# Patient Record
Sex: MALE | Race: WHITE | NOT HISPANIC OR LATINO | Employment: FULL TIME | URBAN - METROPOLITAN AREA
[De-identification: names, ages, dates, MRNs, and addresses within clinical notes are randomized per-mention and may not be internally consistent; named-entity substitution may affect disease eponyms.]

---

## 2019-05-08 ENCOUNTER — OFFICE VISIT (OUTPATIENT)
Dept: GASTROENTEROLOGY | Facility: CLINIC | Age: 44
End: 2019-05-08
Payer: COMMERCIAL

## 2019-05-08 VITALS
DIASTOLIC BLOOD PRESSURE: 80 MMHG | RESPIRATION RATE: 18 BRPM | BODY MASS INDEX: 29.07 KG/M2 | HEIGHT: 72 IN | HEART RATE: 70 BPM | TEMPERATURE: 97.6 F | SYSTOLIC BLOOD PRESSURE: 120 MMHG | WEIGHT: 214.6 LBS

## 2019-05-08 DIAGNOSIS — K21.9 GERD WITHOUT ESOPHAGITIS: Primary | ICD-10-CM

## 2019-05-08 PROCEDURE — 99204 OFFICE O/P NEW MOD 45 MIN: CPT | Performed by: INTERNAL MEDICINE

## 2019-05-08 RX ORDER — PANTOPRAZOLE SODIUM 40 MG/1
40 TABLET, DELAYED RELEASE ORAL DAILY
Qty: 30 TABLET | Refills: 2 | Status: SHIPPED | OUTPATIENT
Start: 2019-05-08 | End: 2019-08-05 | Stop reason: SDUPTHER

## 2019-05-08 RX ORDER — BUSPIRONE HYDROCHLORIDE 10 MG/1
TABLET ORAL
COMMUNITY
Start: 2019-04-15 | End: 2020-07-10 | Stop reason: SDUPTHER

## 2019-05-08 RX ORDER — VENLAFAXINE HYDROCHLORIDE 75 MG/1
CAPSULE, EXTENDED RELEASE ORAL
COMMUNITY
Start: 2019-04-15 | End: 2020-07-10 | Stop reason: SDUPTHER

## 2019-05-08 RX ORDER — RANITIDINE 150 MG/1
150 TABLET ORAL
Qty: 30 TABLET | Refills: 3 | Status: SHIPPED | OUTPATIENT
Start: 2019-05-08 | End: 2019-09-06 | Stop reason: SDUPTHER

## 2019-06-27 ENCOUNTER — ANESTHESIA EVENT (OUTPATIENT)
Dept: GASTROENTEROLOGY | Facility: AMBULARY SURGERY CENTER | Age: 44
End: 2019-06-27

## 2019-06-28 ENCOUNTER — ANESTHESIA (OUTPATIENT)
Dept: GASTROENTEROLOGY | Facility: AMBULARY SURGERY CENTER | Age: 44
End: 2019-06-28

## 2019-06-28 ENCOUNTER — HOSPITAL ENCOUNTER (OUTPATIENT)
Dept: GASTROENTEROLOGY | Facility: AMBULARY SURGERY CENTER | Age: 44
Setting detail: OUTPATIENT SURGERY
Discharge: HOME | End: 2019-06-28
Attending: INTERNAL MEDICINE | Admitting: INTERNAL MEDICINE
Payer: COMMERCIAL

## 2019-06-28 VITALS
HEART RATE: 77 BPM | WEIGHT: 215 LBS | BODY MASS INDEX: 29.12 KG/M2 | RESPIRATION RATE: 18 BRPM | TEMPERATURE: 98.6 F | HEIGHT: 72 IN | OXYGEN SATURATION: 100 % | DIASTOLIC BLOOD PRESSURE: 72 MMHG | SYSTOLIC BLOOD PRESSURE: 115 MMHG

## 2019-06-28 DIAGNOSIS — K21.9 GERD WITHOUT ESOPHAGITIS: ICD-10-CM

## 2019-06-28 PROCEDURE — 43239 EGD BIOPSY SINGLE/MULTIPLE: CPT | Performed by: INTERNAL MEDICINE

## 2019-06-28 PROCEDURE — 88305 TISSUE EXAM BY PATHOLOGIST: CPT | Performed by: PATHOLOGY

## 2019-06-28 RX ORDER — LIDOCAINE HYDROCHLORIDE 10 MG/ML
INJECTION, SOLUTION EPIDURAL; INFILTRATION; INTRACAUDAL; PERINEURAL AS NEEDED
Status: DISCONTINUED | OUTPATIENT
Start: 2019-06-28 | End: 2019-06-28 | Stop reason: SURG

## 2019-06-28 RX ORDER — SODIUM CHLORIDE, SODIUM LACTATE, POTASSIUM CHLORIDE, CALCIUM CHLORIDE 600; 310; 30; 20 MG/100ML; MG/100ML; MG/100ML; MG/100ML
125 INJECTION, SOLUTION INTRAVENOUS CONTINUOUS
Status: DISCONTINUED | OUTPATIENT
Start: 2019-06-28 | End: 2019-07-02 | Stop reason: HOSPADM

## 2019-06-28 RX ORDER — PROPOFOL 10 MG/ML
INJECTION, EMULSION INTRAVENOUS AS NEEDED
Status: DISCONTINUED | OUTPATIENT
Start: 2019-06-28 | End: 2019-06-28 | Stop reason: SURG

## 2019-06-28 RX ORDER — ONDANSETRON 2 MG/ML
4 INJECTION INTRAMUSCULAR; INTRAVENOUS ONCE AS NEEDED
Status: DISCONTINUED | OUTPATIENT
Start: 2019-06-28 | End: 2019-07-02 | Stop reason: HOSPADM

## 2019-06-28 RX ADMIN — PROPOFOL 150 MG: 10 INJECTION, EMULSION INTRAVENOUS at 11:45

## 2019-06-28 RX ADMIN — PROPOFOL 50 MG: 10 INJECTION, EMULSION INTRAVENOUS at 11:47

## 2019-06-28 RX ADMIN — LIDOCAINE HYDROCHLORIDE 50 MG: 10 INJECTION, SOLUTION EPIDURAL; INFILTRATION; INTRACAUDAL; PERINEURAL at 11:45

## 2019-06-28 RX ADMIN — PROPOFOL 50 MG: 10 INJECTION, EMULSION INTRAVENOUS at 11:49

## 2019-06-28 RX ADMIN — SODIUM CHLORIDE, SODIUM LACTATE, POTASSIUM CHLORIDE, AND CALCIUM CHLORIDE: .6; .31; .03; .02 INJECTION, SOLUTION INTRAVENOUS at 11:06

## 2019-06-28 RX ADMIN — PROPOFOL 50 MG: 10 INJECTION, EMULSION INTRAVENOUS at 11:46

## 2019-06-28 NOTE — H&P
History and Physical -  [facility]  Annmarie Smith 44 y o  male MRN: [id number]    HPI: Annmarie Smith is a 44y o  year old male who presents with chronic GERD, refractory reflux  Review of Systems    Historical Information   Past Medical History:   Diagnosis Date    Depression     GERD (gastroesophageal reflux disease)      Past Surgical History:   Procedure Laterality Date    CLOSED REDUCTION FOREARM FRACTURE Right     EGD       Social History   Social History     Substance and Sexual Activity   Alcohol Use Yes    Frequency: Monthly or less     Social History     Substance and Sexual Activity   Drug Use Not on file     Social History     Tobacco Use   Smoking Status Never Smoker     History reviewed  No pertinent family history  Meds/Allergies       (Not in a hospital admission)    No Known Allergies    Objective     /84   Pulse 64   Temp 98 6 °F (37 °C) (Tympanic)   Resp (!) 64   Ht 6' (1 829 m)   Wt 97 5 kg (215 lb)   SpO2 98%   BMI 29 16 kg/m²       PHYSICAL EXAM    Gen: NAD  CV: RRR  CHEST: Clear  ABD: soft, NT/ND  EXT: no edema  Neuro: AAO      ASSESSMENT/PLAN:  This is a 44y o  year old male here for chronic GERD, refractory reflux         PLAN:   Procedure: Preet Krishna

## 2019-07-08 ENCOUNTER — TELEPHONE (OUTPATIENT)
Dept: GASTROENTEROLOGY | Facility: CLINIC | Age: 44
End: 2019-07-08

## 2019-07-08 NOTE — TELEPHONE ENCOUNTER
Spoke to patient, notified of results  Pt stated he will call back to make follow up apt as he has to look at his schedule

## 2019-07-08 NOTE — TELEPHONE ENCOUNTER
----- Message from Carol Coleman MD sent at 7/4/2019 11:22 PM EDT -----  Please inform patient that biopsies were negative for H pylori, biopsies from esophagus show acid reflux changes  Please have him follow-up in the office in 2 months time, continue current medications, please have him call with any questions or concerns

## 2019-07-09 ENCOUNTER — TELEPHONE (OUTPATIENT)
Dept: GASTROENTEROLOGY | Facility: AMBULARY SURGERY CENTER | Age: 44
End: 2019-07-09

## 2019-07-09 NOTE — TELEPHONE ENCOUNTER
----- Message from Irlanda Giron MD sent at 7/8/2019  2:20 PM EDT -----  Please inform the patient biopsies from stomach were negative for H pylori  Biopsies from esophagus show changes of acid reflux with mild increase in number of eosinophils  Please make sure that he is taking acid medication twice daily, please have him follow-up in the office with the PA in 3 months time  Please have him call with any questions or concerns

## 2019-08-05 ENCOUNTER — TELEPHONE (OUTPATIENT)
Dept: GASTROENTEROLOGY | Facility: AMBULARY SURGERY CENTER | Age: 44
End: 2019-08-05

## 2019-08-05 DIAGNOSIS — K21.9 GERD WITHOUT ESOPHAGITIS: ICD-10-CM

## 2019-08-05 RX ORDER — PANTOPRAZOLE SODIUM 40 MG/1
40 TABLET, DELAYED RELEASE ORAL DAILY
Qty: 30 TABLET | Refills: 2 | Status: SHIPPED | OUTPATIENT
Start: 2019-08-05 | End: 2019-10-31 | Stop reason: SDUPTHER

## 2019-08-05 NOTE — TELEPHONE ENCOUNTER
DR Sherrill Madsen PT    Refill Request for Medication: PANTOPRAZOLE    Dose: 40MG  Quantity:     Pharmacy: [address] [place] [address]

## 2019-09-06 ENCOUNTER — TELEPHONE (OUTPATIENT)
Dept: GASTROENTEROLOGY | Facility: CLINIC | Age: 44
End: 2019-09-06

## 2019-09-06 DIAGNOSIS — K21.9 GERD WITHOUT ESOPHAGITIS: ICD-10-CM

## 2019-09-06 RX ORDER — RANITIDINE 150 MG/1
150 TABLET ORAL
Qty: 30 TABLET | Refills: 3 | Status: SHIPPED | OUTPATIENT
Start: 2019-09-06 | End: 2019-10-31 | Stop reason: ALTCHOICE

## 2019-09-06 NOTE — TELEPHONE ENCOUNTER
Dr Jennifer Xiong pt called stating he needs a refill      Refill Request for Medication: Ranitidine    Dose: 150mg    Quantity: 30    Pharmacy: [facility]

## 2019-10-18 ENCOUNTER — OFFICE VISIT (OUTPATIENT)
Dept: GASTROENTEROLOGY | Facility: CLINIC | Age: 44
End: 2019-10-18
Payer: COMMERCIAL

## 2019-10-18 VITALS
RESPIRATION RATE: 18 BRPM | BODY MASS INDEX: 29.45 KG/M2 | HEIGHT: 72 IN | SYSTOLIC BLOOD PRESSURE: 112 MMHG | TEMPERATURE: 97.8 F | HEART RATE: 76 BPM | DIASTOLIC BLOOD PRESSURE: 62 MMHG | WEIGHT: 217.4 LBS

## 2019-10-18 DIAGNOSIS — K21.9 GERD WITHOUT ESOPHAGITIS: Primary | ICD-10-CM

## 2019-10-18 DIAGNOSIS — R10.13 EPIGASTRIC PAIN: ICD-10-CM

## 2019-10-18 PROCEDURE — 99213 OFFICE O/P EST LOW 20 MIN: CPT | Performed by: INTERNAL MEDICINE

## 2019-10-18 RX ORDER — FAMOTIDINE 40 MG/1
40 TABLET, FILM COATED ORAL
Qty: 30 TABLET | Refills: 3 | Status: SHIPPED | OUTPATIENT
Start: 2019-10-18 | End: 2019-12-03 | Stop reason: SDUPTHER

## 2019-10-19 NOTE — PROGRESS NOTES
[facility]  Wendy Johnson 44 y o  male MRN: [id number]            Assessment & Plan:    Pleasant 36-year-old gentleman here for follow-up of chronic reflux  Patient has been doing better with twice daily PPI therapy, still having some intermittent epigastric abdominal pain  1  GERD with esophagitis:  -continue with PPI therapy in the morning, recommended that he transitioned to eat H2 blocker before going to bed  -continu with reflux lifestyle modification, increase back to twice daily PPI therapy if needed    2  Epigastric abdominal pain:  Unclear etiology, likely secondary to reflux  -given persistent symptoms we will check laboratory studies and ultrasound to exclude biliary or other pathology  -we will check lipase  -if symptoms persist consider CT scan in future             _____________________________________________________________        CC: Follow-up of GERD    HPI:  Wendy Johnson is a 44 y o male who is here for follow-up of GERD  As you know this is a 36-year-old gentleman who presents with significant acid reflux, he had upper endoscopy which demonstrated esophagitis  He notes that since taking PPI therapy twice daily he has been feeling much better but still has some breakthrough reflux about twice per week  Patient still notes that unfortunately due to his working schedule he returns home in the eats dinner just before going to bed  He notes that his appetite is increased since being on the medication but his weight has been stable  Denies any nausea, vomiting, dysphagia  Overall has been feeling much better  Bowel movements are regular  Continues to drink significant amounts of beer throughout the course of the week usually on nights that he is not working  ROS:  The remainder of the ROS was negative except for the pertinent positives mentioned in HPI  Allergies: Patient has no known allergies      Medications:   Current Outpatient Medications:    busPIRone (BUSPAR) 10 mg tablet, , Disp: , Rfl:     pantoprazole (PROTONIX) 40 mg tablet, Take 1 tablet (40 mg total) by mouth daily for 90 days, Disp: 30 tablet, Rfl: 2    ranitidine (ZANTAC) 150 mg tablet, Take 1 tablet (150 mg total) by mouth daily at bedtime, Disp: 30 tablet, Rfl: 3    venlafaxine (EFFEXOR-XR) 75 mg 24 hr capsule, , Disp: , Rfl:     famotidine (PEPCID) 40 MG tablet, Take 1 tablet (40 mg total) by mouth daily at bedtime, Disp: 30 tablet, Rfl: 3    Past Medical History:   Diagnosis Date    Depression     GERD (gastroesophageal reflux disease)        Past Surgical History:   Procedure Laterality Date    CLOSED REDUCTION FOREARM FRACTURE Right     EGD         No family history on file  reports that he has never smoked  He does not have any smokeless tobacco history on file  He reports that he drinks alcohol        Physical Exam:    /62 (BP Location: Right arm, Patient Position: Sitting, Cuff Size: Standard)   Pulse 76   Temp 97 8 °F (36 6 °C)   Resp 18   Ht 6' (1 829 m)   Wt 98 6 kg (217 lb 6 4 oz)   BMI 29 48 kg/m²     Gen: wn/wd, NAD, healthy-appearing gentleman  HEENT: anicteric, MMM, no cervical LAD  CVS: RRR, no m/r/g  CHEST: CTA b/l  ABD: +BS, soft, NT,ND, no hepatosplenomegaly  EXT: no c/c/e  NEURO: aaox3  SKIN: NO rashes

## 2019-10-23 ENCOUNTER — TRANSCRIBE ORDERS (OUTPATIENT)
Dept: ADMINISTRATIVE | Facility: HOSPITAL | Age: 44
End: 2019-10-23

## 2019-10-23 ENCOUNTER — APPOINTMENT (OUTPATIENT)
Dept: LAB | Facility: HOSPITAL | Age: 44
End: 2019-10-23
Attending: INTERNAL MEDICINE
Payer: COMMERCIAL

## 2019-10-23 ENCOUNTER — HOSPITAL ENCOUNTER (OUTPATIENT)
Dept: RADIOLOGY | Facility: HOSPITAL | Age: 44
Discharge: HOME | End: 2019-10-23
Attending: INTERNAL MEDICINE
Payer: COMMERCIAL

## 2019-10-23 DIAGNOSIS — R10.13 EPIGASTRIC PAIN: ICD-10-CM

## 2019-10-23 LAB
ALBUMIN SERPL BCP-MCNC: 3.7 G/DL (ref 3.5–5)
ALP SERPL-CCNC: 127 U/L (ref 46–116)
ALT SERPL W P-5'-P-CCNC: 115 U/L (ref 12–78)
ANION GAP SERPL CALCULATED.3IONS-SCNC: 5 MMOL/L (ref 4–13)
AST SERPL W P-5'-P-CCNC: 40 U/L (ref 5–45)
BASOPHILS # BLD AUTO: 0.07 THOUSANDS/ΜL (ref 0–0.1)
BASOPHILS NFR BLD AUTO: 1 % (ref 0–1)
BILIRUB DIRECT SERPL-MCNC: 0.3 MG/DL (ref 0–0.2)
BILIRUB SERPL-MCNC: 1.1 MG/DL (ref 0.2–1)
BUN SERPL-MCNC: 16 MG/DL (ref 5–25)
CALCIUM SERPL-MCNC: 8.8 MG/DL (ref 8.3–10.1)
CHLORIDE SERPL-SCNC: 103 MMOL/L (ref 100–108)
CO2 SERPL-SCNC: 31 MMOL/L (ref 21–32)
CREAT SERPL-MCNC: 0.98 MG/DL (ref 0.6–1.3)
EOSINOPHIL # BLD AUTO: 0.18 THOUSAND/ΜL (ref 0–0.61)
EOSINOPHIL NFR BLD AUTO: 3 % (ref 0–6)
ERYTHROCYTE [DISTWIDTH] IN BLOOD BY AUTOMATED COUNT: 11.8 % (ref 11.6–15.1)
GFR SERPL CREATININE-BSD FRML MDRD: 95 ML/MIN/1.73SQ M
GLUCOSE P FAST SERPL-MCNC: 81 MG/DL (ref 65–99)
HCT VFR BLD AUTO: 45.5 % (ref 36.5–49.3)
HGB BLD-MCNC: 15 G/DL (ref 12–17)
IMM GRANULOCYTES # BLD AUTO: 0.03 THOUSAND/UL (ref 0–0.2)
IMM GRANULOCYTES NFR BLD AUTO: 1 % (ref 0–2)
LIPASE SERPL-CCNC: 94 U/L (ref 73–393)
LYMPHOCYTES # BLD AUTO: 1.22 THOUSANDS/ΜL (ref 0.6–4.47)
LYMPHOCYTES NFR BLD AUTO: 19 % (ref 14–44)
MCH RBC QN AUTO: 30 PG (ref 26.8–34.3)
MCHC RBC AUTO-ENTMCNC: 33 G/DL (ref 31.4–37.4)
MCV RBC AUTO: 91 FL (ref 82–98)
MONOCYTES # BLD AUTO: 0.75 THOUSAND/ΜL (ref 0.17–1.22)
MONOCYTES NFR BLD AUTO: 11 % (ref 4–12)
NEUTROPHILS # BLD AUTO: 4.35 THOUSANDS/ΜL (ref 1.85–7.62)
NEUTS SEG NFR BLD AUTO: 65 % (ref 43–75)
NRBC BLD AUTO-RTO: 0 /100 WBCS
PLATELET # BLD AUTO: 375 THOUSANDS/UL (ref 149–390)
PMV BLD AUTO: 9.5 FL (ref 8.9–12.7)
POTASSIUM SERPL-SCNC: 4.8 MMOL/L (ref 3.5–5.3)
PROT SERPL-MCNC: 7.5 G/DL (ref 6.4–8.2)
RBC # BLD AUTO: 5 MILLION/UL (ref 3.88–5.62)
SODIUM SERPL-SCNC: 139 MMOL/L (ref 136–145)
WBC # BLD AUTO: 6.6 THOUSAND/UL (ref 4.31–10.16)

## 2019-10-23 PROCEDURE — 80076 HEPATIC FUNCTION PANEL: CPT

## 2019-10-23 PROCEDURE — 76700 US EXAM ABDOM COMPLETE: CPT

## 2019-10-23 PROCEDURE — 36415 COLL VENOUS BLD VENIPUNCTURE: CPT

## 2019-10-23 PROCEDURE — 83690 ASSAY OF LIPASE: CPT

## 2019-10-23 PROCEDURE — 80048 BASIC METABOLIC PNL TOTAL CA: CPT

## 2019-10-23 PROCEDURE — 85025 COMPLETE CBC W/AUTO DIFF WBC: CPT

## 2019-10-28 ENCOUNTER — TELEPHONE (OUTPATIENT)
Dept: GASTROENTEROLOGY | Facility: CLINIC | Age: 44
End: 2019-10-28

## 2019-10-28 NOTE — LETTER
October 28, 2019     [address] [place] [id number]      Dear Mr Sherwood: We have attempted to reach you regarding your results  We ask that you please contact our office upon receipt of this letter to receive your results  Thank you in advance for your cooperation and assistance          Sincerely,   Dede [facility] Staff  [phone]

## 2019-10-28 NOTE — TELEPHONE ENCOUNTER
----- Message from Cassie Sanchez MD sent at 10/26/2019 10:34 AM EDT -----  Please inform patient that recent laboratory studies show some elevation of his liver function tests, otherwise his labs are normal     Patient should try to minimize alcohol as this is leading to mild inflammation in his liver      Please have the patient call with any questions or concerns, please make sure he has office follow-up with the PA in 3 months time period

## 2019-10-31 DIAGNOSIS — K21.9 GERD WITHOUT ESOPHAGITIS: ICD-10-CM

## 2019-10-31 RX ORDER — PANTOPRAZOLE SODIUM 40 MG/1
40 TABLET, DELAYED RELEASE ORAL DAILY
Qty: 30 TABLET | Refills: 5 | Status: SHIPPED | OUTPATIENT
Start: 2019-10-31 | End: 2020-12-16 | Stop reason: ALTCHOICE

## 2019-11-11 ENCOUNTER — TELEPHONE (OUTPATIENT)
Dept: GASTROENTEROLOGY | Facility: CLINIC | Age: 44
End: 2019-11-11

## 2019-11-12 NOTE — TELEPHONE ENCOUNTER
Recall apt: 2/19 at [address] [place] [address] with Santana Brood at [place]

## 2019-12-03 DIAGNOSIS — R10.13 EPIGASTRIC PAIN: ICD-10-CM

## 2019-12-03 RX ORDER — FAMOTIDINE 40 MG/1
40 TABLET, FILM COATED ORAL
Qty: 30 TABLET | Refills: 3 | Status: SHIPPED | OUTPATIENT
Start: 2019-12-03 | End: 2021-12-01 | Stop reason: DRUGHIGH

## 2020-02-19 ENCOUNTER — OFFICE VISIT (OUTPATIENT)
Dept: GASTROENTEROLOGY | Facility: CLINIC | Age: 45
End: 2020-02-19
Payer: COMMERCIAL

## 2020-02-19 VITALS
BODY MASS INDEX: 29.93 KG/M2 | SYSTOLIC BLOOD PRESSURE: 124 MMHG | DIASTOLIC BLOOD PRESSURE: 76 MMHG | HEART RATE: 72 BPM | WEIGHT: 221 LBS | TEMPERATURE: 98.7 F | HEIGHT: 72 IN

## 2020-02-19 DIAGNOSIS — R79.89 ELEVATED LFTS: ICD-10-CM

## 2020-02-19 DIAGNOSIS — K21.00 GASTROESOPHAGEAL REFLUX DISEASE WITH ESOPHAGITIS: Primary | ICD-10-CM

## 2020-02-19 DIAGNOSIS — K76.0 FATTY LIVER: ICD-10-CM

## 2020-02-19 DIAGNOSIS — F10.10 ALCOHOL ABUSE: ICD-10-CM

## 2020-02-19 DIAGNOSIS — K21.9 GERD WITHOUT ESOPHAGITIS: Primary | ICD-10-CM

## 2020-02-19 PROCEDURE — 99214 OFFICE O/P EST MOD 30 MIN: CPT | Performed by: PHYSICIAN ASSISTANT

## 2020-02-19 NOTE — PROGRESS NOTES
Assessment and Plan    #1  GERD with esophagitis: well controlled on pantoprazole 40 mg in AM and pepcid in PM   -continue anti acid medications  Discussed with patient that he can trial doing Pepcid b i d  And weaning off the pantoprazole  -anti-reflux diet and measures  -discussed alcohol cessation  -no indication for repeat EGD unless alarm symptoms      #2  Elevated LFTs and fatty liver in the setting of alcohol abuse: LFTs elevated in October, reports 20-30 beers per week, due to underlying stress/anxiety  -extensively discussed alcohol cessation with the patient to avoid worsening liver inflammation and prevent cirrhosis  -recommended patient follow up with family doctor to further discuss underlying stress and anxiety issues that is leading to the alcohol use  -we will check a CBC for platelets, INR, and repeat liver function tests    Follow-up in 6 months or sooner if needed  --------------------------------------------------------------------------------------------------------------------    Chief Complaint:  Follow-up GERD, elevated LFTs    HPI: Enmanuel Hilton is a 38 y o  male with a history of GERD, anxiety , and alcohol abuse who presents today for follow up for elevated LFTs and GERD  Patient currently is taking pantoprazole 40 mg in the morning and Pepcid before the evening  He has been buying over-the-counter because of the shortage of Pepcid recently  He reports his symptoms are well controlled  He no longer has any epigastric pain  Denies any significant heartburn symptoms or trouble swallowing  Will occasionally has some left upper quadrant discomfort but this is rare  Has been eating well and denies any anorexia, nausea, or vomiting  Denies any unexplained weight loss  Bowel movements have been regular and denies any blood in the stool, black tarry stools, diarrhea, or constipation  Last year he did have blood work and an ultrasound which showed fatty liver and elevated LFTs  Patient reports that he drinks about 20 to 30 beers in a week primarily in the evenings after work  He reports he has underlying anxiety and stress that leads to this  He is on BuSpar and Effexor  His last endoscopy was June 2019 which showed some esophagitis and gastritis  This was negative for eosinophilic esophagitis    Review of Systems:   General: negative for fatigue, fever, night sweats or unexpected weight loss  Psychological: negative for depression; +anxiety  Ophthalmic: negative for blurry vision or scleral icterus  ENT: negative for headaches, oral lesions, sore throat, vocal changes or dysphagia  Hematological and Lymphatic: negative for pallor or swollen lymph nodes  Respiratory: negative for cough, shortness of breath or wheezing  Cardiovascular: negative for chest pain, edema or murmur  Gastrointestinal: as mentioned in HPI  Genito-Urinary: negative for dysuria or incontinence  Musculoskeletal: negative for joint pain, joint stiffness or joint swelling  Dermatological: negative for pruritus, rash, or jaundice    Current Medications  Current Outpatient Medications   Medication Sig Dispense Refill    busPIRone (BUSPAR) 10 mg tablet       famotidine (PEPCID) 40 MG tablet Take 1 tablet (40 mg total) by mouth daily at bedtime 30 tablet 3    pantoprazole (PROTONIX) 40 mg tablet Take 1 tablet (40 mg total) by mouth daily 30 tablet 5    venlafaxine (EFFEXOR-XR) 75 mg 24 hr capsule        No current facility-administered medications for this visit          Past Medical History  Past Medical History:   Diagnosis Date    Depression     GERD (gastroesophageal reflux disease)        Past Surgical History  Past Surgical History:   Procedure Laterality Date    CLOSED REDUCTION FOREARM FRACTURE Right     EGD         Past Social History   Social History     Socioeconomic History    Marital status: Single     Spouse name: None    Number of children: None    Years of education: None    Highest education level: None   Occupational History    None   Social Needs    Financial resource strain: None    Food insecurity:     Worry: None     Inability: None    Transportation needs:     Medical: None     Non-medical: None   Tobacco Use    Smoking status: Never Smoker    Smokeless tobacco: Never Used   Substance and Sexual Activity    Alcohol use: Yes     Frequency: Monthly or less    Drug use: Never    Sexual activity: None   Lifestyle    Physical activity:     Days per week: None     Minutes per session: None    Stress: None   Relationships    Social connections:     Talks on phone: None     Gets together: None     Attends Worship service: None     Active member of club or organization: None     Attends meetings of clubs or organizations: None     Relationship status: None    Intimate partner violence:     Fear of current or ex partner: None     Emotionally abused: None     Physically abused: None     Forced sexual activity: None   Other Topics Concern    None   Social History Narrative    None       The following portions of the patient's history were reviewed and updated as appropriate: allergies, current medications, past family history, past medical history, past social history, past surgical history and problem list     Vital Signs  Vitals:    02/19/20 0828   BP: 124/76   BP Location: Right arm   Patient Position: Sitting   Cuff Size: Standard   Pulse: 72   Temp: 98 7 °F (37 1 °C)   Weight: 100 kg (221 lb)   Height: 6' (1 829 m)       Physical Exam:  General appearance: alert, cooperative, no distress  HEENT: normocephalic, anicteric, no eye erythema or discharge, no oropharyngeal thrush  Neck: supple, trachea midline, no adenopathy  Lungs: CTA b/l, no rales, rhonchi, or wheezing, unlabored respirations  Heart: RRR, no murmur, rubs, or gallops  Abdomen: soft, non-tender, non-distended, normal bowel sounds, no masses or organomegaly  Rectal: deferred  Extremities: no cyanosis, clubbing, or edema  Musculoskeletal: normal gait  Skin: color and texture normal, no jaundice, no rashes or lesions  Psychiatric: alert and oriented, normal affect and behavior

## 2020-07-01 PROBLEM — F41.9 ANXIETY DISORDER, UNSPECIFIED: Status: ACTIVE | Noted: 2020-07-01

## 2020-07-10 DIAGNOSIS — F41.9 ANXIETY DISORDER, UNSPECIFIED TYPE: Primary | ICD-10-CM

## 2020-07-10 RX ORDER — BUSPIRONE HYDROCHLORIDE 10 MG/1
10 TABLET ORAL 3 TIMES DAILY
Qty: 270 TABLET | Refills: 0 | Status: SHIPPED | OUTPATIENT
Start: 2020-07-10 | End: 2020-09-23 | Stop reason: SDUPTHER

## 2020-07-10 RX ORDER — VENLAFAXINE HYDROCHLORIDE 75 MG/1
75 CAPSULE, EXTENDED RELEASE ORAL DAILY
Qty: 90 CAPSULE | Refills: 0 | Status: SHIPPED | OUTPATIENT
Start: 2020-07-10 | End: 2020-09-23 | Stop reason: SDUPTHER

## 2020-09-23 ENCOUNTER — OFFICE VISIT (OUTPATIENT)
Dept: PSYCHIATRY | Facility: CLINIC | Age: 45
End: 2020-09-23
Payer: COMMERCIAL

## 2020-09-23 DIAGNOSIS — F41.9 ANXIETY DISORDER, UNSPECIFIED TYPE: Primary | ICD-10-CM

## 2020-09-23 PROCEDURE — 99214 OFFICE O/P EST MOD 30 MIN: CPT | Performed by: NURSE PRACTITIONER

## 2020-09-23 RX ORDER — VENLAFAXINE HYDROCHLORIDE 75 MG/1
75 CAPSULE, EXTENDED RELEASE ORAL DAILY
Qty: 90 CAPSULE | Refills: 0 | Status: SHIPPED | OUTPATIENT
Start: 2020-09-23 | End: 2020-12-16 | Stop reason: SDUPTHER

## 2020-09-23 RX ORDER — BUSPIRONE HYDROCHLORIDE 10 MG/1
10 TABLET ORAL 3 TIMES DAILY
Qty: 270 TABLET | Refills: 0 | Status: SHIPPED | OUTPATIENT
Start: 2020-09-23 | End: 2020-12-16 | Stop reason: SDUPTHER

## 2020-10-18 VITALS — HEIGHT: 72 IN | BODY MASS INDEX: 29.31 KG/M2 | WEIGHT: 216.4 LBS

## 2020-12-16 ENCOUNTER — OFFICE VISIT (OUTPATIENT)
Dept: PSYCHIATRY | Facility: CLINIC | Age: 45
End: 2020-12-16
Payer: COMMERCIAL

## 2020-12-16 DIAGNOSIS — F41.9 ANXIETY DISORDER, UNSPECIFIED TYPE: Primary | ICD-10-CM

## 2020-12-16 PROCEDURE — 99214 OFFICE O/P EST MOD 30 MIN: CPT | Performed by: NURSE PRACTITIONER

## 2020-12-16 RX ORDER — BUSPIRONE HYDROCHLORIDE 10 MG/1
10 TABLET ORAL 3 TIMES DAILY
Qty: 270 TABLET | Refills: 0 | Status: SHIPPED | OUTPATIENT
Start: 2020-12-16 | End: 2021-03-10 | Stop reason: SDUPTHER

## 2020-12-16 RX ORDER — VENLAFAXINE HYDROCHLORIDE 75 MG/1
75 CAPSULE, EXTENDED RELEASE ORAL DAILY
Qty: 90 CAPSULE | Refills: 0 | Status: SHIPPED | OUTPATIENT
Start: 2020-12-16 | End: 2021-03-10 | Stop reason: SDUPTHER

## 2021-03-10 ENCOUNTER — TELEMEDICINE (OUTPATIENT)
Dept: PSYCHIATRY | Facility: CLINIC | Age: 46
End: 2021-03-10
Payer: COMMERCIAL

## 2021-03-10 DIAGNOSIS — F41.9 ANXIETY DISORDER, UNSPECIFIED TYPE: Primary | ICD-10-CM

## 2021-03-10 PROCEDURE — 99214 OFFICE O/P EST MOD 30 MIN: CPT | Performed by: NURSE PRACTITIONER

## 2021-03-10 RX ORDER — VENLAFAXINE HYDROCHLORIDE 75 MG/1
75 CAPSULE, EXTENDED RELEASE ORAL DAILY
Qty: 90 CAPSULE | Refills: 0 | Status: SHIPPED | OUTPATIENT
Start: 2021-03-10 | End: 2021-08-17 | Stop reason: SDUPTHER

## 2021-03-10 RX ORDER — BUSPIRONE HYDROCHLORIDE 10 MG/1
10 TABLET ORAL 3 TIMES DAILY
Qty: 270 TABLET | Refills: 0 | Status: SHIPPED | OUTPATIENT
Start: 2021-03-10 | End: 2021-07-14 | Stop reason: SDUPTHER

## 2021-03-10 NOTE — PSYCH
PROGRESS NOTE        [facility]      Name and Date of Birth:  Sarabjit Dixon 41 y o  1975    Date of Visit: 03/10/21    Pt was spoken to today for medication management for 30 minutes by phone because pt was unable to connect via Teams  Door to office was closed; provider was alone in office, pt aware and consented to phone session  Time spent on Epic chart review of relevant information, note composition, and after-visit plan: 5 minutes  Total time for this visit: 35 minutes      SUBJECTIVE: moods generally ok, takes his medications as prescribed, no side effects  Has been hanging in there, thinks things are looking a little better, mood down sometimes but it comes and goes, the down is probably a little not a lot  Has to go to a  today, his best friend's brother , pt hadn't seen the ginny who  in quite a few years  Work has been better for the last couple of weeks, he thinks corporate 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 some changes and things have been better  He hopes to make it there until he is 49, then go something that isn't physical  He's there for 19 years this summer  His parents got both shots of COVID vaccine  He thinking about it but isn't sure  2020: not doing bad, still working, will spend [place] day with his parents, quiet year but getting through it, a little down because of COVID  He denies suicidal ideation, intent or plan at present, has no suicidal ideation, intent or plan at present  He denies any auditory hallucinations and visual hallucinations, denies any other delusional thinking, denies any delusional thinking  He denies any side effects from medications      HPI ROS Appetite Changes and Sleep: normal appetite, normal sleep    Review Of Systems:      Constitutional Negative   ENT Negative   Cardiovascular Negative   Respiratory Negative   Gastrointestinal Negative   Genitourinary Negative Musculoskeletal Negative   Integumentary Negative   Neurological Negative   Endocrine Negative   Other Symptoms Negative and None       Laboratory Results: No results found for this or any previous visit  Substance Abuse History:    Social History     Substance and Sexual Activity   Drug Use Never       Family Psychiatric History:     No family history on file      The following portions of the patient's history were reviewed and updated as appropriate: past family history, past medical history, past social history, past surgical history and problem list     Social History     Socioeconomic History    Marital status: Single     Spouse name: Not on file    Number of children: Not on file    Years of education: Not on file    Highest education level: Not on file   Occupational History    Not on file   Social Needs    Financial resource strain: Not on file    Food insecurity     Worry: Not on file     Inability: Not on file    Transportation needs     Medical: Not on file     Non-medical: Not on file   Tobacco Use    Smoking status: Never Smoker    Smokeless tobacco: Never Used   Substance and Sexual Activity    Alcohol use: Yes     Frequency: Monthly or less    Drug use: Never    Sexual activity: Not on file   Lifestyle    Physical activity     Days per week: Not on file     Minutes per session: Not on file    Stress: Not on file   Relationships    Social connections     Talks on phone: Not on file     Gets together: Not on file     Attends Protestant service: Not on file     Active member of club or organization: Not on file     Attends meetings of clubs or organizations: Not on file     Relationship status: Not on file    Intimate partner violence     Fear of current or ex partner: Not on file     Emotionally abused: Not on file     Physically abused: Not on file     Forced sexual activity: Not on file   Other Topics Concern    Not on file   Social History Narrative    Not on file     Social History     Social History Narrative    Not on file        Social History     Tobacco History     Smoking Status  Never Smoker    Smokeless Tobacco Use  Never Used          Alcohol History     Alcohol Use Status  Yes          Drug Use     Drug Use Status  Never          Sexual Activity     Sexually Active  Not Asked          Activities of Daily Living    Not Asked                     OBJECTIVE:     Mental Status Evaluation:    Appearance Phone appointment, not assessed   Behavior pleasant, cooperative   Speech normal volume, normal pitch   Mood slightly sad   Affect Phone appointment, not assessed   Thought Processes logical   Associations intact associations   Thought Content normal   Perceptual Disturbances: none   Abnormal Thoughts  Risk Potential Suicidal ideation - None  Homicidal ideation - None  Potential for aggression - No   Orientation oriented to person, place, time/date and situation   Memory recent and remote memory grossly intact   Cosciousness alert and awake   Attention Span attention span and concentration are age appropriate   Intellect Not formally assessed   Insight age appropriate    Judgement good    Muscle Strength and  Gait Phone appointment, not assessed   Language no difficulty naming common objects   Fund of Knowledge displays adequate knowledge of current events   Pain none   Pain Scale 0       Assessment/Plan:       Diagnoses and all orders for this visit:    Anxiety disorder, unspecified type          Treatment Recommendations/Precautions:    Continue current medications: venlafaxine ER 75 mg cap 1 cap po qd, buspirone 10 mg one tab po TID    Risks/Benefits      Risks, Benefits And Possible Side Effects Of Medications:    Risks, benefits, and possible side effects of medications explained to patient and patient verbalizes understanding and agreement for treatment      Controlled Medication Discussion:     Not applicable    Psychotherapy Provided:     Individual psychotherapy provided: No

## 2021-06-02 ENCOUNTER — TELEMEDICINE (OUTPATIENT)
Dept: PSYCHIATRY | Facility: CLINIC | Age: 46
End: 2021-06-02
Payer: COMMERCIAL

## 2021-06-02 DIAGNOSIS — F41.1 GENERALIZED ANXIETY DISORDER: ICD-10-CM

## 2021-06-02 DIAGNOSIS — F33.42 MAJOR DEPRESSIVE DISORDER, RECURRENT, IN FULL REMISSION (HCC): Primary | ICD-10-CM

## 2021-06-02 PROCEDURE — 99214 OFFICE O/P EST MOD 30 MIN: CPT | Performed by: NURSE PRACTITIONER

## 2021-06-02 PROCEDURE — 90833 PSYTX W PT W E/M 30 MIN: CPT | Performed by: NURSE PRACTITIONER

## 2021-06-02 NOTE — PATIENT INSTRUCTIONS
Continue current medications:    venlafaxine ER 75 mg capsule - take 1 capsule by mouth every day   buspirone 10 mg tablet - take 1 tablet by mouth three times a day

## 2021-06-02 NOTE — PSYCH
PROGRESS NOTE        [facility]      Name and Date of Birth:  Ivett Jon 41 y o  1975    Date of Visit: 21    Pt was seen today via [address] for medication management and psychotherapy with pts consent  Door to office was closed, provider was alone in office  Time spent on psychotherapy: 19  minutes    Treatment modality: medication management; medication education, COVID vaccination information      SUBJECTIVE: feels better than he did at his last appt  doesn't plan on getting COVID vaccine, never got the flu vaccine or had the flu  Both parents have had medical procedures lately and he has been driving them around  Takes it one day at a time at his job  Mood is neither happy nor sad, neutral     3/10/2021: moods generally ok, takes his medications as prescribed, no side effects  Has been hanging in there, thinks things are looking a little better, mood down sometimes but it comes and goes, the down is probably a little not a lot  Has to go to a  today, his best friend's brother , pt hadn't seen the ginny who  in quite a few years  Work has been better for the last couple of weeks, he thinks [employer] 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 some changes and things have been better  He hopes to make it there until he is 49, then go something that isn't physical  He's there for 19 years this summer  His parents got both shots of COVID vaccine  He thinking about it but isn't sure        He denies suicidal ideation, intent or plan at present, has no suicidal ideation, intent or plan at present  He denies any auditory hallucinations and visual hallucinations, denies any other delusional thinking, denies any delusional thinking  He denies any side effects from medications      HPI ROS Appetite Changes and Sleep: normal appetite, normal sleep    Review Of Systems:      Constitutional Negative   ENT Negative   Cardiovascular Negative   Respiratory Negative   Gastrointestinal Negative   Genitourinary Negative   Musculoskeletal Negative   Integumentary Negative   Neurological Negative   Endocrine Negative   Other Symptoms Negative and None       Laboratory Results: No results found for this or any previous visit  Substance Abuse History:    Social History     Substance and Sexual Activity   Drug Use Never       Family Psychiatric History:     No family history on file      The following portions of the patient's history were reviewed and updated as appropriate: past family history, past medical history, past social history, past surgical history and problem list     Social History     Socioeconomic History    Marital status: Single     Spouse name: Not on file    Number of children: Not on file    Years of education: Not on file    Highest education level: Not on file   Occupational History    Not on file   Social Needs    Financial resource strain: Not on file    Food insecurity     Worry: Not on file     Inability: Not on file    Transportation needs     Medical: Not on file     Non-medical: Not on file   Tobacco Use    Smoking status: Never Smoker    Smokeless tobacco: Never Used   Substance and Sexual Activity    Alcohol use: Yes     Frequency: Monthly or less    Drug use: Never    Sexual activity: Not on file   Lifestyle    Physical activity     Days per week: Not on file     Minutes per session: Not on file    Stress: Not on file   Relationships    Social connections     Talks on phone: Not on file     Gets together: Not on file     Attends Holiness service: Not on file     Active member of club or organization: Not on file     Attends meetings of clubs or organizations: Not on file     Relationship status: Not on file    Intimate partner violence     Fear of current or ex partner: Not on file     Emotionally abused: Not on file     Physically abused: Not on file     Forced sexual activity: Not on file   Other Topics Concern    Not on file   Social History Narrative    Not on file     Social History     Social History Narrative    Not on file        Social History     Tobacco History     Smoking Status  Never Smoker    Smokeless Tobacco Use  Never Used          Alcohol History     Alcohol Use Status  Yes          Drug Use     Drug Use Status  Never          Sexual Activity     Sexually Active  Not Asked          Activities of Daily Living    Not Asked                     OBJECTIVE:     Mental Status Evaluation:    Appearance age appropriate, casually dressed   Behavior pleasant, cooperative   Speech normal volume, normal pitch   Mood neutral   Affect Mood-congruent   Thought Processes Coherent, organized, goal-directed   Associations intact associations   Thought Content normal   Perceptual Disturbances: none   Abnormal Thoughts  Risk Potential Suicidal ideation - None  Homicidal ideation - None  Potential for aggression - No   Orientation oriented to person, place, time/date and situation   Memory recent and remote memory grossly intact   Cosciousness alert and awake   Attention Span attention span and concentration are age appropriate   Intellect Not formally assessed   Insight age appropriate    Judgement good    Muscle Strength and  Gait muscle strength and tone were normal   Language no difficulty naming common objects   Fund of Knowledge displays adequate knowledge of current events   Pain none   Pain Scale 0     Patient's chart was reviewed for relevant lab reports and recent encounters with other providers  Medications, treatment progress and treatment plan were reviewed with patient  Treatment plan was updated with patient who agreed with updated plan      Assessment/Plan:       Diagnoses and all orders for this visit:    Major depressive disorder, recurrent, in full remission ([facility] [address] )    Generalized anxiety disorder        Treatment Recommendations/Precautions:    Continue current medications:    venlafaxine ER 75 mg capsule - take 1 capsule by mouth every day   buspirone 10 mg tablet - take 1 tablet by mouth three times a day      Risks/Benefits       Risks, Benefits And Possible Side Effects Of Medications:     Risks, benefits, and possible side effects of medications explained to patient and patient verbalizes understanding and agreement for treatment      Controlled Medication Discussion:      Not applicable

## 2021-06-02 NOTE — BH TREATMENT PLAN
TREATMENT PLAN         [facility]    Name and Date of Birth:  Harinder Ward 41 y o  1975    Date of Treatment Plan: June 2, 2021    Diagnosis/Diagnoses:    1  Major depressive disorder, recurrent, in full remission ([facility] [address] )    2  Generalized anxiety disorder      Strengths/Personal Resources for Self-Care: taking medications as prescribed, ability to communicate well, motivation for treatment      Area/Areas of need (in own words): anxiety symptoms  1  Long Term Goal: continue current status with medication  Target date - 12/2/2021  Person/Persons responsible for completion of goal: heriberto Magallanes     2  Short Term Objective (s) - How will we reach this goal?:   A  Provider new recommended medication/dosage changes and/or continue medication(s): continue current medications as prescribed Effexor XR, Buspar  B  Take medications as prescribed, attend scheduled appts  C  N/A  Target date - 12/2/2021  Person/Persons Responsible for Completion of Goal: heriberto Magallanes     Progress Towards Goals: stable     Treatment Modality: medication management every 12 weeks     Review due 120 - 180 days from date of this plan:  12/2/2021  Expected length of service: maintenance  My Physician/PA/NP and I have developed this plan together and I agree to work on the goals and objectives  I understand the treatment goals that were developed for my treatment

## 2021-06-04 DIAGNOSIS — F41.9 ANXIETY DISORDER, UNSPECIFIED TYPE: ICD-10-CM

## 2021-06-08 RX ORDER — BUSPIRONE HYDROCHLORIDE 10 MG/1
TABLET ORAL
Qty: 270 TABLET | Refills: 0 | OUTPATIENT
Start: 2021-06-08

## 2021-06-08 RX ORDER — VENLAFAXINE HYDROCHLORIDE 75 MG/1
CAPSULE, EXTENDED RELEASE ORAL
Qty: 90 CAPSULE | Refills: 0 | OUTPATIENT
Start: 2021-06-08

## 2021-07-14 DIAGNOSIS — F41.9 ANXIETY DISORDER, UNSPECIFIED TYPE: ICD-10-CM

## 2021-07-14 RX ORDER — BUSPIRONE HYDROCHLORIDE 10 MG/1
10 TABLET ORAL 3 TIMES DAILY
Qty: 270 TABLET | Refills: 0 | Status: SHIPPED | OUTPATIENT
Start: 2021-07-14 | End: 2021-09-08 | Stop reason: SDUPTHER

## 2021-07-14 NOTE — TELEPHONE ENCOUNTER
----- Message from Sonya Olivera sent at 7/14/2021 12:07 PM EDT -----  Regarding: Refill  Buspar 10mg  [facility] [address]  Appt 9/8 elisa

## 2021-08-17 DIAGNOSIS — F41.9 ANXIETY DISORDER, UNSPECIFIED TYPE: ICD-10-CM

## 2021-08-17 RX ORDER — VENLAFAXINE HYDROCHLORIDE 75 MG/1
75 CAPSULE, EXTENDED RELEASE ORAL DAILY
Qty: 90 CAPSULE | Refills: 0 | Status: SHIPPED | OUTPATIENT
Start: 2021-08-17 | End: 2021-11-08 | Stop reason: SDUPTHER

## 2021-08-17 NOTE — TELEPHONE ENCOUNTER
----- Message from Abdirashid Rubio sent at 8/17/2021  2:45 PM EDT -----  Regarding: Refill  Venlafaxine 75mg  [facility] [address]  Appt 9/8, Jeremiah Pool

## 2021-09-08 ENCOUNTER — OFFICE VISIT (OUTPATIENT)
Dept: PSYCHIATRY | Facility: CLINIC | Age: 46
End: 2021-09-08
Payer: COMMERCIAL

## 2021-09-08 VITALS — WEIGHT: 214 LBS | HEIGHT: 72 IN | BODY MASS INDEX: 28.99 KG/M2

## 2021-09-08 DIAGNOSIS — F33.42 MAJOR DEPRESSIVE DISORDER, RECURRENT, IN FULL REMISSION (HCC): Primary | ICD-10-CM

## 2021-09-08 DIAGNOSIS — F41.9 ANXIETY DISORDER, UNSPECIFIED TYPE: ICD-10-CM

## 2021-09-08 DIAGNOSIS — F41.1 GENERALIZED ANXIETY DISORDER: ICD-10-CM

## 2021-09-08 PROCEDURE — 90833 PSYTX W PT W E/M 30 MIN: CPT | Performed by: NURSE PRACTITIONER

## 2021-09-08 PROCEDURE — 99214 OFFICE O/P EST MOD 30 MIN: CPT | Performed by: NURSE PRACTITIONER

## 2021-09-08 RX ORDER — BUSPIRONE HYDROCHLORIDE 10 MG/1
10 TABLET ORAL 3 TIMES DAILY
Qty: 270 TABLET | Refills: 0 | Status: SHIPPED | OUTPATIENT
Start: 2021-09-08 | End: 2021-12-01 | Stop reason: SDUPTHER

## 2021-09-08 NOTE — PSYCH
PROGRESS NOTE        [facility]      Name and Date of Birth:  Maximo Dixon 41 y o  1975    Date of Visit: 21    This patient was seen in the office today for medication management and psychotherapy  COVID-19 precautions were followed at all times: masks were worn by patient and provider, goggles were worn by provider, social distancing was maintained, hand-washing was performed before and after appointment, pts seat and providers electronic signature device and stylus were cleaned with germicidal disposable wipes according to product instructions, and room was ventilated before pt entered the room and after pt left it  Time spent on psychotherapy: 20 minutes    Treatment modality: medication management; medication education; reinforce adaptive behavior, e g  stress management      SUBJECTIVE: PHQ9 score =4  Pt reports that in the past two weeks for several days he has had anhedonia, felt down, tired/little energy, bad about himself  He did decide to get vaccinated against COVID-19, had both Pfizer doses, has not felt quite like himself since then  Work stresses continue  2021: feels better than he did at his last appt  doesn't plan on getting COVID vaccine, never got the flu vaccine or had the flu  Both parents have had medical procedures lately and he has been driving them around  Takes it one day at a time at his job  Mood is neither happy nor sad, neutral      3/10/2021: moods generally ok, takes his medications as prescribed, no side effects  Has been hanging in there, thinks things are looking a little better, mood down sometimes but it comes and goes, the down is probably a little not a lot  Has to go to a  today, his best friend's brother , pt hadn't seen the ginny who  in quite a few years   Work has been better for the last couple of weeks, he thinks [employer] 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 some changes and things have been better  He hopes to make it there until he is 49, then go something that isn't physical  He's there for 19 years this summer  His parents got both shots of COVID vaccine  He thinking about it but isn't sure  He denies suicidal ideation, intent or plan at present, has no suicidal ideation, intent or plan at present  He denies any auditory hallucinations and visual hallucinations, denies any other delusional thinking, denies any delusional thinking  He denies any side effects from medications    HPI ROS Appetite Changes and Sleep: normal appetite, normal sleep    Review Of Systems:      Constitutional Negative   ENT Negative   Cardiovascular Negative   Respiratory Negative   Gastrointestinal Negative   Genitourinary Negative   Musculoskeletal Negative   Integumentary Negative   Neurological Negative   Endocrine Negative   Other Symptoms Negative and None       Laboratory Results: No results found for this or any previous visit  Substance Abuse History:    Social History     Substance and Sexual Activity   Drug Use Never       Family Psychiatric History:     No family history on file  The following portions of the patient's history were reviewed and updated as appropriate: past family history, past medical history, past social history, past surgical history and problem list     Social History     Socioeconomic History    Marital status: Single     Spouse name: Not on file    Number of children: Not on file    Years of education: Not on file    Highest education level: Not on file   Occupational History    Not on file   Tobacco Use    Smoking status: Never Smoker    Smokeless tobacco: Never Used   Vaping Use    Vaping Use: Never used   Substance and Sexual Activity    Alcohol use:  Yes    Drug use: Never    Sexual activity: Not on file   Other Topics Concern    Not on file   Social History Narrative    Not on file     Social Determinants of Health     Financial Resource Strain:    Jesus Lira Difficulty of Paying Living Expenses:    Food Insecurity:     Worried About Running Out of Food in the Last Year:     [address] in the Last Year:    Transportation Needs:     Lack of Transportation (Medical):      Lack of Transportation (Non-Medical):    Physical Activity:     Days of Exercise per Week:     Minutes of Exercise per Session:    Stress:     Feeling of Stress :    Social Connections:     Frequency of Communication with Friends and Family:     Frequency of Social Gatherings with Friends and Family:     Attends Worship Services:     Active Member of Clubs or Organizations:     Attends Club or Organization Meetings:     Marital Status:    Intimate Partner Violence:     Fear of Current or Ex-Partner:     Emotionally Abused:     Physically Abused:     Sexually Abused:      Social History     Social History Narrative    Not on file        Social History     Tobacco History     Smoking Status  Never Smoker    Smokeless Tobacco Use  Never Used          Alcohol History     Alcohol Use Status  Yes          Drug Use     Drug Use Status  Never          Sexual Activity     Sexually Active  Not Asked          Activities of Daily Living    Not Asked                     OBJECTIVE:     Mental Status Evaluation:    Appearance age appropriate, casually dressed   Behavior pleasant, cooperative   Speech normal volume, normal pitch   Mood Slightly anxious   Affect Full and appropriate   Thought Processes Coherent, organized, goal-directed   Associations intact associations   Thought Content normal   Perceptual Disturbances: none   Abnormal Thoughts  Risk Potential Suicidal ideation - None  Homicidal ideation - None  Potential for aggression - No   Orientation oriented to person, place, time/date and situation   Memory recent and remote memory grossly intact   Consciousness alert and awake   Attention Span attention span and concentration are age appropriate   Intellect Not formally assessed   Insight age appropriate    Judgement good    Muscle Strength and  Gait muscle strength and tone were normal   Language no difficulty naming common objects   Fund of Knowledge displays adequate knowledge of current events   Pain none   Pain Scale 0     The patient's chart was reviewed for relevant lab reports and recent encounters with other providers  Medications, treatment progress and treatment plan were reviewed with the patient         Assessment/Plan:       Diagnoses and all orders for this visit:    Major depressive disorder, recurrent, in full remission ([facility] [address] )    Generalized anxiety disorder          Treatment Recommendations/Precautions:    Continue current medications:               venlafaxine ER 75 mg capsule - take 1 capsule by mouth every day              buspirone 10 mg tablet - take 1 tablet by mouth three times a day      Risks/Benefits       Risks, Benefits And Possible Side Effects Of Medications:     Risks, benefits, and possible side effects of medications explained to patient and patient verbalizes understanding and agreement for treatment      Controlled Medication Discussion:      Not applicable

## 2021-11-08 DIAGNOSIS — F41.9 ANXIETY DISORDER, UNSPECIFIED TYPE: ICD-10-CM

## 2021-11-09 RX ORDER — VENLAFAXINE HYDROCHLORIDE 75 MG/1
75 CAPSULE, EXTENDED RELEASE ORAL DAILY
Qty: 90 CAPSULE | Refills: 0 | Status: SHIPPED | OUTPATIENT
Start: 2021-11-09 | End: 2022-02-15 | Stop reason: SDUPTHER

## 2021-12-01 ENCOUNTER — OFFICE VISIT (OUTPATIENT)
Dept: PSYCHIATRY | Facility: CLINIC | Age: 46
End: 2021-12-01
Payer: COMMERCIAL

## 2021-12-01 VITALS — HEIGHT: 72 IN | WEIGHT: 214.6 LBS | BODY MASS INDEX: 29.07 KG/M2

## 2021-12-01 DIAGNOSIS — F41.1 GENERALIZED ANXIETY DISORDER: ICD-10-CM

## 2021-12-01 DIAGNOSIS — F33.42 MAJOR DEPRESSIVE DISORDER, RECURRENT, IN FULL REMISSION (HCC): Primary | ICD-10-CM

## 2021-12-01 PROCEDURE — 99214 OFFICE O/P EST MOD 30 MIN: CPT | Performed by: NURSE PRACTITIONER

## 2021-12-01 RX ORDER — BUSPIRONE HYDROCHLORIDE 10 MG/1
10 TABLET ORAL 3 TIMES DAILY
Qty: 270 TABLET | Refills: 0 | Status: SHIPPED | OUTPATIENT
Start: 2021-12-01 | End: 2022-02-23 | Stop reason: SDUPTHER

## 2021-12-01 RX ORDER — FAMOTIDINE 20 MG/1
20 TABLET, FILM COATED ORAL DAILY
COMMUNITY

## 2022-02-15 DIAGNOSIS — F33.42 MAJOR DEPRESSIVE DISORDER, RECURRENT, IN FULL REMISSION (HCC): ICD-10-CM

## 2022-02-15 DIAGNOSIS — F41.1 GENERALIZED ANXIETY DISORDER: Primary | ICD-10-CM

## 2022-02-15 DIAGNOSIS — F41.9 ANXIETY DISORDER, UNSPECIFIED TYPE: ICD-10-CM

## 2022-02-15 RX ORDER — VENLAFAXINE HYDROCHLORIDE 75 MG/1
75 CAPSULE, EXTENDED RELEASE ORAL DAILY
Qty: 90 CAPSULE | Refills: 0 | Status: SHIPPED | OUTPATIENT
Start: 2022-02-15 | End: 2022-05-18 | Stop reason: SDUPTHER

## 2022-02-15 NOTE — TELEPHONE ENCOUNTER
----- Message from Salina Arzola sent at 2/15/2022  1:12 PM EST -----  Regarding: refill  venlafaxine (EFFEXOR-XR) 75 mg 24 hr capsule Take 1 capsule (75 mg total) by mouth daily    [facility] [address] [place], [place] - [address] [place] [address] [address] [place]  2/23/22

## 2022-02-23 ENCOUNTER — OFFICE VISIT (OUTPATIENT)
Dept: PSYCHIATRY | Facility: CLINIC | Age: 47
End: 2022-02-23
Payer: COMMERCIAL

## 2022-02-23 VITALS — BODY MASS INDEX: 28.96 KG/M2 | HEIGHT: 72 IN | WEIGHT: 213.8 LBS

## 2022-02-23 DIAGNOSIS — F41.1 GENERALIZED ANXIETY DISORDER: ICD-10-CM

## 2022-02-23 DIAGNOSIS — F33.42 MAJOR DEPRESSIVE DISORDER, RECURRENT, IN FULL REMISSION (HCC): Primary | ICD-10-CM

## 2022-02-23 PROCEDURE — 90833 PSYTX W PT W E/M 30 MIN: CPT | Performed by: NURSE PRACTITIONER

## 2022-02-23 PROCEDURE — 99214 OFFICE O/P EST MOD 30 MIN: CPT | Performed by: NURSE PRACTITIONER

## 2022-02-23 RX ORDER — BUSPIRONE HYDROCHLORIDE 10 MG/1
10 TABLET ORAL 3 TIMES DAILY
Qty: 270 TABLET | Refills: 0 | Status: SHIPPED | OUTPATIENT
Start: 2022-02-23 | End: 2022-05-18 | Stop reason: SDUPTHER

## 2022-02-23 NOTE — PSYCH
This note was not shared with the patient due to privacy exception: note includes other individuals    Aleshia      Name and Date of Birth:  Ivett Jon 40 y o  1975    Date of Visit: 02/23/22      Ivett Jon was seen today for medication management and brief psychotherapy for depression, anxiety      Office Regular Visit    Throughout this appointment, COVID-19 precautions were followed at all times: masks were worn by this patient and the provider, goggles were worn by the provider, social distancing was maintained, hand-washing was performed before and after appointment, and the provider's room was ventilated before this patient entered the room and after this patient left it  Time spent on psychotherapy: 17 minutes    Treatment modality:   Medication management   Medication education  Supportive psychotherapy  Work-related stress      SUBJECTIVE: taking medications as prescribed, reports stable symptom control, denies side effects  about the same as at his last appointment, some anxiety, job and family stress, his father's health isn't good and pt and his mother disagree about pt driving them to [place] to visit family there  Wants to stay at his current doses of medications  Stayed in this area for [place]     12/1/2021: feels a little better than at his last appt, getting better sleep lately, has often had broken sleep and checked the clock frequently  Still working day shift  Had to work [employer], and thinks he will have to work [employer]  His mother recently retired from working at [employer]  His father is on dialysis again and he and pt's mother may not be able to visit pt's sister in [place] for [place] because of it  Pt says that he gets seasick very easily, even on a small boat in the [facility]   Pt says that his health is stable     medication management; medication education, supportive psychotherapy re dealing with job stresses        He denies suicidal ideation, intent or plan at present, has no suicidal ideation, intent or plan at present  He denies any auditory hallucinations and visual hallucinations, denies any other delusional thinking, denies any delusional thinking  He denies any side effects from medications      HPI ROS Appetite Changes and Sleep:   Appetite - normal    Sleep - normal    Review Of Systems:      Constitutional Negative   ENT Negative   Cardiovascular Negative   Respiratory Negative   Gastrointestinal Negative   Genitourinary Negative   Musculoskeletal Negative   Integumentary Negative   Neurological Negative   Endocrine Negative   Other Symptoms Negative and None       Laboratory Results: No results found for this or any previous visit  Substance Abuse History:    Social History     Substance and Sexual Activity   Drug Use Never       Family Psychiatric History:     No family history on file  The following portions of the patient's history were reviewed and updated as appropriate: past family history, past medical history, past social history, past surgical history and problem list     Social History     Socioeconomic History    Marital status: Single     Spouse name: Not on file    Number of children: Not on file    Years of education: Not on file    Highest education level: Not on file   Occupational History    Not on file   Tobacco Use    Smoking status: Never Smoker    Smokeless tobacco: Never Used   Vaping Use    Vaping Use: Never used   Substance and Sexual Activity    Alcohol use:  Yes    Drug use: Never    Sexual activity: Not on file   Other Topics Concern    Not on file   Social History Narrative    Not on file     Social Determinants of Health     Financial Resource Strain: Not on file   Food Insecurity: Not on file   Transportation Needs: Not on file   Physical Activity: Not on file   Stress: Not on file   Social Connections: Not on file   Intimate Partner Violence: Not on file   Housing Stability: Not on file     Social History     Social History Narrative    Not on file        Social History     Tobacco History     Smoking Status  Never Smoker    Smokeless Tobacco Use  Never Used          Alcohol History     Alcohol Use Status  Yes          Drug Use     Drug Use Status  Never          Sexual Activity     Sexually Active  Not Asked          Activities of Daily Living    Not Asked                     OBJECTIVE:     Mental Status Evaluation:    Appearance age appropriate, casually dressed   Behavior pleasant, cooperative   Speech normal rate, rhythm, volume   Mood "kind of nervous"   Affect Mood-congruent, full   Thought Processes Coherent, organized, goal-directed   Associations intact associations   Thought Content normal   Perceptual Disturbances: none   Abnormal Thoughts  Risk Potential Suicidal ideation - None  Homicidal ideation - None  Potential for aggression - No   Orientation oriented to person, place, date and situation   Memory recent and remote memory grossly intact   Consciousness alert and awake   Attention Span attention span and concentration are age appropriate   Intellect Not formally assessed   Insight intact   Judgement intact    Muscle Strength and  Gait muscle strength and tone were normal, gait normal   Language no difficulty naming common objects   Fund of Knowledge displays adequate knowledge of current events             The patient's chart was reviewed for relevant lab reports and recent encounters with other providers  Medications, treatment progress and treatment plan were reviewed with the patient         Assessment/Plan:       Diagnoses and all orders for this visit:    Major depressive disorder, recurrent, in full remission ([facility] [address] )    Generalized anxiety disorder          Treatment Recommendations/Precautions:    Continue current medications:               venlafaxine ER 75 mg capsule - take 1 capsule by mouth every day              buspirone 10 mg tablet - take 1 tablet by mouth three times a day      Risks/Benefits       Risks, Benefits And Possible Side Effects Of Medications:     Risks, benefits, and possible side effects of medications explained to patient and patient verbalizes understanding and agreement for treatment      Controlled Medication Discussion:      Not applicable

## 2022-05-18 ENCOUNTER — OFFICE VISIT (OUTPATIENT)
Dept: PSYCHIATRY | Facility: CLINIC | Age: 47
End: 2022-05-18
Payer: COMMERCIAL

## 2022-05-18 VITALS — WEIGHT: 216.2 LBS | BODY MASS INDEX: 29.28 KG/M2 | HEIGHT: 72 IN

## 2022-05-18 DIAGNOSIS — F33.42 MAJOR DEPRESSIVE DISORDER, RECURRENT, IN FULL REMISSION (HCC): Primary | ICD-10-CM

## 2022-05-18 DIAGNOSIS — F41.1 GENERALIZED ANXIETY DISORDER: ICD-10-CM

## 2022-05-18 PROCEDURE — 99214 OFFICE O/P EST MOD 30 MIN: CPT | Performed by: NURSE PRACTITIONER

## 2022-05-18 RX ORDER — BUSPIRONE HYDROCHLORIDE 10 MG/1
10 TABLET ORAL 3 TIMES DAILY
Qty: 270 TABLET | Refills: 0 | Status: SHIPPED | OUTPATIENT
Start: 2022-05-18

## 2022-05-18 RX ORDER — VENLAFAXINE HYDROCHLORIDE 75 MG/1
75 CAPSULE, EXTENDED RELEASE ORAL DAILY
Qty: 90 CAPSULE | Refills: 0 | Status: SHIPPED | OUTPATIENT
Start: 2022-05-18

## 2022-05-18 NOTE — BH TREATMENT PLAN
TREATMENT PLAN         [facility]    Name and Date of Birth:  Mara Restrepo 40 y o  1975    Date of Treatment Plan: May 18, 2022    Diagnosis/Diagnoses:    1  Major depressive disorder, recurrent, in full remission ([facility] [address] )    2  Generalized anxiety disorder        Strengths/Personal Resources for Self-Care: taking medications as prescribed, ability to communicate well, motivation for treatment      Area/Areas of need (in own words): anxiety symptoms  1          Long Term Goal: continue current status with medication  Target date: 11/18/2022  Person/Persons responsible for completion of goal: heriberto Maldonado     2          Short Term Objective (s) - How will we reach this goal?:   A  Provider new recommended medication/dosage changes and/or continue medication(s): continue current medications as prescribed Effexor XR, Buspar  B  Take medications as prescribed, attend scheduled appts  Target date: 11/18/2022  Person/Persons Responsible for Completion of Goal: heriberto Maldonado     Progress Towards Goals: stable     Treatment Modality: medication management every 12 weeks     Review due 120 - 180 days from date of this plan: 11/18/2022  Expected length of service: maintenance  My Physician/PA/NP and I have developed this plan together and I agree to work on the goals and objectives  I understand the treatment goals that were developed for my treatment

## 2022-05-18 NOTE — PSYCH
This note was not shared with the patient due to privacy exception: note includes other individuals    [facility]      Name and Date of Birth:  Jluis Madrigal 40 y o  1975    Date of Visit: 05/18/22      Jluis Madrigal was seen today for medication management and brief psychotherapy for depression, anxiety      Office Regular Visit    Throughout this appointment, COVID-19 precautions were followed at all times: masks were worn by this patient and the provider, social distancing was maintained, hand-washing was performed before and after appointment, and the provider's room was ventilated before this patient entered the room and after this patient left it  Time spent on psychotherapy: 10 minutes    Treatment modality:   Medication management   Medication education  Supportive psychotherapy  Encouraged and reinforced adaptive behavior        SUBJECTIVE: taking medications as prescribed, denies side effects  Generally ok, not too bad  Eating and sleeping ok  Job the same, sometimes better sometimes worses  Father is getting dialysis port changed to one in his arm today      2/23/2022: taking medications as prescribed, reports stable symptom control, denies side effects  about the same as at his last appointment, some anxiety, job and family stress, his father's health isn't good and pt and his mother disagree about pt driving them to [place] to visit family there  Wants to stay at his current doses of medications  Stayed in this area for [place]    Continue current medications:               venlafaxine ER 75 mg capsule - take 1 capsule by mouth every day              buspirone 10 mg tablet - take 1 tablet by mouth three times a day       He denies suicidal ideation, intent or plan at present, has no suicidal ideation, intent or plan at present      He denies any auditory hallucinations and visual hallucinations, denies any other delusional thinking, denies any delusional thinking  He denies any side effects from medications      HPI ROS Appetite Changes and Sleep:   Appetite - normal    Sleep - normal    Review Of Systems:      Constitutional Negative   ENT Negative   Cardiovascular Negative   Respiratory Negative   Gastrointestinal Negative   Genitourinary Negative   Musculoskeletal Negative   Integumentary Negative   Neurological Negative   Endocrine Negative   Other Symptoms Negative and None       Laboratory Results: No results found for this or any previous visit  Substance Abuse History:    Social History     Substance and Sexual Activity   Drug Use Never       Family Psychiatric History:     History reviewed  No pertinent family history  The following portions of the patient's history were reviewed and updated as appropriate: past family history, past medical history, past social history, past surgical history and problem list     Social History     Socioeconomic History    Marital status: Single     Spouse name: Not on file    Number of children: Not on file    Years of education: Not on file    Highest education level: Not on file   Occupational History    Not on file   Tobacco Use    Smoking status: Never Smoker    Smokeless tobacco: Never Used   Vaping Use    Vaping Use: Never used   Substance and Sexual Activity    Alcohol use:  Yes    Drug use: Never    Sexual activity: Not on file   Other Topics Concern    Not on file   Social History Narrative    Not on file     Social Determinants of Health     Financial Resource Strain: Not on file   Food Insecurity: Not on file   Transportation Needs: Not on file   Physical Activity: Not on file   Stress: Not on file   Social Connections: Not on file   Intimate Partner Violence: Not on file   Housing Stability: Not on file     Social History     Social History Narrative    Not on file        Social History     Tobacco History     Smoking Status  Never Smoker Smokeless Tobacco Use  Never Used          Alcohol History     Alcohol Use Status  Yes          Drug Use     Drug Use Status  Never          Sexual Activity     Sexually Active  Not Asked          Activities of Daily Living    Not Asked                     OBJECTIVE:     Mental Status Evaluation:    Appearance age appropriate, casually dressed   Behavior pleasant, cooperative   Speech normal rate, rhythm, volume   Mood "ok now"   Affect Mood-congruent, full   Thought Processes Coherent, organized, goal-directed   Associations intact associations   Thought Content normal   Perceptual Disturbances: none   Abnormal Thoughts  Risk Potential Suicidal ideation - None  Homicidal ideation - None  Potential for aggression - No   Orientation oriented to person, place, date and situation   Memory recent and remote memory grossly intact   Consciousness alert and awake   Attention Span attention span and concentration are age appropriate   Intellect Not formally assessed   Insight intact   Judgement intact    Muscle Strength and  Gait muscle strength and tone were normal, gait normal   Language no difficulty naming common objects   Fund of Knowledge displays adequate knowledge of current events               The patient's chart was reviewed for relevant lab reports and recent encounters with other providers  Medications, treatment progress, and current treatment plan that was enacted on 12/1/2021 and due for review/update on 6/1/2022 were reviewed with the patient  The treatment plan was updated today with the patient who verbally agreed with the updated plan  Today's treatment plan is due for review/update NLT 11/18/2022  Pt and writer were unable to sign plan because signature pad is not working      Treatment plan goals discussed in this encounter: continue current status with medication        Assessment/Plan:       Diagnoses and all orders for this visit:    Major depressive disorder, recurrent, in full remission ([address] )  -     venlafaxine (EFFEXOR-XR) 75 mg 24 hr capsule; Take 1 capsule (75 mg total) by mouth in the morning  Generalized anxiety disorder  -     venlafaxine (EFFEXOR-XR) 75 mg 24 hr capsule; Take 1 capsule (75 mg total) by mouth in the morning   -     busPIRone (BUSPAR) 10 mg tablet; Take 1 tablet (10 mg total) by mouth in the morning and 1 tablet (10 mg total) in the evening and 1 tablet (10 mg total) before bedtime            Treatment Recommendations/Precautions:    Continue current medications:               venlafaxine ER 75 mg capsule - take 1 capsule by mouth every day              buspirone 10 mg tablet - take 1 tablet by mouth three times a day      Risks/Benefits       Risks, Benefits And Possible Side Effects Of Medications:     Risks, benefits, and possible side effects of medications explained to patient and patient verbalizes understanding and agreement for treatment      Controlled Medication Discussion:      Not applicable

## 2022-07-26 ENCOUNTER — TELEPHONE (OUTPATIENT)
Dept: PSYCHIATRY | Facility: CLINIC | Age: 47
End: 2022-07-26

## 2022-07-26 NOTE — TELEPHONE ENCOUNTER
I left message on patient's voicemail for a call back and  cancelling appointment on 8/10/2022 with [employer] due to provider on vacation

## 2022-08-15 DIAGNOSIS — F41.1 GENERALIZED ANXIETY DISORDER: ICD-10-CM

## 2022-08-15 DIAGNOSIS — F33.42 MAJOR DEPRESSIVE DISORDER, RECURRENT, IN FULL REMISSION (HCC): ICD-10-CM

## 2022-08-15 RX ORDER — VENLAFAXINE HYDROCHLORIDE 75 MG/1
75 CAPSULE, EXTENDED RELEASE ORAL DAILY
Qty: 90 CAPSULE | Refills: 1 | Status: SHIPPED | OUTPATIENT
Start: 2022-08-15 | End: 2023-02-03 | Stop reason: SDUPTHER

## 2022-08-24 ENCOUNTER — OFFICE VISIT (OUTPATIENT)
Dept: PSYCHIATRY | Facility: CLINIC | Age: 47
End: 2022-08-24
Payer: COMMERCIAL

## 2022-08-24 VITALS — BODY MASS INDEX: 29.15 KG/M2 | HEIGHT: 72 IN | WEIGHT: 215.2 LBS

## 2022-08-24 DIAGNOSIS — F33.42 MAJOR DEPRESSIVE DISORDER, RECURRENT, IN FULL REMISSION (HCC): Primary | ICD-10-CM

## 2022-08-24 DIAGNOSIS — F41.1 GENERALIZED ANXIETY DISORDER: ICD-10-CM

## 2022-08-24 PROCEDURE — 99214 OFFICE O/P EST MOD 30 MIN: CPT | Performed by: NURSE PRACTITIONER

## 2022-08-24 RX ORDER — BUSPIRONE HYDROCHLORIDE 10 MG/1
10 TABLET ORAL 3 TIMES DAILY
Qty: 270 TABLET | Refills: 1 | Status: SHIPPED | OUTPATIENT
Start: 2022-08-24

## 2022-08-24 NOTE — BH TREATMENT PLAN
TREATMENT PLAN         [facility]    Name and Date of Birth:  Jensen Jackson 40 y o  1975    Date of Treatment Plan: August 24, 2022    Diagnosis/Diagnoses:    1  Major depressive disorder, recurrent, in full remission ([facility] [address] )    2  Generalized anxiety disorder        Strengths/Personal Resources for Self-Care: taking medications as prescribed, ability to communicate well, motivation for treatment      Area/Areas of need (in own words): anxiety symptoms  1          Long Term Goal: continue current status with medication  Target date: 2/23/2023  Person/Persons responsible for completion of goal: heriberto Maldonado     2          Short Term Objective (s) - How will we reach this goal?:   A  Provider new recommended medication/dosage changes and/or continue medication(s): continue current medications as prescribed Effexor XR, Buspar  B  Take medications as prescribed, attend scheduled appts  Target date: 2/23/2023  Person/Persons Responsible for Completion of Goal: heriberto Maldonado     Progress Towards Goals: stable     Treatment Modality: medication management every 12 weeks     Review due 120 - 180 days from date of this plan: 2/23/2023  Expected length of service: maintenance  My Physician/PA/NP and I have developed this plan together and I agree to work on the goals and objectives  I understand the treatment goals that were developed for my treatment

## 2022-08-24 NOTE — PSYCH
This note was not shared with the patient due to privacy exception: note includes other individuals    Aleshia      Name and Date of Birth:  Eric Richards 40 y o  1975    Date of Visit: 08/24/22      Eric Richards was seen today for medication management and brief psychotherapy  Office Regular Visit    Throughout this appointment, COVID-19 precautions were followed at all times: masks were worn by this patient and the provider, social distancing was maintained, hand-washing was performed before and after appointment, and the provider's room was ventilated before this patient entered the room and after this patient left it            Time spent on psychotherapy: 10 minutes    Treatment modality:   Medication management   Medication education  Supportive psychotherapy        SUBJECTIVE: pt reports taking psychiatric medications as prescribed, denies side effects  Things are pretty much the same  Job no change  Father mostly the same  Appetite and sleep are fine      5/18/2022: taking medications as prescribed, denies side effects  Generally ok, not too bad  Eating and sleeping ok  Job the same, sometimes better sometimes worses  Father is getting dialysis port changed to one in his arm today   Continue current medications:               venlafaxine ER 75 mg capsule - take 1 capsule by mouth every day              buspirone 10 mg tablet - take 1 tablet by mouth three times a day            2/23/2022: taking medications as prescribed, reports stable symptom control, denies side effects  about the same as at his last appointment, some anxiety, job and family stress, his father's health isn't good and pt and his mother disagree about pt driving them to [place] to visit family there  Wants to stay at his current doses of medications  Stayed in this area for [place]    Continue current medications:   [facility] caps[facility] - take 1 capsule by mouth every day              buspirone 10 mg tablet - take 1 tablet by mouth three times a day          He denies suicidal ideation, intent or plan at present, has no suicidal ideation, intent or plan at present  He denies any auditory hallucinations and visual hallucinations, denies any other delusional thinking, denies any delusional thinking  He denies any side effects from medications      HPI ROS Appetite Changes and Sleep:   Appetite - normal    Sleep - normal    Review Of Systems:      Constitutional Negative   ENT Negative   Cardiovascular Negative   Respiratory Negative   Gastrointestinal Negative   Genitourinary Negative   Musculoskeletal Negative   Integumentary Negative   Neurological Negative   Endocrine Negative   Other Symptoms Negative and None       Laboratory Results: No results found for this or any previous visit  Substance Abuse History:    Social History     Substance and Sexual Activity   Drug Use Never       Family Psychiatric History:     No family history on file  The following portions of the patient's history were reviewed and updated as appropriate: past family history, past medical history, past social history, past surgical history and problem list     Social History     Socioeconomic History   • Marital status: Single     Spouse name: Not on file   • Number of children: Not on file   • Years of education: Not on file   • Highest education level: Not on file   Occupational History   • Not on file   Tobacco Use   • Smoking status: Never Smoker   • Smokeless tobacco: Never Used   Vaping Use   • Vaping Use: Never used   Substance and Sexual Activity   • Alcohol use:  Yes   • Drug use: Never   • Sexual activity: Not on file   Other Topics Concern   • Not on file   Social History Narrative   • Not on file     Social Determinants of Health     Financial Resource Strain: Not on file   Food Insecurity: Not on file   Transportation Needs: Not on file Physical Activity: Not on file   Stress: Not on file   Social Connections: Not on file   Intimate Partner Violence: Not on file   Housing Stability: Not on file     Social History     Social History Narrative   • Not on file        Social History     Tobacco History     Smoking Status  Never Smoker    Smokeless Tobacco Use  Never Used          Alcohol History     Alcohol Use Status  Yes          Drug Use     Drug Use Status  Never          Sexual Activity     Sexually Active  Not Asked          Activities of Daily Living    Not Asked                     OBJECTIVE:     Mental Status Evaluation:    Appearance age appropriate, casually dressed   Behavior pleasant, cooperative   Speech normal rate, rhythm, volume   Mood Generally euthymic, occasional mild anxiety    Affect Normal range and intensity, appropriate   Thought Processes Organized, goal-directed   Associations intact associations   Thought Content No overt delusions   Perceptual Disturbances: No auditory hallucinations, no visual hallucinations   Abnormal Thoughts  Risk Potential Suicidal ideation - None  Homicidal ideation - None  Potential for aggression - No   Orientation oriented to person, place, date and situation   Memory recent and remote memory grossly intact   Consciousness alert and awake   Attention Span attention span and concentration are age appropriate   Intellect Not formally assessed   Insight intact   Judgement intact    Muscle Strength and  Gait muscle strength and tone were normal, gait normal   Language no difficulty naming common objects, repeating a phrase   Fund of Knowledge displays adequate knowledge of current events, past history, vocabulary               The patient's chart was reviewed for relevant lab reports and recent encounters with other providers  Medications, treatment progress, and current treatment plan that was enacted on 5/18/2022 and due for review/update on 11/28/2022 were reviewed with the patient   The treatment plan was updated today with the patient who verbally agreed with the updated plan  Today's treatment plan is due for review/update NLT  2/24/2023  Pt and writer were unable to sign plan because signature pad is not working      Treatment plan goals discussed in this encounter: continue current status with medication        Assessment/Plan:       Diagnoses and all orders for this visit:    Major depressive disorder, recurrent, in full remission ([facility] [address] )    Generalized anxiety disorder          Treatment Recommendations/Precautions:    Continue current medications/doses:        venlafaxine ER 75 mg capsule - take 1 capsule by mouth every day              buspirone 10 mg tablet - take 1 tablet by mouth three times a day      Risks/Benefits       Risks, Benefits And Possible Side Effects Of Medications:     Risks, benefits, and possible side effects of medications explained to patient and patient verbalizes understanding and agreement for treatment      Controlled Medication Discussion:      Not applicable

## 2022-10-13 NOTE — PATIENT INSTRUCTIONS
Continue current medications/doses:        venlafaxine ER 75 mg capsule - take 1 capsule by mouth every day              buspirone 10 mg tablet - take 1 tablet by mouth three times a day

## 2022-10-18 ENCOUNTER — TELEPHONE (OUTPATIENT)
Dept: PSYCHIATRY | Facility: CLINIC | Age: 47
End: 2022-10-18

## 2022-10-18 NOTE — TELEPHONE ENCOUNTER
I left message on patient's voicemail for a call back and reschedule of appointment 11/16/2022 with Brittany Burch to an earlier day

## 2022-11-01 ENCOUNTER — TELEPHONE (OUTPATIENT)
Dept: PSYCHIATRY | Facility: CLINIC | Age: 47
End: 2022-11-01

## 2022-11-01 NOTE — TELEPHONE ENCOUNTER
Our office is calling regarding cancelling an appointment  Date/Time: 11/7/2022 @ 2:45 pm    Reason: Provider Retiring Left message on voicemail to call back if having medication issues and will call office when he needs refills      Patient was rescheduled: YES [] NO [x]  If yes, when was Patient reschedule for:     Patient requesting call back to reschedule: YES [] NO [x]

## 2023-02-03 DIAGNOSIS — F41.1 GENERALIZED ANXIETY DISORDER: ICD-10-CM

## 2023-02-03 DIAGNOSIS — F33.42 MAJOR DEPRESSIVE DISORDER, RECURRENT, IN FULL REMISSION (HCC): ICD-10-CM

## 2023-02-03 RX ORDER — VENLAFAXINE HYDROCHLORIDE 75 MG/1
75 CAPSULE, EXTENDED RELEASE ORAL DAILY
Qty: 90 CAPSULE | Refills: 1 | Status: SHIPPED | OUTPATIENT
Start: 2023-02-03

## 2023-02-03 NOTE — TELEPHONE ENCOUNTER
Medication Refill Request     Name of Medication venlafaxine  Dose/Frequency 75mg  Quantity 1 cap daily  Verified pharmacy   [x]  Verified ordering Provider   [x]  Does patient have enough for the next 3 days? Yes [] No [x]  Does patient have a follow-up appointment scheduled?  Yes [] No [x]   If so when is appointment: pt of [facility] waiting for new provider

## 2023-03-27 DIAGNOSIS — F41.1 GENERALIZED ANXIETY DISORDER: ICD-10-CM

## 2023-03-27 RX ORDER — BUSPIRONE HYDROCHLORIDE 10 MG/1
10 TABLET ORAL 3 TIMES DAILY
Qty: 90 TABLET | Refills: 1 | Status: SHIPPED | OUTPATIENT
Start: 2023-03-27

## 2023-03-27 NOTE — TELEPHONE ENCOUNTER
Medication Refill Request     Name of Medication Buspar 10 mg  Dose/Frequency 1tid  Quantity 270  Verified pharmacy   [x]  Verified ordering Provider   [x]  Does patient have enough for the next 3 days? Yes [] No [x]  Does patient have a follow-up appointment scheduled?  Yes [] No [x]   If so when is appointment: Last Seen Keshia Mendoza 8/24/2022

## 2023-06-05 DIAGNOSIS — F41.1 GENERALIZED ANXIETY DISORDER: ICD-10-CM

## 2023-06-05 RX ORDER — BUSPIRONE HYDROCHLORIDE 10 MG/1
10 TABLET ORAL 3 TIMES DAILY
Qty: 90 TABLET | Refills: 1 | Status: SHIPPED | OUTPATIENT
Start: 2023-06-05

## 2023-06-19 ENCOUNTER — OFFICE VISIT (OUTPATIENT)
Dept: PSYCHIATRY | Facility: CLINIC | Age: 48
End: 2023-06-19
Payer: COMMERCIAL

## 2023-06-19 VITALS — HEIGHT: 72 IN | WEIGHT: 214.8 LBS | BODY MASS INDEX: 29.09 KG/M2

## 2023-06-19 DIAGNOSIS — F33.42 MAJOR DEPRESSIVE DISORDER, RECURRENT, IN FULL REMISSION (HCC): ICD-10-CM

## 2023-06-19 DIAGNOSIS — F41.1 GENERALIZED ANXIETY DISORDER: ICD-10-CM

## 2023-06-19 PROCEDURE — 99214 OFFICE O/P EST MOD 30 MIN: CPT | Performed by: PHYSICIAN ASSISTANT

## 2023-06-19 RX ORDER — BUSPIRONE HYDROCHLORIDE 10 MG/1
10 TABLET ORAL 3 TIMES DAILY
Qty: 270 TABLET | Refills: 0 | Status: SHIPPED | OUTPATIENT
Start: 2023-06-19 | End: 2023-09-17

## 2023-06-19 RX ORDER — VENLAFAXINE HYDROCHLORIDE 75 MG/1
75 CAPSULE, EXTENDED RELEASE ORAL DAILY
Qty: 90 CAPSULE | Refills: 0 | Status: SHIPPED | OUTPATIENT
Start: 2023-06-19 | End: 2023-09-17

## 2023-06-19 NOTE — BH TREATMENT PLAN
TREATMENT PLAN (Medication Management Only)        [facility]    Name and Date of Birth:  Pamela Bo 56 y o  1975  Date of Treatment Plan: June 19, 2023  Diagnosis/Diagnoses:    1  Generalized anxiety disorder    2  Major depressive disorder, recurrent, in full remission [facility])      Strengths/Personal Resources for Self-Care: taking medications as prescribed, ability to adapt to life changes, ability to communicate needs, ability to communicate well  Area/Areas of need (in own words): anxiety symptoms, depressive symptoms  1  Long Term Goal: maintain control of anxiety  Target Date:6 months - 12/19/2023  Person/Persons responsible for completion of goal: Joel WALTERS  2  Short Term Objective (s) - How will we reach this goal?:   A  Provider new recommended medication/dosage changes and/or continue medication(s): continue current medications as prescribed  B  N/A   C  N/A  Target Date:6 months - 12/19/2023  Person/Persons Responsible for Completion of Goal: Sly WALTERS  Progress Towards Goals: continuing treatment  Treatment Modality: medication management every 3 months  Review due 180 days from date of this plan: 6 months - 12/19/2023  Expected length of service: ongoing treatment  My Physician/PA/NP and I have developed this plan together and I agree to work on the goals and objectives  I understand the treatment goals that were developed for my treatment

## 2023-06-19 NOTE — PSYCH
"This note was not shared with the patient due to reasonable likelihood of causing patient harm    PROGRESS NOTE        [address] [place] [address]      Name and Date of Birth:  Angi Black 56 y o  1975    Date of Visit: 06/19/23    SUBJECTIVE:    Paul Alcala presents today for medication management and follow up  He was previously being seen by a Provider within this office who has since retired  He was seeing the previous Provider for approximately 9-10 years  He reports past diagnoses of Anxiety and Depression  He notes that he has been stable on his current medication regimen for the last 20 years  He states \"they enable me to function  \" Prior to the start of medication \"it was hard to focus on the job  \" He shares that at one point he did try a higher dose of his medication but \"I felt it was too much so we went back down  \" He does note \"more sweating in the hot weather\" recently and feels this may be related to his medication  He would like to continue with his current regimen  He currently lives alone  He lives 10 miles from his parents  His Father is struggling with his health and he does bring him to his appointments  In his spare time he enjoys \"watching sports and doing word puzzles  \" Patient reports that he does drink \"beer\" 1-4 times per week  No tobacco use  No marijuana or illicit drug use  He describes his current mood as \"neutral \" He denies any recent feelings of hopelessness or worthlessness  He reports his current energy level as \"average  \" He denies any changes with appetite  He does note intermittent difficulty with sleep  He has tried melatonin in the past without positive results  He is diagnosed with GERD  No known drug allergies  No pertinent family history  No history of inpatient admissions  He denies suicidal ideation, intent or plan at present, has no suicidal ideation, intent or plan at present      He denies any auditory " hallucinations and visual hallucinations, denies any other delusional thinking, denies any delusional thinking  He denies any side effects from medications    HPI ROS Appetite Changes and Sleep: normal appetite, intermittent difficulty with sleep    Review Of Systems:      Constitutional Negative   ENT Negative   Cardiovascular Negative   Respiratory Negative   Gastrointestinal As per HPI   Genitourinary Negative   Musculoskeletal Negative   Integumentary Negative   Neurological Negative   Endocrine Negative   Other Symptoms Negative and None       Laboratory Results: No results found for this or any previous visit  Substance Abuse History:    Social History     Substance and Sexual Activity   Drug Use Never       Family Psychiatric History:     No family history on file  The following portions of the patient's history were reviewed and updated as appropriate: past family history, past medical history, past social history, past surgical history and problem list     Social History     Socioeconomic History   • Marital status: Single     Spouse name: Not on file   • Number of children: Not on file   • Years of education: Not on file   • Highest education level: Not on file   Occupational History   • Not on file   Tobacco Use   • Smoking status: Never   • Smokeless tobacco: Never   Vaping Use   • Vaping Use: Never used   Substance and Sexual Activity   • Alcohol use:  Yes   • Drug use: Never   • Sexual activity: Not on file   Other Topics Concern   • Not on file   Social History Narrative   • Not on file     Social Determinants of Health     Financial Resource Strain: Not on file   Food Insecurity: Not on file   Transportation Needs: Not on file   Physical Activity: Not on file   Stress: Not on file   Social Connections: Not on file   Intimate Partner Violence: Not on file   Housing Stability: Not on file     Social History     Social History Narrative   • Not on file        Social History     Tobacco History Smoking Status  Never    Smokeless Tobacco Use  Never          Alcohol History     Alcohol Use Status  Yes          Drug Use     Drug Use Status  Never          Sexual Activity     Sexually Active  Not Asked          Activities of Daily Living    Not Asked                     OBJECTIVE:     Mental Status Evaluation:    Appearance age appropriate, casually dressed   Behavior guarded, cooperative   Speech normal volume, normal pitch   Mood Slightly anxious   Affect Mood congruent    Thought Processes logical   Associations intact associations   Thought Content normal   Perceptual Disturbances: none   Abnormal Thoughts  Risk Potential Suicidal ideation - None  Homicidal ideation - None  Potential for aggression - No   Orientation oriented to person, place, time/date and situation   Memory recent and remote memory grossly intact   Cosciousness alert and awake   Attention Span attention span and concentration are age appropriate   Intellect Appears to be of Average Intelligence   Insight age appropriate    Judgement good    Muscle Strength and  Gait muscle strength and tone were normal   Language no difficulty naming common objects   Fund of Knowledge displays adequate knowledge of current events   Pain none   Pain Scale 0       Assessment/Plan:       Diagnoses and all orders for this visit:    Generalized anxiety disorder  -     busPIRone (BUSPAR) 10 mg tablet; Take 1 tablet (10 mg total) by mouth 3 (three) times a day  -     venlafaxine (EFFEXOR-XR) 75 mg 24 hr capsule; Take 1 capsule (75 mg total) by mouth daily    Major depressive disorder, recurrent, in full remission (HCC)  -     venlafaxine (EFFEXOR-XR) 75 mg 24 hr capsule; Take 1 capsule (75 mg total) by mouth daily          Treatment Recommendations/Precautions:    Patient is doing well on his current regimen and would like to remain on the same       Continue current medications:    Buspar 10 mg TID for anxiety  Effexor XR 75 mg daily for anxiety and depression    We will follow up in 3 months or sooner if questions or concerns arise  He is aware of emergent vs non-emergent mental health resources  He is able to contract for safety at this time  Risks/Benefits      Risks, Benefits And Possible Side Effects Of Medications:    Risks, benefits, and possible side effects of medications explained to patient and patient verbalizes understanding and agreement for treatment      Controlled Medication Discussion:     Not applicable    Psychotherapy Provided:     Individual psychotherapy provided: No

## 2023-07-26 ENCOUNTER — OFFICE VISIT (OUTPATIENT)
Dept: URGENT CARE | Facility: CLINIC | Age: 48
End: 2023-07-26

## 2023-07-26 VITALS
WEIGHT: 217 LBS | HEIGHT: 73 IN | RESPIRATION RATE: 14 BRPM | BODY MASS INDEX: 28.76 KG/M2 | TEMPERATURE: 97.9 F | HEART RATE: 75 BPM | DIASTOLIC BLOOD PRESSURE: 100 MMHG | OXYGEN SATURATION: 96 % | SYSTOLIC BLOOD PRESSURE: 142 MMHG

## 2023-07-26 DIAGNOSIS — G47.9 TROUBLE IN SLEEPING: Primary | ICD-10-CM

## 2023-07-26 RX ORDER — HYDROXYZINE PAMOATE 25 MG/1
25 CAPSULE ORAL
Qty: 14 CAPSULE | Refills: 0 | Status: SHIPPED | OUTPATIENT
Start: 2023-07-26

## 2023-07-26 NOTE — PROGRESS NOTES
[facility]        NAME: Mara Orantes is a 56 y.o. male  : 1975    MRN: [id number]  DATE: 2023  TIME: 8:44 AM    Assessment and Plan   Trouble in sleeping [G47.9]  1. Trouble in sleeping  hydrOXYzine pamoate (VISTARIL) 25 mg capsule            Patient Instructions     Patient Instructions   Prescribed Atarax to help with sleep at night. Discussed this matter should be worked up and evaluated further with psychiatrist.  With any progression or worsening of symptoms to be seen in ER. All patient's questions answered and is agreeable with this plan. Follow up with PCP in 3-5 days. Proceed to  ER if symptoms worsen. Chief Complaint     Chief Complaint   Patient presents with   • Insomnia     Pt reports not being able to sleep for a month         History of Present Illness       Patient is a 52-year-old male presenting today with trouble sleeping x1 month. Patient has PMH significant for anxiety/depression, currently under the care of psychiatrist.  Notes he has an appointment coming up next month, has not reached out about the symptoms. States he has tried a few over-the-counter sleeping medications but notes no change. Admits to increased stress at work, reports discussions of potential layoffs coming up which she believes is attributing to his anxiety and trouble sleeping at night. Denies SI/HI, chest pain, palpitations, lightheadedness, dizziness. Review of Systems   Review of Systems   Constitutional: Negative for chills and fever. HENT: Negative for ear pain and sore throat. Eyes: Negative for pain and visual disturbance. Respiratory: Negative for cough and shortness of breath. Cardiovascular: Negative for chest pain and palpitations. Gastrointestinal: Negative for abdominal pain and vomiting. Genitourinary: Negative for dysuria and hematuria. Musculoskeletal: Negative for arthralgias and back pain. Skin: Negative for color change and rash. Neurological: Negative for seizures and syncope. Psychiatric/Behavioral: Positive for sleep disturbance. Negative for self-injury and suicidal ideas. The patient is nervous/anxious. All other systems reviewed and are negative. Current Medications       Current Outpatient Medications:   •  busPIRone (BUSPAR) 10 mg tablet, Take 1 tablet (10 mg total) by mouth 3 (three) times a day, Disp: 270 tablet, Rfl: 0  •  hydrOXYzine pamoate (VISTARIL) 25 mg capsule, Take 1 capsule (25 mg total) by mouth daily at bedtime, Disp: 14 capsule, Rfl: 0  •  venlafaxine (EFFEXOR-XR) 75 mg 24 hr capsule, Take 1 capsule (75 mg total) by mouth daily, Disp: 90 capsule, Rfl: 0  •  famotidine (Pepcid) 20 mg tablet, Take 20 mg by mouth daily, Disp: , Rfl:     Current Allergies     Allergies as of 07/26/2023   • (No Known Allergies)            The following portions of the patient's history were reviewed and updated as appropriate: allergies, current medications, past family history, past medical history, past social history, past surgical history and problem list.     Past Medical History:   Diagnosis Date   • Depression    • GERD (gastroesophageal reflux disease)        Past Surgical History:   Procedure Laterality Date   • CLOSED REDUCTION FOREARM FRACTURE Right    • EGD         History reviewed. No pertinent family history. Medications have been verified. Objective   /100   Pulse 75   Temp 97.9 °F (36.6 °C)   Resp 14   Ht 6' 1" (1.854 m)   Wt 98.4 kg (217 lb)   SpO2 96%   BMI 28.63 kg/m²        Physical Exam     Physical Exam  Vitals and nursing note reviewed. Constitutional:       General: He is not in acute distress. HENT:      Head: Normocephalic. Right Ear: External ear normal.      Left Ear: External ear normal.   Cardiovascular:      Rate and Rhythm: Normal rate and regular rhythm. Pulses: Normal pulses. Heart sounds: Normal heart sounds.    Pulmonary:      Effort: Pulmonary effort is normal.      Breath sounds: Normal breath sounds. Skin:     General: Skin is warm. Capillary Refill: Capillary refill takes less than 2 seconds. Neurological:      General: No focal deficit present. Mental Status: He is alert and oriented to person, place, and time.    Psychiatric:         Mood and Affect: Mood normal.         Behavior: Behavior normal.

## 2023-07-26 NOTE — PATIENT INSTRUCTIONS
Prescribed Atarax to help with sleep at night. Discussed this matter should be worked up and evaluated further with psychiatrist.  With any progression or worsening of symptoms to be seen in ER. All patient's questions answered and is agreeable with this plan.

## 2023-07-26 NOTE — LETTER
July 26, 2023     Patient: Silke Moralez   YOB: 1975   Date of Visit: 7/26/2023       To Whom It May Concern: It is my medical opinion that Silke Moralez may return to work on 07/28/2023 . Please excuse his absence until this time. If you have any questions or concerns, please don't hesitate to call.          Sincerely,        Chante Villavicencio PA-C    CC: No Recipients

## 2023-07-29 ENCOUNTER — HOSPITAL ENCOUNTER (EMERGENCY)
Facility: HOSPITAL | Age: 48
Discharge: HOME | End: 2023-07-29
Attending: EMERGENCY MEDICINE
Payer: COMMERCIAL

## 2023-07-29 VITALS
DIASTOLIC BLOOD PRESSURE: 103 MMHG | OXYGEN SATURATION: 96 % | BODY MASS INDEX: 27.89 KG/M2 | RESPIRATION RATE: 20 BRPM | TEMPERATURE: 97.4 F | SYSTOLIC BLOOD PRESSURE: 144 MMHG | WEIGHT: 211.4 LBS | HEART RATE: 114 BPM

## 2023-07-29 DIAGNOSIS — G47.00 INSOMNIA: Primary | ICD-10-CM

## 2023-07-29 DIAGNOSIS — F41.1 GENERALIZED ANXIETY DISORDER: ICD-10-CM

## 2023-07-29 PROCEDURE — 99284 EMERGENCY DEPT VISIT MOD MDM: CPT | Performed by: EMERGENCY MEDICINE

## 2023-07-29 PROCEDURE — 99283 EMERGENCY DEPT VISIT LOW MDM: CPT

## 2023-07-29 NOTE — DISCHARGE INSTRUCTIONS
We recommend techniques to improve your sleep hygiene to help you fall asleep and sleep throughout the night. We recommend that you avoid caffeine and alcohol in the hours before bed. We recommend that you keep your room cool and dark. We recommend that you only use your bed for sleep. If you are trying to fall asleep and it takes more than half an hour, we recommend that you get up and go to another room, do something boring that makes you sleepy, then try to go to bed again. Avoid screen time on your TV or phone immediately prior to going to bed. If you wake up in the night and feel anxious and that your heart is racing, focus on taking deep breaths and clearing your mind and go to another room to reset yourself if you need to. We recommend that you ask your psychiatrist about recommendations for a therapist to talk to about your anxiety. If you have any thoughts of hurting yourself or hurting anyone else, return to the emergency department immediately.

## 2023-07-29 NOTE — ED PROVIDER NOTES
History  Chief Complaint   Patient presents with   • Insomnia     States he is exhausted and feels weak. States having a issue with sleeping at night. Patient was seen at [facility] for same and prescribed Vistaril which he states is not working. Daily drinker of whiskey- 4 bottles per week      HPI  Patient is a 81-year-old male history of generalized anxiety disorder presenting for evaluation of anxiety, insomnia. Patient states he has been feeling a lot more fatigued over the course of the past month. Patient states difficulty falling asleep and states that he wakes up after approximately 3 to 4 hours with his heart racing, feels anxious, feels his thoughts racing, and struggles to fall back to sleep. Patient states he has been having issues sleeping for about the past year but feels that it is been worse for the past month. Patient states that he feels anxious about his job security. Patient states that he sees a psychiatrist, is taking Effexor, denies any recent medication adjustments. Patient denies SI/HI/AH/VH. Patient states that he is a daily drinker. Prior to Admission Medications   Prescriptions Last Dose Informant Patient Reported? Taking?   busPIRone (BUSPAR) 10 mg tablet   No No   Sig: Take 1 tablet (10 mg total) by mouth 3 (three) times a day   famotidine (Pepcid) 20 mg tablet   Yes No   Sig: Take 20 mg by mouth daily   hydrOXYzine pamoate (VISTARIL) 25 mg capsule   No No   Sig: Take 1 capsule (25 mg total) by mouth daily at bedtime   venlafaxine (EFFEXOR-XR) 75 mg 24 hr capsule   No No   Sig: Take 1 capsule (75 mg total) by mouth daily      Facility-Administered Medications: None       Past Medical History:   Diagnosis Date   • Anxiety    • Depression    • GERD (gastroesophageal reflux disease)        Past Surgical History:   Procedure Laterality Date   • CLOSED REDUCTION FOREARM FRACTURE Right    • EGD         History reviewed. No pertinent family history.   I have reviewed and agree with the history as documented. E-Cigarette/Vaping   • E-Cigarette Use Never User      E-Cigarette/Vaping Substances   • Nicotine No    • THC No    • CBD No    • Flavoring No    • Other No    • Unknown No      Social History     Tobacco Use   • Smoking status: Never   • Smokeless tobacco: Never   Vaping Use   • Vaping Use: Never used   Substance Use Topics   • Alcohol use: Yes     Comment: daily whiskey 4 bottles per week   • Drug use: Never       Review of Systems   Constitutional: Negative for chills, fatigue and fever. Respiratory: Negative for cough and shortness of breath. Cardiovascular: Negative for chest pain. Gastrointestinal: Negative for diarrhea, nausea and vomiting. Genitourinary: Negative for flank pain. Musculoskeletal: Negative for arthralgias and myalgias. Neurological: Negative for headaches. Psychiatric/Behavioral: Positive for sleep disturbance. Negative for confusion and suicidal ideas. The patient is nervous/anxious. All other systems reviewed and are negative. Physical Exam  Physical Exam  Vitals and nursing note reviewed. Constitutional:       General: He is not in acute distress. Appearance: Normal appearance. He is not ill-appearing, toxic-appearing or diaphoretic. Comments: Well-appearing, nontoxic, nondistressed   HENT:      Head: Normocephalic and atraumatic. Comments: Moist mucous membranes     Right Ear: External ear normal.      Left Ear: External ear normal.   Eyes:      General:         Right eye: No discharge. Left eye: No discharge. Cardiovascular:      Comments: Sinus tachycardia rate of 110. No murmurs rubs or gallops. Extremities warm and well-perfused without mottling  Pulmonary:      Effort: No respiratory distress. Comments: No increased work of breathing. Speaking in complete sentences. Lungs clear to auscultation bilaterally without wheezes, rales, rhonchi.   Satting 96% on room air indicating adequate oxygenation. Abdominal:      General: There is no distension. Musculoskeletal:         General: No deformity. Cervical back: Normal range of motion. Skin:     Findings: No lesion or rash. Neurological:      Mental Status: He is alert and oriented to person, place, and time. Mental status is at baseline. Comments: AAOx4. Pleasant, interactive. No visible tremor. Psychiatric:         Mood and Affect: Mood and affect normal.         Vital Signs  ED Triage Vitals [07/29/23 1534]   Temperature Pulse Respirations Blood Pressure SpO2   (!) 97.4 °F (36.3 °C) (!) 114 20 (!) 144/103 96 %      Temp Source Heart Rate Source Patient Position - Orthostatic VS BP Location FiO2 (%)   Tympanic Monitor Sitting Left arm --      Pain Score       No Pain           Vitals:    07/29/23 1534   BP: (!) 144/103   Pulse: (!) 114   Patient Position - Orthostatic VS: Sitting         Visual Acuity      ED Medications  Medications - No data to display    Diagnostic Studies  Results Reviewed     None                 No orders to display              Procedures  Procedures         ED Course                               SBIRT 22yo+    Flowsheet Row Most Recent Value   Initial Alcohol Screen: US AUDIT-C     1. How often do you have a drink containing alcohol? 6 Filed at: 07/29/2023 1538   2. How many drinks containing alcohol do you have on a typical day you are drinking? 6 Filed at: 07/29/2023 1538   3a. Male UNDER 65: How often do you have five or more drinks on one occasion? 6 Filed at: 07/29/2023 1538   Audit-C Score 18 Filed at: 07/29/2023 1538   Full Alcohol Screen: US AUDIT    4. How often during the last year have you found that you were not able to stop drinking once you had started? 4 Filed at: 07/29/2023 1538   6. How often in past year have you needed a first drink in the morning to get yourself going after a heavy drinking session? 0 Filed at: 07/29/2023 1538   7.  How often in past year have you had feeling of guilt or remorse after drinking? 3 Filed at: 07/29/2023 1532   8. How often in past year have you been unable to remember what happened night before because you had been drinking? 0 Filed at: 07/29/2023 1531   9. Have you or someone else been injured as a result of your drinking? 0 Filed at: 07/29/2023 1538   10. Has a relative, friend, doctor or other health worker been concerned about your drinking and suggested you cut down?  --  [Has never discussed with Caterina Muse at: 07/29/2023 1538   YORDY: How many times in the past year have you. .. Used an illegal drug or used a prescription medication for non-medical reasons? Never Filed at: 07/29/2023 1538                    Medical Decision Making  I obtained history from the patient. I reviewed external medical documentation noting patient's extensive history of generalized anxiety disorder and depression. Patient denies suicidality, states that he feels safe at home and feels he is able to take care of himself, is already seeing a psychiatrist.  Had prolonged discussion regarding sleep hygiene. Patient provided with multiple strategies to help him fall asleep and stay asleep. Offered resources for alcohol counseling but patient not desiring at this time. Encourage patient to return to the emergency department if he changes his mind. Provided patient with reassurance, discharged with return precautions.         Disposition  Final diagnoses:   Insomnia   Generalized anxiety disorder     Time reflects when diagnosis was documented in both MDM as applicable and the Disposition within this note     Time User Action Codes Description Comment    7/29/2023  4:00 PM Brandt García Add [G47.00] Insomnia     7/29/2023  4:00 PM Brandt García Add [F41.1] Generalized anxiety disorder       ED Disposition     ED Disposition   Discharge    Condition   Stable    Date/Time   Sat Jul 29, 2023  4:00 PM    Comment   Dagmar Betancourt discharge to home/self care.               Follow-up Information     Follow up With Specialties Details Why Contact Info Additional Information    [address] Emergency Department Emergency Medicine  If symptoms worsen [address] [address]  [address] Emergency Department, [address] [facility] [address], [place], [place], [address]          Discharge Medication List as of 7/29/2023  4:04 PM      CONTINUE these medications which have NOT CHANGED    Details   busPIRone (BUSPAR) 10 mg tablet Take 1 tablet (10 mg total) by mouth 3 (three) times a day, Starting Mon 6/19/2023, Until Sun 9/17/2023, Normal      famotidine (Pepcid) 20 mg tablet Take 20 mg by mouth daily, Historical Med      hydrOXYzine pamoate (VISTARIL) 25 mg capsule Take 1 capsule (25 mg total) by mouth daily at bedtime, Starting Wed 7/26/2023, Normal      venlafaxine (EFFEXOR-XR) 75 mg 24 hr capsule Take 1 capsule (75 mg total) by mouth daily, Starting Mon 6/19/2023, Until Sun 9/17/2023, Normal             No discharge procedures on file.     PDMP Review     None          ED Provider  Electronically Signed by           Judge Silver MD  07/29/23 5700

## 2023-08-01 ENCOUNTER — HOSPITAL ENCOUNTER (INPATIENT)
Facility: HOSPITAL | Age: 48
LOS: 2 days | Discharge: HOME | End: 2023-08-03
Attending: EMERGENCY MEDICINE | Admitting: EMERGENCY MEDICINE
Payer: COMMERCIAL

## 2023-08-01 ENCOUNTER — HOSPITAL ENCOUNTER (EMERGENCY)
Facility: HOSPITAL | Age: 48
End: 2023-08-01
Attending: EMERGENCY MEDICINE | Admitting: EMERGENCY MEDICINE
Payer: COMMERCIAL

## 2023-08-01 VITALS
TEMPERATURE: 97.9 F | BODY MASS INDEX: 27.57 KG/M2 | DIASTOLIC BLOOD PRESSURE: 92 MMHG | OXYGEN SATURATION: 99 % | HEART RATE: 120 BPM | SYSTOLIC BLOOD PRESSURE: 134 MMHG | RESPIRATION RATE: 20 BRPM | WEIGHT: 209 LBS

## 2023-08-01 DIAGNOSIS — F41.1 GENERALIZED ANXIETY DISORDER: ICD-10-CM

## 2023-08-01 DIAGNOSIS — F10.20 ALCOHOL USE DISORDER, SEVERE, DEPENDENCE (HCC): ICD-10-CM

## 2023-08-01 DIAGNOSIS — F10.939 ALCOHOL WITHDRAWAL (HCC): Primary | ICD-10-CM

## 2023-08-01 DIAGNOSIS — F33.42 MAJOR DEPRESSIVE DISORDER, RECURRENT, IN FULL REMISSION (HCC): ICD-10-CM

## 2023-08-01 PROBLEM — G47.00 INSOMNIA: Status: ACTIVE | Noted: 2023-08-01

## 2023-08-01 PROBLEM — E86.0 DEHYDRATION: Status: ACTIVE | Noted: 2023-08-01

## 2023-08-01 PROBLEM — E87.6 HYPOKALEMIA: Status: ACTIVE | Noted: 2023-08-01

## 2023-08-01 PROBLEM — R74.01 TRANSAMINITIS: Status: ACTIVE | Noted: 2023-08-01

## 2023-08-01 LAB
ALBUMIN SERPL BCP-MCNC: 4.5 G/DL (ref 3.5–5)
ALP SERPL-CCNC: 61 U/L (ref 34–104)
ALT SERPL W P-5'-P-CCNC: 64 U/L (ref 7–52)
AMPHETAMINES SERPL QL SCN: NEGATIVE
ANION GAP SERPL CALCULATED.3IONS-SCNC: 14 MMOL/L
AST SERPL W P-5'-P-CCNC: 142 U/L (ref 13–39)
BARBITURATES UR QL: NEGATIVE
BASOPHILS # BLD AUTO: 0.06 THOUSANDS/ÂΜL (ref 0–0.1)
BASOPHILS NFR BLD AUTO: 1 % (ref 0–1)
BENZODIAZ UR QL: NEGATIVE
BILIRUB SERPL-MCNC: 1.72 MG/DL (ref 0.2–1)
BUN SERPL-MCNC: 9 MG/DL (ref 5–25)
CALCIUM SERPL-MCNC: 9 MG/DL (ref 8.4–10.2)
CHLORIDE SERPL-SCNC: 99 MMOL/L (ref 96–108)
CO2 SERPL-SCNC: 21 MMOL/L (ref 21–32)
COCAINE UR QL: NEGATIVE
CREAT SERPL-MCNC: 0.96 MG/DL (ref 0.6–1.3)
EOSINOPHIL # BLD AUTO: 0.01 THOUSAND/ÂΜL (ref 0–0.61)
EOSINOPHIL NFR BLD AUTO: 0 % (ref 0–6)
ERYTHROCYTE [DISTWIDTH] IN BLOOD BY AUTOMATED COUNT: 12 % (ref 11.6–15.1)
ETHANOL SERPL-MCNC: 311 MG/DL
GFR SERPL CREATININE-BSD FRML MDRD: 94 ML/MIN/1.73SQ M
GLUCOSE SERPL-MCNC: 102 MG/DL (ref 65–140)
HCT VFR BLD AUTO: 50 % (ref 36.5–49.3)
HGB BLD-MCNC: 18.2 G/DL (ref 12–17)
IMM GRANULOCYTES # BLD AUTO: 0.03 THOUSAND/UL (ref 0–0.2)
IMM GRANULOCYTES NFR BLD AUTO: 0 % (ref 0–2)
LIPASE SERPL-CCNC: 47 U/L (ref 11–82)
LYMPHOCYTES # BLD AUTO: 1.98 THOUSANDS/ÂΜL (ref 0.6–4.47)
LYMPHOCYTES NFR BLD AUTO: 19 % (ref 14–44)
MAGNESIUM SERPL-MCNC: 2 MG/DL (ref 1.9–2.7)
MCH RBC QN AUTO: 31.3 PG (ref 26.8–34.3)
MCHC RBC AUTO-ENTMCNC: 36.4 G/DL (ref 31.4–37.4)
MCV RBC AUTO: 86 FL (ref 82–98)
METHADONE UR QL: NEGATIVE
MONOCYTES # BLD AUTO: 1.07 THOUSAND/ÂΜL (ref 0.17–1.22)
MONOCYTES NFR BLD AUTO: 10 % (ref 4–12)
NEUTROPHILS # BLD AUTO: 7.4 THOUSANDS/ÂΜL (ref 1.85–7.62)
NEUTS SEG NFR BLD AUTO: 70 % (ref 43–75)
NRBC BLD AUTO-RTO: 0 /100 WBCS
OPIATES UR QL SCN: NEGATIVE
OXYCODONE+OXYMORPHONE UR QL SCN: NEGATIVE
PCP UR QL: NEGATIVE
PLATELET # BLD AUTO: 366 THOUSANDS/UL (ref 149–390)
PMV BLD AUTO: 9.2 FL (ref 8.9–12.7)
POTASSIUM SERPL-SCNC: 3.4 MMOL/L (ref 3.5–5.3)
PROT SERPL-MCNC: 7.4 G/DL (ref 6.4–8.4)
RBC # BLD AUTO: 5.82 MILLION/UL (ref 3.88–5.62)
SODIUM SERPL-SCNC: 134 MMOL/L (ref 135–147)
THC UR QL: NEGATIVE
WBC # BLD AUTO: 10.55 THOUSAND/UL (ref 4.31–10.16)

## 2023-08-01 PROCEDURE — HZ2ZZZZ DETOXIFICATION SERVICES FOR SUBSTANCE ABUSE TREATMENT: ICD-10-PCS | Performed by: EMERGENCY MEDICINE

## 2023-08-01 PROCEDURE — 82077 ASSAY SPEC XCP UR&BREATH IA: CPT | Performed by: PHYSICIAN ASSISTANT

## 2023-08-01 PROCEDURE — 36415 COLL VENOUS BLD VENIPUNCTURE: CPT | Performed by: PHYSICIAN ASSISTANT

## 2023-08-01 PROCEDURE — 83690 ASSAY OF LIPASE: CPT | Performed by: PHYSICIAN ASSISTANT

## 2023-08-01 PROCEDURE — 85025 COMPLETE CBC W/AUTO DIFF WBC: CPT | Performed by: PHYSICIAN ASSISTANT

## 2023-08-01 PROCEDURE — 99291 CRITICAL CARE FIRST HOUR: CPT | Performed by: PHYSICIAN ASSISTANT

## 2023-08-01 PROCEDURE — 83735 ASSAY OF MAGNESIUM: CPT | Performed by: PHYSICIAN ASSISTANT

## 2023-08-01 PROCEDURE — 80307 DRUG TEST PRSMV CHEM ANLYZR: CPT | Performed by: PHYSICIAN ASSISTANT

## 2023-08-01 PROCEDURE — 99223 1ST HOSP IP/OBS HIGH 75: CPT

## 2023-08-01 PROCEDURE — 93005 ELECTROCARDIOGRAM TRACING: CPT

## 2023-08-01 PROCEDURE — 80053 COMPREHEN METABOLIC PANEL: CPT | Performed by: PHYSICIAN ASSISTANT

## 2023-08-01 RX ORDER — DIAZEPAM 5 MG/ML
10 INJECTION, SOLUTION INTRAMUSCULAR; INTRAVENOUS ONCE
Status: COMPLETED | OUTPATIENT
Start: 2023-08-01 | End: 2023-08-01

## 2023-08-01 RX ORDER — LORAZEPAM 2 MG/ML
1 INJECTION INTRAMUSCULAR ONCE
Status: COMPLETED | OUTPATIENT
Start: 2023-08-01 | End: 2023-08-01

## 2023-08-01 RX ORDER — ACETAMINOPHEN 325 MG/1
650 TABLET ORAL EVERY 6 HOURS PRN
Status: CANCELLED | OUTPATIENT
Start: 2023-08-01

## 2023-08-01 RX ORDER — ONDANSETRON 2 MG/ML
4 INJECTION INTRAMUSCULAR; INTRAVENOUS EVERY 6 HOURS PRN
Status: CANCELLED | OUTPATIENT
Start: 2023-08-01

## 2023-08-01 RX ORDER — ENOXAPARIN SODIUM 100 MG/ML
40 INJECTION SUBCUTANEOUS DAILY
Status: DISCONTINUED | OUTPATIENT
Start: 2023-08-02 | End: 2023-08-03 | Stop reason: HOSPADM

## 2023-08-01 RX ORDER — VENLAFAXINE HYDROCHLORIDE 75 MG/1
75 CAPSULE, EXTENDED RELEASE ORAL DAILY
Status: DISCONTINUED | OUTPATIENT
Start: 2023-08-02 | End: 2023-08-03 | Stop reason: HOSPADM

## 2023-08-01 RX ORDER — TRAZODONE HYDROCHLORIDE 50 MG/1
50 TABLET ORAL
Status: DISCONTINUED | OUTPATIENT
Start: 2023-08-01 | End: 2023-08-03 | Stop reason: HOSPADM

## 2023-08-01 RX ORDER — BUSPIRONE HYDROCHLORIDE 10 MG/1
10 TABLET ORAL 3 TIMES DAILY
Status: DISCONTINUED | OUTPATIENT
Start: 2023-08-01 | End: 2023-08-03 | Stop reason: HOSPADM

## 2023-08-01 RX ORDER — POTASSIUM CHLORIDE 14.9 MG/ML
20 INJECTION INTRAVENOUS ONCE
Status: COMPLETED | OUTPATIENT
Start: 2023-08-01 | End: 2023-08-01

## 2023-08-01 RX ORDER — ONDANSETRON 2 MG/ML
4 INJECTION INTRAMUSCULAR; INTRAVENOUS EVERY 6 HOURS PRN
Status: DISCONTINUED | OUTPATIENT
Start: 2023-08-01 | End: 2023-08-03 | Stop reason: HOSPADM

## 2023-08-01 RX ORDER — TRAZODONE HYDROCHLORIDE 50 MG/1
50 TABLET ORAL
Status: CANCELLED | OUTPATIENT
Start: 2023-08-01

## 2023-08-01 RX ORDER — ENOXAPARIN SODIUM 100 MG/ML
40 INJECTION SUBCUTANEOUS DAILY
Status: CANCELLED | OUTPATIENT
Start: 2023-08-02

## 2023-08-01 RX ORDER — PANTOPRAZOLE SODIUM 40 MG/1
40 TABLET, DELAYED RELEASE ORAL
Status: DISCONTINUED | OUTPATIENT
Start: 2023-08-02 | End: 2023-08-03 | Stop reason: HOSPADM

## 2023-08-01 RX ORDER — SODIUM CHLORIDE 9 MG/ML
100 INJECTION, SOLUTION INTRAVENOUS CONTINUOUS
Status: CANCELLED | OUTPATIENT
Start: 2023-08-01

## 2023-08-01 RX ORDER — SODIUM CHLORIDE 9 MG/ML
100 INJECTION, SOLUTION INTRAVENOUS CONTINUOUS
Status: DISCONTINUED | OUTPATIENT
Start: 2023-08-01 | End: 2023-08-02

## 2023-08-01 RX ORDER — ACETAMINOPHEN 325 MG/1
650 TABLET ORAL EVERY 6 HOURS PRN
Status: DISCONTINUED | OUTPATIENT
Start: 2023-08-01 | End: 2023-08-03 | Stop reason: HOSPADM

## 2023-08-01 RX ADMIN — SODIUM CHLORIDE 100 ML/HR: 0.9 INJECTION, SOLUTION INTRAVENOUS at 22:47

## 2023-08-01 RX ADMIN — DIAZEPAM 10 MG: 10 INJECTION, SOLUTION INTRAMUSCULAR; INTRAVENOUS at 21:35

## 2023-08-01 RX ADMIN — Medication 650 MG: at 22:56

## 2023-08-01 RX ADMIN — DIAZEPAM 10 MG: 10 INJECTION, SOLUTION INTRAMUSCULAR; INTRAVENOUS at 18:37

## 2023-08-01 RX ADMIN — BUSPIRONE HYDROCHLORIDE 10 MG: 10 TABLET ORAL at 22:51

## 2023-08-01 RX ADMIN — LORAZEPAM 1 MG: 2 INJECTION INTRAMUSCULAR; INTRAVENOUS at 17:29

## 2023-08-01 RX ADMIN — THIAMINE HYDROCHLORIDE 100 MG: 100 INJECTION, SOLUTION INTRAMUSCULAR; INTRAVENOUS at 17:30

## 2023-08-01 RX ADMIN — POTASSIUM CHLORIDE 20 MEQ: 14.9 INJECTION, SOLUTION INTRAVENOUS at 20:18

## 2023-08-01 RX ADMIN — SODIUM CHLORIDE 1000 ML: 0.9 INJECTION, SOLUTION INTRAVENOUS at 16:59

## 2023-08-01 RX ADMIN — FOLIC ACID 1 MG: 5 INJECTION, SOLUTION INTRAMUSCULAR; INTRAVENOUS; SUBCUTANEOUS at 18:13

## 2023-08-01 NOTE — EMTALA/ACUTE CARE TRANSFER
[address] EMERGENCY DEPARTMENT  [address]  [address] [address]  Dept: [phone]      EMTALA TRANSFER CONSENT    NAME Warren HOWARD 1975                              MRN [id number]    I have been informed of my rights regarding examination, treatment, and transfer   by Dr. Linda Miller MD    Benefits: Specialized equipment and/or services available at the receiving facility (Include comment)________________________    Risks: Potential for delay in receiving treatment      Consent for Transfer:  I acknowledge that my medical condition has been evaluated and explained to me by the emergency department physician or other qualified medical person and/or my attending physician, who has recommended that I be transferred to the service of  Accepting Physician: Abena Heard at [address] [facility] [place] [address] Name, [address] : [address] [place] [address] Dr Detox. The above potential benefits of such transfer, the potential risks associated with such transfer, and the probable risks of not being transferred have been explained to me, and I fully understand them. The doctor has explained that, in my case, the benefits of transfer outweigh the risks. I agree to be transferred. I authorize the performance of emergency medical procedures and treatments upon me in both transit and upon arrival at the receiving facility. Additionally, I authorize the release of any and all medical records to the receiving facility and request they be transported with me, if possible. I understand that the safest mode of transportation during a medical emergency is an ambulance and that the Hospital advocates the use of this mode of transport. Risks of traveling to the receiving facility by car, including absence of medical control, life sustaining equipment, such as oxygen, and medical personnel has been explained to me and I fully understand them.     ([address])  [  ]  I consent to the stated transfer and to be transported by ambulance/helicopter. [  ]  I consent to the stated transfer, but refuse transportation by ambulance and accept full responsibility for my transportation by car. I understand the risks of non-ambulance transfers and I exonerate the Hospital and its staff from any deterioration in my condition that results from this refusal.    X___________________________________________    DATE  23  TIME________  Signature of patient or legally responsible individual signing on patient behalf           RELATIONSHIP TO PATIENT_________________________          Provider Certification    NAME Jose Buenrostro                                         1975                              MRN [id number]    A medical screening exam was performed on the above named patient. Based on the examination:    Condition Necessitating Transfer The encounter diagnosis was Alcohol withdrawal ([address]). Patient Condition: The patient has been stabilized such that within reasonable medical probability, no material deterioration of the patient condition or the condition of the unborn child(stephanie) is likely to result from the transfer    Reason for Transfer: Level of Care needed not available at this facility    Transfer Requirements: [address]   · Space available and qualified personnel available for treatment as acknowledged by PACS  · Agreed to accept transfer and to provide appropriate medical treatment as acknowledged by       [place]  · Appropriate medical records of the examination and treatment of the patient are provided at the time of transfer   [address] _______  · Transfer will be performed by qualified personnel from    and appropriate transfer equipment as required, including the use of necessary and appropriate life support measures.     Provider Certification: I have examined the patient and explained the following risks and benefits of being transferred/refusing transfer to the patient/family:  General risk, such as traffic hazards, adverse weather conditions, rough terrain or turbulence, possible failure of equipment (including vehicle or aircraft), or consequences of actions of persons outside the control of the transport personnel, Risk of worsening condition, Unanticipated needs of medical equipment and personnel during transport      Based on these reasonable risks and benefits to the patient and/or the unborn child(stephanie), and based upon the information available at the time of the patient’s examination, I certify that the medical benefits reasonably to be expected from the provision of appropriate medical treatments at another medical facility outweigh the increasing risks, if any, to the individual’s medical condition, and in the case of labor to the unborn child, from effecting the transfer.     X____________________________________________ DATE 08/01/23        TIME_______      ORIGINAL - SEND TO MEDICAL RECORDS   COPY - SEND WITH PATIENT DURING TRANSFER

## 2023-08-01 NOTE — LETTER
[address]   [address] [facility] [address]  [phone]  Dept: [phone]    August 9, 2023     Patient: Caprice Barakat   YOB: 1975   Date of Visit: 8/1/2023       To Whom it May Concern:    Caprice Barakat is under my professional care. He was seen in the hospital from 8/1/2023 to 8/3/2023. He may return to work on 8/11/2023 without limitations. If you have any questions or concerns, please don't hesitate to call.          Sincerely,          Flaco Lloyd

## 2023-08-01 NOTE — ED PROVIDER NOTES
History  Chief Complaint   Patient presents with   • Withdrawal - Alcohol     States on a binge for about a week. Last drink was at 11 am. " I'm having some serious withdrawal ". /119   • Insomnia     Patient states he has not slept for a week and the Vistaril he was given does not work. Patient states he is very anxious. He continues to work . Patient restless in chair. Patient is a 16-year-old white male with history of alcohol abuse, depression, GERD who  reports he has been a daily drinker for the past 12 years and has been on a binge of alcohol for the past 1 week. Drinks multiple shots daily. States he lives alone. Denies SI or HI. Reports  he is drinking more because he is concerned about his job. He has not been to work for the past week. Denies illicit drug use. Reports he chews tobacco.  No prior history of alcohol withdrawal seizures. No prior history of inpatient detox. Denies auditory or visual hallucinations. States he has not been able to sleep for the past week. His last drink estimated at 12 noon today. No abdominal pain or vomiting today          Prior to Admission Medications   Prescriptions Last Dose Informant Patient Reported? Taking?   busPIRone (BUSPAR) 10 mg tablet   No No   Sig: Take 1 tablet (10 mg total) by mouth 3 (three) times a day   famotidine (Pepcid) 20 mg tablet   Yes No   Sig: Take 20 mg by mouth daily   hydrOXYzine pamoate (VISTARIL) 25 mg capsule   No No   Sig: Take 1 capsule (25 mg total) by mouth daily at bedtime   venlafaxine (EFFEXOR-XR) 75 mg 24 hr capsule   No No   Sig: Take 1 capsule (75 mg total) by mouth daily      Facility-Administered Medications: None       Past Medical History:   Diagnosis Date   • Anxiety    • Depression    • ETOH abuse    • GERD (gastroesophageal reflux disease)        Past Surgical History:   Procedure Laterality Date   • CLOSED REDUCTION FOREARM FRACTURE Right    • EGD         History reviewed.  No pertinent family history. I have reviewed and agree with the history as documented. E-Cigarette/Vaping   • E-Cigarette Use Never User      E-Cigarette/Vaping Substances   • Nicotine No    • THC No    • CBD No    • Flavoring No    • Other No    • Unknown No      Social History     Tobacco Use   • Smoking status: Never   • Smokeless tobacco: Never   Vaping Use   • Vaping Use: Never used   Substance Use Topics   • Alcohol use: Yes     Comment: daily whiskey 4 bottles per week   • Drug use: Never       Review of Systems   Constitutional: Negative for chills and fever. HENT: Negative for ear pain and sore throat. Respiratory: Negative for cough and shortness of breath. Cardiovascular: Negative for chest pain and palpitations. Gastrointestinal: Negative for abdominal pain and vomiting. Genitourinary: Negative for dysuria and hematuria. Musculoskeletal: Negative for arthralgias and back pain. Skin: Negative for color change and rash. Neurological: Negative for seizures and syncope. All other systems reviewed and are negative. Physical Exam  Physical Exam  Vitals and nursing note reviewed. Constitutional:       General: He is not in acute distress. Appearance: He is not ill-appearing, toxic-appearing or diaphoretic. HENT:      Head: Normocephalic and atraumatic. Right Ear: Tympanic membrane, ear canal and external ear normal.      Left Ear: Tympanic membrane, ear canal and external ear normal.      Nose: Nose normal.      Mouth/Throat:      Mouth: Mucous membranes are dry. Pharynx: Oropharynx is clear. Eyes:      Conjunctiva/sclera: Conjunctivae normal.      Pupils: Pupils are equal, round, and reactive to light. Cardiovascular:      Rate and Rhythm: Tachycardia present. Heart sounds: Normal heart sounds. Pulmonary:      Effort: Pulmonary effort is normal.      Breath sounds: Normal breath sounds. Abdominal:      General: Abdomen is flat.  Bowel sounds are normal. There is no distension. Palpations: Abdomen is soft. There is no mass. Tenderness: There is no abdominal tenderness. There is no right CVA tenderness, left CVA tenderness, guarding or rebound. Hernia: No hernia is present. Musculoskeletal:         General: Normal range of motion. Cervical back: Normal range of motion and neck supple. Skin:     General: Skin is warm and dry. Capillary Refill: Capillary refill takes less than 2 seconds. Neurological:      General: No focal deficit present. Mental Status: He is alert and oriented to person, place, and time. Mental status is at baseline. Cranial Nerves: No cranial nerve deficit. Sensory: No sensory deficit.       Coordination: Coordination normal.         Vital Signs  ED Triage Vitals [08/01/23 1617]   Temperature Pulse Respirations Blood Pressure SpO2   97.9 °F (36.6 °C) (!) 122 20 (!) 208/119 97 %      Temp Source Heart Rate Source Patient Position - Orthostatic VS BP Location FiO2 (%)   Tympanic Monitor Sitting Left arm --      Pain Score       --           Vitals:    08/01/23 1617 08/01/23 1812   BP: (!) 208/119 132/85   Pulse: (!) 122 (!) 108   Patient Position - Orthostatic VS: Sitting Lying         Visual Acuity      ED Medications  Medications   potassium chloride 20 mEq IVPB (premix) (has no administration in time range)   thiamine (VITAMIN B1) 100 mg in sodium chloride 0.9 % 50 mL IVPB (0 mg Intravenous Stopped 5/7/90 1733)   folic acid 1 mg in sodium chloride 0.9 % 50 mL IVPB (1 mg Intravenous New Bag 8/1/23 1813)   sodium chloride 0.9 % bolus 1,000 mL (1,000 mL Intravenous New Bag 8/1/23 1659)   LORazepam (ATIVAN) injection 1 mg (1 mg Intravenous Given 8/1/23 1729)   diazepam (VALIUM) injection 10 mg (10 mg Intravenous Given 8/1/23 1837)       Diagnostic Studies  Results Reviewed     Procedure Component Value Units Date/Time    Ethanol level [[id number]]  (Abnormal) Collected: 08/01/23 1658    Lab Status: Final result Specimen: Blood from Arm, Left Updated: 08/01/23 1748     Ethanol Lvl 311 mg/dL     Comprehensive metabolic panel [[id number]]  (Abnormal) Collected: 08/01/23 1658    Lab Status: Final result Specimen: Blood from Arm, Left Updated: 08/01/23 1748     Sodium 134 mmol/L      Potassium 3.4 mmol/L      Chloride 99 mmol/L      CO2 21 mmol/L      ANION GAP 14 mmol/L      BUN 9 mg/dL      Creatinine 0.96 mg/dL      Glucose 102 mg/dL      Calcium 9.0 mg/dL       U/L      ALT 64 U/L      Alkaline Phosphatase 61 U/L      Total Protein 7.4 g/dL      Albumin 4.5 g/dL      Total Bilirubin 1.72 mg/dL      eGFR 94 ml/min/1.73sq m     Narrative:      [place] guidelines for Chronic Kidney Disease (CKD):   •  Stage 1 with normal or high GFR (GFR > 90 mL/min/1.73 square meters)  •  Stage 2 Mild CKD (GFR = 60-89 mL/min/1.73 square meters)  •  Stage 3A Moderate CKD (GFR = 45-59 mL/min/1.73 square meters)  •  Stage 3B Moderate CKD (GFR = 30-44 mL/min/1.73 square meters)  •  Stage 4 Severe CKD (GFR = 15-29 mL/min/1.73 square meters)  •  Stage 5 End Stage CKD (GFR <15 mL/min/1.73 square meters)  Note: GFR calculation is accurate only with a steady state creatinine    Lipase [[id number]]  (Normal) Collected: 08/01/23 1658    Lab Status: Final result Specimen: Blood from Arm, Left Updated: 08/01/23 1748     Lipase 47 u/L     Magnesium [[id number]]  (Normal) Collected: 08/01/23 1658    Lab Status: Final result Specimen: Blood from Arm, Left Updated: 08/01/23 1748     Magnesium 2.0 mg/dL     CBC and differential [[id number]]  (Abnormal) Collected: 08/01/23 1658    Lab Status: Final result Specimen: Blood from Arm, Left Updated: 08/01/23 1728     WBC 10.55 Thousand/uL      RBC 5.82 Million/uL      Hemoglobin 18.2 g/dL      Hematocrit 50.0 %      MCV 86 fL      MCH 31.3 pg      MCHC 36.4 g/dL      RDW 12.0 %      MPV 9.2 fL      Platelets 035 Thousands/uL      nRBC 0 /100 WBCs      Neutrophils Relative 70 % Immat GRANS % 0 %      Lymphocytes Relative 19 %      Monocytes Relative 10 %      Eosinophils Relative 0 %      Basophils Relative 1 %      Neutrophils Absolute 7.40 Thousands/µL      Immature Grans Absolute 0.03 Thousand/uL      Lymphocytes Absolute 1.98 Thousands/µL      Monocytes Absolute 1.07 Thousand/µL      Eosinophils Absolute 0.01 Thousand/µL      Basophils Absolute 0.06 Thousands/µL     Rapid drug screen, urine [[id number]]     Lab Status: No result Specimen: Urine                  No orders to display              Procedures  ECG 12 Lead Documentation Only    Date/Time: 8/1/2023 5:55 PM    Performed by: Edouard Lane PA-C  Authorized by: Edouard Lane PA-C    Patient location:  ED  Rate:     ECG rate:  108    ECG rate assessment: tachycardic    Rhythm:     Rhythm: sinus tachycardia    Ectopy:     Ectopy: none    QRS:     QRS axis:  Left    QRS intervals:  Normal  ST segments:     ST segments:  Non-specific  T waves:     T waves: non-specific      CriticalCare Time    Date/Time: 8/1/2023 6:48 PM    Performed by: Edouard Lane PA-C  Authorized by: Edouard Lane PA-C    Critical care provider statement:     Critical care time (minutes):  40    Critical care was necessary to treat or prevent imminent or life-threatening deterioration of the following conditions:  Toxidrome    Critical care was time spent personally by me on the following activities:  Discussions with consultants, evaluation of patient's response to treatment, examination of patient, obtaining history from patient or surrogate, development of treatment plan with patient or surrogate, ordering and review of laboratory studies, re-evaluation of patient's condition, review of old charts and ordering and performing treatments and interventions    I assumed direction of critical care for this patient from another provider in my specialty: no               ED Course                               SBIRT 20yo+    Flowsheet Row Most Recent Value   Initial Alcohol Screen: US AUDIT-C     1. How often do you have a drink containing alcohol? 6 Filed at: 08/01/2023 1620   2. How many drinks containing alcohol do you have on a typical day you are drinking? 6 Filed at: 08/01/2023 1620   3b. FEMALE Any Age, or MALE 65+: How often do you have 4 or more drinks on one occassion? 6 Filed at: 08/01/2023 1620   Audit-C Score 18 Filed at: 08/01/2023 1620   Full Alcohol Screen: US AUDIT    4. How often during the last year have you found that you were not able to stop drinking once you had started? 4 Filed at: 08/01/2023 1620   5. How often during past year have you failed to do what was normally expected of you because of drinking? 0 Filed at: 08/01/2023 1620   6. How often in past year have you needed a first drink in the morning to get yourself going after a heavy drinking session? 0 Filed at: 08/01/2023 1620   7. How often in past year have you had feeling of guilt or remorse after drinking? 4 Filed at: 08/01/2023 1620   8. How often in past year have you been unable to remember what happened night before because you had been drinking? 0 Filed at: 08/01/2023 1620   9. Have you or someone else been injured as a result of your drinking? 0 Filed at: 08/01/2023 1620   10. Has a relative, friend, doctor or other health worker been concerned about your drinking and suggested you cut down? 4 Filed at: 08/01/2023 1620   AUDIT Total Score 30 Filed at: 08/01/2023 1620   YORDY: How many times in the past year have you. .. Used an illegal drug or used a prescription medication for non-medical reasons? Never Filed at: 08/01/2023 1620                    Medical Decision Making  Labs reviewed. Slight hypokalemia with K of 3.4 and slight hyponatremia with sodium of 134. There is elevation of his liver transaminases. He was given IV fluid hydration, folic acid and thiamine.   He was initially given Ativan 1 mg IV with improvement in his blood pressure and symptoms. Still slightly symptomatic so given Valium IV. [facility]  on-call Dr. Nany Macedo and Zohreh Mattson PA-C  who accepted in transfer to [facility] detox unit     Amount and/or Complexity of Data Reviewed  Labs: ordered. Risk  Prescription drug management. Disposition  Final diagnoses:   Alcohol withdrawal ([address])     Time reflects when diagnosis was documented in both MDM as applicable and the Disposition within this note     Time User Action Codes Description Comment    8/1/2023  6:14 PM Pedro Gabriel Add [L10.362] Alcohol withdrawal [facility])       ED Disposition     ED Disposition   Transfer to Another Facility-In Network    Condition   --    Date/Time   Tue Aug 1, 2023  6:14 PM    Comment   Yesenia Butts should be transferred out to [facility].            MD Documentation    Woodie Pallas Most Recent Value   Patient Condition The patient has been stabilized such that within reasonable medical probability, no material deterioration of the patient condition or the condition of the unborn child(stephanie) is likely to result from the transfer   Reason for Transfer Level of Care needed not available at this facility   Benefits of Transfer Specialized equipment and/or services available at the receiving facility (Include comment)________________________   Risks of Transfer Potential for delay in receiving treatment   Accepting Physician Chiquis Jensen Name, [address] [facility]    (Name & Tel number) PACS   Sending MD [facility]   Provider Certification General risk, such as traffic hazards, adverse weather conditions, rough terrain or turbulence, possible failure of equipment (including vehicle or aircraft), or consequences of actions of persons outside the control of the transport personnel, Risk of worsening condition, Unanticipated needs of medical equipment and personnel during transport      RN Documentation    Flowsheet Row Most Recent Value   Accepting Facility Name, [address] [facility]    (Name & Tel number) PACS      Follow-up Information    None         Patient's Medications   Discharge Prescriptions    No medications on file       No discharge procedures on file.     PDMP Review     None          ED Provider  Electronically Signed by           Angus Little PA-C  08/01/23 1168

## 2023-08-02 PROBLEM — E83.42 HYPOMAGNESEMIA: Status: ACTIVE | Noted: 2023-08-02

## 2023-08-02 LAB
ALBUMIN SERPL BCP-MCNC: 4 G/DL (ref 3.5–5)
ALP SERPL-CCNC: 54 U/L (ref 34–104)
ALT SERPL W P-5'-P-CCNC: 51 U/L (ref 7–52)
ANION GAP SERPL CALCULATED.3IONS-SCNC: 11 MMOL/L
AST SERPL W P-5'-P-CCNC: 104 U/L (ref 13–39)
ATRIAL RATE: 108 BPM
BILIRUB SERPL-MCNC: 2.72 MG/DL (ref 0.2–1)
BUN SERPL-MCNC: 8 MG/DL (ref 5–25)
CALCIUM SERPL-MCNC: 7.8 MG/DL (ref 8.4–10.2)
CHLORIDE SERPL-SCNC: 104 MMOL/L (ref 96–108)
CO2 SERPL-SCNC: 21 MMOL/L (ref 21–32)
CREAT SERPL-MCNC: 0.83 MG/DL (ref 0.6–1.3)
ERYTHROCYTE [DISTWIDTH] IN BLOOD BY AUTOMATED COUNT: 11.9 % (ref 11.6–15.1)
GFR SERPL CREATININE-BSD FRML MDRD: 105 ML/MIN/1.73SQ M
GLUCOSE SERPL-MCNC: 112 MG/DL (ref 65–140)
HCT VFR BLD AUTO: 43.8 % (ref 36.5–49.3)
HGB BLD-MCNC: 15.4 G/DL (ref 12–17)
MAGNESIUM SERPL-MCNC: 1.7 MG/DL (ref 1.9–2.7)
MCH RBC QN AUTO: 30.9 PG (ref 26.8–34.3)
MCHC RBC AUTO-ENTMCNC: 35.2 G/DL (ref 31.4–37.4)
MCV RBC AUTO: 88 FL (ref 82–98)
P AXIS: 35 DEGREES
PLATELET # BLD AUTO: 262 THOUSANDS/UL (ref 149–390)
PMV BLD AUTO: 9.4 FL (ref 8.9–12.7)
POTASSIUM SERPL-SCNC: 3.1 MMOL/L (ref 3.5–5.3)
PR INTERVAL: 136 MS
PROT SERPL-MCNC: 6.1 G/DL (ref 6.4–8.4)
QRS AXIS: -29 DEGREES
QRSD INTERVAL: 94 MS
QT INTERVAL: 352 MS
QTC INTERVAL: 471 MS
RBC # BLD AUTO: 4.98 MILLION/UL (ref 3.88–5.62)
SODIUM SERPL-SCNC: 136 MMOL/L (ref 135–147)
T WAVE AXIS: 23 DEGREES
VENTRICULAR RATE: 108 BPM
WBC # BLD AUTO: 8.13 THOUSAND/UL (ref 4.31–10.16)

## 2023-08-02 PROCEDURE — 80053 COMPREHEN METABOLIC PANEL: CPT

## 2023-08-02 PROCEDURE — 93010 ELECTROCARDIOGRAM REPORT: CPT | Performed by: INTERNAL MEDICINE

## 2023-08-02 PROCEDURE — 83735 ASSAY OF MAGNESIUM: CPT

## 2023-08-02 PROCEDURE — 85027 COMPLETE CBC AUTOMATED: CPT

## 2023-08-02 PROCEDURE — 99233 SBSQ HOSP IP/OBS HIGH 50: CPT | Performed by: EMERGENCY MEDICINE

## 2023-08-02 RX ORDER — POTASSIUM CHLORIDE 14.9 MG/ML
20 INJECTION INTRAVENOUS
Status: COMPLETED | OUTPATIENT
Start: 2023-08-02 | End: 2023-08-02

## 2023-08-02 RX ORDER — PHENOBARBITAL SODIUM 130 MG/ML
130 INJECTION INTRAMUSCULAR ONCE
Status: COMPLETED | OUTPATIENT
Start: 2023-08-02 | End: 2023-08-02

## 2023-08-02 RX ORDER — PHENOBARBITAL SODIUM 130 MG/ML
260 INJECTION INTRAMUSCULAR ONCE
Status: COMPLETED | OUTPATIENT
Start: 2023-08-02 | End: 2023-08-02

## 2023-08-02 RX ORDER — POTASSIUM CHLORIDE 20 MEQ/1
40 TABLET, EXTENDED RELEASE ORAL ONCE
Status: COMPLETED | OUTPATIENT
Start: 2023-08-02 | End: 2023-08-02

## 2023-08-02 RX ORDER — MAGNESIUM SULFATE HEPTAHYDRATE 40 MG/ML
2 INJECTION, SOLUTION INTRAVENOUS ONCE
Status: COMPLETED | OUTPATIENT
Start: 2023-08-02 | End: 2023-08-02

## 2023-08-02 RX ORDER — GABAPENTIN 300 MG/1
300 CAPSULE ORAL 3 TIMES DAILY
Status: DISCONTINUED | OUTPATIENT
Start: 2023-08-02 | End: 2023-08-03 | Stop reason: HOSPADM

## 2023-08-02 RX ADMIN — PHENOBARBITAL SODIUM 130 MG: 130 INJECTION INTRAMUSCULAR at 19:51

## 2023-08-02 RX ADMIN — PANTOPRAZOLE SODIUM 40 MG: 40 TABLET, DELAYED RELEASE ORAL at 05:02

## 2023-08-02 RX ADMIN — ENOXAPARIN SODIUM 40 MG: 40 INJECTION SUBCUTANEOUS at 08:41

## 2023-08-02 RX ADMIN — FOLIC ACID: 5 INJECTION, SOLUTION INTRAMUSCULAR; INTRAVENOUS; SUBCUTANEOUS at 11:08

## 2023-08-02 RX ADMIN — PHENOBARBITAL SODIUM 260 MG: 130 INJECTION INTRAMUSCULAR at 02:17

## 2023-08-02 RX ADMIN — PHENOBARBITAL SODIUM 260 MG: 130 INJECTION INTRAMUSCULAR at 00:24

## 2023-08-02 RX ADMIN — BUSPIRONE HYDROCHLORIDE 10 MG: 10 TABLET ORAL at 21:44

## 2023-08-02 RX ADMIN — PHENOBARBITAL SODIUM 130 MG: 130 INJECTION INTRAMUSCULAR at 15:46

## 2023-08-02 RX ADMIN — BUSPIRONE HYDROCHLORIDE 10 MG: 10 TABLET ORAL at 15:47

## 2023-08-02 RX ADMIN — SODIUM CHLORIDE 100 ML/HR: 0.9 INJECTION, SOLUTION INTRAVENOUS at 09:24

## 2023-08-02 RX ADMIN — POTASSIUM CHLORIDE 40 MEQ: 1500 TABLET, EXTENDED RELEASE ORAL at 08:37

## 2023-08-02 RX ADMIN — BUSPIRONE HYDROCHLORIDE 10 MG: 10 TABLET ORAL at 08:38

## 2023-08-02 RX ADMIN — POTASSIUM CHLORIDE 20 MEQ: 14.9 INJECTION, SOLUTION INTRAVENOUS at 08:38

## 2023-08-02 RX ADMIN — TRAZODONE HYDROCHLORIDE 50 MG: 50 TABLET ORAL at 21:44

## 2023-08-02 RX ADMIN — GABAPENTIN 300 MG: 300 CAPSULE ORAL at 21:44

## 2023-08-02 RX ADMIN — PHENOBARBITAL SODIUM 130 MG: 130 INJECTION INTRAMUSCULAR at 12:42

## 2023-08-02 RX ADMIN — VENLAFAXINE HYDROCHLORIDE 75 MG: 75 CAPSULE, EXTENDED RELEASE ORAL at 08:38

## 2023-08-02 RX ADMIN — PHENOBARBITAL SODIUM 130 MG: 130 INJECTION INTRAMUSCULAR at 05:23

## 2023-08-02 RX ADMIN — MAGNESIUM SULFATE HEPTAHYDRATE 2 G: 2 INJECTION, SOLUTION INTRAVENOUS at 07:26

## 2023-08-02 RX ADMIN — POTASSIUM CHLORIDE 20 MEQ: 14.9 INJECTION, SOLUTION INTRAVENOUS at 11:09

## 2023-08-02 NOTE — H&P
HISTORY & PHYSICAL EXAM  DEPARTMENT OF MEDICAL TOXICOLOGY  LEVEL 4 MEDICAL DETOX UNIT  Warren Mancini 56 y.o. male MRN: [id number]  Unit/Bed#: 5T [facility] 513-01 Encounter: [id number]      Reason for Admission/Principal Problem: Ethanol withdrawal, Ethanol use disorder  Admitting Provider: Marcela Brito PA-C  Attending Provider: Keith Rader MD   8/1/2023 10:16 PM        Alcohol withdrawal syndrome with complication [facility])  Assessment & Plan  · Patient reports daily alcohol use   · Last drink 1200 on 8/1  · EtOH 311 on initial labs   · Patient denies history of withdrawal seizures   · Patient currently endorses anxiety, agitation, diaphoresis, and tremor     · Patient initiated on SEWS protocol for symptom triggered phenobarbital therapy   · Initial dose 650 mg phenobarbital  · Patient received Valium 20 mg and Ativan 1 mg in the ED prior to transfer   · Symptomatic and supportive management   · Thiamine and folic acid supplementation  · Electrolyte repletion as needed  · Pulse oximetry and telemetry monitoring        Alcohol use disorder, severe, dependence ([address])  Assessment & Plan  · Patient reports relapse in drinking a few months ago, currently drinking 15 drinks off 100 proof liquor daily   · Reports previous rehab stays   · Denies previous treatment with naltrexone- interested in starting     · Case management for assistance in discharge planning   · See Alcohol Withdrawal    * Dehydration  Assessment & Plan  · Noted to be hemoconcentrated on initial labs  · Hgb 18.2   · The setting of acute alcohol withdrawal and poor p.o. intake for the past few days    · IV hydration  · Continue to monitor    Insomnia  Assessment & Plan  · Patient reports history of insomnia treated with Vistaril 25 mg nightly  · Patient notes being unable to sleep more than short stretches in the past week    · Trazodone as needed for insomnia    Hypokalemia  Assessment & Plan  Recent Labs     08/01/23  1658   K 3.4*     · Repleated · Continue to monitor and replete as needed    Transaminitis  Assessment & Plan  Recent Labs     08/01/23  1658   *   ALT 64*   ALKPHOS 61     · In the setting of chronic alcohol use  · Continue to monitor  · Encourage cessation      Major depressive disorder, recurrent, in full remission ([address])  Assessment & Plan  · Patient reports history of depression currently managed on buspirone 10 mg 3 times daily and venlafaxine 75 mg daily  · Reports compliance with home regimen  · Initially scored as low risk in the ED due to nature of chief complaint   · Patient denies SI/HI  · No continue observation indicated at this time    · Continue home medications  · Recommended continue follow-up with psychiatry outpatient    GERD without esophagitis  Assessment & Plan  · Continue home medication             VTE Prophylaxis: Enoxaparin (Lovenox)  / sequential compression device   Code Status: Full code      Anticipated Length of Stay:  Patient will be admitted on an Inpatient basis with an anticipated length of stay of  2-3  midnights. Justification for Hospital Stay: Alcohol withdrawal     For any questions or concerns, please Tiger Text the advanced practitioner in the role of [facility]-AP On Call      This patient qualifies for Level IV medically managed intensive inpatient services under the criteria set by the American Society of Addiction Medicine, including dimensions 1-3. The patient is in withdrawal (or is intoxicated with high risk of withdrawal), with severe and unstable medical and/or psychiatric (dual diagnosis) problems, requiring requires 24-hour medical and nursing care and the full resources of a licensed hospital.          [facility] patient to medical detox unit and continue supportive care and stabilization of acute ethanol withdrawal per medical toxicology/detox treatment pathway.  Monitor ethanol withdrawal severity via the Severity of Ethanol Withdrawal Scale (SEWS) Q4 hours and then hourly if/when SEWS > 6. Treat withdrawal per pathway and reassess Q30-60 minutes. Mild SEWS Score 1-6  Administer medications* (IV or PO; PO preferred):  • If initial SEWS score: diazepam 10mg PO/IV x 1 AND phenobarbital 65 mg PO/IV x 1  • If repeat SEWS score 1-6: phenobarbital 65 mg PO/IV q1 hour x 5 doses maximum   Reassessment:   • SEWS q1 hour after each dose until SEWS 0 x 2 hours  • VS q1 hours (until SEWS 0, then q4 hours)  • Notify provider for bedside evaluation if 5-dose maximum is reached, RASS of -3 to -5, or SEWS score escalates to moderate or severe. Moderate SEWS Score 7-12  Administer medications* (IV):  • If initial SEWS score: diazepam 10mg IV x 1 AND phenobarbital 260 mg IV x 1  • If repeat SEWS score 7-12 or score escalated from mild: phenobarbital 130 mg IV q30 minutes x 5 doses maximum   Reassessment:  • SEWS q30 minutes after each dose until SEWS < 7 (then hourly until SEWS 0 x 2 hours)  • VS q30 minutes until SEWS < 7 (then hourly until SEWS 0, then q4 hours)  • Notify provider for bedside evaluation if 5-dose maximum is reached, RASS of -3 to -5, or SEWS score escalates to severe. Severe SEWS Score ? 13  Administer medications* (IV):  • If initial SEWS score: Diazepam 10 mg IV x 1 AND phenobarbital 650 mg IV piggyback x 1 over 15-30 minutes  • If repeat SEWS score ? 13 or score escalated from mild or moderate: phenobarbital 130 mg IV q30 minutes x 5 doses maximum   Reassessment:  • SEWS q30 minutes after each dose until SEWS < 7 (then hourly until SEWS 0 x 2 hours)   • VS q30 minutes until SEWS < 7 (then hourly until SEWS 0, then q4 hours)  • Notify provider for bedside evaluation if 5-dose maximum is reached or RASS of -3 to -5   *Hold medications and notify provider if CNS depression, respirations < 10/min, or RASS of -3 to -5.          Medications to be administered adjunctively if more than 2 grams of phenobarbital is needed for stabilization of withdrawal; require attending approval.   • Dexmedetomidine infusion 0.1-1mcg/kg/hr IV infusion, titratable to reduced agitation (Goal: RASS -2)  • Ketamine   o Acute agitated delirium: 1-2 mg/kg IV or 4-5 mg/kg IM  o Refractory withdrawal: 0.1-1mg/kg/hr IV infusion, titratable to reduced agitation (Goal: RASS -2)    Further evaluation, screening and treatment:  Evaluate complete metabolic panel, transaminases, INR, and lipase. Assess hepatic ultrasound for any sign of alcoholic liver disease or cirrhosis, and ultimately refer for further hepatic evaluation and care as/if indicated. Additional medications for ethanol associated malnutrition: Thiamine 100 mg IV daily, increase to 500 mg TID for signs/symptoms of Wernicke's Encephalopathy or Wernicke Korsakoff Syndrome   Folic acid 1 mg IV daily   Multivitamin PO daily      Will offer first monthly injection of Naltrexone 380 mg IM, once patient is stabilized, as it has been shown to assist in decreasing cravings for ethanol. Evaluate and treat for coexisting substance use, such as opioids and nicotine. Discuss risk factors for infectious disease, such as history of intravenous drug abuse, and offer hepatitis and HIV screening if indicated. Case management consultation to assist with coordination of subsequent treatment after discharge. Hx and PE limited by: None, patient alert and cooperative     HPI: Flaquita Perez is a 56y.o. year old male with PMHx of depression, GERD, and insomnia, who presented to the [facility] ED due to alcohol withdrawal.  Patient reports drinking 15 drinks of 100 proof whiskey daily with last drink 1200 on 8/1. Notes he attempted to self wean off of alcohol approximately 1 week ago but was unsuccessful. EtOH 311 on initial labs. Patient also notes insomnia, stating has been unable to sleep for the past week. Patient received Valium 20 mg and Ativan 1 mg total prior to transport.     Patient was admitted to [facility] detox unit for alcohol detoxification. He was started on SEWS protocol with symptom triggered phenobarbital along with symptomatic management of withdrawal. Patient received an initial dose of 650 mg phenobarbital.  Patient is currently endorsing anxiety, agitation, diaphoresis, and tremor. Preferred alcoholic beverage(s): 917 proof whiskey  Quantity and frequency of alcohol intake: 15 drinks daily  Use of any ethanol substitutes (toxic alcohols): no  Date/Time of last alcohol intake: 1200 on 8/1  Current signs and symptoms of ethanol withdrawal: anxiety, tremor, diaphoresis, tachycardia and hypertension    SEWS Total Score: 13 (8/1/2023 10:49 PM)      Ethanol Withdrawal History  Previous ethanol withdrawal? yes  Prior inpatient treatment for ethanol withdrawal? no  Prior outpatient treatment for ethanol withdrawal? no  History of seizures with prior ethanol withdrawal? no  Prior treatment with naltrexone (Vivitrol)? no  Current treatment with naltrexone (Vivitrol)? no  Other current treatment for ethanol use disorder? no  Co-existing substance use? no    Review of PDMP: yes     Social History     Substance and Sexual Activity   Alcohol Use Yes    Comment: daily whiskey 4 bottles per week     Social History     Substance and Sexual Activity   Drug Use Never     Social History     Tobacco Use   Smoking Status Never   Smokeless Tobacco Never       Review of Systems   Constitutional: Positive for diaphoresis and fatigue. Negative for chills and fever. HENT: Negative for congestion and rhinorrhea. Respiratory: Negative for cough, chest tightness and shortness of breath. Cardiovascular: Negative for chest pain and palpitations. Gastrointestinal: Positive for diarrhea (Intermittent, none today). Negative for abdominal pain, constipation, nausea and vomiting. Genitourinary: Negative for difficulty urinating. Musculoskeletal: Negative for arthralgias, gait problem and myalgias.    Neurological: Positive for tremors. Negative for dizziness, seizures and headaches. Psychiatric/Behavioral: Positive for agitation. Negative for hallucinations, self-injury and suicidal ideas. The patient is nervous/anxious. Historical Information   Past Medical History:   Diagnosis Date   • Anxiety    • Depression    • ETOH abuse    • GERD (gastroesophageal reflux disease)      Past Surgical History:   Procedure Laterality Date   • CLOSED REDUCTION FOREARM FRACTURE Right    • EGD       No family history on file. Social History   Marital Status: Single   Occupation:   Patient Pre-hospital Living Situation: Stable, lives alone  Patient Pre-hospital Level of Mobility: Independent  Patient Pre-hospital Diet Restrictions: None    No Known Allergies    Prior to Admission medications    Medication Sig Start Date End Date Taking?  Authorizing Provider   busPIRone (BUSPAR) 10 mg tablet Take 1 tablet (10 mg total) by mouth 3 (three) times a day 6/19/23 9/17/23  Jai Snowden PA-C   famotidine (Pepcid) 20 mg tablet Take 20 mg by mouth daily    Historical Provider, MD   hydrOXYzine pamoate (VISTARIL) 25 mg capsule Take 1 capsule (25 mg total) by mouth daily at bedtime 7/26/23   Chiquis Dahl PA-C   venlafaxine [facility] 75 mg 24 hr capsule Take 1 capsule (75 mg total) by mouth daily 6/19/23 9/17/23  Jai Snowden PA-C       Current Facility-Administered Medications   Medication Dose Route Frequency   • acetaminophen (TYLENOL) tablet 650 mg  650 mg Oral Q6H PRN   • busPIRone (BUSPAR) tablet 10 mg  10 mg Oral TID   • [START ON 8/2/2023] enoxaparin (LOVENOX) subcutaneous injection 40 mg  40 mg Subcutaneous Daily   • [START ON 0/0/8531] folic acid 1 mg, thiamine (VITAMIN B1) 100 mg in sodium chloride 0.9 % 100 mL IV piggyback   Intravenous Daily   • ondansetron (ZOFRAN) injection 4 mg  4 mg Intravenous Q6H PRN   • [START ON 8/2/2023] pantoprazole (PROTONIX) EC tablet 40 mg  40 mg Oral Early Morning   • phenobarbital in sodium chloride 0.9% 650 mg  650 mg Intravenous Once   • sodium chloride 0.9 % infusion  100 mL/hr Intravenous Continuous   • traZODone (DESYREL) tablet 50 mg  50 mg Oral HS PRN   • [START ON 8/2/2023] venlafaxine (EFFEXOR-XR) 24 hr capsule 75 mg  75 mg Oral Daily       Continuous Infusions:sodium chloride, 100 mL/hr, Last Rate: 100 mL/hr (08/01/23 2247)             Objective     No intake or output data in the 24 hours ending 08/01/23 2301    Invasive Devices:   Peripheral IV 08/01/23 Left Antecubital (Active)   Site Assessment WDL 08/01/23 1658   Dressing Type Transparent 08/01/23 1658   Line Status Blood return noted; Flushed 08/01/23 1658   Dressing Status Clean;Dry; Intact 08/01/23 1658       Vitals   Vitals:    08/01/23 2224   BP: (!) 168/110   TempSrc: Temporal   Pulse: (!) 120   Resp: 20   Patient Position - Orthostatic VS: Sitting   Temp: 98.2 °F (36.8 °C)       Physical Exam  Constitutional:       Appearance: He is ill-appearing and diaphoretic. HENT:      Head: Normocephalic. Eyes:      Extraocular Movements:      Right eye: Nystagmus present. Left eye: Nystagmus present. Pupils: Pupils are equal, round, and reactive to light. Cardiovascular:      Rate and Rhythm: Regular rhythm. Tachycardia present. Pulses: Normal pulses. Heart sounds: Normal heart sounds. No murmur heard. No gallop. Pulmonary:      Effort: Pulmonary effort is normal. No respiratory distress. Breath sounds: Normal breath sounds. No wheezing or rales. Abdominal:      General: Abdomen is protuberant. Bowel sounds are normal. There is no distension. Palpations: Abdomen is soft. Tenderness: There is no abdominal tenderness. There is no guarding. Musculoskeletal:         General: Normal range of motion. Cervical back: Normal range of motion. Skin:     General: Skin is warm. Capillary Refill: Capillary refill takes less than 2 seconds.    Neurological:      Mental Status: He is alert and oriented to person, place, and time. Motor: Tremor present. Coordination: Finger-Nose-Finger Test normal.   Psychiatric:         Attention and Perception: Attention and perception normal.         Mood and Affect: Affect normal. Mood is anxious. Behavior: Behavior is agitated. Behavior is cooperative. Thought Content: Thought content normal.         Data:    EKG, Pathology, and Other Studies: I have personally reviewed pertinent reports.         Lab Results:  CBC ETOH     Lab Results   Component Value Date    WBC 10.55 (H) 08/01/2023    RBC 5.82 (H) 08/01/2023    HGB 18.2 (H) 08/01/2023    HCT 50.0 (H) 08/01/2023    MCV 86 08/01/2023    MCH 31.3 08/01/2023    MCHC 36.4 08/01/2023    RDW 12.0 08/01/2023     08/01/2023    MPV 9.2 08/01/2023      No results found for: "LACTICACID"   CMP UA         Component Value Date/Time    K 3.4 (L) 08/01/2023 1658    CL 99 08/01/2023 1658    CO2 21 08/01/2023 1658    BUN 9 08/01/2023 1658    CREATININE 0.96 08/01/2023 1658         Component Value Date/Time    CALCIUM 9.0 08/01/2023 1658    ALKPHOS 61 08/01/2023 1658     (H) 08/01/2023 1658    ALT 64 (H) 08/01/2023 1658      No results found for: "CLARITYU", "COLORU", "Medina Laud", "PHUR", "GLUCOSEU", "Jen Juliana", "BLOODU", "PROTEIN UA", "NITRITE", "BILIRUBINUR", "UROBILINOGEN", "LEUKOCYTESUR", "Opal Ou", "Morene Wanda", "HYALINE", "BACTERIA", "EPIS"     Liver Function Test: ASA     Lab Results   Component Value Date    TBILI 1.72 (H) 08/01/2023    BILIDIR 0.30 (H) 10/23/2019    ALKPHOS 61 08/01/2023     (H) 08/01/2023    ALT 64 (H) 08/01/2023    TP 7.4 08/01/2023    ALB 4.5 08/01/2023      No results found for: "SALICYLATE"   Troponin APAP     No results found for: "TROPONINI"   No results found for: "ACTMNPHEN"   VBG HCG     No results found for: "PHVEN", "CTG6ILJ", "PO2VEN", "XBU3YAS", "Arlyne Eric", "G0HDFLAUN", "V7TGSGI"   No results found for: "HCGQUANT"   ABG Urine Drug Screen     No results found for: "PHART", "DXS6PHK", "PO2ART", "BQA8LSQ", "BEART", "A8OMDOUFM", "O2HGB", "SOURC", "KAHLIL", "VTAC", "Ancil Tiago", "INSPIREDAIR", "PEEP"   Lab Results   Component Value Date    AMPMETHUR Negative 08/01/2023    BARBTUR Negative 08/01/2023    BDZUR Negative 08/01/2023    COCAINEUR Negative 08/01/2023    METHADONEUR Negative 08/01/2023    OPIATEUR Negative 08/01/2023    PCPUR Negative 08/01/2023    THCUR Negative 08/01/2023    OXYCODONEUR Negative 08/01/2023      Lactate INR     No results found for: "LACTICACID"   No results found for: "INR"   PTT Protime     No results found for: "PTT"     No results found for: "PROTIME"           Imaging Studies: I have personally reviewed pertinent reports. Counseling / Coordination of Care  Total floor / unit time spent today 60 minutes. Greater than 50% of total time was spent with the patient and / or family counseling and / or coordination of care. ** Please Note: This note has been constructed using a voice recognition system.  **

## 2023-08-02 NOTE — UTILIZATION REVIEW
Initial Clinical Review    Pt initially presented to [address] [place] [address] ED. Pt was transferred by EMS to [facility] for its Level IV medically managed intensive inpatient detox unit, not available at [facility]. Admission: Date/Time/Statement:   Admission Orders (From admission, onward)     Ordered        08/01/23 2220  Inpatient Admission  Once                      Orders Placed This Encounter   Procedures   • Inpatient Admission     Standing Status:   Standing     Number of Occurrences:   1     Order Specific Question:   Level of Care     Answer:   Med Surg [16]     Order Specific Question:   Estimated length of stay     Answer:   More than 2 Midnights     Order Specific Question:   Certification     Answer:   I certify that inpatient services are medically necessary for this patient for a duration of greater than two midnights. See H&P and MD Progress Notes for additional information about the patient's course of treatment. Initial Presentation: 56 y.o. male who presented to medical detox. Inpatient admission for evaluation and treatment of alcohol withdrawal syndrome. Presented w/ need for detox from alcohol. Serum ETOH: 311. Reports 15 drinks of 100 proof whiskey daily, last drink on 8/1 @ noon. Has prior rehab treatment for withdrawal. Reports no hx of withdrawal seizures. On exam, anxiety, tremors, diaphoresis, tachycardic, HTN, b/l nystagmus, agitated. SEWS 13. Plan: SEWS monitoring w/ phenobarbital management, IV thiamine/folic acid supplement, IVF, telemetry, continuous pulse ox, continue PTA meds, trend labs, replete electrolytes as needed. Date: 08/02/23       Day 2: Pt reports ongoing withdrawal symptoms, tolerated breakfast. On exam, anxiety, diaphoresis, restlessness, tremors. SEWS 0.  Plan: continue SEWS monitoring w/ phenobarbital management, IV thiamine/folic acid supplement, telemetry, continuous pulse ox, continue PTA meds, trend labs, replete electrolytes as needed. Wt Readings from Last 1 Encounters:   08/01/23 94.8 kg (209 lb)     Vital Signs:   Date/Time Temp Pulse Resp BP MAP (mmHg) SpO2 O2 Device   08/02/23 1112 97.5 °F (36.4 °C) 87 16 141/85 103 98 % None (Room air)   08/02/23 0712 97.6 °F (36.4 °C) 91 16 132/86 86 97 % None (Room air)   08/02/23 0515 98.3 °F (36.8 °C) 91 18 140/94 -- 100 % None (Room air)   08/02/23 0210 97.1 °F (36.2 °C) Abnormal  107 Abnormal  18 138/85 -- 99 % None (Room air)   08/02/23 0015 98.2 °F (36.8 °C) 110 Abnormal  18 133/84 -- 95 % None (Room air)   08/01/23 2224 98.2 °F (36.8 °C) 120 Abnormal  20 168/110 Abnormal  -- 96 % None (Room air)       Severity of Ethanol Withdrawal Scale (SEWS):   Row Name 08/02/23 1000 08/02/23 0700 08/02/23 0600 08/02/23 0516 08/02/23 0210   Severity of Ethanol Withdrawal Scale (SEWS)   ANXIETY: Do you feel that something bad is about to happen to you right now? 0  -MD 0  -MD 0  -MD 3  -TO 3  -DE   NAUSEA and DRY HEAVES or VOMITING? 0  -MD 0  -MD 0  -MD 0  -TO 0  -DE   SWEATING: (includes moist palms, sweating now)? Score 0 or 2 0  -MD 0  -MD 0  -MD 2  -TO 2  -DE   TREMOR: with arms extended eyes closed? 0  -MD 0  -MD 0  -MD 2  -TO 2  -DE   AGITATION: Fidgety, restless, pacing? 0  -MD 0  -MD 0  -MD 0  -TO 3  -DE   DISORIENTATION: 0  -MD 0  -MD 0  -MD 0  -TO 0  -DE   HALLUCINATIONS: 0  -MD 0  -MD 0  -MD 0  -TO 0  -DE   VITAL SIGNS: ANY (Pulse >613, Diastolic BP >59, Temp >74.6) 0  -MD 0  -MD 0  -MD 3  -TO 0  -DE   SEWS Total Score 0  -MD 0  -MD 0  -MD 10  -TO 10  -DE   Vazquez Agitation Sedation Scale (RASS)   Vazquez Agitation Sedation Scale (RASS) 0  -MD 0  -MD 0  -MD 0  -TO --   Row Name 08/02/23 0016 08/01/23 2249 08/01/23 2200     Severity of Ethanol Withdrawal Scale (SEWS)   ANXIETY: Do you feel that something bad is about to happen to you right now? 3  -DE 3  -TO 3  -TO     NAUSEA and DRY HEAVES or VOMITING? 0  -DE 0  -TO 0  -TO     SWEATING: (includes moist palms, sweating now)?  Score 0 or 2 2  -DE 2  -TO 2  -TO     TREMOR: with arms extended eyes closed? 2  -DE 2  -TO 2  -TO     AGITATION: Fidgety, restless, pacing?  3  -DE 3  -TO 3  -TO     DISORIENTATION: 0  -DE 0  -TO 0  -TO     HALLUCINATIONS: 0  -DE 0  -TO 0  -TO     VITAL SIGNS: ANY (Pulse >824, Diastolic BP >59, Temp >46.4) 3  -DE 3  -TO 3  -TO     SEWS Total Score 13  -DE 13  -TO 13  -TO           Pertinent Labs/Diagnostic Test Results:   Results from last 7 days   Lab Units 08/02/23  0518 08/01/23  1658   WBC Thousand/uL 8.13 10.55*   HEMOGLOBIN g/dL 15.4 18.2*   HEMATOCRIT % 43.8 50.0*   PLATELETS Thousands/uL 262 366   NEUTROS ABS Thousands/µL  --  7.40         Results from last 7 days   Lab Units 08/02/23  0518 08/01/23  1658   SODIUM mmol/L 136 134*   POTASSIUM mmol/L 3.1* 3.4*   CHLORIDE mmol/L 104 99   CO2 mmol/L 21 21   ANION GAP mmol/L 11 14   BUN mg/dL 8 9   CREATININE mg/dL 0.83 0.96   EGFR ml/min/1.73sq m 105 94   CALCIUM mg/dL 7.8* 9.0   MAGNESIUM mg/dL 1.7* 2.0     Results from last 7 days   Lab Units 08/02/23  0518 08/01/23  1658   AST U/L 104* 142*   ALT U/L 51 64*   ALK PHOS U/L 54 61   TOTAL PROTEIN g/dL 6.1* 7.4   ALBUMIN g/dL 4.0 4.5   TOTAL BILIRUBIN mg/dL 2.72* 1.72*         Results from last 7 days   Lab Units 08/02/23  0518 08/01/23  1658   GLUCOSE RANDOM mg/dL 112 102     Results from last 7 days   Lab Units 08/01/23  1658   LIPASE u/L 47     Results from last 7 days   Lab Units 08/01/23  2127   AMPH/METH  Negative   BARBITURATE UR  Negative   BENZODIAZEPINE UR  Negative   COCAINE UR  Negative   METHADONE URINE  Negative   OPIATE UR  Negative   PCP UR  Negative   THC UR  Negative     Results from last 7 days   Lab Units 08/01/23  1658   ETHANOL LVL mg/dL 311*           Past Medical History:   Diagnosis Date   • Anxiety    • Depression    • ETOH abuse    • GERD (gastroesophageal reflux disease)      Present on Admission:  • GERD without esophagitis  • Major depressive disorder, recurrent, in full remission ([address])  • Alcohol withdrawal syndrome with complication (HCC)  • Alcohol use disorder, severe, dependence ([address])  • Dehydration  • Hypokalemia  • Transaminitis  • Insomnia      Admitting Diagnosis: Alcohol dependence with withdrawal  Age/Sex: 56 y.o. male  Admission Orders:  Regular Diet. SCDs. Fall & Seizure Precautions. SEWS monitoring. Telemetry & Continuous Pulse Ox. Scheduled Medications:  busPIRone, 10 mg, Oral, TID  enoxaparin, 40 mg, Subcutaneous, Daily  folic acid 1 mg, thiamine (VITAMIN B1) 100 mg in sodium chloride 0.9 % 100 mL IV piggyback, , Intravenous, Daily  pantoprazole, 40 mg, Oral, Early Morning  venlafaxine, 75 mg, Oral, Daily    Continuous IV Infusions:    sodium chloride 0.9 %, 100 mL/hr, Intravenous, Continuous; Start: 08/01/23 2230 End: 08/02/23 1124    PRN Meds:  acetaminophen, 650 mg, Oral, Q6H PRN  magnesium sulfate, 2 g, Intravenous; 8/2 x1  ondansetron, 4 mg, Intravenous, Q6H PRN  phenobarbital, 650 mg, Intravenous; 8/1 x1  phenobarbital, 260 mg, Intravenous; 8/2 x2  phenobarbital, 130 mg, Intravenous; 8/2 x1  potassium chloride, 40 mEq, Oral; 8/2 x1  potassium chloride, 20 mEq, Intravenous; 8/2 x2  traZODone, 50 mg, Oral, HS PRN      IP CONSULT TO CASE MANAGEMENT    Network Utilization Review Department  ATTENTION: Please call with any questions or concerns to [phone] and carefully listen to the prompts so that you are directed to the right person. All voicemails are confidential.  Riley Silva all requests for admission clinical reviews, approved or denied determinations and any other requests to dedicated fax number below belonging to the campus where the patient is receiving treatment.  List of dedicated fax numbers for the Facilities:  [place] DENIALS (Administrative/Medical Necessity) [phone] [facility] [address] [place] [address] [address] [facility] [address] [address] [facility] [address] [phone]

## 2023-08-02 NOTE — ASSESSMENT & PLAN NOTE
· Patient reports daily alcohol use   · Last drink 1200 on 8/1  · EtOH 311 on initial labs   · Patient denies history of withdrawal seizures   · Tolerated SEWS, received 1690 phenobarbital total. Withdrawal adequately managed

## 2023-08-02 NOTE — DISCHARGE INSTR - OTHER ORDERS
Addiction Services - Agency Programs  Community Prevention Resources of [address] and [address] for [facility]*  Outpatient, Intensive Outpatient Services, and [address], Intensive Outpatient Services, and [address] (Males)    [phone] [facility]*  Withdrawal Management, Short-term Residential, Short-term with Co-occurring Disorders    [phone]    Parent to Parent*  [facility]    [phone]    [facility]*  Medication Assisted Treatment    [phone]

## 2023-08-02 NOTE — ASSESSMENT & PLAN NOTE
Recent Labs     08/02/23  0518   MG 1.7*     · Repleted  · Continue to monitor and replete as needed

## 2023-08-02 NOTE — ASSESSMENT & PLAN NOTE
Recent Labs     08/01/23  1658 08/02/23  0518 08/03/23  0619   * 104* 85*   ALT 64* 51 48   ALKPHOS 61 54 67     · In the setting of chronic alcohol use  · Down trending this AM  · Encourage cessation  · Encourage PCP follow up

## 2023-08-02 NOTE — PROGRESS NOTES
PROGRESS NOTE  DEPARTMENT OF MEDICAL TOXICOLOGY  LEVEL 4 MEDICAL DETOX UNIT  Nela Duckworth 56 y.o. male MRN: [id number]  Unit/Bed#: 5T [facility] 513-01 Encounter: [id number]      Reason for Admission/Principal Problem: Ethanol withdrawal  Rounding Provider: Davin Hightower MD  Attending Provider: Camelia Gusman MD   8/1/2023 10:16 PM           Alcohol withdrawal syndrome with complication [facility])  Assessment & Plan  · Patient reports daily alcohol use   · Last drink 1200 on 8/1  · EtOH 311 on initial labs   · Patient denies history of withdrawal seizures   · On admission:  · Subjective symptoms: anxiety, agitation, diaphoresis, and tremor   · Objective symptoms: HTN, tachycardia    · Patient initiated on SEWS protocol for symptom triggered phenobarbital therapy   · Initial dose 650 mg phenobarbital  · Patient received Valium 20 mg and Ativan 1 mg in the ED prior to transfer   · Total received: 1300 mg of Phenobarbital   · Continues to report restlessness, anxiety, and diaphoresis this morning.   · Continue SEWS and monitor  · Continue Symptomatic and supportive management   · Thiamine and folic acid supplementation  · Electrolyte repletion as needed  · Pulse oximetry and telemetry monitoring        Alcohol use disorder, severe, dependence ([address])  Assessment & Plan  · Patient reports relapse in drinking a few months ago, currently drinking 15 drinks of 100 proof liquor daily   · Reports previous rehab stays   · Denies previous treatment with naltrexone- interested in starting   · Case management for assistance in discharge planning - patient is interested in outpatient resources and plans to maintain sobriety  · See Alcohol Withdrawal    Hypomagnesemia  Assessment & Plan  Recent Labs     08/02/23  0518   MG 1.7*     · Repleted  · Continue to monitor and replete as needed    Insomnia  Assessment & Plan  · Patient reports history of insomnia treated with Vistaril 25 mg nightly  · Patient notes being unable to sleep more than short stretches in the past week    · Trazodone as needed for insomnia    Transaminitis  Assessment & Plan  Recent Labs     08/01/23  1658 08/02/23  0518   * 104*   ALT 64* 51   ALKPHOS 61 54     · In the setting of chronic alcohol use  · Down trending this AM  · Continue to monitor  · Encourage cessation      Hypokalemia  Assessment & Plan  Recent Labs     08/01/23  1658 08/02/23  0518   K 3.4* 3.1*     · Repleted   · Continue to monitor and replete as needed    Major depressive disorder, recurrent, in full remission ([address])  Assessment & Plan  · Patient reports history of depression currently managed on buspirone 10 mg 3 times daily and venlafaxine 75 mg daily  · Reports compliance with home regimen  · Initially scored as low risk in the ED due to nature of chief complaint   · Patient denies SI/HI  · No continue observation indicated at this time    · Continue home medications  · Recommended to continue follow-up with psychiatry outpatient    GERD without esophagitis  Assessment & Plan  · Continue home medication    * Dehydration  Assessment & Plan  · Noted to be hemoconcentrated on initial labs  · Hgb 18.2   · The setting of acute alcohol withdrawal and poor p.o. intake for the past few days  · Improved this AM: 15.4  · Since patient is able to tolerate PO, will d/c fluids  · Continue to monitor      VTE Pharmacologic Prophylaxis:   Pharmacologic: Enoxaparin (Lovenox)  Mechanical VTE Prophylaxis in Place: no    Code Status: Level 1 - Full Code    Patient Centered Rounds: I have performed bedside rounds with nursing staff today. Discussions with Specialists or Other Care Team Provider: none     Education and Discussions with Family / Patient: discussed plans for obtaining sobriety after detox    Time Spent for Care: 30 minutes. More than 50% of total time spent on counseling and coordination of care as described above.     Current Length of Stay: 1 day(s)    Current Patient Status: Inpatient     Certification Statement: The patient will continue to require additional inpatient hospital stay due to continued monitoring and management for ethanol withdrawal Discharge Plan: interested in outpatient resources upon discharge at this time        Subjective:   Patient seen and evaluated at bedside this morning, in NAD. He reports symptoms of anxiety, diaphoresis, and restlessness, but was able to eat breakfast without nausea or vomiting. Denies chest pain, palpitations, abdominal pain, SOB, or any other concerns at this time. Objective:     Clinical Opiate Withdrawal Scale  Pulse: 87    SEWS Total Score: 0 (2023 10:00 AM)        Last 24 Hours Medication List:   Current Facility-Administered Medications   Medication Dose Route Frequency Provider Last Rate   • acetaminophen  650 mg Oral Q6H PRN Wesley Cordial, PA-C     • busPIRone  10 mg Oral TID Wesley Cordial, PA-C     • enoxaparin  40 mg Subcutaneous Daily Wesley Cordial, PA-C     • folic acid 1 mg, thiamine (VITAMIN B1) 100 mg in sodium chloride 0.9 % 100 mL IV piggyback   Intravenous Daily Wesley Cordial, PA-C     • ondansetron  4 mg Intravenous Q6H PRN Wesley Cordial, PA-C     • pantoprazole  40 mg Oral Early Morning Wesley Cordial, PA-C     • potassium chloride  20 mEq Intravenous Q2H Wesley Cordial, PA-C 20 mEq (23 1109)   • traZODone  50 mg Oral HS PRN Wesley Cordial, PA-C     • venlafaxine  75 mg Oral Daily Wesley Cordial, PA-C           Vitals:   Temp (24hrs), Av.8 °F (36.6 °C), Min:97.1 °F (36.2 °C), Max:98.3 °F (36.8 °C)    Temp:  [97.1 °F (36.2 °C)-98.3 °F (36.8 °C)] 97.5 °F (36.4 °C)  HR:  [] 87  Resp:  [16-20] 16  BP: (132-208)/() 141/85  SpO2:  [95 %-100 %] 98 %  Body mass index is 27.57 kg/m². Input and Output Summary (last 24 hours):No intake or output data in the 24 hours ending 23 1129    Physical Exam:   Physical Exam  Vitals and nursing note reviewed. Constitutional:       General: He is not in acute distress.      Appearance: He is well-developed. HENT:      Head: Normocephalic and atraumatic. Right Ear: External ear normal.      Left Ear: External ear normal.      Nose: Nose normal.   Eyes:      Conjunctiva/sclera: Conjunctivae normal.   Cardiovascular:      Rate and Rhythm: Normal rate and regular rhythm. Pulses: Normal pulses. Heart sounds: No murmur heard. Pulmonary:      Effort: Pulmonary effort is normal. No respiratory distress. Breath sounds: Normal breath sounds. Abdominal:      Palpations: Abdomen is soft. Tenderness: There is no abdominal tenderness. Musculoskeletal:         General: No swelling. Cervical back: Neck supple. Skin:     General: Skin is warm and dry. Capillary Refill: Capillary refill takes less than 2 seconds. Neurological:      Mental Status: He is alert and oriented to person, place, and time. Motor: Motor function is intact. Comments: Mild intention tremor. Psychiatric:         Mood and Affect: Mood normal.         Additional Data:     Labs:   Results from last 7 days   Lab Units 08/02/23  0518 08/01/23  1658   WBC Thousand/uL 8.13 10.55*   HEMOGLOBIN g/dL 15.4 18.2*   HEMATOCRIT % 43.8 50.0*   PLATELETS Thousands/uL 262 366   NEUTROS PCT %  --  70   LYMPHS PCT %  --  19   MONOS PCT %  --  10   EOS PCT %  --  0      Results from last 7 days   Lab Units 08/02/23  0518   SODIUM mmol/L 136   POTASSIUM mmol/L 3.1*   CHLORIDE mmol/L 104   CO2 mmol/L 21   BUN mg/dL 8   CREATININE mg/dL 0.83   ANION GAP mmol/L 11   CALCIUM mg/dL 7.8*   ALBUMIN g/dL 4.0   TOTAL BILIRUBIN mg/dL 2.72*   ALK PHOS U/L 54   ALT U/L 51   AST U/L 104*   GLUCOSE RANDOM mg/dL 112                              * I Have Reviewed All Lab Data Listed Above. * Additional Pertinent Lab Tests Reviewed: [facility] Admission Reviewed      Imaging Studies: I have personally reviewed pertinent reports. Recent Cultures (last 7 days):         Today, Patient Was Seen By: Lindsey Patel MD

## 2023-08-02 NOTE — ASSESSMENT & PLAN NOTE
· Patient reports relapse in drinking a few months ago, currently drinking 15 drinks of 100 proof liquor daily   · Reports previous rehab stays   · Denies previous treatment with naltrexone- interested in starting   · Case management for assistance in discharge planning - patient is interested in outpatient resources and plans to maintain sobriety  · See Alcohol Withdrawal

## 2023-08-02 NOTE — ASSESSMENT & PLAN NOTE
· Patient reports daily alcohol use   · Last drink 1200 on 8/1  · EtOH 311 on initial labs   · Patient denies history of withdrawal seizures   · On admission:  · Subjective symptoms: anxiety, agitation, diaphoresis, and tremor   · Objective symptoms: HTN, tachycardia    · Patient initiated on SEWS protocol for symptom triggered phenobarbital therapy   · Initial dose 650 mg phenobarbital  · Patient received Valium 20 mg and Ativan 1 mg in the ED prior to transfer   · Total received: 1300 mg of Phenobarbital   · Continues to report restlessness, anxiety, and diaphoresis this morning.   · Continue SEWS and monitor  · Continue Symptomatic and supportive management   · Thiamine and folic acid supplementation  · Electrolyte repletion as needed  · Pulse oximetry and telemetry monitoring    · Pending PT visit

## 2023-08-02 NOTE — PROGRESS NOTES
08/02/23 1003   Referral Data   Referral Source Other (Comment)   Referral Name [facility] ED   Referral Reason Drug/Alcohol [address] [place], [place]   Readmission Root Cause   30 Day Readmission No   Patient Information   Mental Status Alert   Primary Caregiver Self   Support System Immediate family   Legal Information   Legal Issues Denies   Activities of Daily Living Prior to Admission   Functional Status Independent   Assistive Device No device needed   Living Arrangement Lives alone   Ambulation Independent   Access to Firearms   Access to Firearms No   Income [address] of Transportation   Means of Transport to Appts: Drives Self        60/15/93 1002   Substance Abuse Addendum Details   History of Withdrawal Symptoms Other withdrawal symptoms (specify in comment)  (tremors, headaches, nauea, anxiety)   Medical Complications GERD   Sober Supports Parents   Present Treatment None   Substance Abuse Treatment Hx Denies past history   ASAM Level & Criteria 2.1 IOP   Stage of Change   Stage of Change Precontemplation       Additional Substance Use Detail    Questions Responses   Problems Due to Past Use of Alcohol? Yes   Alcohol Use Frequency Daily   Alcohol Drink of Choice Liquor   1st Use of Alcohol 20   Last Use of Alcohol & Amount 15 drinks, 100 proof liquor   Longest Abstinence from Alcohol 2 years       Worker completed assessment. Worker explained role of case management. Worker confirmed name, address and phone number. Pt presented to [facility] ED for alcohol withdrawal, was transferred to  for withdrawal management. Pt reports he has been drinking 15 drinks of 100 proof liquor daily for the last 3-4 years. Pt reports first age of use 22, last used 8/1/2023. Pt denies any blackouts. Pt reports longest period of clean time is 2 years. Pt denies any street drugs. Pt denies any previous inpatient or outpatient rehab.   Pt at this time declining inpatient rehab, considering outpatient rehab. Alcohol: 311  UDS: negative    Pt reports mental health diagnosis of Major Depressive Disorder. Pt denies any previous inpatient psychiatric hospitalizations. Pt currently goes to [address] in [facility], next appointment is scheduled for 9/11. Pt denies any suicide attempts. Pt denies any family hx of mental health. Pt denies any abuse or trauma. Pt reports medical conditions of GERD, has no current PCP at this time. Pt reports having health insurance of [employer], signed GLENN. Pt denies any current legal issues. Pt is currently single, with no children. Pt lives alone at [address] [place] [address], [address] [place] [address]. Pt is currently employed at [employer]. Pt reports highest level of education is 1 year of college. Pt declined to sign GLENN for any family or friends. Relapse prevention plan was completed. Pt was able to provide his warning signs. Pt reported minimal coping skills. Pt reports his parents are supportive. Clinical impression, pt was calm and cooperative but vague at times. Pt was vague regarding his current use as well as previous substance use hx. Pt would benefit from inpatient rehab to address long term alcohol use as well as learn coping skills. Pt at this time declining inpatient rehab, considering outpatient. Pt is in the pre-contemplation stage of change.

## 2023-08-02 NOTE — ASSESSMENT & PLAN NOTE
Recent Labs     08/01/23  1658 08/02/23  0518   K 3.4* 3.1*     · Repleted   · Continue to monitor and replete as needed

## 2023-08-02 NOTE — ASSESSMENT & PLAN NOTE
Recent Labs     08/01/23  1658 08/02/23  0518   * 104*   ALT 64* 51   ALKPHOS 61 54     · In the setting of chronic alcohol use  · Down trending this AM  · Continue to monitor  · Encourage cessation

## 2023-08-02 NOTE — ASSESSMENT & PLAN NOTE
· Patient reports history of depression currently managed on buspirone 10 mg 3 times daily and venlafaxine 75 mg daily  · Reports compliance with home regimen  · Initially scored as low risk in the ED due to nature of chief complaint   · Patient denies SI/HI  · No continue observation indicated at this time    · Continue home medications  · Recommended to continue follow-up with psychiatry outpatient

## 2023-08-02 NOTE — ASSESSMENT & PLAN NOTE
· Patient reports history of insomnia treated with Vistaril 25 mg nightly  · Patient notes being unable to sleep more than short stretches in the past week    · Trazodone as needed for insomnia

## 2023-08-02 NOTE — ASSESSMENT & PLAN NOTE
Recent Labs     08/01/23  1658 08/02/23  0518 08/03/23  0619   K 3.4* 3.1* 3.4*     · Repleted   · Continue to monitor and replete as needed

## 2023-08-02 NOTE — ASSESSMENT & PLAN NOTE
· Noted to be hemoconcentrated on initial labs  · Hgb 18.2   · The setting of acute alcohol withdrawal and poor p.o. intake for the past few days  · Improved this AM: 15.4  · Since patient is able to tolerate PO, will d/c fluids  · Continue to monitor

## 2023-08-03 VITALS
DIASTOLIC BLOOD PRESSURE: 86 MMHG | WEIGHT: 209 LBS | TEMPERATURE: 97.7 F | OXYGEN SATURATION: 100 % | HEIGHT: 73 IN | BODY MASS INDEX: 27.7 KG/M2 | RESPIRATION RATE: 16 BRPM | SYSTOLIC BLOOD PRESSURE: 132 MMHG | HEART RATE: 91 BPM

## 2023-08-03 PROBLEM — F10.939 ALCOHOL WITHDRAWAL SYNDROME WITH COMPLICATION (HCC): Status: RESOLVED | Noted: 2023-08-01 | Resolved: 2023-08-03

## 2023-08-03 LAB
ALBUMIN SERPL BCP-MCNC: 3.6 G/DL (ref 3.5–5)
ALP SERPL-CCNC: 67 U/L (ref 34–104)
ALT SERPL W P-5'-P-CCNC: 48 U/L (ref 7–52)
ANION GAP SERPL CALCULATED.3IONS-SCNC: 10 MMOL/L
AST SERPL W P-5'-P-CCNC: 85 U/L (ref 13–39)
BILIRUB SERPL-MCNC: 1.95 MG/DL (ref 0.2–1)
BUN SERPL-MCNC: 6 MG/DL (ref 5–25)
CALCIUM SERPL-MCNC: 8 MG/DL (ref 8.4–10.2)
CHLORIDE SERPL-SCNC: 104 MMOL/L (ref 96–108)
CO2 SERPL-SCNC: 21 MMOL/L (ref 21–32)
CREAT SERPL-MCNC: 0.74 MG/DL (ref 0.6–1.3)
ERYTHROCYTE [DISTWIDTH] IN BLOOD BY AUTOMATED COUNT: 12 % (ref 11.6–15.1)
GFR SERPL CREATININE-BSD FRML MDRD: 110 ML/MIN/1.73SQ M
GLUCOSE SERPL-MCNC: 98 MG/DL (ref 65–140)
HCT VFR BLD AUTO: 41.1 % (ref 36.5–49.3)
HGB BLD-MCNC: 14.2 G/DL (ref 12–17)
MAGNESIUM SERPL-MCNC: 2.2 MG/DL (ref 1.9–2.7)
MCH RBC QN AUTO: 31.4 PG (ref 26.8–34.3)
MCHC RBC AUTO-ENTMCNC: 34.5 G/DL (ref 31.4–37.4)
MCV RBC AUTO: 91 FL (ref 82–98)
PLATELET # BLD AUTO: 175 THOUSANDS/UL (ref 149–390)
PMV BLD AUTO: 10 FL (ref 8.9–12.7)
POTASSIUM SERPL-SCNC: 3.4 MMOL/L (ref 3.5–5.3)
PROT SERPL-MCNC: 5.8 G/DL (ref 6.4–8.4)
RBC # BLD AUTO: 4.52 MILLION/UL (ref 3.88–5.62)
SODIUM SERPL-SCNC: 135 MMOL/L (ref 135–147)
WBC # BLD AUTO: 5.06 THOUSAND/UL (ref 4.31–10.16)

## 2023-08-03 PROCEDURE — 80053 COMPREHEN METABOLIC PANEL: CPT

## 2023-08-03 PROCEDURE — 85027 COMPLETE CBC AUTOMATED: CPT

## 2023-08-03 PROCEDURE — 99239 HOSP IP/OBS DSCHRG MGMT >30: CPT | Performed by: NURSE PRACTITIONER

## 2023-08-03 PROCEDURE — 83735 ASSAY OF MAGNESIUM: CPT

## 2023-08-03 RX ORDER — GABAPENTIN 300 MG/1
300 CAPSULE ORAL 3 TIMES DAILY
Qty: 45 CAPSULE | Refills: 0 | Status: ON HOLD | OUTPATIENT
Start: 2023-08-03 | End: 2023-08-18

## 2023-08-03 RX ORDER — VENLAFAXINE HYDROCHLORIDE 75 MG/1
75 CAPSULE, EXTENDED RELEASE ORAL DAILY
Qty: 30 CAPSULE | Refills: 1 | Status: ON HOLD | OUTPATIENT
Start: 2023-08-03 | End: 2023-10-02

## 2023-08-03 RX ORDER — NALTREXONE HYDROCHLORIDE 50 MG/1
50 TABLET, FILM COATED ORAL DAILY
Qty: 30 TABLET | Refills: 1 | Status: ON HOLD | OUTPATIENT
Start: 2023-08-04

## 2023-08-03 RX ORDER — NALTREXONE HYDROCHLORIDE 50 MG/1
50 TABLET, FILM COATED ORAL DAILY
Status: DISCONTINUED | OUTPATIENT
Start: 2023-08-03 | End: 2023-08-03 | Stop reason: HOSPADM

## 2023-08-03 RX ADMIN — VENLAFAXINE HYDROCHLORIDE 75 MG: 75 CAPSULE, EXTENDED RELEASE ORAL at 08:03

## 2023-08-03 RX ADMIN — BUSPIRONE HYDROCHLORIDE 10 MG: 10 TABLET ORAL at 08:03

## 2023-08-03 RX ADMIN — PANTOPRAZOLE SODIUM 40 MG: 40 TABLET, DELAYED RELEASE ORAL at 06:21

## 2023-08-03 RX ADMIN — GABAPENTIN 300 MG: 300 CAPSULE ORAL at 08:03

## 2023-08-03 RX ADMIN — NALTREXONE HYDROCHLORIDE 50 MG: 50 TABLET, FILM COATED ORAL at 08:03

## 2023-08-03 NOTE — NURSING NOTE
Pt verbalized understanding of discharge instructions. All belongings returned to pt. Pt escorted to main lobby via PCA without event.

## 2023-08-03 NOTE — CASE MANAGEMENT
Case Management Discharge Planning Note    Patient name Fox Rizzo  Location 5T DETOX 513/5T DETOX 46* MRN [id number]  : 1975 Date 8/3/2023       Current Admission Date: 2023  Current Admission Diagnosis:Dehydration   Patient Active Problem List    Diagnosis Date Noted   • Hypomagnesemia 2023   • Alcohol withdrawal syndrome with complication ([address])    • Alcohol use disorder, severe, dependence ([address]) 2023   • Dehydration 2023   • Hypokalemia 2023   • Transaminitis 2023   • Insomnia 2023   • Major depressive disorder, recurrent, in full remission ([address]) 2021   • Generalized anxiety disorder 2020   • Epigastric pain 10/18/2019   • GERD without esophagitis 2019      LOS (days): 2  Geometric Mean LOS (GMLOS) (days):   Days to GMLOS:     OBJECTIVE:  Risk of Unplanned Readmission Score: 10.58         Current admission status: Inpatient   Preferred Pharmacy:   [facility], [address] [place] [address]  Phone: [phone] Fax: [phone]    Primary Care Provider: No primary care provider on file. Primary Insurance: RAJINDER  Secondary Insurance:     DISCHARGE DETAILS: Pt is being discharged today, escorted to [address] [place] [address] via [employer], [place] waiver signed. Pt reports his vehicle is at [employer]. Pt declined inpatient rehab.   Pt declined all aftercare appointments be arranged, requested outpatient resources, provided on his AVS.     Discharge planning discussed with[de-identified] Patient  Freedom of Choice: Yes                   Contacts  Patient Contacts: Declined for outpatient appointments to be arranged  Relationship to Patient[de-identified] Treatment Provider  Reason/Outcome: Discharge Planning, Continuity of Care              Other Referral/Resources/Interventions Provided:  Financial Resources Provided: Indigent Transportation  Referrals Provided[de-identified] Declines resources

## 2023-08-03 NOTE — DISCHARGE SUMMARY
[facility]  Discharge- Shila Hooker 1975, 56 y.o. male MRN: [id number]  Unit/Bed#: 5T DETOX 513-01 Encounter: [id number]  Primary Care Provider: No primary care provider on file. Date and time admitted to hospital: 8/1/2023 10:16 PM  MEDICAL DETOX UNIT, LEVEL 4  Department of Medical Toxicology  Reason for Admission/Principal Problem: dehydration, alcohol withdrawal, alcohol use disorder   Admitting provider: SHIRA Sabillon DO   8/1/2023 10:16 PM       Discharging Physician / Practitioner: SHIRA Sabillon  PCP: No primary care provider on file.   Admission Date:   Admission Orders (From admission, onward)     Ordered        08/01/23 2220  Inpatient Admission  Once                      Discharge Date: 08/03/23    Medical Problems     Resolved Problems  Date Reviewed: 8/3/2023          Resolved    Alcohol withdrawal syndrome with complication ([address]) 8/8/2507     Resolved by  SHIRA Sabillon          * Alcohol use disorder, severe, dependence ([address])  Assessment & Plan  · Patient reports relapse in drinking a few months ago, currently drinking 15 drinks of 100 proof liquor daily   · Reports previous rehab stays   · Denies previous treatment with naltrexone- interested in starting   · Case management for assistance in discharge planning - patient is interested in outpatient resources and plans to maintain sobriety  · See Alcohol Withdrawal    Hypomagnesemia  Assessment & Plan  Recent Labs     08/03/23  0619   MG 2.2     · Repleted  · Continue to monitor and replete as needed    Insomnia  Assessment & Plan  · Patient reports history of insomnia treated with Vistaril 25 mg nightly  · Patient notes being unable to sleep more than short stretches in the past week    · Trazodone as needed for insomnia    Transaminitis  Assessment & Plan  Recent Labs     08/01/23  1658 08/02/23  0518 08/03/23  0619   * 104* 85*   ALT 64* 51 48   ALKPHOS 61 54 67     · In the setting of chronic alcohol use  · Down trending this AM  · Encourage cessation  · Encourage PCP follow up       Hypokalemia  Assessment & Plan  Recent Labs     08/01/23  1658 08/02/23  0518 08/03/23  0619   K 3.4* 3.1* 3.4*     · Repleted   · Continue to monitor and replete as needed    Dehydration  Assessment & Plan  · Noted to be hemoconcentrated on initial labs  · Hgb 18.2   · Improved, tolerating PO     Major depressive disorder, recurrent, in full remission ([address])  Assessment & Plan  · Patient reports history of depression currently managed on buspirone 10 mg 3 times daily and venlafaxine 75 mg daily  · Reports compliance with home regimen  · Initially scored as low risk in the ED due to nature of chief complaint   · Patient denies SI/HI  · No continue observation indicated at this time    · Continue home medications  · Recommended to continue follow-up with psychiatry outpatient    GERD without esophagitis  Assessment & Plan  · Continue home medication    Alcohol withdrawal syndrome with complication (HCC)-resolved as of 8/3/2023  Assessment & Plan  · Patient reports daily alcohol use   · Last drink 1200 on 8/1  · EtOH 311 on initial labs   · Patient denies history of withdrawal seizures   · Tolerated SEWS, received 1690 phenobarbital total. Withdrawal adequately managed       Consultations During Hospital Stay:  · Case management     Procedures Performed:   · None    Significant Findings / Test Results:   · Hypokalemia - supplemented   · Transaminitis - improved  · Hypomagnesemia - improved   · Dehydration - resolved    Incidental Findings:   · None     Test Results Pending at Discharge (will require follow up):    · None      Outpatient Tests / Follow Up Requested:  · Recommend f/u with PCP within 1-2 weeks of discharge   · Recommend OP f/u with Psychiatry     Complications:  none    Reason for Admission: dehydration, alcohol withdrawal, alcohol use disorder     Hospital Course: Yesenia Butts is a 56 y.o. male patient who originally presented to the hospital on 8/1/2023 due to alcohol withdrawal. Patient initially presented to the [facility] ED requesting detoxification from alcohol. Patient was admitted to the [facility] medical detox unit under [facility] protocol for management pf medically assisted alcohol withdrawal and dehydration. He received a total of 1690 mg phenobarbital for alcohol withdrawal mgmt without complication. Patient's alcohol withdrawal symptoms subsequently resolved, and he has remained without objective evidence of alcohol withdrawal at this time. He was started on gabapentin for PAWS for residual anxiety, restlessness, and sleep disturbance with good effect. Naltrexone was also started on day of discharge for ongoing alcohol MAT. His dehydration improved with IVF hydration and initial hemoconcentration on CBC resolved on subsequent labs. During this hospitalization, patient was found to have transaminitis, which improved with IVFs and alcohol abstinence, as well as hypokalemia and hypomagnesemia, for which he received appropriate electrolyte supplementation. Case management was consulted for assistance with aftercare resources, and patient will be discharged home with OP resources. Please see above list of diagnoses and related plan for additional information. Condition at Discharge: good     Discharge Day Visit / Exam:     Subjective:  Resting in bed comfortably. Denies any acute complaints. Vitals: Blood Pressure: 132/86 (08/03/23 1122)  Pulse: 91 (08/03/23 1122)  Temperature: 97.7 °F (36.5 °C) (08/03/23 1122)  Temp Source: Temporal (08/03/23 1122)  Respirations: 16 (08/03/23 1122)  Height: 6' 1" (185.4 cm) (08/01/23 2224)  Weight - Scale: 94.8 kg (209 lb) (08/01/23 2224)  SpO2: 100 % (08/03/23 1122)  Exam:   Physical Exam  Vitals and nursing note reviewed. Constitutional:       General: He is not in acute distress. Appearance: He is well-developed. HENT:      Head: Normocephalic. Eyes:      Conjunctiva/sclera: Conjunctivae normal.   Cardiovascular:      Rate and Rhythm: Normal rate and regular rhythm. Pulmonary:      Effort: Pulmonary effort is normal. No respiratory distress. Abdominal:      Palpations: Abdomen is soft. Tenderness: There is no abdominal tenderness. Musculoskeletal:         General: No swelling. Cervical back: Neck supple. Skin:     General: Skin is warm and dry. Neurological:      Mental Status: He is alert and oriented to person, place, and time. Motor: Motor function is intact. Psychiatric:         Mood and Affect: Mood normal.         Discussion with Family: I personally did not discuss this case with the patient's family. I reviewed the discharge plan with the patient and answered all questions to the best of my ability. Discharge instructions/Information to patient and family:   See after visit summary for information provided to patient and family. Provisions for Follow-Up Care:  See after visit summary for information related to follow-up care and any pertinent home health orders. Disposition:     Home    For Discharges to [facility] SNF:   · Not Applicable to this Patient - Not Applicable to this Patient    Planned Readmission: NA     Discharge Statement:  I spent 35 minutes discharging the patient. This time was spent on the day of discharge. I had direct contact with the patient on the day of discharge. Greater than 50% of the total time was spent examining patient, answering all patient questions, arranging and discussing plan of care with patient as well as directly providing post-discharge instructions. Additional time then spent on discharge activities. Discharge Medications:  See after visit summary for reconciled discharge medications provided to patient and family.       ** Please Note: This note has been constructed using a voice recognition system **

## 2023-08-03 NOTE — PLAN OF CARE
Problem: DISCHARGE PLANNING  Goal: Discharge to home or other facility with appropriate resources  Description: INTERVENTIONS:  - Identify barriers to discharge w/patient and caregiver  - Arrange for needed discharge resources and transportation as appropriate  - Identify discharge learning needs (meds, wound care, etc.)  - Arrange for interpretive services to assist at discharge as needed  - Refer to Case Management Department for coordinating discharge planning if the patient needs post-hospital services based on physician/advanced practitioner order or complex needs related to functional status, cognitive ability, or social support system  Outcome: Progressing     Problem: Knowledge Deficit  Goal: Patient/family/caregiver demonstrates understanding of disease process, treatment plan, medications, and discharge instructions  Description: Complete learning assessment and assess knowledge base.   Interventions:  - Provide teaching at level of understanding  - Provide teaching via preferred learning methods  Outcome: Progressing     Problem: SUBSTANCE USE/ABUSE  Goal: By discharge, will develop insight into their chemical dependency and sustain motivation to continue in recovery  Description: INTERVENTIONS:  - Attends all daily group sessions and scheduled AA groups  - Actively practices coping skills through participation in the therapeutic community and adherence to program rules  - Reviews and completes assignments from individual treatment plan  - Assist patient development of understanding of their personal cycle of addiction and relapse triggers  Outcome: Progressing  Goal: By discharge, patient will have ongoing treatment plan addressing chemical dependency  Description: INTERVENTIONS:  - Assist patient with resources and/or appointments for ongoing recovery based living  Outcome: Progressing

## 2023-08-04 NOTE — UTILIZATION REVIEW
NOTIFICATION OF ADMISSION DISCHARGE   This is a Notification of Discharge from [facility]. Please be advised that this patient has been discharge from our facility. Below you will find the admission and discharge date and time including the patient’s disposition. UTILIZATION REVIEW CONTACT:  Shea Paul MA  Utilization   Network Utilization Review Department  Phone: [phone] x carefully listen to the prompts. All voicemails are confidential.  Email: [email or web address]     ADMISSION INFORMATION  PRESENTATION DATE: 8/1/2023 10:16 PM  OBERVATION ADMISSION DATE:   INPATIENT ADMISSION DATE: 8/1/23 10:16 PM   DISCHARGE DATE: 8/3/2023  3:07 PM   DISPOSITION:Home/Self Care    IMPORTANT INFORMATION:  Send all requests for admission clinical reviews, approved or denied determinations and any other requests to dedicated fax number below belonging to the campus where the patient is receiving treatment.  List of dedicated fax numbers:  [place] DENIALS (Administrative/Medical Necessity) [phone] [address] [facility] (Maternity/NICU/Pediatrics) [phone]   [facility] [phone]   [facility] [phone] [address] [facility] [address] [phone]   [address] [place] [address] [phone]   [facility] [phone]   [address] [place] [address] [address] [place] [address] [phone]   [address] [place] [address] [phone]   [address] [facility] [phone]   [address] [facility] [address] [facility] [phone]

## 2023-08-04 NOTE — UTILIZATION REVIEW
Aaron Mcclendon RN  Registered Nurse  Specialty:  Utilization Review  Utilization Review     Signed  Date of Service:  8/2/2023 12:09 PM     Signed        Expand All Collapse All    Initial Clinical Review     Pt initially presented to [address] [place] [address] ED. Pt was transferred by EMS to [facility] for its Level IV medically managed intensive inpatient detox unit, not available at [facility].     Admission: Date/Time/Statement:       Admission Orders (From admission, onward)       Ordered         08/01/23 2220   Inpatient Admission  Once                               Orders Placed This Encounter   Procedures   • Inpatient Admission       Standing Status:   Standing       Number of Occurrences:   1       Order Specific Question:   Level of Care       Answer:   Med Surg [16]       Order Specific Question:   Estimated length of stay       Answer:   More than 2 Midnights       Order Specific Question:   Certification       Answer:   I certify that inpatient services are medically necessary for this patient for a duration of greater than two midnights. See H&P and MD Progress Notes for additional information about the patient's course of treatment.      Initial Presentation: 56 y.o. male who presented to medical detox. Inpatient admission for evaluation and treatment of alcohol withdrawal syndrome. Presented w/ need for detox from alcohol. Serum ETOH: 311. Reports 15 drinks of 100 proof whiskey daily, last drink on 8/1 @ noon. Has prior rehab treatment for withdrawal. Reports no hx of withdrawal seizures. On exam, anxiety, tremors, diaphoresis, tachycardic, HTN, b/l nystagmus, agitated.  SEWS 13. Plan: SEWS monitoring w/ phenobarbital management, IV thiamine/folic acid supplement, IVF, telemetry, continuous pulse ox, continue PTA meds, trend labs, replete electrolytes as needed.         Date: 08/02/23       Day 2: Pt reports ongoing withdrawal symptoms, tolerated breakfast. On exam, anxiety, diaphoresis, restlessness, tremors. SEWS 0. Plan: continue SEWS monitoring w/ phenobarbital management, IV thiamine/folic acid supplement, telemetry, continuous pulse ox, continue PTA meds, trend labs, replete electrolytes as needed.             Wt Readings from Last 1 Encounters:   08/01/23 94.8 kg (209 lb)      Vital Signs:   Date/Time Temp Pulse Resp BP MAP (mmHg) SpO2 O2 Device   08/02/23 1112 97.5 °F (36.4 °C) 87 16 141/85 103 98 % None (Room air)   08/02/23 0712 97.6 °F (36.4 °C) 91 16 132/86 86 97 % None (Room air)   08/02/23 0515 98.3 °F (36.8 °C) 91 18 140/94 -- 100 % None (Room air)   08/02/23 0210 97.1 °F (36.2 °C) Abnormal  107 Abnormal  18 138/85 -- 99 % None (Room air)   08/02/23 0015 98.2 °F (36.8 °C) 110 Abnormal  18 133/84 -- 95 % None (Room air)   08/01/23 2224 98.2 °F (36.8 °C) 120 Abnormal  20 168/110 Abnormal  -- 96 % None (Room air)         Severity of Ethanol Withdrawal Scale (SEWS):   Row Name 08/02/23 1000 08/02/23 0700 08/02/23 0600 08/02/23 0516 08/02/23 0210   Severity of Ethanol Withdrawal Scale (SEWS)   ANXIETY: Do you feel that something bad is about to happen to you right now? 0  -MD 0  -MD 0  -MD 3  -TO 3  -DE   NAUSEA and DRY HEAVES or VOMITING? 0  -MD 0  -MD 0  -MD 0  -TO 0  -DE   SWEATING: (includes moist palms, sweating now)? Score 0 or 2 0  -MD 0  -MD 0  -MD 2  -TO 2  -DE   TREMOR: with arms extended eyes closed? 0  -MD 0  -MD 0  -MD 2  -TO 2  -DE   AGITATION: Fidgety, restless, pacing?  0  -MD 0  -MD 0  -MD 0  -TO 3  -DE   DISORIENTATION: 0  -MD 0  -MD 0  -MD 0  -TO 0  -DE   HALLUCINATIONS: 0  -MD 0  -MD 0  -MD 0  -TO 0  -DE   VITAL SIGNS: ANY (Pulse >906, Diastolic BP >42, Temp >25.5) 0  -MD 0  -MD 0  -MD 3  -TO 0  -DE   SEWS Total Score 0  -MD 0  -MD 0  -MD 10  -TO 10  -DE   Vzaquez Agitation Sedation Scale (RASS)   Vazquez Agitation Sedation Scale (RASS) 0  -MD 0  -MD 0  -MD 0  -TO --   Row Name 08/02/23 0016 08/01/23 2249 08/01/23 2200       Severity of Ethanol Withdrawal Scale (SEWS)   ANXIETY: Do you feel that something bad is about to happen to you right now? 3  -DE 3  -TO 3  -TO       NAUSEA and DRY HEAVES or VOMITING? 0  -DE 0  -TO 0  -TO       SWEATING: (includes moist palms, sweating now)? Score 0 or 2 2  -DE 2  -TO 2  -TO       TREMOR: with arms extended eyes closed? 2  -DE 2  -TO 2  -TO       AGITATION: Fidgety, restless, pacing?  3  -DE 3  -TO 3  -TO       DISORIENTATION: 0  -DE 0  -TO 0  -TO       HALLUCINATIONS: 0  -DE 0  -TO 0  -TO       VITAL SIGNS: ANY (Pulse >425, Diastolic BP >93, Temp >01.0) 3  -DE 3  -TO 3  -TO       SEWS Total Score 13  -DE 13  -TO 13  -TO                Pertinent Labs/Diagnostic Test Results:         Results from last 7 days   Lab Units 08/02/23  0518 08/01/23  1658   WBC Thousand/uL 8.13 10.55*   HEMOGLOBIN g/dL 15.4 18.2*   HEMATOCRIT % 43.8 50.0*   PLATELETS Thousands/uL 262 366   NEUTROS ABS Thousands/µL  --  7.40                Results from last 7 days   Lab Units 08/02/23  0518 08/01/23  1658   SODIUM mmol/L 136 134*   POTASSIUM mmol/L 3.1* 3.4*   CHLORIDE mmol/L 104 99   CO2 mmol/L 21 21   ANION GAP mmol/L 11 14   BUN mg/dL 8 9   CREATININE mg/dL 0.83 0.96   EGFR ml/min/1.73sq m 105 94   CALCIUM mg/dL 7.8* 9.0   MAGNESIUM mg/dL 1.7* 2.0            Results from last 7 days   Lab Units 08/02/23  0518 08/01/23  1658   AST U/L 104* 142*   ALT U/L 51 64*   ALK PHOS U/L 54 61   TOTAL PROTEIN g/dL 6.1* 7.4   ALBUMIN g/dL 4.0 4.5   TOTAL BILIRUBIN mg/dL 2.72* 1.72*          Results from last 7 days   Lab Units 08/02/23  0518 08/01/23  1658   GLUCOSE RANDOM mg/dL 112 102           Results from last 7 days   Lab Units 08/01/23  1658   LIPASE u/L 47           Results from last 7 days   Lab Units 08/01/23  2127   AMPH/METH   Negative   BARBITURATE UR   Negative   BENZODIAZEPINE UR   Negative   COCAINE UR   Negative   METHADONE URINE   Negative   OPIATE UR   Negative   PCP UR   Negative   THC UR   Negative           Results from last 7 days Lab Units 08/01/23  1658   ETHANOL LVL mg/dL 311*               Medical History        Past Medical History:   Diagnosis Date   • Anxiety     • Depression     • ETOH abuse     • GERD (gastroesophageal reflux disease)           Present on Admission:  • GERD without esophagitis  • Major depressive disorder, recurrent, in full remission ([address] )  • Alcohol withdrawal syndrome with complication (HCC)  • Alcohol use disorder, severe, dependence ([address])  • Dehydration  • Hypokalemia  • Transaminitis  • Insomnia        Admitting Diagnosis: Alcohol dependence with withdrawal  Age/Sex: 56 y.o. male  Admission Orders:  Regular Diet. SCDs. Fall & Seizure Precautions. SEWS monitoring. Telemetry & Continuous Pulse Ox.     Scheduled Medications:  busPIRone, 10 mg, Oral, TID  enoxaparin, 40 mg, Subcutaneous, Daily  folic acid 1 mg, thiamine (VITAMIN B1) 100 mg in sodium chloride 0.9 % 100 mL IV piggyback, , Intravenous, Daily  pantoprazole, 40 mg, Oral, Early Morning  venlafaxine, 75 mg, Oral, Daily     Continuous IV Infusions:    sodium chloride 0.9 %, 100 mL/hr, Intravenous, Continuous; Start: 08/01/23 2230 End: 08/02/23 1124     PRN Meds:  acetaminophen, 650 mg, Oral, Q6H PRN  magnesium sulfate, 2 g, Intravenous; 8/2 x1  ondansetron, 4 mg, Intravenous, Q6H PRN  phenobarbital, 650 mg, Intravenous; 8/1 x1  phenobarbital, 260 mg, Intravenous; 8/2 x2  phenobarbital, 130 mg, Intravenous; 8/2 x1  potassium chloride, 40 mEq, Oral; 8/2 x1  potassium chloride, 20 mEq, Intravenous; 8/2 x2  traZODone, 50 mg, Oral, HS PRN        IP CONSULT TO CASE MANAGEMENT     Network Utilization Review Department  ATTENTION: Please call with any questions or concerns to [phone] and carefully listen to the prompts so that you are directed to the right person.  All voicemails are confidential.  Eva Stein all requests for admission clinical reviews, approved or denied determinations and any other requests to dedicated fax number below belonging to the [place] where the patient is receiving treatment.  List of dedicated fax numbers for the Facilities:  [place] DENIALS (Administrative/Medical Necessity) [phone] [address] (Maternity/NICU/Pediatrics) [phone]   [address] [phone]   [facility] [address] [facility] [phone] [address] [place] [address] [phone]   [address]  [facility] [phone]

## 2023-08-09 ENCOUNTER — TELEPHONE (OUTPATIENT)
Dept: CASE MANAGEMENT | Facility: HOSPITAL | Age: 48
End: 2023-08-09

## 2023-08-09 NOTE — TELEPHONE ENCOUNTER
Cm received a call from pt requesting a return to work letter. Cm discussed this need with pt and completed this letter. Cm sent this letter to pt's [employer] account.

## 2023-09-02 ENCOUNTER — HOSPITAL ENCOUNTER (INPATIENT)
Facility: HOSPITAL | Age: 48
LOS: 4 days | Discharge: HOME | DRG: 897 | End: 2023-09-06
Attending: EMERGENCY MEDICINE | Admitting: EMERGENCY MEDICINE
Payer: COMMERCIAL

## 2023-09-02 DIAGNOSIS — F10.10 ALCOHOL ABUSE: Primary | ICD-10-CM

## 2023-09-02 DIAGNOSIS — R44.3 HALLUCINATIONS: ICD-10-CM

## 2023-09-02 DIAGNOSIS — F10.20 ALCOHOL USE DISORDER, SEVERE, DEPENDENCE (HCC): ICD-10-CM

## 2023-09-02 LAB
ALBUMIN SERPL BCP-MCNC: 4.3 G/DL (ref 3.5–5)
ALP SERPL-CCNC: 81 U/L (ref 34–104)
ALT SERPL W P-5'-P-CCNC: 43 U/L (ref 7–52)
ANION GAP SERPL CALCULATED.3IONS-SCNC: 14 MMOL/L
AST SERPL W P-5'-P-CCNC: 74 U/L (ref 13–39)
BASOPHILS # BLD AUTO: 0.09 THOUSANDS/ÂΜL (ref 0–0.1)
BASOPHILS NFR BLD AUTO: 1 % (ref 0–1)
BILIRUB SERPL-MCNC: 0.8 MG/DL (ref 0.2–1)
BUN SERPL-MCNC: 14 MG/DL (ref 5–25)
CALCIUM SERPL-MCNC: 8.7 MG/DL (ref 8.4–10.2)
CHLORIDE SERPL-SCNC: 103 MMOL/L (ref 96–108)
CO2 SERPL-SCNC: 21 MMOL/L (ref 21–32)
CREAT SERPL-MCNC: 0.86 MG/DL (ref 0.6–1.3)
EOSINOPHIL # BLD AUTO: 0.03 THOUSAND/ÂΜL (ref 0–0.61)
EOSINOPHIL NFR BLD AUTO: 0 % (ref 0–6)
ERYTHROCYTE [DISTWIDTH] IN BLOOD BY AUTOMATED COUNT: 13 % (ref 11.6–15.1)
ETHANOL SERPL-MCNC: 243 MG/DL
GFR SERPL CREATININE-BSD FRML MDRD: 103 ML/MIN/1.73SQ M
GLUCOSE SERPL-MCNC: 113 MG/DL (ref 65–140)
HCT VFR BLD AUTO: 45.4 % (ref 36.5–49.3)
HGB BLD-MCNC: 16.3 G/DL (ref 12–17)
IMM GRANULOCYTES # BLD AUTO: 0.01 THOUSAND/UL (ref 0–0.2)
IMM GRANULOCYTES NFR BLD AUTO: 0 % (ref 0–2)
LIPASE SERPL-CCNC: 38 U/L (ref 11–82)
LYMPHOCYTES # BLD AUTO: 1.68 THOUSANDS/ÂΜL (ref 0.6–4.47)
LYMPHOCYTES NFR BLD AUTO: 19 % (ref 14–44)
MAGNESIUM SERPL-MCNC: 1.8 MG/DL (ref 1.9–2.7)
MCH RBC QN AUTO: 32 PG (ref 26.8–34.3)
MCHC RBC AUTO-ENTMCNC: 35.9 G/DL (ref 31.4–37.4)
MCV RBC AUTO: 89 FL (ref 82–98)
MONOCYTES # BLD AUTO: 0.89 THOUSAND/ÂΜL (ref 0.17–1.22)
MONOCYTES NFR BLD AUTO: 10 % (ref 4–12)
NEUTROPHILS # BLD AUTO: 6.11 THOUSANDS/ÂΜL (ref 1.85–7.62)
NEUTS SEG NFR BLD AUTO: 70 % (ref 43–75)
NRBC BLD AUTO-RTO: 0 /100 WBCS
PLATELET # BLD AUTO: 309 THOUSANDS/UL (ref 149–390)
PMV BLD AUTO: 9.4 FL (ref 8.9–12.7)
POTASSIUM SERPL-SCNC: 3.6 MMOL/L (ref 3.5–5.3)
PROT SERPL-MCNC: 7.1 G/DL (ref 6.4–8.4)
RBC # BLD AUTO: 5.09 MILLION/UL (ref 3.88–5.62)
SODIUM SERPL-SCNC: 138 MMOL/L (ref 135–147)
WBC # BLD AUTO: 8.81 THOUSAND/UL (ref 4.31–10.16)

## 2023-09-02 PROCEDURE — 99284 EMERGENCY DEPT VISIT MOD MDM: CPT

## 2023-09-02 PROCEDURE — 93005 ELECTROCARDIOGRAM TRACING: CPT

## 2023-09-02 PROCEDURE — 36415 COLL VENOUS BLD VENIPUNCTURE: CPT | Performed by: PHYSICIAN ASSISTANT

## 2023-09-02 PROCEDURE — 99285 EMERGENCY DEPT VISIT HI MDM: CPT | Performed by: PHYSICIAN ASSISTANT

## 2023-09-02 PROCEDURE — 99223 1ST HOSP IP/OBS HIGH 75: CPT

## 2023-09-02 PROCEDURE — 82077 ASSAY SPEC XCP UR&BREATH IA: CPT | Performed by: PHYSICIAN ASSISTANT

## 2023-09-02 PROCEDURE — 80053 COMPREHEN METABOLIC PANEL: CPT | Performed by: PHYSICIAN ASSISTANT

## 2023-09-02 PROCEDURE — 96360 HYDRATION IV INFUSION INIT: CPT

## 2023-09-02 PROCEDURE — 83690 ASSAY OF LIPASE: CPT | Performed by: PHYSICIAN ASSISTANT

## 2023-09-02 PROCEDURE — 83735 ASSAY OF MAGNESIUM: CPT | Performed by: PHYSICIAN ASSISTANT

## 2023-09-02 PROCEDURE — 85025 COMPLETE CBC W/AUTO DIFF WBC: CPT | Performed by: PHYSICIAN ASSISTANT

## 2023-09-02 RX ORDER — FAMOTIDINE 20 MG/1
20 TABLET, FILM COATED ORAL DAILY
Status: DISCONTINUED | OUTPATIENT
Start: 2023-09-03 | End: 2023-09-04

## 2023-09-02 RX ORDER — ENOXAPARIN SODIUM 100 MG/ML
40 INJECTION SUBCUTANEOUS DAILY
Status: DISCONTINUED | OUTPATIENT
Start: 2023-09-03 | End: 2023-09-06 | Stop reason: HOSPADM

## 2023-09-02 RX ORDER — BUSPIRONE HYDROCHLORIDE 10 MG/1
10 TABLET ORAL 3 TIMES DAILY
Status: DISCONTINUED | OUTPATIENT
Start: 2023-09-02 | End: 2023-09-06 | Stop reason: HOSPADM

## 2023-09-02 RX ORDER — SODIUM CHLORIDE 9 MG/ML
100 INJECTION, SOLUTION INTRAVENOUS CONTINUOUS
Status: DISCONTINUED | OUTPATIENT
Start: 2023-09-02 | End: 2023-09-04

## 2023-09-02 RX ORDER — LANOLIN ALCOHOL/MO/W.PET/CERES
100 CREAM (GRAM) TOPICAL DAILY
Status: DISCONTINUED | OUTPATIENT
Start: 2023-09-03 | End: 2023-09-06 | Stop reason: HOSPADM

## 2023-09-02 RX ORDER — VENLAFAXINE HYDROCHLORIDE 75 MG/1
75 CAPSULE, EXTENDED RELEASE ORAL DAILY
Status: DISCONTINUED | OUTPATIENT
Start: 2023-09-03 | End: 2023-09-03

## 2023-09-02 RX ORDER — ACETAMINOPHEN 325 MG/1
650 TABLET ORAL EVERY 6 HOURS PRN
Status: DISCONTINUED | OUTPATIENT
Start: 2023-09-02 | End: 2023-09-06 | Stop reason: HOSPADM

## 2023-09-02 RX ORDER — DIAZEPAM 5 MG/ML
10 INJECTION, SOLUTION INTRAMUSCULAR; INTRAVENOUS ONCE
Status: COMPLETED | OUTPATIENT
Start: 2023-09-03 | End: 2023-09-02

## 2023-09-02 RX ORDER — HYDROXYZINE HYDROCHLORIDE 25 MG/1
25 TABLET, FILM COATED ORAL
Status: DISCONTINUED | OUTPATIENT
Start: 2023-09-02 | End: 2023-09-06 | Stop reason: HOSPADM

## 2023-09-02 RX ORDER — MAGNESIUM SULFATE HEPTAHYDRATE 40 MG/ML
2 INJECTION, SOLUTION INTRAVENOUS ONCE
Status: COMPLETED | OUTPATIENT
Start: 2023-09-02 | End: 2023-09-03

## 2023-09-02 RX ORDER — ONDANSETRON 2 MG/ML
4 INJECTION INTRAMUSCULAR; INTRAVENOUS EVERY 6 HOURS PRN
Status: DISCONTINUED | OUTPATIENT
Start: 2023-09-02 | End: 2023-09-06 | Stop reason: HOSPADM

## 2023-09-02 RX ORDER — TRAZODONE HYDROCHLORIDE 50 MG/1
50 TABLET ORAL
Status: DISCONTINUED | OUTPATIENT
Start: 2023-09-02 | End: 2023-09-06 | Stop reason: HOSPADM

## 2023-09-02 RX ORDER — FOLIC ACID 1 MG/1
1 TABLET ORAL DAILY
Status: DISCONTINUED | OUTPATIENT
Start: 2023-09-03 | End: 2023-09-06 | Stop reason: HOSPADM

## 2023-09-02 RX ADMIN — SODIUM CHLORIDE 1000 ML: 0.9 INJECTION, SOLUTION INTRAVENOUS at 21:24

## 2023-09-02 RX ADMIN — BUSPIRONE HYDROCHLORIDE 10 MG: 10 TABLET ORAL at 23:57

## 2023-09-02 RX ADMIN — HYDROXYZINE HYDROCHLORIDE 25 MG: 25 TABLET ORAL at 23:57

## 2023-09-02 RX ADMIN — SODIUM CHLORIDE 100 ML/HR: 0.9 INJECTION, SOLUTION INTRAVENOUS at 23:57

## 2023-09-02 RX ADMIN — DIAZEPAM 10 MG: 10 INJECTION, SOLUTION INTRAMUSCULAR; INTRAVENOUS at 23:57

## 2023-09-02 RX ADMIN — MAGNESIUM SULFATE HEPTAHYDRATE 2 G: 2 INJECTION, SOLUTION INTRAVENOUS at 23:57

## 2023-09-02 RX ADMIN — Medication 650 MG: at 23:57

## 2023-09-02 NOTE — LETTER
[facility] INPATIENT DETOX  [address] [facility] [address]  [phone]  Dept: [phone]    September 5, 2023     Patient: Cale Lee   YOB: 1975   Date of Admission: 9/2/2023       To Whom it May Concern:    Cale Lee has twice been under the professional care of the [address] [place] [address] Detox Unit. He was seen in the hospital from 8/1/2023 to 8/3/2023 and again from 9/2/2023 to 09/05/23. On both occasions, he was admitted and treated for alcohol withdrawal syndrome with complication, coinciding with a history of severe alcohol use disorder with dependence. If you have any questions or concerns, please don't hesitate to call. The unit phone number is [phone]. Thank you.          Sincerely,          Latisha Giang PA-C

## 2023-09-03 LAB
ALBUMIN SERPL BCP-MCNC: 3.7 G/DL (ref 3.5–5)
ALP SERPL-CCNC: 59 U/L (ref 34–104)
ALT SERPL W P-5'-P-CCNC: 34 U/L (ref 7–52)
AMPHETAMINES SERPL QL SCN: NEGATIVE
ANION GAP SERPL CALCULATED.3IONS-SCNC: 11 MMOL/L
AST SERPL W P-5'-P-CCNC: 61 U/L (ref 13–39)
ATRIAL RATE: 105 BPM
BACTERIA UR QL AUTO: ABNORMAL /HPF
BARBITURATES UR QL: POSITIVE
BENZODIAZ UR QL: NEGATIVE
BILIRUB SERPL-MCNC: 1.23 MG/DL (ref 0.2–1)
BILIRUB UR QL STRIP: NEGATIVE
BUN SERPL-MCNC: 10 MG/DL (ref 5–25)
CALCIUM SERPL-MCNC: 7.9 MG/DL (ref 8.4–10.2)
CHLORIDE SERPL-SCNC: 104 MMOL/L (ref 96–108)
CLARITY UR: CLEAR
CO2 SERPL-SCNC: 21 MMOL/L (ref 21–32)
COCAINE UR QL: NEGATIVE
COLOR UR: YELLOW
CREAT SERPL-MCNC: 0.7 MG/DL (ref 0.6–1.3)
ERYTHROCYTE [DISTWIDTH] IN BLOOD BY AUTOMATED COUNT: 13.2 % (ref 11.6–15.1)
GFR SERPL CREATININE-BSD FRML MDRD: 113 ML/MIN/1.73SQ M
GLUCOSE SERPL-MCNC: 71 MG/DL (ref 65–140)
GLUCOSE UR STRIP-MCNC: NEGATIVE MG/DL
HCT VFR BLD AUTO: 40 % (ref 36.5–49.3)
HGB BLD-MCNC: 14.4 G/DL (ref 12–17)
HGB UR QL STRIP.AUTO: 10
KETONES UR STRIP-MCNC: NEGATIVE MG/DL
LEUKOCYTE ESTERASE UR QL STRIP: NEGATIVE
MAGNESIUM SERPL-MCNC: 2.2 MG/DL (ref 1.9–2.7)
MCH RBC QN AUTO: 32.4 PG (ref 26.8–34.3)
MCHC RBC AUTO-ENTMCNC: 36 G/DL (ref 31.4–37.4)
MCV RBC AUTO: 90 FL (ref 82–98)
METHADONE UR QL: NEGATIVE
MUCOUS THREADS UR QL AUTO: ABNORMAL
NITRITE UR QL STRIP: NEGATIVE
NON-SQ EPI CELLS URNS QL MICRO: ABNORMAL /HPF
OPIATES UR QL SCN: NEGATIVE
OXYCODONE+OXYMORPHONE UR QL SCN: NEGATIVE
P AXIS: 41 DEGREES
PCP UR QL: NEGATIVE
PH UR STRIP.AUTO: 6 [PH]
PLATELET # BLD AUTO: 228 THOUSANDS/UL (ref 149–390)
PMV BLD AUTO: 9.5 FL (ref 8.9–12.7)
POTASSIUM SERPL-SCNC: 3.6 MMOL/L (ref 3.5–5.3)
PR INTERVAL: 120 MS
PROT SERPL-MCNC: 6 G/DL (ref 6.4–8.4)
PROT UR STRIP-MCNC: ABNORMAL MG/DL
QRS AXIS: -24 DEGREES
QRSD INTERVAL: 110 MS
QT INTERVAL: 364 MS
QTC INTERVAL: 481 MS
RBC # BLD AUTO: 4.45 MILLION/UL (ref 3.88–5.62)
RBC #/AREA URNS AUTO: ABNORMAL /HPF
SODIUM SERPL-SCNC: 136 MMOL/L (ref 135–147)
SP GR UR STRIP.AUTO: 1.01 (ref 1–1.04)
T WAVE AXIS: 25 DEGREES
THC UR QL: NEGATIVE
UROBILINOGEN UA: NEGATIVE MG/DL
VENTRICULAR RATE: 105 BPM
WBC # BLD AUTO: 6.46 THOUSAND/UL (ref 4.31–10.16)
WBC #/AREA URNS AUTO: ABNORMAL /HPF

## 2023-09-03 PROCEDURE — 81003 URINALYSIS AUTO W/O SCOPE: CPT | Performed by: PHYSICIAN ASSISTANT

## 2023-09-03 PROCEDURE — 80053 COMPREHEN METABOLIC PANEL: CPT

## 2023-09-03 PROCEDURE — 83735 ASSAY OF MAGNESIUM: CPT

## 2023-09-03 PROCEDURE — 93010 ELECTROCARDIOGRAM REPORT: CPT | Performed by: INTERNAL MEDICINE

## 2023-09-03 PROCEDURE — 99291 CRITICAL CARE FIRST HOUR: CPT | Performed by: EMERGENCY MEDICINE

## 2023-09-03 PROCEDURE — 99222 1ST HOSP IP/OBS MODERATE 55: CPT | Performed by: PSYCHIATRY & NEUROLOGY

## 2023-09-03 PROCEDURE — 81001 URINALYSIS AUTO W/SCOPE: CPT | Performed by: PHYSICIAN ASSISTANT

## 2023-09-03 PROCEDURE — 85027 COMPLETE CBC AUTOMATED: CPT

## 2023-09-03 PROCEDURE — 80307 DRUG TEST PRSMV CHEM ANLYZR: CPT | Performed by: PHYSICIAN ASSISTANT

## 2023-09-03 RX ORDER — VENLAFAXINE HYDROCHLORIDE 75 MG/1
75 CAPSULE, EXTENDED RELEASE ORAL DAILY
Status: DISCONTINUED | OUTPATIENT
Start: 2023-09-05 | End: 2023-09-06 | Stop reason: HOSPADM

## 2023-09-03 RX ORDER — PHENOBARBITAL SODIUM 130 MG/ML
130 INJECTION INTRAMUSCULAR ONCE
Status: COMPLETED | OUTPATIENT
Start: 2023-09-03 | End: 2023-09-03

## 2023-09-03 RX ORDER — NICOTINE 21 MG/24HR
21 PATCH, TRANSDERMAL 24 HOURS TRANSDERMAL DAILY
Status: DISCONTINUED | OUTPATIENT
Start: 2023-09-03 | End: 2023-09-06 | Stop reason: HOSPADM

## 2023-09-03 RX ORDER — PHENOBARBITAL SODIUM 130 MG/ML
260 INJECTION INTRAMUSCULAR ONCE
Status: COMPLETED | OUTPATIENT
Start: 2023-09-03 | End: 2023-09-03

## 2023-09-03 RX ORDER — LOPERAMIDE HYDROCHLORIDE 2 MG/1
2 CAPSULE ORAL 4 TIMES DAILY PRN
Status: DISCONTINUED | OUTPATIENT
Start: 2023-09-03 | End: 2023-09-06 | Stop reason: HOSPADM

## 2023-09-03 RX ADMIN — NICOTINE POLACRILEX 4 MG: 4 GUM, CHEWING BUCCAL at 00:05

## 2023-09-03 RX ADMIN — BUSPIRONE HYDROCHLORIDE 10 MG: 10 TABLET ORAL at 08:08

## 2023-09-03 RX ADMIN — BUSPIRONE HYDROCHLORIDE 10 MG: 10 TABLET ORAL at 16:45

## 2023-09-03 RX ADMIN — SODIUM CHLORIDE 100 ML/HR: 0.9 INJECTION, SOLUTION INTRAVENOUS at 08:37

## 2023-09-03 RX ADMIN — PHENOBARBITAL SODIUM 130 MG: 130 INJECTION INTRAMUSCULAR at 08:15

## 2023-09-03 RX ADMIN — PHENOBARBITAL SODIUM 260 MG: 130 INJECTION INTRAMUSCULAR at 05:05

## 2023-09-03 RX ADMIN — FAMOTIDINE 20 MG: 20 TABLET, FILM COATED ORAL at 08:08

## 2023-09-03 RX ADMIN — THIAMINE HCL TAB 100 MG 100 MG: 100 TAB at 08:08

## 2023-09-03 RX ADMIN — HYDROXYZINE HYDROCHLORIDE 25 MG: 25 TABLET ORAL at 21:02

## 2023-09-03 RX ADMIN — BUSPIRONE HYDROCHLORIDE 10 MG: 10 TABLET ORAL at 21:02

## 2023-09-03 RX ADMIN — VENLAFAXINE HYDROCHLORIDE 75 MG: 75 CAPSULE, EXTENDED RELEASE ORAL at 08:08

## 2023-09-03 RX ADMIN — SODIUM CHLORIDE 100 ML/HR: 0.9 INJECTION, SOLUTION INTRAVENOUS at 18:36

## 2023-09-03 RX ADMIN — LOPERAMIDE HYDROCHLORIDE 2 MG: 2 CAPSULE ORAL at 14:46

## 2023-09-03 RX ADMIN — MULTIPLE VITAMINS W/ MINERALS TAB 1 TABLET: TAB ORAL at 08:08

## 2023-09-03 RX ADMIN — PHENOBARBITAL SODIUM 130 MG: 130 INJECTION INTRAMUSCULAR at 00:56

## 2023-09-03 RX ADMIN — FOLIC ACID 1 MG: 1 TABLET ORAL at 08:08

## 2023-09-03 NOTE — PLAN OF CARE
Problem: SUBSTANCE USE/ABUSE  Goal: By discharge, will develop insight into their chemical dependency and sustain motivation to continue in recovery  Description: INTERVENTIONS:  - Attends all daily group sessions and scheduled AA groups  - Actively practices coping skills through participation in the therapeutic community and adherence to program rules  - Reviews and completes assignments from individual treatment plan  - Assist patient development of understanding of their personal cycle of addiction and relapse triggers  Outcome: Progressing  Goal: By discharge, patient will have ongoing treatment plan addressing chemical dependency  Description: INTERVENTIONS:  - Assist patient with resources and/or appointments for ongoing recovery based living  Outcome: Progressing     Problem: DISCHARGE PLANNING  Goal: Discharge to home or other facility with appropriate resources  Description: INTERVENTIONS:  - Identify barriers to discharge w/patient and caregiver  - Arrange for needed discharge resources and transportation as appropriate  - Identify discharge learning needs (meds, wound care, etc.)  - Arrange for interpretive services to assist at discharge as needed  - Refer to Case Management Department for coordinating discharge planning if the patient needs post-hospital services based on physician/advanced practitioner order or complex needs related to functional status, cognitive ability, or social support system  Outcome: Progressing

## 2023-09-03 NOTE — ED PROVIDER NOTES
History  Chief Complaint   Patient presents with   • Detox Evaluation     Pt wants be admitted to detox for alcohol. Last drink 4 hours ago. Pt states usually he drinks a 750mL bottle of liquor every night. Patient presents for detox evaluation. Patient was seen by this facility one month ago for similar. States upon discharge, he did not follow up with outpatient rehab and started drinking within a couple hours at home. States he wants to 'get clean' for good now. States he has been drinking about 750mL whiskey daily since then. Last drink was 3-4 hours ago. Denies any chest pain, shortness of breath, nausea, vomiting, SI/ HI. No other complaints. Prior to Admission Medications   Prescriptions Last Dose Informant Patient Reported? Taking?   busPIRone (BUSPAR) 10 mg tablet   No No   Sig: Take 1 tablet (10 mg total) by mouth 3 (three) times a day   famotidine (Pepcid) 20 mg tablet   Yes No   Sig: Take 20 mg by mouth daily   gabapentin (NEURONTIN) 300 mg capsule   No No   Sig: Take 1 capsule (300 mg total) by mouth 3 (three) times a day for 15 days   hydrOXYzine pamoate (VISTARIL) 25 mg capsule   No No   Sig: Take 1 capsule (25 mg total) by mouth daily at bedtime   naltrexone (REVIA) 50 mg tablet   No No   Sig: Take 1 tablet (50 mg total) by mouth daily Do not start before August 4, 2023. venlafaxine (EFFEXOR-XR) 75 mg 24 hr capsule   No No   Sig: Take 1 capsule (75 mg total) by mouth daily      Facility-Administered Medications: None       Past Medical History:   Diagnosis Date   • Anxiety    • Depression    • ETOH abuse    • GERD (gastroesophageal reflux disease)        Past Surgical History:   Procedure Laterality Date   • CLOSED REDUCTION FOREARM FRACTURE Right    • EGD         History reviewed. No pertinent family history. I have reviewed and agree with the history as documented.     E-Cigarette/Vaping   • E-Cigarette Use Never User      E-Cigarette/Vaping Substances   • Nicotine No    • THC No • CBD No    • Flavoring No    • Other No    • Unknown No      Social History     Tobacco Use   • Smoking status: Never   • Smokeless tobacco: Never   Vaping Use   • Vaping Use: Never used   Substance Use Topics   • Alcohol use: Yes     Comment: daily whiskey 4 bottles per week   • Drug use: Never       Review of Systems   Constitutional: Negative for chills and fever. HENT: Negative for congestion, ear pain and sore throat. Eyes: Negative for pain. Respiratory: Negative for cough and shortness of breath. Cardiovascular: Negative for chest pain. Gastrointestinal: Negative for abdominal pain, nausea and vomiting. Genitourinary: Negative for dysuria. Musculoskeletal: Negative for back pain. Skin: Negative for rash. Neurological: Negative for dizziness, weakness and numbness. Psychiatric/Behavioral: Negative for suicidal ideas. All other systems reviewed and are negative. Physical Exam  Physical Exam  Vitals reviewed. Constitutional:       General: He is not in acute distress. Appearance: He is well-developed. He is not diaphoretic. Comments: Intoxicated, cooperative   HENT:      Head: Normocephalic and atraumatic. Right Ear: External ear normal.      Left Ear: External ear normal.      Nose: Nose normal.   Eyes:      Pupils: Pupils are equal, round, and reactive to light. Cardiovascular:      Rate and Rhythm: Normal rate and regular rhythm. Heart sounds: Normal heart sounds. Pulmonary:      Effort: Pulmonary effort is normal.      Breath sounds: Normal breath sounds. Abdominal:      General: Bowel sounds are normal.      Palpations: Abdomen is soft. Tenderness: There is no abdominal tenderness. Musculoskeletal:         General: Normal range of motion. Cervical back: Normal range of motion and neck supple. Skin:     General: Skin is warm and dry. Neurological:      Mental Status: He is alert and oriented to person, place, and time.    Psychiatric: Mood and Affect: Affect is flat. Behavior: Behavior is cooperative. Thought Content: Thought content normal. Thought content does not include homicidal or suicidal ideation. Thought content does not include homicidal or suicidal plan.          Vital Signs  ED Triage Vitals   Temperature Pulse Respirations Blood Pressure SpO2   09/02/23 2106 09/02/23 2056 09/02/23 2056 09/02/23 2057 09/02/23 2056   98.9 °F (37.2 °C) (!) 120 16 (!) 177/120 99 %      Temp src Heart Rate Source Patient Position - Orthostatic VS BP Location FiO2 (%)   -- -- -- -- --             Pain Score       --                  Vitals:    09/02/23 2056 09/02/23 2057   BP:  (!) 177/120   Pulse: (!) 120          Visual Acuity      ED Medications  Medications   sodium chloride 0.9 % bolus 1,000 mL (1,000 mL Intravenous New Bag 9/2/23 2124)       Diagnostic Studies  Results Reviewed     Procedure Component Value Units Date/Time    Comprehensive metabolic panel [[id number]]  (Abnormal) Collected: 09/02/23 2117    Lab Status: Final result Specimen: Blood from Arm, Left Updated: 09/02/23 2140     Sodium 138 mmol/L      Potassium 3.6 mmol/L      Chloride 103 mmol/L      CO2 21 mmol/L      ANION GAP 14 mmol/L      BUN 14 mg/dL      Creatinine 0.86 mg/dL      Glucose 113 mg/dL      Calcium 8.7 mg/dL      AST 74 U/L      ALT 43 U/L      Alkaline Phosphatase 81 U/L      Total Protein 7.1 g/dL      Albumin 4.3 g/dL      Total Bilirubin 0.80 mg/dL      eGFR 103 ml/min/1.73sq m     Narrative:      [place] guidelines for Chronic Kidney Disease (CKD):   •  Stage 1 with normal or high GFR (GFR > 90 mL/min/1.73 square meters)  •  Stage 2 Mild CKD (GFR = 60-89 mL/min/1.73 square meters)  •  Stage 3A Moderate CKD (GFR = 45-59 mL/min/1.73 square meters)  •  Stage 3B Moderate CKD (GFR = 30-44 mL/min/1.73 square meters)  •  Stage 4 Severe CKD (GFR = 15-29 mL/min/1.73 square meters)  •  Stage 5 End Stage CKD (GFR <15 mL/min/1.73 square meters)  Note: GFR calculation is accurate only with a steady state creatinine    Magnesium [[id number]]  (Abnormal) Collected: 09/02/23 2117    Lab Status: Final result Specimen: Blood from Arm, Left Updated: 09/02/23 2140     Magnesium 1.8 mg/dL     Lipase [[id number]]  (Normal) Collected: 09/02/23 2117    Lab Status: Final result Specimen: Blood from Arm, Left Updated: 09/02/23 2140     Lipase 38 u/L     Ethanol [[id number]]  (Abnormal) Collected: 09/02/23 2117    Lab Status: Final result Specimen: Blood from Arm, Left Updated: 09/02/23 2138     Ethanol Lvl 243 mg/dL     CBC and differential [[id number]] Collected: 09/02/23 2117    Lab Status: Final result Specimen: Blood from Arm, Left Updated: 09/02/23 2123     WBC 8.81 Thousand/uL      RBC 5.09 Million/uL      Hemoglobin 16.3 g/dL      Hematocrit 45.4 %      MCV 89 fL      MCH 32.0 pg      MCHC 35.9 g/dL      RDW 13.0 %      MPV 9.4 fL      Platelets 877 Thousands/uL      nRBC 0 /100 WBCs      Neutrophils Relative 70 %      Immat GRANS % 0 %      Lymphocytes Relative 19 %      Monocytes Relative 10 %      Eosinophils Relative 0 %      Basophils Relative 1 %      Neutrophils Absolute 6.11 Thousands/µL      Immature Grans Absolute 0.01 Thousand/uL      Lymphocytes Absolute 1.68 Thousands/µL      Monocytes Absolute 0.89 Thousand/µL      Eosinophils Absolute 0.03 Thousand/µL      Basophils Absolute 0.09 Thousands/µL     UA w Reflex to Microscopic w Reflex to Culture [[id number]]     Lab Status: No result Specimen: Urine, Clean Catch     Rapid drug screen, urine [[id number]]     Lab Status: No result Specimen: Urine                  No orders to display              Procedures  Procedures         ED Course  ED Course as of 09/02/23 2228   Sat Sep 02, 2023   2133 Procedure Note: EKG  Date/Time: 09/02/23 9:33 PM   Performed by: Lolita Soto  Authorized by: Lolita Soto  ECG interpreted by me, the ED Provider: yes   The EKG demonstrates:  Rate 105  Rhythm sinus tachycardia  QTc 481  No ST elevations/depressions                                                 Medical Decision Making  Discussed case with toxicology who will admit patient. Patient accepted under care of Dr. Paolo Mathews. Patient agreeable    Amount and/or Complexity of Data Reviewed  Labs: ordered. Risk  Decision regarding hospitalization. Disposition  Final diagnoses:   Alcohol abuse     Time reflects when diagnosis was documented in both MDM as applicable and the Disposition within this note     Time User Action Codes Description Comment    9/2/2023 10:27 PM Misael Harding Add [F10.10] Alcohol abuse       ED Disposition     ED Disposition   Admit    Condition   Stable    Date/Time   Sat Sep 2, 2023 10:27 PM    Comment   Case was discussed with detox and the patient's admission status was agreed to be Admission Status: inpatient status to the service of Dr. Paolo Mathews . Follow-up Information    None         Patient's Medications   Discharge Prescriptions    No medications on file       No discharge procedures on file.     PDMP Review     None          ED Provider  Electronically Signed by           Manuel Vivar PA-C  09/02/23 0745

## 2023-09-03 NOTE — PROGRESS NOTES
PROGRESS NOTE  DEPARTMENT OF MEDICAL TOXICOLOGY  LEVEL 4 MEDICAL DETOX UNIT  Chela Patel 56 y.o. male MRN: [id number]  Unit/Bed#: 5T [facility] 507-01 Encounter: [id number]      Reason for Admission/Principal Problem: Alcohol withdrawal, alcohol use disorder    Rounding Provider: [place] MD  Attending Provider: [place]*   9/2/2023  8:54 PM       Alcohol withdrawal syndrome with complication [facility])  Assessment & Plan  · Continue SEWS protocol with symptom-triggered, as needed phenobarbital    Alcohol use disorder, severe, dependence ([address])  Assessment & Plan  · Continue vitamin supplementation  · Case management consulted for disposition planning; patient desires inpatient rehabilitation  · Will discuss naltrexone when withdrawal improved    Hypomagnesemia  Assessment & Plan  Recent Labs     09/02/23 2117 09/03/23  0457   MG 1.8* 2.2     · Improved/resolved    * Dehydration  Assessment & Plan  Recent Labs     09/02/23 2117 09/03/23  0457   HGB 16.3 14.4   HCT 45.4 40.0       • Improved  • Continue IVF hydration until tolerating adequate PO intake    Major depressive disorder, recurrent, in full remission ([address])  Assessment & Plan  · Continue home medications: venlafaxine, buspirone, and hydroxyzine  · Psychiatry consulted for auditory and visual hallucinations as requested by patient; appreciate recommendations  · Outpatient psychiatry follow up    [facility]     09/02/23 2117 09/03/23  0457   AST 74* 61*   ALT 43 34   ALKPHOS 81 59     • Likely secondary to alcohol use  • Improving    GERD without esophagitis  Assessment & Plan  · Continue famotidine    Insomnia  Assessment & Plan  · Continue home hydroxyzine  · Trazodone as needed      VTE Pharmacologic Prophylaxis:   Pharmacologic: Enoxaparin (Lovenox)  Mechanical VTE Prophylaxis in Place: yes    Code Status: Level 1 - Full Code    Patient Centered Rounds: I have performed bedside rounds with nursing staff today. Discussions with Specialists or Other Care Team Provider: Nursing, psychiatry     Education and Discussions with Family / Patient: patient    Time Spent for Care: 45 minutes. More than 50% of total time spent on counseling and coordination of care as described above. Minutes of critical care time 28  -Critical care time was exclusive of separately billable procedures and teaching time.   -Critical care was necessary to treat or prevent imminent or life-threatening deterioration of the following condition: withdrawal  -Critical care time was spent personally by me on the following activities as well as the above as per the course and rest of chart: obtaining history from patient/surrogate, review of old charts, development of a treatment plan, discussions with referring provider(s) and/or consultants, examination of the patient, performing treatments and interventions, evaluation of patient's response to the treatment, re-evaluation of the patient's condition, ordering/interpreting laboratory studies, ordering/interpreting of radiographic studies. Current Length of Stay: 1 day(s)    Current Patient Status: Inpatient     Certification Statement: The patient will continue to require additional inpatient hospital stay due to alcohol withdrawal management, disposition planning Discharge Plan: Case management consulted for disposition planning; patient desires inpatient rehabilitation      Subjective:   Patient endorses nausea, diarrhea, anorexia, fatigue this morning.     Objective:     Clinical Opiate Withdrawal Scale  Pulse: 86    SEWS Total Score: 0 (9/3/2023 11:00 AM)        Last 24 Hours Medication List:   Current Facility-Administered Medications   Medication Dose Route Frequency Provider Last Rate   • acetaminophen  650 mg Oral Q6H PRN Early PrintEVRNA medel     • busPIRone  10 mg Oral TID Early PrintVERNA medel     • enoxaparin  40 mg Subcutaneous Daily Early VERNA Hercules     • famotidine 20 mg Oral Daily Desi Santos PA-C     • folic acid  1 mg Oral Daily Desi Santos PA-C     • hydrOXYzine HCL  25 mg Oral HS Desi Santos PA-C     • multivitamin-minerals  1 tablet Oral Daily Desi Santos, VERNA     • nicotine  21 mg Transdermal Daily Desi Santos, VERNA     • nicotine polacrilex  4 mg Oral Q2H PRN Desi Santos PA-C     • ondansetron  4 mg Intravenous Q6H PRN Desi Santos PA-C     • sodium chloride  100 mL/hr Intravenous Continuous DOUG OwenC 100 mL/hr (23 8089)   • thiamine  100 mg Oral Daily Desi Santos PA-C     • traZODone  50 mg Oral HS PRN Desi Santos PA-C     • venlafaxine  75 mg Oral Daily Desi Santos PA-C           Vitals:   Temp (24hrs), Av.2 °F (36.8 °C), Min:97.5 °F (36.4 °C), Max:98.9 °F (37.2 °C)    Temp:  [97.5 °F (36.4 °C)-98.9 °F (37.2 °C)] 97.5 °F (36.4 °C)  HR:  [] 86  Resp:  [16-18] 16  BP: (138-177)/() 162/92  SpO2:  [92 %-100 %] 99 %  Body mass index is 27.76 kg/m². Input and Output Summary (last 24 hours):No intake or output data in the 24 hours ending 23 1147    Physical Exam:   Physical Exam  Vitals and nursing note reviewed. Constitutional:       General: He is not in acute distress. Appearance: He is diaphoretic. He is not toxic-appearing. Comments: Sleeping but arouses to voice   HENT:      Head: Normocephalic and atraumatic. Nose: Nose normal.      Mouth/Throat:      Mouth: Mucous membranes are moist.   Eyes:      General: No scleral icterus. Right eye: No discharge. Left eye: No discharge. Conjunctiva/sclera: Conjunctivae normal.   Cardiovascular:      Rate and Rhythm: Normal rate and regular rhythm. Pulmonary:      Effort: Pulmonary effort is normal. No respiratory distress. Breath sounds: No wheezing, rhonchi or rales. Abdominal:      General: There is no distension. Palpations: Abdomen is soft. Tenderness: There is no abdominal tenderness.  There is no guarding or rebound. Musculoskeletal:         General: No swelling or deformity. Cervical back: Neck supple. Skin:     General: Skin is warm. Neurological:      General: No focal deficit present. Mental Status: He is oriented to person, place, and time. Comments: No tongue fasciculations; mild intention tremor   Psychiatric:         Behavior: Behavior normal.         Additional Data:     Labs:   Results from last 7 days   Lab Units 09/03/23 0457 09/02/23  2117   WBC Thousand/uL 6.46 8.81   HEMOGLOBIN g/dL 14.4 16.3   HEMATOCRIT % 40.0 45.4   PLATELETS Thousands/uL 228 309   NEUTROS PCT %  --  70   LYMPHS PCT %  --  19   MONOS PCT %  --  10   EOS PCT %  --  0      Results from last 7 days   Lab Units 09/03/23 0457   SODIUM mmol/L 136   POTASSIUM mmol/L 3.6   CHLORIDE mmol/L 104   CO2 mmol/L 21   BUN mg/dL 10   CREATININE mg/dL 0.70   ANION GAP mmol/L 11   CALCIUM mg/dL 7.9*   ALBUMIN g/dL 3.7   TOTAL BILIRUBIN mg/dL 1.23*   ALK PHOS U/L 59   ALT U/L 34   AST U/L 61*   GLUCOSE RANDOM mg/dL 71                              * I Have Reviewed All Lab Data Listed Above. * Additional Pertinent Lab Tests Reviewed: All Labs Within Last 24 Hours Reviewed      Imaging Studies: n/a      Recent Cultures (last 7 days): Today, Patient Was Seen By: Kassidy Moralez MD    ** Please Note: Dictation voice to text software may have been used in the creation of this document.  **

## 2023-09-03 NOTE — ASSESSMENT & PLAN NOTE
Recent Labs     09/02/23  2117 09/03/23  0457 09/04/23  0504   HGB 16.3 14.4 14.9   HCT 45.4 40.0 43.1       • Improved  • Tolerating adequate PO intake

## 2023-09-03 NOTE — ASSESSMENT & PLAN NOTE
Recent Labs     09/02/23  2117 09/03/23  0457 09/04/23  0504   MG 1.8* 2.2 1.9     · Improved/resolved

## 2023-09-03 NOTE — H&P
HISTORY & PHYSICAL EXAM  DEPARTMENT OF MEDICAL TOXICOLOGY  LEVEL 4 MEDICAL DETOX UNIT  Myranda Manley 56 y.o. male MRN: [id number]  Unit/Bed#: [address] [place] [address] [id number] Encounter: [id number]      Reason for Admission/Principal Problem: Ethanol withdrawal, Ethanol use disorder  Admitting Provider: Leotha Prader, PA-C  Attending Provider: Shantelle Joe*   9/2/2023  8:54 PM        Alcohol withdrawal syndrome with complication [facility])  Assessment & Plan  • Patient reports daily alcohol use   ? Last drink 1600 on 9/2  • EtOH 243 on initial labs   • Patient denies history of withdrawal seizures   • Patient currently endorses anxiety, agitation, nausea, diaphoresis, and tremor      • [facility] triggered [facility]   ?  Initial dose 650 mg phenobarbital  • Symptomatic and supportive management   • Thiamine and folic acid supplementation  • Electrolyte repletion as needed  • Pulse oximetry and telemetry monitoring      Alcohol use disorder, severe, dependence (HCC)  Assessment & Plan  • Recent detox admission 8/1-8//2023  • Patient reports relapse relapse immediately upon discharge   • Currently drinking 750 mL off 100 proof liquor daily   • Reports previous rehab stays   • Started on naltrexone during last admission, not currently taking       • Case management for assistance in discharge planning- expressed interest in inpatient rehab   • See Alcohol Withdrawal    * Dehydration  Assessment & Plan  Recent Labs     09/02/23  2117   HGB 16.3   HCT 45.4       • Noted to be hemoconcentrated on initial labs compared to baseline   • The setting of acute alcohol withdrawal and poor p.o. intake for the past few days  • Received 1 L NS bolus in ED      • IV hydration  • Continue to monitor    Insomnia  Assessment & Plan  • Patient reports history of insomnia treated with Vistaril 25 mg nightly  • Patient notes being unable to sleep more than short stretches in the past week     • Continue home medication   • Trazodone as needed for insomnia    Hypomagnesemia  Assessment & Plan  Recent Labs     09/02/23  2117   MG 1.8*     · Repleated   · Continue to monitor and replete as needed    Transaminitis  Assessment & Plan  Recent Labs     09/02/23 2117   AST 74*   ALT 43   ALKPHOS 81     • In the setting of daily alcohol use  • Continue to monitor  • Encourage cessation    Major depressive disorder, recurrent, in full remission ([address])  Assessment & Plan  • Patient reports history of depression currently managed on buspirone 10 mg 3 times daily and venlafaxine 75 mg daily  ? Reports compliance with home regimen  · Notes sporadic visual and auditory hallucinations both while drinking and when sober   · Patient denies SI/HI  ? No continue observation indicated at this time     • Continue home medications  • Psychiatry consulted due to patient request  • Recommended continue follow-up with psychiatry outpatient    GERD without esophagitis  Assessment & Plan  • Continue home medication             VTE Prophylaxis: Enoxaparin (Lovenox)  / sequential compression device   Code Status: Full code      Anticipated Length of Stay:  Patient will be admitted on an Inpatient basis with an anticipated length of stay of  2-3  midnights. Justification for Hospital Stay: Alcohol withdrawal     For any questions or concerns, please Tiger Text the advanced practitioner in the role of [facility] On Call      This patient qualifies for Level IV medically managed intensive inpatient services under the criteria set by the American Society of Addiction Medicine, including dimensions 1-3.  The patient is in withdrawal (or is intoxicated with high risk of withdrawal), with severe and unstable medical and/or psychiatric (dual diagnosis) problems, requiring requires 24-hour medical and nursing care and the full resources of a licensed hospital.          [address] [facility] [address] patient to medical detox unit and continue supportive care and stabilization of acute ethanol withdrawal per medical toxicology/detox treatment pathway. Monitor ethanol withdrawal severity via the Severity of Ethanol Withdrawal Scale (SEWS) Q4 hours and then hourly if/when SEWS > 6. Treat withdrawal per pathway and reassess Q30-60 minutes. Mild SEWS Score 1-6  Administer medications* (IV or PO; PO preferred):  • If initial SEWS score: diazepam 10mg PO/IV x 1 AND phenobarbital 65 mg PO/IV x 1  • If repeat SEWS score 1-6: phenobarbital 65 mg PO/IV q1 hour x 5 doses maximum   Reassessment:   • SEWS q1 hour after each dose until SEWS 0 x 2 hours  • VS q1 hours (until SEWS 0, then q4 hours)  • Notify provider for bedside evaluation if 5-dose maximum is reached, RASS of -3 to -5, or SEWS score escalates to moderate or severe. Moderate SEWS Score 7-12  Administer medications* (IV):  • If initial SEWS score: diazepam 10mg IV x 1 AND phenobarbital 260 mg IV x 1  • If repeat SEWS score 7-12 or score escalated from mild: phenobarbital 130 mg IV q30 minutes x 5 doses maximum   Reassessment:  • SEWS q30 minutes after each dose until SEWS < 7 (then hourly until SEWS 0 x 2 hours)  • VS q30 minutes until SEWS < 7 (then hourly until SEWS 0, then q4 hours)  • Notify provider for bedside evaluation if 5-dose maximum is reached, RASS of -3 to -5, or SEWS score escalates to severe. Severe SEWS Score ? 13  Administer medications* (IV):  • If initial SEWS score: Diazepam 10 mg IV x 1 AND phenobarbital 650 mg IV piggyback x 1 over 15-30 minutes  • If repeat SEWS score ?  13 or score escalated from mild or moderate: phenobarbital 130 mg IV q30 minutes x 5 doses maximum   Reassessment:  • SEWS q30 minutes after each dose until SEWS < 7 (then hourly until SEWS 0 x 2 hours)   • VS q30 minutes until SEWS < 7 (then hourly until SEWS 0, then q4 hours)  • Notify provider for bedside evaluation if 5-dose maximum is reached or RASS of -3 to -5   *Hold medications and notify provider if CNS depression, respirations < 10/min, or RASS of -3 to -5. Medications to be administered adjunctively if more than 2 grams of phenobarbital is needed for stabilization of withdrawal; require attending approval.   • Dexmedetomidine infusion 0.1-1mcg/kg/hr IV infusion, titratable to reduced agitation (Goal: RASS -2)  • Ketamine   o Acute agitated delirium: 1-2 mg/kg IV or 4-5 mg/kg IM  o Refractory withdrawal: 0.1-1mg/kg/hr IV infusion, titratable to reduced agitation (Goal: RASS -2)    Further evaluation, screening and treatment:  Evaluate complete metabolic panel, transaminases, INR, and lipase. Assess hepatic ultrasound for any sign of alcoholic liver disease or cirrhosis, and ultimately refer for further hepatic evaluation and care as/if indicated. Additional medications for ethanol associated malnutrition: Thiamine 100 mg IV daily, increase to 500 mg TID for signs/symptoms of Wernicke's Encephalopathy or Wernicke Korsakoff Syndrome   Folic acid 1 mg IV daily   Multivitamin PO daily      Will offer first monthly injection of Naltrexone 380 mg IM, once patient is stabilized, as it has been shown to assist in decreasing cravings for ethanol. Evaluate and treat for coexisting substance use, such as opioids and nicotine. Discuss risk factors for infectious disease, such as history of intravenous drug abuse, and offer hepatitis and HIV screening if indicated. Case management consultation to assist with coordination of subsequent treatment after discharge. Hx and PE limited by: None, patient alert and cooperative     HPI: Pattie Kate is a 56y.o. year old male with PMHx of anxiety, depression, and GERS, who presented to the [facility] ED requesting detoxification from alcohol. Patient was recently admitted to the detox unit 8/1-8/3/23 and reports relapse in drinking immediately upon discharge.   He currently endorses drinking 750 mL of 100 proof liquor daily with last drink 1600 on 9/2. EtOH 243 on initial labs. Patient was admitted to [address] [place] [address] detox unit for alcohol detoxification. He was started on SEWS protocol with symptom triggered phenobarbital along with symptomatic management of withdrawal. Patient received an initial dose of 650 mg phenobarbital.  He currently endorses anxiety, agitation, diaphoresis, nausea, and tremor. Preferred alcoholic beverage(s): 469 proof liquor   Quantity and frequency of alcohol intake: 750 mL daily   Use of any ethanol substitutes (toxic alcohols): no  Date/Time of last alcohol intake: 1600 on 9/2  Current signs and symptoms of ethanol withdrawal: anxiety, tremor, diaphoresis, tachycardia, hypertension and nausea    SEWS Total Score: 16 (9/2/2023 11:27 PM)        Ethanol Withdrawal History  Previous ethanol withdrawal? yes  Prior inpatient treatment for ethanol withdrawal? yes  Prior outpatient treatment for ethanol withdrawal? no  History of seizures with prior ethanol withdrawal? no  Prior treatment with naltrexone (Vivitrol)? yes  Current treatment with naltrexone (Vivitrol)? no  Other current treatment for ethanol use disorder? no  Co-existing substance use? no    Review of PDMP: yes     Social History     Substance and Sexual Activity   Alcohol Use Yes    Comment: daily whiskey 4 bottles per week     Social History     Substance and Sexual Activity   Drug Use Never     Social History     Tobacco Use   Smoking Status Never   Smokeless Tobacco Current   • Types: Chew       Review of Systems   Constitutional: Positive for diaphoresis and fatigue. Negative for chills and fever. HENT: Negative for congestion and rhinorrhea. Respiratory: Negative for cough, chest tightness and shortness of breath. Cardiovascular: Negative for chest pain and palpitations. Gastrointestinal: Positive for nausea. Negative for abdominal pain, constipation, diarrhea and vomiting.    Genitourinary: Negative for difficulty urinating. Musculoskeletal: Negative for arthralgias, gait problem and myalgias. Neurological: Positive for tremors. Negative for dizziness, seizures and headaches. Psychiatric/Behavioral: Positive for agitation. Negative for hallucinations, self-injury and suicidal ideas. The patient is nervous/anxious. Historical Information   Past Medical History:   Diagnosis Date   • Anxiety    • Depression    • ETOH abuse    • GERD (gastroesophageal reflux disease)      Past Surgical History:   Procedure Laterality Date   • CLOSED REDUCTION FOREARM FRACTURE Right    • EGD       History reviewed. No pertinent family history. Social History   Marital Status: Single   Occupation:    Patient Pre-hospital Living Situation: Stable, lives alone  Patient Pre-hospital Level of Mobility: Independent   Patient Pre-hospital Diet Restrictions: None    No Known Allergies    Prior to Admission medications    Medication Sig Start Date End Date Taking?  Authorizing Provider   busPIRone (BUSPAR) 10 mg tablet Take 1 tablet (10 mg total) by mouth 3 (three) times a day 6/19/23 9/17/23  Geeta Ponce PA-C   famotidine (Pepcid) 20 mg tablet Take 20 mg by mouth daily    Historical Provider, MD   gabapentin (NEURONTIN) 300 mg capsule Take 1 capsule (300 mg total) by mouth 3 (three) times a day for 15 days 8/3/23 8/18/23  SHIRA Engle   hydrOXYzine pamoate (VISTARIL) 25 mg capsule Take 1 capsule (25 mg total) by mouth daily at bedtime 7/26/23   Allyssa Judd PA-C   naltrexone (REVIA) 50 mg tablet Take 1 tablet (50 mg total) by mouth daily Do not start before August 4, 2023. 8/4/23   SHIRA Engle   venlafaxine (EFFEXOR-XR) 75 mg 24 hr capsule Take 1 capsule (75 mg total) by mouth daily 8/3/23 10/2/23  SHIRA Engle       Current Facility-Administered Medications   Medication Dose Route Frequency   • acetaminophen (TYLENOL) tablet 650 mg  650 mg Oral Q6H PRN   • busPIRone (BUSPAR) tablet 10 mg  10 mg Oral TID   • [START ON 9/3/2023] enoxaparin (LOVENOX) subcutaneous injection 40 mg  40 mg Subcutaneous Daily   • [START ON 9/3/2023] famotidine (PEPCID) tablet 20 mg  20 mg Oral Daily   • [START ON 0/7/8596] folic acid (FOLVITE) tablet 1 mg  1 mg Oral Daily   • hydrOXYzine HCL (ATARAX) tablet 25 mg  25 mg Oral HS   • magnesium sulfate 2 g/50 mL IVPB (premix) 2 g  2 g Intravenous Once   • [START ON 9/3/2023] multivitamin-minerals (CENTRUM) tablet 1 tablet  1 tablet Oral Daily   • ondansetron (ZOFRAN) injection 4 mg  4 mg Intravenous Q6H PRN   • sodium chloride 0.9 % infusion  100 mL/hr Intravenous Continuous   • [START ON 9/3/2023] thiamine tablet 100 mg  100 mg Oral Daily   • traZODone (DESYREL) tablet 50 mg  50 mg Oral HS PRN   • [START ON 9/3/2023] venlafaxine (EFFEXOR-XR) 24 hr capsule 75 mg  75 mg Oral Daily       Continuous Infusions:sodium chloride, 100 mL/hr             Objective     No intake or output data in the 24 hours ending 09/02/23 2341    Invasive Devices:   Peripheral IV 09/02/23 Left Forearm (Active)       Vitals   Vitals:    09/02/23 2056 09/02/23 2057 09/02/23 2106 09/02/23 2234   BP:  (!) 177/120  138/90   Pulse: (!) 120   100   Resp: 16   16   Temp:   98.9 °F (37.2 °C)        Physical Exam  Constitutional:       General: He is not in acute distress. Appearance: He is diaphoretic. HENT:      Head: Normocephalic. Eyes:      Pupils: Pupils are equal, round, and reactive to light. Cardiovascular:      Rate and Rhythm: Regular rhythm. Tachycardia present. Pulses: Normal pulses. Heart sounds: Normal heart sounds. No murmur heard. No gallop. Pulmonary:      Effort: Pulmonary effort is normal. No respiratory distress. Breath sounds: Normal breath sounds. No wheezing or rales. Abdominal:      General: Abdomen is flat. Bowel sounds are normal. There is no distension. Palpations: Abdomen is soft. Tenderness:  There is abdominal tenderness (Slight epigastric tenderness ). There is no guarding. Musculoskeletal:         General: Normal range of motion. Cervical back: Normal range of motion. Skin:     General: Skin is warm. Capillary Refill: Capillary refill takes less than 2 seconds. Neurological:      Mental Status: He is alert and oriented to person, place, and time. Motor: Tremor present. Coordination: Finger-Nose-Finger Test normal.   Psychiatric:         Mood and Affect: Mood is anxious. Affect is tearful. Speech: Speech normal.         Behavior: Behavior is agitated. Behavior is cooperative. Thought Content: Thought content normal.         Data:    EKG, Pathology, and Other Studies: I have personally reviewed pertinent reports.       Lab Results:  CBC ETOH     Lab Results   Component Value Date    WBC 8.81 09/02/2023    RBC 5.09 09/02/2023    HGB 16.3 09/02/2023    HCT 45.4 09/02/2023    MCV 89 09/02/2023    MCH 32.0 09/02/2023    MCHC 35.9 09/02/2023    RDW 13.0 09/02/2023     09/02/2023    MPV 9.4 09/02/2023      No results found for: "LACTICACID"   CMP UA         Component Value Date/Time    K 3.6 09/02/2023 2117     09/02/2023 2117    CO2 21 09/02/2023 2117    BUN 14 09/02/2023 2117    CREATININE 0.86 09/02/2023 2117         Component Value Date/Time    CALCIUM 8.7 09/02/2023 2117    ALKPHOS 81 09/02/2023 2117    AST 74 (H) 09/02/2023 2117    ALT 43 09/02/2023 2117      No results found for: "CLARITYU", "COLORU", "Lacy Large", "PHUR", "GLUCOSEU", "Rushie Abler", "BLOODU", "PROTEIN UA", "NITRITE", "BILIRUBINUR", "UROBILINOGEN", "LEUKOCYTESUR", "Randol Bhatt", "Xochitl Skill", "HYALINE", "BACTERIA", "EPIS"     Liver Function Test: ASA     Lab Results   Component Value Date    TBILI 0.80 09/02/2023    BILIDIR 0.30 (H) 10/23/2019    ALKPHOS 81 09/02/2023    AST 74 (H) 09/02/2023    ALT 43 09/02/2023    TP 7.1 09/02/2023    ALB 4.3 09/02/2023      No results found for: "SALICYLATE"   Troponin APAP     No results found for: "TROPONINI"   No results found for: "ACTMNPHEN"   VBG HCG     No results found for: "PHVEN", "AFZ5VLV", "PO2VEN", "XTA3BFJ", "Fuentes Simpers", "V5CHXBCSJ", "V0XQDHK"   No results found for: "HCGQUANT"   ABG Urine Drug Screen     No results found for: "PHART", "SGG6AFR", "PO2ART", "NRQ9HZA", "BEART", "F6QLUHMOE", "O2HGB", "SOURC", "KAHLIL", "VTAC", "Gladys Mering", "INSPIREDAIR", "PEEP"   Lab Results   Component Value Date    AMPMETHUR Negative 08/01/2023    BARBTUR Negative 08/01/2023    BDZUR Negative 08/01/2023    COCAINEUR Negative 08/01/2023    METHADONEUR Negative 08/01/2023    OPIATEUR Negative 08/01/2023    PCPUR Negative 08/01/2023    THCUR Negative 08/01/2023    OXYCODONEUR Negative 08/01/2023      Lactate INR     No results found for: "LACTICACID"   No results found for: "INR"   PTT Protime     No results found for: "PTT"     No results found for: "PROTIME"           Imaging Studies: I have personally reviewed pertinent reports. Counseling / Coordination of Care  Total floor / unit time spent today 60 minutes. Greater than 50% of total time was spent with the patient and / or family counseling and / or coordination of care. ** Please Note: This note has been constructed using a voice recognition system.  **

## 2023-09-03 NOTE — ASSESSMENT & PLAN NOTE
· Continue vitamin supplementation  · Case management consulted for disposition planning; patient desires outpatient services   · Is not interested in starting naltrexone at this time

## 2023-09-03 NOTE — SOCIAL WORK
09/03/23 1441   Referral Data   Referral Source Patient   Referral Reason Drug/Alcohol [address] of Residence [place]   Readmission Root Cause   30 Day Readmission Yes   Who directed you to return to the hospital? Self   Did you understand whom to contact if you had questions or problems? Yes  (Pt stated the people he could call were too far away)   Did you get your prescriptions before you left the hospital? Yes   Were you able to get your prescriptions filled when you left the hospital? Yes   Did you take your medications as prescribed? Yes   Were you able to get to your follow-up appointments? No   Reason: Compliance   During previous admission, was a post-acute recommendation made? Yes   What post-acute resources were offered? Other;Offered, but declined  (Inpatient and outpatient drug and alcohol rehab)   Patient was readmitted due to dehydration   Action Plan Team and patient to create a post discharge POC with OP services that is agreed upon and achievable. Patient Information   Mental Status Alert   Primary Caregiver Self   Support System Immediate family   Legal [address] [facility] Proxy Appointed No   Activities of Daily Living Prior to Admission   Functional Status Independent   Assistive Device No device needed   Living Arrangement Apartment   Ambulation Independent   Access to Firearms   Access to Firearms No   [address] of Transport to Appts: Drives Self        82/65/29 1445   Patient Intake   Living Arrangement Apartment   Can patient return home?  Yes   Address to be Discharge to: [address]   Patient's Telephone Number [phone]   Access to Firearms No   Type of Work [address] [facility] [employer] freshman   Patient History   Medical Problems Gerd, MDD, insomnia   Substance Abuse Yes (See [address] [facility] History section for detail)   Crisis Info   Release of Information Signed Yes        09/03/23 7122   Substance Abuse Addendum Details   History of Withdrawal Symptoms Other withdrawal symptoms (specify in comment)  (tremors, nausea)   Medical Complications Fleet Jo Ann, MDD, insomnia   Sober Supports Mother, sister Amena Silverio, sister Marshal Powell, friend Yaneli   Present Treatment detox   Substance Abuse Treatment Hx Past detox   Stage of Change   Stage of Change Precontemplation     Additional Substance Use Detail    Questions Responses   Problems Due to Past Use of Alcohol? Yes   Problems Due to Past Use of Substances? No   Alcohol Use Frequency Daily   Alcohol Drink of Choice Liquor   1st Use of Alcohol 21   Last Use of Alcohol & Amount 15 drinks, 100 proof liquor   Longest Abstinence from Alcohol 2 years       Worker completed assessment. Worker explained role of case management. Worker confirmed name, address and phone number. Pt [facility] to [facility] ED for dehydration (d/t alcohol), was transferred to  for withdrawal management. Pt reports he has been drinking 15 drinks of 100 proof liquor daily for the last 3-4 years. Pt reports first age of use 22, last used 9/1/2023. Pt denies any blackouts. Reports, tremors, nausea and dizziness  Pt reports longest period of clean time is 2 years. Pt denies any street drugs. Pt denies any previous inpatient or outpatient rehab. Pt at this time declining inpatient rehab, considering outpatient rehab.      Alcohol: 243  UDS: positive for barbituates     Pt reports mental health diagnosis of Major Depressive Disorder. Pt denies any previous inpatient psychiatric hospitalizations. Pt currently goes to [facility] in [place], next appointment is scheduled for 9/11. Pt denies any suicide attempts. Pt denies any family hx of mental health. Pt denies any abuse or trauma.      Pt reports medical conditions of GERD, has no current PCP at this time.   Pt reports having health insurance of [employer], signed GLENN.      Pt denies any current legal issues.      Pt is currently single, with no children. Pt [facility] at [facility], [address] [place] [address]. Pt is currently employed at [employer] but has not been to work in 6 weeks d/t alcohol use. Pt reports highest level of education is 1 year of college.       Pt signed LG[employer] for sister Radha Price and [facility] [place].      Relapse prevention plan was completed. Pt was able to provide his warning signs. Pt reported minimal coping skills. Pt reports his mother, sister and cousin are supportive. Clinical impression, pt was calm and cooperative but vague at times. Pt was vague regarding his current use as well as previous substance use hx. Pt would benefit from inpatient rehab to address long term alcohol use as well as learn coping skills. Pt at this time declining inpatient rehab, considering outpatient. Pt is in the pre-contemplation stage of change.

## 2023-09-03 NOTE — ASSESSMENT & PLAN NOTE
· Continue home medications: venlafaxine, buspirone, and hydroxyzine  · Psychiatry consulted for auditory and visual hallucinations as requested by patient; appreciate recommendations  · Outpatient psychiatry follow up

## 2023-09-03 NOTE — SOCIAL WORK
Discussed with patient: AUDIT score of **  UDS/Identified Substance(s) used: alcohol  Risks discussed included: medical and mental health problems  Recommendations discussed: inpatient and outpatient rehabilitation  Patient's response: pt chose out patient

## 2023-09-03 NOTE — UTILIZATION REVIEW
Initial Clinical Review    Pt presented to [facility] for its Level IV medically managed intensive inpatient detox unit. Admission: Date/Time/Statement:   Admission Orders (From admission, onward)     Ordered        09/02/23 2228  INPATIENT ADMISSION  Once                      Orders Placed This Encounter   Procedures   • INPATIENT ADMISSION     Standing Status:   Standing     Number of Occurrences:   1     Order Specific Question:   Level of Care     Answer:   Level 2 Stepdown / HOT [14]     Order Specific Question:   Estimated length of stay     Answer:   More than 2 Midnights     Order Specific Question:   Certification     Answer:   I certify that inpatient services are medically necessary for this patient for a duration of greater than two midnights. See H&P and MD Progress Notes for additional information about the patient's course of treatment. ED Arrival Information     Expected   -    Arrival   9/2/2023 20:48    Acuity   Emergent            Means of arrival   Walk-In    Escorted by   [facility] Toxicology    Admission type   Emergency            Arrival complaint   detox           Chief Complaint   Patient presents with   • Detox Evaluation     Pt wants be admitted to detox for alcohol. Last drink 4 hours ago. Pt states usually he drinks a 750mL bottle of liquor every night. Initial Presentation: 56 y.o. male who presented w/ need for detox from alcohol. Currently drinking 750 mL off 100 proof liquor daily. Last drink 1600 on 9/2. Serum ETOH: 243     Denies h/o withdrawal seizures. previous rehab stays (Started on naltrexone during last admission, not currently taking)     currently endorses anxiety, agitation, nausea, diaphoresis, and tremor. transaminitis present     9/02      Admit  IP status,  MS Level of care/  DETOX  UNIT  For medical management of  Alcohol withdrawal.     Initiate  SEWS protocol for symptom triggered phenobarbital therapy ;   Thiamine and folic acid supplementation;  Electrolyte repletion as needed;  Pulse oximetry and telemetry monitoring .   Provide IVF  NS  @  100 cc/hr. closely monitor volume status for dehydration. Trend lever enzymes. Psych consult for h/o depression.   Case Management consult for dc planning  (pt desires IP rehab)  discuss naltrexone when withdrawal improved     Date:    9/03      Day 2: Pt reports  nausea, diarrhea, anorexia, fatigue this morning. Diaphoretic. CIWA score 6  (tremor, anxiety, disorientation)    SEWS score 0. Plan: continue SEWS monitoring w/ phenobarbital management,  thiamine/folic acid supplement, telemetry, continuous pulse ox, trend labs, replete electrolytes as needed. 9/03  SEWS scores:    0,  12  (anxiety, sweating, tremor, tachy)   10,  0,  0     9/03  PSYCH:   Not a danger to himself or others. Cont buspar,  Hold Effexor until 9/5 if bp stable.      ED Triage Vitals   Temperature Pulse Respirations Blood Pressure SpO2   09/02/23 2106 09/02/23 2056 09/02/23 2056 09/02/23 2057 09/02/23 2056   98.9 °F (37.2 °C) (!) 120 16 (!) 177/120 99 %      Temp Source Heart Rate Source Patient Position - Orthostatic VS BP Location FiO2 (%)   09/02/23 2320 09/02/23 2320 09/02/23 2320 09/02/23 2320 --   Temporal Monitor Sitting Left arm       Pain Score       09/02/23 2320       No Pain          Wt Readings from Last 1 Encounters:   09/03/23 95.4 kg (210 lb 6.4 oz)     Vital Signs:   Date/Time Temp Pulse Resp BP SpO2 O2 Device   09/03/23 1444 98.8 °F (37.1 °C) 70 15 146/96 97 % None (Room air)   09/03/23 1100 97.5 °F (36.4 °C) 86 16 162/92 99 % None (Room air)   09/03/23 0810 97.8 °F (36.6 °C) 104 18 170/101 Abnormal  97 % None (Room air)   09/03/23 0446 98.8 °F (37.1 °C) 96 -- 154/95 99 % None (Room air)   09/03/23 0026 97.9 °F (36.6 °C) 91 18 150/90 92 % None (Room air)   09/02/23 2320 98.1 °F (36.7 °C) 122 Abnormal  18 157/104 Abnormal  97 % None (Room air)   09/02/23 2319 -- -- -- -- -- None (Room air)   09/02/23 2234 -- 100 16 138/90 100 % --   09/02/23 2113 -- -- -- -- -- None (Room air)   09/02/23 2106 98.9 °F (37.2 °C) -- -- -- -- --       Pertinent Labs/Diagnostic Test Results:   9/02  EKG   Sinus tach       Results from last 7 days   Lab Units 09/03/23 0457 09/02/23 2117   WBC Thousand/uL 6.46 8.81   HEMOGLOBIN g/dL 14.4 16.3   HEMATOCRIT % 40.0 45.4   PLATELETS Thousands/uL 228 309   NEUTROS ABS Thousands/µL  --  6.11         Results from last 7 days   Lab Units 09/03/23 0457 09/02/23 2117   SODIUM mmol/L 136 138   POTASSIUM mmol/L 3.6 3.6   CHLORIDE mmol/L 104 103   CO2 mmol/L 21 21   ANION GAP mmol/L 11 14   BUN mg/dL 10 14   CREATININE mg/dL 0.70 0.86   EGFR ml/min/1.73sq m 113 103   CALCIUM mg/dL 7.9* 8.7   MAGNESIUM mg/dL 2.2 1.8*     Results from last 7 days   Lab Units 09/03/23 0457 09/02/23 2117   AST U/L 61* 74*   ALT U/L 34 43   ALK PHOS U/L 59 81   TOTAL PROTEIN g/dL 6.0* 7.1   ALBUMIN g/dL 3.7 4.3   TOTAL BILIRUBIN mg/dL 1.23* 0.80         Results from last 7 days   Lab Units 09/03/23 0457 09/02/23 2117   GLUCOSE RANDOM mg/dL 71 113       Results from last 7 days   Lab Units 09/02/23 2117   LIPASE u/L 38                 Results from last 7 days   Lab Units 09/03/23  0019   CLARITY UA  Clear   COLOR UA  Yellow   SPEC GRAV UA  1.010   PH UA  6.0   GLUCOSE UA mg/dl Negative   KETONES UA mg/dl Negative   BLOOD UA  10.0*   PROTEIN UA mg/dl 15 (Trace)*   NITRITE UA  Negative   BILIRUBIN UA  Negative   UROBILINOGEN UA mg/dL Negative   LEUKOCYTES UA  Negative   WBC UA /hpf 0-1   RBC UA /hpf 0-1   BACTERIA UA /hpf Occasional   EPITHELIAL CELLS WET PREP /hpf Occasional   MUCUS THREADS  Occasional*             Results from last 7 days   Lab Units 09/03/23  0018   AMPH/METH  Negative   BARBITURATE UR  Positive*   BENZODIAZEPINE UR  Negative   COCAINE UR  Negative   METHADONE URINE  Negative   OPIATE UR  Negative   PCP UR  Negative   THC UR  Negative     Results from last 7 days   Lab Units 09/02/23 2117   ETHANOL LVL mg/dL 243*     ED Treatment:   Medication Administration from 09/02/2023 2048 to 09/02/2023 2318       Date/Time Order Dose Route Action     09/02/2023 2124 EDT sodium chloride 0.9 % bolus 1,000 mL 1,000 mL Intravenous New Bag          Past Medical History:   Diagnosis Date   • Anxiety    • Depression    • ETOH abuse    • GERD (gastroesophageal reflux disease)      Present on Admission:  • Alcohol withdrawal syndrome with complication ([address])  • Alcohol use disorder, severe, dependence ([address])  • Dehydration  • Insomnia  • Transaminitis  • Major depressive disorder, recurrent, in full remission ([address])  • GERD without esophagitis  • Hypomagnesemia      Admitting Diagnosis: Alcohol abuse [F10.10]  Persons encountering health services in other specified circumstances [Z76.89]  Alcohol use disorder, severe, dependence ([address]) [F10.20]  Age/Sex: 56 y.o. male   Admission Orders:   See above note. Reg  Diet. SCDs. Fall & Seizure Precautions. SEWS monitoring. Telemetry & Continuous Pulse Ox.     Scheduled Medications:  busPIRone, 10 mg, Oral, TID  enoxaparin, 40 mg, Subcutaneous, Daily  famotidine, 20 mg, Oral, Daily  folic acid, 1 mg, Oral, Daily  hydrOXYzine HCL, 25 mg, Oral, HS  multivitamin-minerals, 1 tablet, Oral, Daily  nicotine, 21 mg, Transdermal, Daily  thiamine, 100 mg, Oral, Daily  [START ON 9/5/2023] venlafaxine, 75 mg, Oral, Daily      On  9/02  @  1447   Given   IV Valium 10 mg ,  Atarax po ,  Mg Sulfate 2 gm IV,  Phenobarb  mg.      9/03  1x and PRN Meds   0056   IV phenobarb 130 mg  0505   IV phenobarb 260 mg   0815   IV phenobarb 130 mg   1446   Imodium po     Continuous IV Infusions:  sodium chloride, 100 mL/hr, Intravenous, Continuous      PRN Meds:  acetaminophen, 650 mg, Oral, Q6H PRN  loperamide, 2 mg, Oral, 4x Daily PRN  nicotine polacrilex, 4 mg, Oral, Q2H PRN  ondansetron, 4 mg, Intravenous, Q6H PRN  traZODone, 50 mg, Oral, HS PRN      IP CONSULT TO CASE MANAGEMENT  IP CONSULT TO PSYCHIATRY    Network Utilization Review Department  ATTENTION: Please call with any questions or concerns to [phone] and carefully listen to the prompts so that you are directed to the right person. All voicemails are confidential.  Grey White all requests for admission clinical reviews, approved or denied determinations and any other requests to dedicated fax number below belonging to the campus where the patient is receiving treatment.  List of dedicated fax numbers for the Facilities:  [place] DENIALS (Administrative/Medical Necessity) [phone] [address] [facility] [phone]   [address] [place] [address] [phone]   [facility] [address] [facility] [phone]   [address] [place] [address] [address] [phone]   [address] [facility] [address] [phone]

## 2023-09-03 NOTE — ASSESSMENT & PLAN NOTE
Recent Labs     09/02/23 2117 09/03/23  0457 09/04/23  0504   AST 74* 61* 62*   ALT 43 34 38   ALKPHOS 81 59 65     • Likely secondary to alcohol use  • Improving/stable

## 2023-09-03 NOTE — CONSULTS
Consultation - [address] 56 y.o. male MRN: [id number]  Unit/Bed#: 5T DETOX 654-04 Encounter: [id number]    Assessment/Plan   Assessment:  Jignesh Ann is a 56 y.o. male with a past psychiatric history significant for alcohol use disorder, major depressive disorder, generalized anxiety disorder who presents to [address] [place] [address]  for acute alcohol withdrawal and detoxification. The patient was admitted on September 2, 2023 to [address] [place] [address]  toxicology floor for treatment of acute alcohol withdrawal syndrome at which time he was started on sews protocol with phenobarbital 650 mg initial dose. The patient endorsed previous hallucinations to detox team at which time a consult was placed to psychiatry for evaluation of perceptual disturbances. Upon evaluation the patient denies any perceptual disturbances outside of the use of alcohol/alcohol withdrawal and has no pertinent psychiatric history consistent with psychotic symptoms or perceptual disturbances due to underlying psychotic process. At this time, the patient is not a danger to himself or others, denies suicidal ideation, homicidal ideation, or auditory or visual hallucinations or other perceptual disturbances and thus does not meet criteria for inpatient hospitalization or antipsychotic therapy at this time, however this service will provide medication optimization while undergoing acute detoxification. Diagnosis: Generalized anxiety disorder, substance-induced mood disorder, alcohol use disorder  Differential: Major depressive disorder versus unspecified mood disorder versus unspecified psychosis    Plan:   • Medical management per primary team.  • Medication recommendations:  o Continue BuSpar 10 mg 3 times daily for anxiety.   o Hold Effexor 75 mg until Tuesday, 9/5/2023 due to concurrent withdrawal and hypertensive episodes   - Most recent 162/92  - May restart Effexor when patient blood pressure less than 140/90  • Consultation appreciated. Psychiatry to follow as necessary. Please do not hesitate to call/contact our service with any additional comments/concerns. Please call/contact on-call Psychiatry via [address] including on-call psychiatric service via [address] [facility] [address] ([phone]) with any comments/concerns if after hours/Fri/Sat/Sunday. Thank you! Risks, benefits and possible side effects of Medications:   Risks, benefits, and possible side effects of medications explained to patient and patient verbalizes understanding. Chief Complaint: "I was going through withdrawal"    History of Present Illness   Physician Requesting Consult: Dane Do*  Reason for Consult / Principal Problem: Hallucinations Dx 1. Hallucinations    Isadora Harden is a 56 y.o. male with past medical history of GERD, electrolyte abnormality secondary to alcohol use and past psychiatric history significant for alcohol use disorder, MDD, SEKOU who presents to [facility] for treatment of acute alcohol withdrawal.  The patient was admitted to the detox unit on 9/2/2023 for treatment of his alcohol withdrawal as well as alcohol detoxification. During his treatment the patient endorsed previous visual hallucinations to primary toxicology team at which time a consult was placed for psychiatry to evaluate the patient's perceptual disturbances. Upon my evaluation, the patient endorses worsening alcohol use over the past few months, stating he initially was drinking 6-8 drinks per day which gradually evolved into one 750mL bottle of 100 proof hard liquor per day.   The patient states he has been struggling with alcohol use for the past few years, his last drink was prior to admission, and he is currently being treated in the outpatient setting by [facility] for major depressive disorder and generalized anxiety disorder for which she takes BuSpar 10 mg 3 times daily and venlafaxine 75 mg daily which she has been adherent to even with his concurrent alcohol use. The patient states he previously found these medications very helpful but that due to his increased drinking and alcohol use he feels as though they are not working as they are supposed to, patient education on CNS depressant effect of alcohol provided at this time. The patient states his last hallucination was approximately 1 month ago at which time he was going through acute withdrawal from alcohol, he also states he was admitted on August 1, 2023 to the detox unit for similar presentation and discharged on August 11, 2023. He states at that time he was having visual hallucinations of rats and spiders climbing up the walls in his bedroom which led him to the hospital, he denies any other perceptual or mood disturbances at that time. The patient states his longest period of sobriety was approximately 8 years from 9866-4046 at which time he had no hallucinations or other perceptual disturbances, no paranoia or overt delusions, and was stabilized on his psychiatric medication for his anxiety and depression. The patient is currently interested in following up with inpatient rehabilitation following detoxification and subsequent medical stabilization. He has no history of inpatient psychiatric hospitalization, no history of self abusive behaviors or suicidal ideation or attempts, no history of homicidal ideation, and no other pertinent history. He denies any other recreational substance use. He states he does have good social support and his best friend as well as his cousin and that he does not currently have access to any weapons or stockpiles of medication. He is amenable to holding venlafaxine until his blood pressure returns to normotensive state. No questions, comments, or concerns at this time.     Psychiatric Review Of Systems:  sleep: yes, decreased due to alcohol use  appetite changes: yes, decreased due to current withdrawal  weight changes: no  energy/anergy: yes, due to alcohol use  interest/pleasure/anhedonia: no  somatic symptoms: no  anxiety/panic: yes  nida: no  guilty/hopeless: no  self injurious behavior/risky behavior: no    Historical Information   Past Psychiatric History:   Patient currently in outpatient psychiatric treatment with [facility] for SEKOU/MDD, last follow-up in June of 2023  Currently in treatment with venlafaxine and BuSpar. Past Suicide attempts: Denies  Past Violent behavior: Denies  Past Psychiatric medication trial: Venlafaxine, BuSpar, naltrexone, Atarax    Substance Abuse History:  Patient currently admitted for alcohol detoxification, endorses drinking alcohol starting at the age of 26 with increasing usage since, last period of sobriety from 0627-0264, more recently over the past few months the patient has increased from 8 glasses of hard liquor per night to a 750 mL bottle of 100 proof liquor daily, previously admitted to detox unit on 8/1/2023 to 8/11/2023 for similar presentation, denies any withdrawal seizures. Denies any other recreational drug use or previous drug and alcohol rehabilitation. I have assessed this patient for substance use within the past 12 months   History of IP/OP rehabilitation program: Denies  Smoking history: Denies    Family Psychiatric History:   Patient denies any pertinent family psychiatric history. Social History  Education: high school diploma/GED  Learning Disabilities: Denies  Marital history: single  Living arrangement, social support: The patient currently lives at home by himself, current social supports include his cousin as well as best friend. Occupational History: Physical   Functioning Relationships: good support system and gets along well with co-workers.   Other Pertinent History: None    Traumatic History:   Abuse: Patient denies any abuse history  Other Traumatic Events: Patient denies    Past Medical History:   Diagnosis Date   • Anxiety    • Depression • ETOH abuse    • GERD (gastroesophageal reflux disease)        Medical Review Of Systems:  Review of Systems - Negative except diarrhea secondary to withdrawal    Meds/Allergies   all current active meds have been reviewed, current meds:   Current Facility-Administered Medications   Medication Dose Route Frequency   • acetaminophen (TYLENOL) tablet 650 mg  650 mg Oral Q6H PRN   • busPIRone (BUSPAR) tablet 10 mg  10 mg Oral TID   • enoxaparin (LOVENOX) subcutaneous injection 40 mg  40 mg Subcutaneous Daily   • famotidine (PEPCID) tablet 20 mg  20 mg Oral Daily   • folic acid (FOLVITE) tablet 1 mg  1 mg Oral Daily   • hydrOXYzine HCL (ATARAX) tablet 25 mg  25 mg Oral HS   • loperamide (IMODIUM) capsule 2 mg  2 mg Oral 4x Daily PRN   • multivitamin-minerals (CENTRUM) tablet 1 tablet  1 tablet Oral Daily   • nicotine (NICODERM CQ) 21 mg/24 hr TD 24 hr patch 21 mg  21 mg Transdermal Daily   • nicotine polacrilex (NICORETTE) gum 4 mg  4 mg Oral Q2H PRN   • ondansetron (ZOFRAN) injection 4 mg  4 mg Intravenous Q6H PRN   • sodium chloride 0.9 % infusion  100 mL/hr Intravenous Continuous   • thiamine tablet 100 mg  100 mg Oral Daily   • traZODone (DESYREL) tablet 50 mg  50 mg Oral HS PRN   • [START ON 9/5/2023] venlafaxine (EFFEXOR-XR) 24 hr capsule 75 mg  75 mg Oral Daily    and PTA meds:   Prior to Admission Medications   Prescriptions Last Dose Informant Patient Reported?  Taking?   busPIRone (BUSPAR) 10 mg tablet 9/2/2023  No Yes   Sig: Take 1 tablet (10 mg total) by mouth 3 (three) times a day   famotidine (Pepcid) 20 mg tablet 9/2/2023  Yes Yes   Sig: Take 20 mg by mouth daily   gabapentin (NEURONTIN) 300 mg capsule   No No   Sig: Take 1 capsule (300 mg total) by mouth 3 (three) times a day for 15 days   hydrOXYzine pamoate (VISTARIL) 25 mg capsule Not Taking  No No   Sig: Take 1 capsule (25 mg total) by mouth daily at bedtime   Patient not taking: Reported on 9/2/2023   naltrexone (REVIA) 50 mg tablet Not Taking No No   Sig: Take 1 tablet (50 mg total) by mouth daily Do not start before August 4, 2023.    Patient not taking: Reported on 9/2/2023   venlafaxine (EFFEXOR-XR) 75 mg 24 hr capsule 9/2/2023 at 0800  No Yes   Sig: Take 1 capsule (75 mg total) by mouth daily      Facility-Administered Medications: None     No Known Allergies    Objective   Vital signs in last 24 hours:  Temp:  [97.5 °F (36.4 °C)-98.9 °F (37.2 °C)] 97.5 °F (36.4 °C)  HR:  [] 86  Resp:  [16-18] 16  BP: (138-177)/() 162/92    No intake or output data in the 24 hours ending 09/03/23 1303    Mental Status Evaluation:  Appearance:  appears stated age, bearded, overweight , laying in bed and Dressed in hospital attire, dark short cropped hair and dark eyes   Behavior:  calm, cooperative and good eye contact   Speech:  normal rate, normal volume and nonpressured   Mood:  "Fine"   Affect:  Anxious and Constricted   Language: No anomia or aphasia   Thought Process:  goal directed, logical and organized   Associations: intact associations   Thought Content:  No overt paranoia or delusional content   Perceptual Disturbances: Denies auditory or visual hallucinations and Does not appear to be responding to internal stimuli   Risk Potential: Suicidal Ideations none, Homicidal Ideations none and Potential for Aggression No   Sensorium:  person, place, time/date and situation   Memory:  recent and remote memory grossly intact   Cognition:  recent and remote memory grossly intact   Consciousness:  alert and awake    Attention: attention span and concentration were age appropriate   Intellect:  Appropriate for age   Fund of Knowledge: awareness of current events: Appropriate   Insight:  Improving   Judgment: Improving   Muscle Strength and Tone: No focal deficits, weaknesses or acute dystonias   Gait/Station: not assessed, patient in bed   Motor Activity: no abnormal movements     Lab Results:    I have personally reviewed all pertinent laboratory/tests results.   Most Recent Labs:   Lab Results   Component Value Date    WBC 6.46 09/03/2023    RBC 4.45 09/03/2023    HGB 14.4 09/03/2023    HCT 40.0 09/03/2023     09/03/2023    RDW 13.2 09/03/2023    NEUTROABS 6.11 09/02/2023    SODIUM 136 09/03/2023    K 3.6 09/03/2023     09/03/2023    CO2 21 09/03/2023    BUN 10 09/03/2023    CREATININE 0.70 09/03/2023    GLUC 71 09/03/2023    GLUF 81 10/23/2019    CALCIUM 7.9 (L) 09/03/2023    AST 61 (H) 09/03/2023    ALT 34 09/03/2023    ALKPHOS 59 09/03/2023    TP 6.0 (L) 09/03/2023    ALB 3.7 09/03/2023    TBILI 1.23 (H) 09/03/2023       Code Status: )Level 1 - Full Code    Alma Hurtado DO  PGY-2

## 2023-09-04 PROBLEM — E83.42 HYPOMAGNESEMIA: Status: RESOLVED | Noted: 2023-08-02 | Resolved: 2023-09-04

## 2023-09-04 PROBLEM — F10.939 ALCOHOL WITHDRAWAL SYNDROME WITH COMPLICATION (HCC): Status: RESOLVED | Noted: 2023-08-01 | Resolved: 2023-09-04

## 2023-09-04 LAB
ALBUMIN SERPL BCP-MCNC: 4.1 G/DL (ref 3.5–5)
ALP SERPL-CCNC: 65 U/L (ref 34–104)
ALT SERPL W P-5'-P-CCNC: 38 U/L (ref 7–52)
ANION GAP SERPL CALCULATED.3IONS-SCNC: 9 MMOL/L
AST SERPL W P-5'-P-CCNC: 62 U/L (ref 13–39)
BILIRUB SERPL-MCNC: 1.58 MG/DL (ref 0.2–1)
BUN SERPL-MCNC: 4 MG/DL (ref 5–25)
CALCIUM SERPL-MCNC: 8.8 MG/DL (ref 8.4–10.2)
CHLORIDE SERPL-SCNC: 101 MMOL/L (ref 96–108)
CO2 SERPL-SCNC: 24 MMOL/L (ref 21–32)
CREAT SERPL-MCNC: 0.67 MG/DL (ref 0.6–1.3)
ERYTHROCYTE [DISTWIDTH] IN BLOOD BY AUTOMATED COUNT: 13.2 % (ref 11.6–15.1)
GFR SERPL CREATININE-BSD FRML MDRD: 115 ML/MIN/1.73SQ M
GLUCOSE SERPL-MCNC: 129 MG/DL (ref 65–140)
HCT VFR BLD AUTO: 43.1 % (ref 36.5–49.3)
HGB BLD-MCNC: 14.9 G/DL (ref 12–17)
MAGNESIUM SERPL-MCNC: 1.9 MG/DL (ref 1.9–2.7)
MCH RBC QN AUTO: 31.6 PG (ref 26.8–34.3)
MCHC RBC AUTO-ENTMCNC: 34.6 G/DL (ref 31.4–37.4)
MCV RBC AUTO: 91 FL (ref 82–98)
PLATELET # BLD AUTO: 200 THOUSANDS/UL (ref 149–390)
PMV BLD AUTO: 10 FL (ref 8.9–12.7)
POTASSIUM SERPL-SCNC: 3 MMOL/L (ref 3.5–5.3)
PROT SERPL-MCNC: 6.7 G/DL (ref 6.4–8.4)
RBC # BLD AUTO: 4.72 MILLION/UL (ref 3.88–5.62)
SODIUM SERPL-SCNC: 134 MMOL/L (ref 135–147)
WBC # BLD AUTO: 5.49 THOUSAND/UL (ref 4.31–10.16)

## 2023-09-04 PROCEDURE — 85027 COMPLETE CBC AUTOMATED: CPT | Performed by: EMERGENCY MEDICINE

## 2023-09-04 PROCEDURE — 80053 COMPREHEN METABOLIC PANEL: CPT | Performed by: EMERGENCY MEDICINE

## 2023-09-04 PROCEDURE — 83735 ASSAY OF MAGNESIUM: CPT | Performed by: EMERGENCY MEDICINE

## 2023-09-04 PROCEDURE — 99232 SBSQ HOSP IP/OBS MODERATE 35: CPT | Performed by: NURSE PRACTITIONER

## 2023-09-04 RX ORDER — FAMOTIDINE 20 MG/1
20 TABLET, FILM COATED ORAL 2 TIMES DAILY
Status: DISCONTINUED | OUTPATIENT
Start: 2023-09-04 | End: 2023-09-04

## 2023-09-04 RX ORDER — HYDROXYZINE 50 MG/1
50 TABLET, FILM COATED ORAL EVERY 6 HOURS PRN
Status: DISCONTINUED | OUTPATIENT
Start: 2023-09-04 | End: 2023-09-04

## 2023-09-04 RX ORDER — FAMOTIDINE 20 MG/1
20 TABLET, FILM COATED ORAL 2 TIMES DAILY
Status: DISCONTINUED | OUTPATIENT
Start: 2023-09-04 | End: 2023-09-06 | Stop reason: HOSPADM

## 2023-09-04 RX ORDER — POTASSIUM CHLORIDE 14.9 MG/ML
20 INJECTION INTRAVENOUS ONCE
Status: COMPLETED | OUTPATIENT
Start: 2023-09-04 | End: 2023-09-04

## 2023-09-04 RX ORDER — POTASSIUM CHLORIDE 20 MEQ/1
40 TABLET, EXTENDED RELEASE ORAL ONCE
Status: COMPLETED | OUTPATIENT
Start: 2023-09-04 | End: 2023-09-04

## 2023-09-04 RX ADMIN — FAMOTIDINE 20 MG: 20 TABLET, FILM COATED ORAL at 08:12

## 2023-09-04 RX ADMIN — POTASSIUM CHLORIDE 40 MEQ: 1500 TABLET, EXTENDED RELEASE ORAL at 10:07

## 2023-09-04 RX ADMIN — POTASSIUM CHLORIDE 20 MEQ: 14.9 INJECTION, SOLUTION INTRAVENOUS at 10:32

## 2023-09-04 RX ADMIN — BUSPIRONE HYDROCHLORIDE 10 MG: 10 TABLET ORAL at 16:22

## 2023-09-04 RX ADMIN — BUSPIRONE HYDROCHLORIDE 10 MG: 10 TABLET ORAL at 21:37

## 2023-09-04 RX ADMIN — FAMOTIDINE 20 MG: 20 TABLET, FILM COATED ORAL at 03:38

## 2023-09-04 RX ADMIN — BUSPIRONE HYDROCHLORIDE 10 MG: 10 TABLET ORAL at 08:11

## 2023-09-04 RX ADMIN — FAMOTIDINE 20 MG: 20 TABLET, FILM COATED ORAL at 18:03

## 2023-09-04 RX ADMIN — FOLIC ACID 1 MG: 1 TABLET ORAL at 08:11

## 2023-09-04 RX ADMIN — HYDROXYZINE HYDROCHLORIDE 25 MG: 25 TABLET ORAL at 21:37

## 2023-09-04 RX ADMIN — THIAMINE HCL TAB 100 MG 100 MG: 100 TAB at 08:11

## 2023-09-04 RX ADMIN — MULTIPLE VITAMINS W/ MINERALS TAB 1 TABLET: TAB ORAL at 08:11

## 2023-09-04 NOTE — PROGRESS NOTES
PROGRESS NOTE  DEPARTMENT OF MEDICAL TOXICOLOGY  LEVEL 4 MEDICAL DETOX UNIT  Melissa Cardenas 56 y.o. male MRN: [id number]  Unit/Bed#: 5T [facility] 507-01 Encounter: [id number]      Reason for Admission/Principal Problem: alcohol withdrawal / alcohol use disorder   Rounding Provider: SHIRA Levine  Attending Provider: Iris Bethea MD   9/2/2023  8:54 PM       * Alcohol use disorder, severe, dependence ([address])  Assessment & Plan  · Continue vitamin supplementation  · Case management consulted for disposition planning; patient desires inpatient rehabilitation  · Is not interested in starting naltrexone at this time     Alcohol withdrawal syndrome with complication (HCC)-resolved as of 9/4/2023  Assessment & Plan  · Tolerated SEWS, received 1170mg phenobarbital   · Withdrawal adequately managed.      Insomnia  Assessment & Plan  · Continue home hydroxyzine  · Trazodone as needed    Transaminitis  Assessment & Plan  Recent Labs     09/02/23 2117 09/03/23 0457 09/04/23  0504   AST 74* 61* 62*   ALT 43 34 38   ALKPHOS 81 59 65     • Likely secondary to alcohol use  • Improving/stable     Hypokalemia  Assessment & Plan  · Hypokalemia noted on AM labs  · Likely secondary to inadequate PO intake   · Will provide supplementation and continue to monitor     Dehydration  Assessment & Plan  Recent Labs     09/02/23 2117 09/03/23 0457 09/04/23  0504   HGB 16.3 14.4 14.9   HCT 45.4 40.0 43.1       • Improved  • Continue IVF hydration until tolerating adequate PO intake    Major depressive disorder, recurrent, in full remission ([address])  Assessment & Plan  · Continue home medications: venlafaxine, buspirone, and hydroxyzine  · Psychiatry consulted for auditory and visual hallucinations as requested by patient; appreciate recommendations  · Outpatient psychiatry follow up    GERD without esophagitis  Assessment & Plan  · Continue famotidine    Hypomagnesemia-resolved as of 9/4/2023  Assessment & Plan  Recent Labs 09/02/23  2117 09/03/23  0457 09/04/23  0504   MG 1.8* 2.2 1.9     · Improved/resolved          VTE Pharmacologic Prophylaxis:   Pharmacologic: Enoxaparin (Lovenox)  Mechanical VTE Prophylaxis in Place: yes    Code Status: Level 1 - Full Code    Patient Centered Rounds: I have performed bedside rounds with nursing staff today. Discussions with Specialists or Other Care Team Provider: case management      Education and Discussions with Family / Patient: patient     Time Spent for Care: 20 minutes. More than 50% of total time spent on counseling and coordination of care as described above. Current Length of Stay: 2 day(s)    Current Patient Status: Inpatient     Certification Statement: The patient will continue to require additional inpatient hospital stay due to Monitoring off of SEWS protocol. Withdrawal symptoms improved/resolved  Discharge Plan: Pending case management disposition         Subjective:   Patient resting in bed comfortably, denies any acute complaints. Withdrawal adequately managed.      Objective:     Clinical Opiate Withdrawal Scale  Pulse: 69    SEWS Total Score: 0 (9/3/2023  8:58 PM)        Last 24 Hours Medication List:   Current Facility-Administered Medications   Medication Dose Route Frequency Provider Last Rate   • acetaminophen  650 mg Oral Q6H PRN Krish Mark PA-C     • busPIRone  10 mg Oral TID Krish Mark PA-C     • enoxaparin  40 mg Subcutaneous Daily Krish Mark PA-C     • famotidine  20 mg Oral BID Krish Mark PA-C     • folic acid  1 mg Oral Daily Krish Mark PA-C     • hydrOXYzine HCL  25 mg Oral HS Krish Mark PA-C     • loperamide  2 mg Oral 4x Daily PRN Chi Dutton MD     • multivitamin-minerals  1 tablet Oral Daily Krish Mark PA-C     • nicotine  21 mg Transdermal Daily Krish Mark PA-C     • nicotine polacrilex  4 mg Oral Q2H PRN Krish Mark PA-C     • ondansetron  4 mg Intravenous Q6H PRN Krish Mark PA-C     • potassium chloride  40 mEq Oral Once [employer], CRNP     • potassium chloride  20 mEq Intravenous Once [employer], CRNP     • sodium chloride  100 mL/hr Intravenous Continuous Deja Benitez PA-C 100 mL/hr (23 1836)   • thiamine  100 mg Oral Daily Deja Benitez PA-C     • traZODone  50 mg Oral HS PRN Deja Benitez PA-C     • [START ON 2023] venlafaxine  75 mg Oral Daily David Fischer DO           Vitals:   Temp (24hrs), Av.1 °F (36.7 °C), Min:97.5 °F (36.4 °C), Max:98.8 °F (37.1 °C)    Temp:  [97.5 °F (36.4 °C)-98.8 °F (37.1 °C)] 98.4 °F (36.9 °C)  HR:  [67-86] 69  Resp:  [15-16] 16  BP: (138-162)/() 150/105  SpO2:  [96 %-99 %] 96 %  Body mass index is 27.76 kg/m². Input and Output Summary (last 24 hours):No intake or output data in the 24 hours ending 23 09    Physical Exam:   Physical Exam  Vitals and nursing note reviewed. Constitutional:       General: He is not in acute distress. Appearance: He is not diaphoretic. HENT:      Head: Normocephalic and atraumatic. Nose: Nose normal.      Mouth/Throat:      Mouth: Mucous membranes are moist.   Eyes:      General: No scleral icterus. Conjunctiva/sclera: Conjunctivae normal.   Cardiovascular:      Rate and Rhythm: Normal rate and regular rhythm. Pulmonary:      Effort: Pulmonary effort is normal. No respiratory distress. Abdominal:      Palpations: Abdomen is soft. Tenderness: There is no abdominal tenderness. Musculoskeletal:         General: No swelling or deformity. Cervical back: Neck supple. Skin:     General: Skin is warm. Neurological:      General: No focal deficit present. Mental Status: He is oriented to person, place, and time. Motor: No tremor.       Comments: No tongue fasciculations   Psychiatric:         Behavior: Behavior normal.         Additional Data:     Labs:   Results from last 7 days   Lab Units 23  0504 23  0457 23  2117   WBC Thousand/uL 5.49 < > 8.81   HEMOGLOBIN g/dL 14.9   < > 16.3   HEMATOCRIT % 43.1   < > 45.4   PLATELETS Thousands/uL 200   < > 309   NEUTROS PCT %  --   --  70   LYMPHS PCT %  --   --  19   MONOS PCT %  --   --  10   EOS PCT %  --   --  0    < > = values in this interval not displayed. Results from last 7 days   Lab Units 09/04/23  0504   SODIUM mmol/L 134*   POTASSIUM mmol/L 3.0*   CHLORIDE mmol/L 101   CO2 mmol/L 24   BUN mg/dL 4*   CREATININE mg/dL 0.67   ANION GAP mmol/L 9   CALCIUM mg/dL 8.8   ALBUMIN g/dL 4.1   TOTAL BILIRUBIN mg/dL 1.58*   ALK PHOS U/L 65   ALT U/L 38   AST U/L 62*   GLUCOSE RANDOM mg/dL 129                              * I Have Reviewed All Lab Data Listed Above. * Additional Pertinent Lab Tests Reviewed: [address] Admission Reviewed      Imaging Studies: I have personally reviewed pertinent reports. Recent Cultures (last 7 days): Today, Patient Was Seen By: SHIRA Rodriguez    ** Please Note: Dictation voice to text software may have been used in the creation of this document.  **

## 2023-09-04 NOTE — CASE MANAGEMENT
Cm spoke with pt's sister, Panchito Vieira, after she contacted unit to discuss pt discharge plan. Cm provided a discussion regarding discharge planning. Panchito Vieira stated she would reach out to pt later and encourage pt to accept discharge planning prior to discharge.

## 2023-09-04 NOTE — ASSESSMENT & PLAN NOTE
· Hypokalemia noted on AM labs  · Likely secondary to inadequate PO intake   · Will provide supplementation and continue to monitor

## 2023-09-04 NOTE — CASE MANAGEMENT
Cm met with pt to discuss discharge plan. Pt stated he had decided he wanted outpatient [facility] treatment at time and questioned his options. Cm agreed to research options through pt's health insurance provider and inform pt of these options. Cm searched these options and provided pt with a list. Pt indicated he would review this list and inform cm if he wanted assistance scheduling with provider or if he preferred to do this from home.

## 2023-09-05 PROBLEM — E87.6 HYPOKALEMIA: Status: RESOLVED | Noted: 2023-08-01 | Resolved: 2023-09-05

## 2023-09-05 LAB
ANION GAP SERPL CALCULATED.3IONS-SCNC: 10 MMOL/L
BUN SERPL-MCNC: 6 MG/DL (ref 5–25)
CALCIUM SERPL-MCNC: 8.9 MG/DL (ref 8.4–10.2)
CHLORIDE SERPL-SCNC: 102 MMOL/L (ref 96–108)
CO2 SERPL-SCNC: 24 MMOL/L (ref 21–32)
CREAT SERPL-MCNC: 0.87 MG/DL (ref 0.6–1.3)
GFR SERPL CREATININE-BSD FRML MDRD: 103 ML/MIN/1.73SQ M
GLUCOSE SERPL-MCNC: 81 MG/DL (ref 65–140)
MAGNESIUM SERPL-MCNC: 1.9 MG/DL (ref 1.9–2.7)
POTASSIUM SERPL-SCNC: 3.5 MMOL/L (ref 3.5–5.3)
SODIUM SERPL-SCNC: 136 MMOL/L (ref 135–147)

## 2023-09-05 PROCEDURE — 80048 BASIC METABOLIC PNL TOTAL CA: CPT | Performed by: NURSE PRACTITIONER

## 2023-09-05 PROCEDURE — 83735 ASSAY OF MAGNESIUM: CPT | Performed by: NURSE PRACTITIONER

## 2023-09-05 PROCEDURE — 99232 SBSQ HOSP IP/OBS MODERATE 35: CPT | Performed by: NURSE PRACTITIONER

## 2023-09-05 RX ORDER — HYDROXYZINE 50 MG/1
50 TABLET, FILM COATED ORAL ONCE
Status: COMPLETED | OUTPATIENT
Start: 2023-09-05 | End: 2023-09-05

## 2023-09-05 RX ADMIN — TRAZODONE HYDROCHLORIDE 50 MG: 50 TABLET ORAL at 21:20

## 2023-09-05 RX ADMIN — HYDROXYZINE HYDROCHLORIDE 50 MG: 50 TABLET, FILM COATED ORAL at 04:54

## 2023-09-05 RX ADMIN — BUSPIRONE HYDROCHLORIDE 10 MG: 10 TABLET ORAL at 21:20

## 2023-09-05 RX ADMIN — FAMOTIDINE 20 MG: 20 TABLET, FILM COATED ORAL at 17:34

## 2023-09-05 RX ADMIN — VENLAFAXINE HYDROCHLORIDE 75 MG: 75 CAPSULE, EXTENDED RELEASE ORAL at 08:03

## 2023-09-05 RX ADMIN — BUSPIRONE HYDROCHLORIDE 10 MG: 10 TABLET ORAL at 08:03

## 2023-09-05 RX ADMIN — THIAMINE HCL TAB 100 MG 100 MG: 100 TAB at 08:03

## 2023-09-05 RX ADMIN — FOLIC ACID 1 MG: 1 TABLET ORAL at 08:03

## 2023-09-05 RX ADMIN — HYDROXYZINE HYDROCHLORIDE 25 MG: 25 TABLET ORAL at 21:20

## 2023-09-05 RX ADMIN — MULTIPLE VITAMINS W/ MINERALS TAB 1 TABLET: TAB ORAL at 08:03

## 2023-09-05 RX ADMIN — BUSPIRONE HYDROCHLORIDE 10 MG: 10 TABLET ORAL at 17:34

## 2023-09-05 RX ADMIN — FAMOTIDINE 20 MG: 20 TABLET, FILM COATED ORAL at 08:03

## 2023-09-05 NOTE — DISCHARGE SUMMARY
[facility]  Discharge- Sadi  1975, 56 y.o. male MRN: [id number]  Unit/Bed#: 5T DETOX 507-01 Encounter: [id number]  Primary Care Provider: No primary care provider on file. Date and time admitted to hospital: 9/2/2023  8:54 PM    MEDICAL DETOX UNIT, LEVEL 4  Department of Medical Toxicology  Reason for Admission/Principal Problem: alcohol withdrawal, alcohol use disorder   Admitting provider: SHIRA Nathan DO   9/2/2023  8:54 PM       Discharging Physician / Practitioner: SHIRA Nathan  PCP: No primary care provider on file. Admission Date:   Admission Orders (From admission, onward)     Ordered        09/02/23 2228  INPATIENT ADMISSION  Once                      Discharge Date: 09/05/23    Medical Problems     Resolved Problems  Date Reviewed: 9/5/2023          Resolved    Alcohol withdrawal syndrome with complication ([address]) 6/1/5960     Resolved by  SHIRA Nathan    Hypokalemia 9/5/2023     Resolved by  SHIRA Nathan    Hypomagnesemia 9/4/2023     Resolved by  SHIRA Nathan          * Alcohol use disorder, severe, dependence ([address])  Assessment & Plan  · Continue vitamin supplementation  · Case management consulted for disposition planning; patient desires outpatient services   · Is not interested in starting naltrexone at this time     Alcohol withdrawal syndrome with complication (HCC)-resolved as of 9/4/2023  Assessment & Plan  · Tolerated SEWS, received 1170mg phenobarbital   · Withdrawal adequately managed.      Insomnia  Assessment & Plan  · Continue home hydroxyzine  · Trazodone as needed    Transaminitis  Assessment & Plan  Recent Labs     09/02/23 2117 09/03/23 0457 09/04/23  0504   AST 74* 61* 62*   ALT 43 34 38   ALKPHOS 81 59 65     • Likely secondary to alcohol use  • Improving/stable     Dehydration  Assessment & Plan  Recent Labs     09/02/23 2117 09/03/23 0457 09/04/23  0504   HGB 16.3 14.4 14.9   HCT 45.4 40.0 43.1       • Improved  • Tolerating adequate PO intake    Major depressive disorder, recurrent, in full remission ([address])  Assessment & Plan  · Continue home medications: venlafaxine, buspirone, and hydroxyzine  · Psychiatry consulted for auditory and visual hallucinations as requested by patient; appreciate recommendations  · Outpatient psychiatry follow up    GERD without esophagitis  Assessment & Plan  · Continue famotidine    Hypomagnesemia-resolved as of 9/4/2023  Assessment & Plan  Recent Labs     09/02/23  2117 09/03/23  0457 09/04/23  0504   MG 1.8* 2.2 1.9     · Improved/resolved    Hypokalemia-resolved as of 9/5/2023  Assessment & Plan  · Hypokalemia noted on AM labs  · Likely secondary to inadequate PO intake   · Will provide supplementation and continue to monitor       Consultations During Hospital Stay:  · Case management   · Psychiatry     Procedures Performed:   · None    Significant Findings / Test Results:   · Dehydration - resolved  · Hypokalemia   · Hypomagnesemia - resolved  · Transaminitis - improved    Incidental Findings:   · None     Test Results Pending at Discharge (will require follow up): · None      Outpatient Tests / Follow Up Requested:  · Recommend f/u with PCP within 1-2 weeks of discharge   · Recommend OP f/u with Psychiatry     Complications:  none    Reason for Admission: alcohol withdrawal, alcohol use disorder     Hospital Course:     Trang Damon is a 56 y.o. male patient who originally presented to the hospital on 9/2/2023 due to alcohol withdrawal. Patient initially presented to the [facility] ED requesting detoxification from alcohol. Patient was admitted to the [facility] medical detox unit under [facility] protocol for medically assisted alcohol withdrawal and received a total of 1170 mg phenobarbital without complication.  Patient's alcohol withdrawal symptoms subsequently resolved, and he has remained without objective evidence of alcohol withdrawal at this time. During this hospitalization, patient was found to have evidence of dehydration and transaminitis, which improved with IVFs and alcohol abstinence, as well as hypokalemia and hypomagnesemia, for which he was given electrolyte supplementation. Psychiatry was consulted for assistance with mgmt of pt's hallucinations and made medication recommendations, namely holding pt's home Effexor until 9/5 due to withdrawal and hypertensive episodes. Case management was consulted for assistance with aftercare resources, and patient will be discharged home with OP resources. Please see above list of diagnoses and related plan for additional information. Condition at Discharge: good     Discharge Day Visit / Exam:     Subjective:  Resting comfortably. Denies acute complaints. Ambulating independently. Vitals: Blood Pressure: 138/95 (09/05/23 0721)  Pulse: 88 (09/05/23 0721)  Temperature: (!) 97.2 °F (36.2 °C) (09/05/23 0721)  Temp Source: Temporal (09/05/23 0721)  Respirations: 18 (09/05/23 0721)  Height: 6' 1" (185.4 cm) (09/03/23 0026)  Weight - Scale: 95.4 kg (210 lb 6.4 oz) (09/03/23 0026)  SpO2: 100 % (09/05/23 0721)  Exam:   Physical Exam  Vitals reviewed. Constitutional:       Appearance: Normal appearance. HENT:      Head: Normocephalic and atraumatic. Cardiovascular:      Rate and Rhythm: Normal rate. Pulmonary:      Effort: Pulmonary effort is normal.   Skin:     General: Skin is warm. Neurological:      Mental Status: He is alert and oriented to person, place, and time. Psychiatric:         Mood and Affect: Mood normal.         Behavior: Behavior normal.         Discussion with Family: I personally did not discuss this case with the patient's family. I reviewed the discharge plan with the patient and answered all questions to the best of my ability. Discharge instructions/Information to patient and family:   See after visit summary for information provided to patient and family. Provisions for Follow-Up Care:  See after visit summary for information related to follow-up care and any pertinent home health orders. Disposition:     Home    For Discharges to [facility] SNF:   · Not Applicable to this Patient - Not Applicable to this Patient    Planned Readmission: N/A     Discharge Statement:  I spent 35 minutes discharging the patient. This time was spent on the day of discharge. I had direct contact with the patient on the day of discharge. Greater than 50% of the total time was spent examining patient, answering all patient questions, arranging and discussing plan of care with patient as well as directly providing post-discharge instructions. Additional time then spent on discharge activities. Discharge Medications:  See after visit summary for reconciled discharge medications provided to patient and family.       ** Please Note: This note has been constructed using a voice recognition system **

## 2023-09-05 NOTE — PROGRESS NOTES
PROGRESS NOTE  DEPARTMENT OF MEDICAL TOXICOLOGY  LEVEL 4 MEDICAL DETOX UNIT  Carmela Brown 56 y.o. male MRN: [id number]  Unit/Bed#: 5T [facility] 507-01 Encounter: [id number]      Reason for Admission/Principal Problem: alcohol withdrawal / alcohol use disorder   Rounding Provider: SHIRA Gross  Attending Provider: Juan A Campbell DO   9/2/2023  8:54 PM       * Alcohol use disorder, severe, dependence ([address])  Assessment & Plan  · Continue vitamin supplementation  · Case management consulted for disposition planning; patient desires outpatient services   · Is not interested in starting naltrexone at this time     Transaminitis  Assessment & Plan  Recent Labs     09/02/23  2117 09/03/23  0457 09/04/23  0504   AST 74* 61* 62*   ALT 43 34 38   ALKPHOS 81 59 65     • Likely secondary to alcohol use  • Improving/stable     Major depressive disorder, recurrent, in full remission (HCC)  Assessment & Plan  · Continue home medications: venlafaxine, buspirone, and hydroxyzine  · Psychiatry consulted for auditory and visual hallucinations as requested by patient; appreciate recommendations  · Outpatient psychiatry follow up          VTE Pharmacologic Prophylaxis:   Pharmacologic: Enoxaparin (Lovenox)  Mechanical VTE Prophylaxis in Place: yes    Code Status: Level 1 - Full Code    Patient Centered Rounds: I have performed bedside rounds with nursing staff today. Discussions with Specialists or Other Care Team Provider: case management      Education and Discussions with Family / Patient: patient     Time Spent for Care: 20 minutes. More than 50% of total time spent on counseling and coordination of care as described above.     Current Length of Stay: 3 day(s)    Current Patient Status: Inpatient     Certification Statement: The patient will continue to require additional inpatient hospital stay due to pending case management disposition  Discharge Plan: Pending case management disposition         Subjective: Patient resting in bed comfortably, denies any acute complaints. Withdrawal adequately managed. Objective:     Clinical Opiate Withdrawal Scale  Pulse: 72    SEWS Total Score: 0 (9/3/2023  8:58 PM)        Last 24 Hours Medication List:   Current Facility-Administered Medications   Medication Dose Route Frequency Provider Last Rate   • acetaminophen  650 mg Oral Q6H PRN Reinaldo Christianson PA-C     • busPIRone  10 mg Oral TID Reinaldo Christianson PA-C     • enoxaparin  40 mg Subcutaneous Daily Reinaldo Christianson PA-C     • famotidine  20 mg Oral BID Reinaldo Christianson PA-C     • folic acid  1 mg Oral Daily Reinaldo Christianson PA-C     • hydrOXYzine HCL  25 mg Oral HS Reinaldo Christianson PA-C     • loperamide  2 mg Oral 4x Daily PRN Shayy Castillo MD     • multivitamin-minerals  1 tablet Oral Daily Reinaldo Christianson PA-C     • nicotine  21 mg Transdermal Daily Reinaldo Christianson PA-C     • nicotine polacrilex  4 mg Oral Q2H PRN Reinaldo Christianson PA-C     • ondansetron  4 mg Intravenous Q6H PRN Reinaldo Christianson PA-C     • thiamine  100 mg Oral Daily Reinaldo Christianson PA-C     • traZODone  50 mg Oral HS PRN Reinaldo Christianson PA-C     • venlafaxine  75 mg Oral Daily David Fischer DO           Vitals:   Temp (24hrs), Av °F (36.1 °C), Min:96.8 °F (36 °C), Max:97.2 °F (36.2 °C)    Temp:  [96.8 °F (36 °C)-97.2 °F (36.2 °C)] 96.8 °F (36 °C)  HR:  [72-88] 72  Resp:  [18] 18  BP: (138-157)/() 157/101  SpO2:  [97 %-100 %] 97 %  Body mass index is 27.76 kg/m². Input and Output Summary (last 24 hours):No intake or output data in the 24 hours ending 23 5759    Physical Exam:   Physical Exam  Vitals and nursing note reviewed. Constitutional:       General: He is not in acute distress. Appearance: He is not diaphoretic. HENT:      Head: Normocephalic and atraumatic. Mouth/Throat:      Mouth: Mucous membranes are moist.   Cardiovascular:      Rate and Rhythm: Normal rate and regular rhythm.    Pulmonary:      Effort: Pulmonary effort is normal. No respiratory distress. Abdominal:      Palpations: Abdomen is soft. Tenderness: There is no abdominal tenderness. Musculoskeletal:      Cervical back: Neck supple. Skin:     General: Skin is warm. Neurological:      General: No focal deficit present. Mental Status: He is oriented to person, place, and time. Motor: No tremor. Psychiatric:         Behavior: Behavior normal.         Additional Data:     Labs:   Results from last 7 days   Lab Units 09/04/23  0504 09/03/23  0457 09/02/23  2117   WBC Thousand/uL 5.49   < > 8.81   HEMOGLOBIN g/dL 14.9   < > 16.3   HEMATOCRIT % 43.1   < > 45.4   PLATELETS Thousands/uL 200   < > 309   NEUTROS PCT %  --   --  70   LYMPHS PCT %  --   --  19   MONOS PCT %  --   --  10   EOS PCT %  --   --  0    < > = values in this interval not displayed. Results from last 7 days   Lab Units 09/05/23  0449 09/04/23  0504   SODIUM mmol/L 136 134*   POTASSIUM mmol/L 3.5 3.0*   CHLORIDE mmol/L 102 101   CO2 mmol/L 24 24   BUN mg/dL 6 4*   CREATININE mg/dL 0.87 0.67   ANION GAP mmol/L 10 9   CALCIUM mg/dL 8.9 8.8   ALBUMIN g/dL  --  4.1   TOTAL BILIRUBIN mg/dL  --  1.58*   ALK PHOS U/L  --  65   ALT U/L  --  38   AST U/L  --  62*   GLUCOSE RANDOM mg/dL 81 129                              * I Have Reviewed All Lab Data Listed Above. * Additional Pertinent Lab Tests Reviewed: [address] [place] [address] Admission Reviewed      Imaging Studies: I have personally reviewed pertinent reports. Recent Cultures (last 7 days): Today, Patient Was Seen By: SHIRA Bartlett    ** Please Note: Dictation voice to text software may have been used in the creation of this document.  **

## 2023-09-05 NOTE — UTILIZATION REVIEW
NOTIFICATION OF INPATIENT ADMISSION   AUTHORIZATION REQUEST   SERVICING FACILITY:   [address] [place] [address]  Tax ID: [id number]  NPI: [id number] ATTENDING PROVIDER:  Attending Name and NPI#: Eddi Dalyker [[id number]]  Address: [address] [place] [address]  Phone: [phone]     ADMISSION INFORMATION:  Place of Service: Inpatient [address]  Place of Service Code: 21  Inpatient Admission Date/Time: 9/2/23 10:28 PM  Discharge Date/Time: No discharge date for patient encounter. Admitting Diagnosis Code/Description:  Alcohol abuse [F10.10]  Persons encountering health services in other specified circumstances [Z76.89]  Alcohol use disorder, severe, dependence ([facility]) [F10.20]     UTILIZATION REVIEW CONTACT:  Saida Garcia Utilization   Network Utilization Review Department  Phone: [phone]  Fax [phone]  Email: [email or web address]  Contact for approvals/pending authorizations, clinical reviews, and discharge. PHYSICIAN ADVISORY SERVICES:  Medical Necessity Denial & [id number] Review  Phone: [phone]  Fax: [phone]  Email: [email or web address]

## 2023-09-05 NOTE — CASE MANAGEMENT
Case Management Discharge Planning Note    Patient name Sherrill Humphries  Location 5T DETOX 507/5T DETOX 50* MRN [id number]  : 1975 Date 2023       Current Admission Date: 2023  Current Admission Diagnosis:Alcohol use disorder, severe, dependence [facility])   Patient Active Problem List    Diagnosis Date Noted   • Alcohol use disorder, severe, dependence ([address]) 2023   • Dehydration 2023   • Hypokalemia 2023   • Transaminitis 2023   • Insomnia 2023   • Major depressive disorder, recurrent, in full remission ([address]) 2021   • Generalized anxiety disorder 2020   • Epigastric pain 10/18/2019   • GERD without esophagitis 2019      LOS (days): 3  Geometric Mean LOS (GMLOS) (days):   Days to GMLOS:     OBJECTIVE:  Risk of Unplanned Readmission Score: 11.34         Current admission status: Inpatient   Preferred Pharmacy:   [address] [place] [address] [facility], [address] [place] [id number]  Phone: [phone] Fax: [phone]    Primary Care Provider: No primary care provider on file. [facility]  Secondary Insurance:     DISCHARGE DETAILS: Cm informed by medical staff pt ready for discharge. Cm met with pt and pt had reviewed outpatient resources provided and selected a few [facility] OP providers to explore. Cm explored multiple providers and pt decided upon [facility] treatment in [address], [address] [place] [address]. This provider was contacted and pt was scheduled an intake appointment for OP [facility] treatment on 2023. Pt stated he would contact his sister and inform her. Pt states he will call family for transportation home. Pt will be discharged home to [address] [place] [address].     Discharge planning discussed with[de-identified] patient  Freedom of Choice: Yes                   Contacts  [facility]  Relationship to Patient[de-identified] Treatment Provider  Reason/Outcome: Continuity of Care, Discharge Planning              Other Referral/Resources/Interventions Provided:  Referrals Provided[de-identified] Support Group, Therapist, IOP    Would you like to participate in our [address] service program?  : No - Declined                                              Family notified[de-identified] Sydney Cooper (sister)

## 2023-09-05 NOTE — DISCHARGE SUMMARY
[address] [facility]  Discharge- [facility] 1975, 56 y.o. male MRN: [id number]  Unit/Bed#: 5T DETOX 507-01 Encounter: [id number]  Primary Care Provider: No primary care provider on file. Date and time admitted to hospital: 9/2/2023  8:54 PM    [facility]  Department of Medical Toxicology  Reason for Admission/Principal Problem: alcohol withdrawal, alcohol use disorder   Admitting provider: Smith Oneal PA-C  [id number] DO [place]   9/2/2023  8:54 PM       Discharging Physician / Practitioner: Smith Oneal PA-C  PCP: No primary care provider on file.   Admission Date:   Admission Orders (From admission, onward)     Ordered        09/02/23 2228  INPATIENT ADMISSION  Once                      Discharge Date: 09/06/23    Medical Problems     Resolved Problems  Date Reviewed: 9/5/2023          Resolved    Alcohol withdrawal syndrome with complication ([address]) 7/0/7801     Resolved by  Frankey Cease, CRNP    Hypokalemia 9/5/2023     Resolved by  Frankey Cease, CRNP    Hypomagnesemia 9/4/2023     Resolved by  Frankey Cease, [place]     09/02/23  2117 09/03/23  0457 09/04/23  0504   AST 74* 61* 62*   ALT 43 34 38   ALKPHOS 81 59 65     • Likely secondary to alcohol use  • Improving/stable     Major depressive disorder, recurrent, in full remission ([address])  Assessment & Plan  · Continue home medications: venlafaxine, buspirone, and hydroxyzine  · Psychiatry consulted for auditory and visual hallucinations as requested by patient; appreciate recommendations  · Outpatient psychiatry follow up    * Alcohol use disorder, severe, dependence ([address])  Assessment & Plan  · Continue vitamin supplementation  · Case management consulted for disposition planning; patient desires outpatient services   · Is not interested in starting naltrexone at this time       Consultations During Hospital Stay:  · Case management   · Psychiatry     Procedures Performed:   · None    Significant Findings / Test Results:   · Dehydration - resolved  · Hypokalemia   · Hypomagnesemia - resolved  · Transaminitis - improved    Incidental Findings:   · None     Test Results Pending at Discharge (will require follow up): · None      Outpatient Tests / Follow Up Requested:  · Recommend f/u with PCP within 1-2 weeks of discharge   · Recommend OP f/u with Psychiatry     Complications:  none    Reason for Admission: alcohol withdrawal, alcohol use disorder     Hospital Course:     Giulia Archer is a 56 y.o. male patient who originally presented to the hospital on 9/2/2023 due to alcohol withdrawal. Patient initially presented to the [facility] ED requesting detoxification from alcohol. Patient was admitted to the [facility] medical detox unit under [facility] protocol for medically assisted alcohol withdrawal and received a total of 1170 mg phenobarbital without complication. Patient's alcohol withdrawal symptoms subsequently resolved, and he has remained without objective evidence of alcohol withdrawal at this time. During this hospitalization, patient was found to have evidence of dehydration and transaminitis, which improved with IVFs and alcohol abstinence, as well as hypokalemia and hypomagnesemia, for which he was given electrolyte supplementation. Psychiatry was consulted for assistance with mgmt of pt's hallucinations and made medication recommendations, namely holding pt's home Effexor until 9/5 due to withdrawal and hypertensive episodes. Case management was consulted for assistance with aftercare resources, and patient will be discharged home with OP resources. Please see above list of diagnoses and related plan for additional information. Condition at Discharge: good     Discharge Day Visit / Exam:     Subjective:  Resting comfortably. Denies acute complaints. Ambulating independently.     Vitals: Blood Pressure: (!) 153/103 (09/05/23 1956)  Pulse: 88 (09/05/23 1956)  Temperature: (!) 97.4 °F (36.3 °C) (09/05/23 1956)  Temp Source: Temporal (09/05/23 1956)  Respirations: 18 (09/05/23 1956)  Height: 6' 1" (185.4 cm) (09/03/23 0026)  Weight - Scale: 95.4 kg (210 lb 6.4 oz) (09/03/23 0026)  SpO2: 97 % (09/05/23 1956)  Exam:   Physical Exam  Vitals reviewed. Constitutional:       Appearance: Normal appearance. HENT:      Head: Normocephalic and atraumatic. Cardiovascular:      Rate and Rhythm: Normal rate. Pulmonary:      Effort: Pulmonary effort is normal.   Skin:     General: Skin is warm. Neurological:      Mental Status: He is alert and oriented to person, place, and time. Psychiatric:         Mood and Affect: Mood normal.         Behavior: Behavior normal.         Discussion with Family: I personally did not discuss this case with the patient's family. I reviewed the discharge plan with the patient and answered all questions to the best of my ability. Discharge instructions/Information to patient and family:   See after visit summary for information provided to patient and family. Provisions for Follow-Up Care:  See after visit summary for information related to follow-up care and any pertinent home health orders. Disposition:     Home    For Discharges to [facility]:   · Not Applicable to this Patient - Not Applicable to this Patient    Planned Readmission: N/A     Discharge Statement:  I spent 35 minutes discharging the patient. This time was spent on the day of discharge. I had direct contact with the patient on the day of discharge. Greater than 50% of the total time was spent examining patient, answering all patient questions, arranging and discussing plan of care with patient as well as directly providing post-discharge instructions. Additional time then spent on discharge activities.     Discharge Medications:  See after visit summary for reconciled discharge medications provided to patient and family.       ** Please Note: This note has been constructed using a voice recognition system **

## 2023-09-06 ENCOUNTER — TELEPHONE (OUTPATIENT)
Dept: CASE MANAGEMENT | Facility: HOSPITAL | Age: 48
End: 2023-09-06

## 2023-09-06 VITALS
DIASTOLIC BLOOD PRESSURE: 92 MMHG | OXYGEN SATURATION: 95 % | WEIGHT: 210.4 LBS | HEIGHT: 73 IN | TEMPERATURE: 97.3 F | SYSTOLIC BLOOD PRESSURE: 151 MMHG | RESPIRATION RATE: 18 BRPM | BODY MASS INDEX: 27.89 KG/M2 | HEART RATE: 68 BPM

## 2023-09-06 PROCEDURE — 99239 HOSP IP/OBS DSCHRG MGMT >30: CPT | Performed by: NURSE PRACTITIONER

## 2023-09-06 RX ADMIN — THIAMINE HCL TAB 100 MG 100 MG: 100 TAB at 08:07

## 2023-09-06 RX ADMIN — FAMOTIDINE 20 MG: 20 TABLET, FILM COATED ORAL at 08:07

## 2023-09-06 RX ADMIN — FOLIC ACID 1 MG: 1 TABLET ORAL at 08:07

## 2023-09-06 RX ADMIN — VENLAFAXINE HYDROCHLORIDE 75 MG: 75 CAPSULE, EXTENDED RELEASE ORAL at 08:07

## 2023-09-06 RX ADMIN — BUSPIRONE HYDROCHLORIDE 10 MG: 10 TABLET ORAL at 08:07

## 2023-09-06 RX ADMIN — MULTIPLE VITAMINS W/ MINERALS TAB 1 TABLET: TAB ORAL at 08:07

## 2023-09-06 NOTE — CASE MANAGEMENT
Cm found pt had not been discharged. Cm met with pt and pt stated his transportation had not arrived. Cm discussed with pt transportation home. Pt was offered assistance with transportation.  Pt stated his mother was providing transportation at [facility].

## 2023-09-06 NOTE — PROGRESS NOTES
PROGRESS NOTE  DEPARTMENT OF MEDICAL TOXICOLOGY  LEVEL 4 MEDICAL DETOX UNIT  Joycelyn Kaur 56 y.o. male MRN: [id number]  Unit/Bed#: 5T [facility] 507-01 Encounter: [id number]      Reason for Admission/Principal Problem: alcohol withdrawal / alcohol use disorder   Rounding Provider: SHIRA Fontaine  Attending Provider: Betsy Carrizales DO   9/2/2023  8:54 PM       * Alcohol use disorder, severe, dependence ([address])  Assessment & Plan  · Continue vitamin supplementation  · Case management consulted for disposition planning; patient desires outpatient services   · Is not interested in starting naltrexone at this time     Transaminitis  Assessment & Plan  Recent Labs     09/02/23  2117 09/03/23  0457 09/04/23  0504   AST 74* 61* 62*   ALT 43 34 38   ALKPHOS 81 59 65     • Likely secondary to alcohol use  • Improving/stable     Major depressive disorder, recurrent, in full remission (HCC)  Assessment & Plan  · Continue home medications: venlafaxine, buspirone, and hydroxyzine  · Psychiatry consulted for auditory and visual hallucinations as requested by patient; appreciate recommendations  · Outpatient psychiatry follow up          VTE Pharmacologic Prophylaxis:   Pharmacologic: Enoxaparin (Lovenox)  Mechanical VTE Prophylaxis in Place: yes    Code Status: Level 1 - Full Code    Patient Centered Rounds: I have performed bedside rounds with nursing staff today. Discussions with Specialists or Other Care Team Provider: case management      Education and Discussions with Family / Patient: patient     Time Spent for Care: 20 minutes. More than 50% of total time spent on counseling and coordination of care as described above.     Current Length of Stay: 4 day(s)    Current Patient Status: Inpatient     Certification Statement: The patient will continue to require additional inpatient hospital stay due to pending case management disposition  Discharge Plan: Pending case management disposition         Subjective: Patient resting in bed comfortably, denies any acute complaints. Withdrawal adequately managed. Objective:     Clinical Opiate Withdrawal Scale  Pulse: 88    SEWS Total Score: 0 (9/3/2023  8:58 PM)        Last 24 Hours Medication List:   Current Facility-Administered Medications   Medication Dose Route Frequency Provider Last Rate   • acetaminophen  650 mg Oral Q6H PRN Bethanne Sear, PA-C     • busPIRone  10 mg Oral TID Bethanne Sear, PA-C     • enoxaparin  40 mg Subcutaneous Daily Bethanne Sear, PA-C     • famotidine  20 mg Oral BID Bethanne Sear, PA-C     • folic acid  1 mg Oral Daily Bethanne Sear, PA-C     • hydrOXYzine HCL  25 mg Oral HS Bethanne Sear, PA-C     • loperamide  2 mg Oral 4x Daily PRN Shayy Walters MD     • multivitamin-minerals  1 tablet Oral Daily Bethanne Sear, PA-C     • nicotine  21 mg Transdermal Daily Bethanne Sear, PA-C     • nicotine polacrilex  4 mg Oral Q2H PRN Bethanne Sear, PA-C     • ondansetron  4 mg Intravenous Q6H PRN Bethanne Sear, PA-C     • thiamine  100 mg Oral Daily Bethanne Sear, PA-C     • traZODone  50 mg Oral HS PRN Bethanne Sear, PA-C     • venlafaxine  75 mg Oral Daily David Fischer DO           Vitals:   Temp (24hrs), Av.1 °F (36.2 °C), Min:96.8 °F (36 °C), Max:97.4 °F (36.3 °C)    Temp:  [96.8 °F (36 °C)-97.4 °F (36.3 °C)] 97.4 °F (36.3 °C)  HR:  [72-88] 88  Resp:  [18] 18  BP: (153-157)/(101-103) 153/103  SpO2:  [97 %] 97 %  Body mass index is 27.76 kg/m². Input and Output Summary (last 24 hours):No intake or output data in the 24 hours ending 23 0725    Physical Exam:   Physical Exam  Vitals and nursing note reviewed. Constitutional:       General: He is not in acute distress. Appearance: He is not diaphoretic. HENT:      Head: Normocephalic and atraumatic. Mouth/Throat:      Mouth: Mucous membranes are moist.   Cardiovascular:      Rate and Rhythm: Normal rate and regular rhythm.    Pulmonary:      Effort: Pulmonary effort is normal. No respiratory distress. Abdominal:      Palpations: Abdomen is soft. Tenderness: There is no abdominal tenderness. Musculoskeletal:      Cervical back: Neck supple. Skin:     General: Skin is warm. Neurological:      General: No focal deficit present. Mental Status: He is oriented to person, place, and time. Motor: No tremor. Psychiatric:         Behavior: Behavior normal.         Additional Data:     Labs:   Results from last 7 days   Lab Units 09/04/23  0504 09/03/23  0457 09/02/23  2117   WBC Thousand/uL 5.49   < > 8.81   HEMOGLOBIN g/dL 14.9   < > 16.3   HEMATOCRIT % 43.1   < > 45.4   PLATELETS Thousands/uL 200   < > 309   NEUTROS PCT %  --   --  70   LYMPHS PCT %  --   --  19   MONOS PCT %  --   --  10   EOS PCT %  --   --  0    < > = values in this interval not displayed. Results from last 7 days   Lab Units 09/05/23  0449 09/04/23  0504   SODIUM mmol/L 136 134*   POTASSIUM mmol/L 3.5 3.0*   CHLORIDE mmol/L 102 101   CO2 mmol/L 24 24   BUN mg/dL 6 4*   CREATININE mg/dL 0.87 0.67   ANION GAP mmol/L 10 9   CALCIUM mg/dL 8.9 8.8   ALBUMIN g/dL  --  4.1   TOTAL BILIRUBIN mg/dL  --  1.58*   ALK PHOS U/L  --  65   ALT U/L  --  38   AST U/L  --  62*   GLUCOSE RANDOM mg/dL 81 129                              * I Have Reviewed All Lab Data Listed Above. * Additional Pertinent Lab Tests Reviewed: [address] [place] [address] Admission Reviewed      Imaging Studies: I have personally reviewed pertinent reports. Recent Cultures (last 7 days): Today, Patient Was Seen By: SHIRA Catherine    ** Please Note: Dictation voice to text software may have been used in the creation of this document.  **

## 2023-09-06 NOTE — TELEPHONE ENCOUNTER
Cm spoke with pt who indicated he wanted FMLA paperwork completed by inpatient provider. Cm explained this would be completed by pt's outpatient provider. Pt stated he had gone to his scheduled [facility] OP intake appointment at [address] [place] [address] and had chosen not to continue due to the need to attend too regulary and for too long.

## 2023-09-06 NOTE — NURSING NOTE
Pt verbalized discharge instructions. Belongings returned to pt, and received from pt's mother in lobby. Pt escorted to main lobby via  without event.

## 2023-09-08 NOTE — PSYCH
This note was not shared with the patient due to reasonable likelihood of causing patient harm    PROGRESS NOTE        [facility]      Name and Date of Birth:  Joan Rosales 56 y.o. 1975    Date of Visit: 23    SUBJECTIVE:    Sander Downs presents today for medication management and follow-up. He was last seen on 2023. He states "honestly I have been better but I did finally admit to my drinking problem."  In reviewing the chart Sander Downs was admitted to medical detox twice over the last several weeks. He states "in late July I became really stressed with my father's health and I could not sleep."  He adds "I ended up binging alcohol and I got delirium tremens."    He reports being discharged from medical detox on . He states "I still felt like I was withdrawing when I left so I have been drinking since I have been home."  He reports drinking 5 standard drinks of whiskey every other day. Documentation from his hospitalization notes that he was offered naltrexone however he declined. His Effexor XR was held for a period of time during hospitalization due to high blood pressure. He has since restarted. He reports being referred to a more intensive stepdown program however he did not attend. Today he reports an increased level of anxiety. He feels that this is the trigger to his drinking. We discussed going back to medical detox and being evaluated in the emergency department however he is not in agreement. He reports not wanting to go back to detox "it is really uncomfortable and 2 people  while I was there."  We discussed risk of alcohol use and continued alcohol abuse. He is aware that if he continues to drink and then stops he could go into withdrawal which may range from mild and uncomfortable symptoms to serious and life-threatening. Today he also reports wanting to be cleared to go back to work- he is a . We discussed that he cannot be cleared to return while he is still drinking alcohol. He does request FMLA documentation to be filled out. He is able to name several supports including his cousin that lives close by. He reports that family members have been checking in with him intermittently. He was encouraged to look into AA resources in his community. He denies suicidal ideation, intent or plan at present, has no suicidal ideation, intent or plan at present. He denies any auditory hallucinations and visual hallucinations, denies any other delusional thinking, denies any delusional thinking. Rosa Baker HPI ROS Appetite Changes and Sleep: no changes in appetite, poor sleep    Review Of Systems:      Constitutional Negative   ENT Negative   Cardiovascular Negative   Respiratory Negative   Gastrointestinal Negative   Genitourinary Negative   Musculoskeletal Negative   Integumentary Negative   Neurological Negative   Endocrine Negative   Other Symptoms Negative and None       Laboratory Results: No results found for this or any previous visit. Substance Abuse History:    Social History     Substance and Sexual Activity   Drug Use Never       Family Psychiatric History:     No family history on file.     The following portions of the patient's history were reviewed and updated as appropriate: past family history, past medical history, past social history, past surgical history and problem list.    Social History     Socioeconomic History   • Marital status: Single     Spouse name: Not on file   • Number of children: Not on file   • Years of education: Not on file   • Highest education level: Not on file   Occupational History   • Not on file   Tobacco Use   • Smoking status: Never   • Smokeless tobacco: Current     Types: Chew   Vaping Use   • Vaping Use: Never used   Substance and Sexual Activity   • Alcohol use: Yes     Comment: daily whiskey 4 bottles per week   • Drug use: Never   • Sexual activity: Not Currently   Other Topics Concern   • Not on file   Social History Narrative   • Not on file     Social Determinants of Health     Financial Resource Strain: Not on file   Food Insecurity: Not on file   Transportation Needs: Not on file   Physical Activity: Not on file   Stress: Not on file   Social Connections: Not on file   Intimate Partner Violence: Not on file   Housing Stability: Not on file     Social History     Social History Narrative   • Not on file        Social History     Tobacco History     Smoking Status  Never    Smokeless Tobacco Use  Current Smokeless Tobacco Type  Chew          Alcohol History     Alcohol Use Status  Yes Comment  daily whiskey 4 bottles per week          Drug Use     Drug Use Status  Never          Sexual Activity     Sexually Active  Not Currently          Activities of Daily Living    Not Asked               Additional Substance Use Detail     Questions Responses    Problems Due to Past Use of Alcohol? Yes    Problems Due to Past Use of Substances? No    Alcohol Use Frequency Daily    Alcohol Drink of Choice Liquor    1st Use of Alcohol 21    Last Use of Alcohol & Amount 15 drinks, 100 proof liquor    Longest Abstinence from Alcohol 2 years    Not reviewed.             OBJECTIVE:     Mental Status Evaluation:    Appearance age appropriate, casually dressed   Behavior cooperative   Speech normal volume, normal pitch   Mood dysphoric   Affect Mood congruent    Thought Processes logical   Associations intact associations   Thought Content normal   Perceptual Disturbances: none   Abnormal Thoughts  Risk Potential Suicidal ideation - None  Homicidal ideation - None  Potential for aggression - No   Orientation oriented to person, place, time/date and situation   Memory recent and remote memory grossly intact   Cosciousness alert and awake   Attention Span attention span and concentration are age appropriate   Intellect Appears to be of Average Intelligence   Insight age appropriate Judgement good    Muscle Strength and  Gait muscle strength and tone were normal, no tremor noted   Language no difficulty naming common objects   Fund of Knowledge displays adequate knowledge of current events   Pain none   Pain Scale 0       Assessment/Plan:       Diagnoses and all orders for this visit:    Major depressive disorder, recurrent, in full remission (HCC)    Generalized anxiety disorder  -     gabapentin (Neurontin) 300 mg capsule; Take 1 capsule (300 mg total) by mouth 3 (three) times a day for 15 days    Other insomnia  -     cloNIDine (Catapres) 0.1 mg tablet; Take 1 tablet (0.1 mg total) by mouth daily at bedtime    Alcohol use disorder  -     Ambulatory referral to [facility]; Future  -     Ambulatory referral to [facility]; Future          Treatment Recommendations/Precautions:    Placed referral for internal psychotherapy. Placed referral for PCP to establish care. Contacted office  to provide a list of outside agencies that provide outpatient therapy (substance use specialty). We thoroughly reviewed reasons to go to the emergency department immediately. At this time patient does not want to return to detox, though he was advised he should given he is still drinking. We also discussed that he was referred to more intensive stepdown program however he opted not to attend. Reviewed that a more intensive program and therapy will be an integral part of his recovery. Today he requested to be cleared to return to work, however this provider reviewed with him that he would not be cleared at this time. Discussed indication, potential side effects and benefits of Gabapentin for anxiety and Clonidine for sleep.      Will start Gabapentin 300 mg TID for anxiety   Will start Clonidine 0.1 mg QHS for sleep     Continue current medications:    Buspar 10 mg TID for anxiety  Effexor XR 75 mg daily for anxiety and depression     We will follow up in 2 weeks or sooner if questions or concerns arise. He is aware of emergent vs non-emergent mental health resources. He is able to contract for safety at this time. Risks/Benefits      Risks, Benefits And Possible Side Effects Of Medications:    Risks, benefits, and possible side effects of medications explained to patient and patient verbalizes understanding and agreement for treatment. Controlled Medication Discussion:     Not applicable    Psychotherapy Provided:     Individual psychotherapy provided: No    This note was completed in part utilizing Dragon dictation Software. Grammatical, translation, syntax errors, random word insertions, spelling mistakes, and incomplete sentences may be an occasional consequence of this system secondary to software limitations with voice recognition, ambient noise, and hardware issues. If you have any questions or concerns about the content, text, or information contained within the body of this dictation, please contact the provider for clarification.

## 2023-09-11 ENCOUNTER — OFFICE VISIT (OUTPATIENT)
Dept: PSYCHIATRY | Facility: CLINIC | Age: 48
End: 2023-09-11
Payer: COMMERCIAL

## 2023-09-11 VITALS — DIASTOLIC BLOOD PRESSURE: 92 MMHG | HEART RATE: 88 BPM | OXYGEN SATURATION: 97 % | SYSTOLIC BLOOD PRESSURE: 140 MMHG

## 2023-09-11 DIAGNOSIS — F41.1 GENERALIZED ANXIETY DISORDER: ICD-10-CM

## 2023-09-11 DIAGNOSIS — G47.09 OTHER INSOMNIA: ICD-10-CM

## 2023-09-11 DIAGNOSIS — F10.90 ALCOHOL USE DISORDER: ICD-10-CM

## 2023-09-11 DIAGNOSIS — F33.42 MAJOR DEPRESSIVE DISORDER, RECURRENT, IN FULL REMISSION (HCC): Primary | ICD-10-CM

## 2023-09-11 PROCEDURE — 99214 OFFICE O/P EST MOD 30 MIN: CPT | Performed by: PHYSICIAN ASSISTANT

## 2023-09-11 RX ORDER — GABAPENTIN 300 MG/1
300 CAPSULE ORAL 3 TIMES DAILY
Qty: 45 CAPSULE | Refills: 0 | Status: SHIPPED | OUTPATIENT
Start: 2023-09-11 | End: 2023-09-26

## 2023-09-11 RX ORDER — CLONIDINE HYDROCHLORIDE 0.1 MG/1
0.1 TABLET ORAL
Qty: 30 TABLET | Refills: 0 | Status: SHIPPED | OUTPATIENT
Start: 2023-09-11 | End: 2023-10-11

## 2023-09-18 ENCOUNTER — TELEPHONE (OUTPATIENT)
Dept: PSYCHIATRY | Facility: CLINIC | Age: 48
End: 2023-09-18

## 2023-09-18 NOTE — TELEPHONE ENCOUNTER
Contacted pt in regards to scheduling for therapy at [facility] office. LVM to contact the intake dept.

## 2023-09-22 NOTE — PSYCH
This note was not shared with the patient due to reasonable likelihood of causing patient harm    PROGRESS NOTE        [facility]      Name and Date of Birth:  Jonathan Kenney 56 y.o. 1975    Date of Visit: 09/25/23    SUBJECTIVE:      Rachel Whelan presents today for medication management and follow-up. He reports that he has been abstaining from alcohol since our last encounter. He states "the medication really helped to calm me down and I was able to stop."  Today he is requesting a return to work note as he is feeling ready to return. He reports that his appetite has returned to normal.  Previously he was having a lot of GI distress and today he notes that his stomach has settled. He has been sleeping better. He notes that he is not currently craving alcohol and he does not currently have any alcohol in his home. He states "I would have to go to the store to get it and I do not plan on doing that."  He has been staying in close contact with his supports like his sister, mother, and friends. He feels physically well today. He did follow through and get an appointment with a PCP to establish care. He did receive information for resources for therapy. He has not yet set up an appointment for therapy. He denies suicidal ideation, intent or plan at present, has no suicidal ideation, intent or plan at present. He denies any auditory hallucinations and visual hallucinations, denies any other delusional thinking, denies any delusional thinking. He denies any side effects from medications  .   HPI ROS Appetite Changes and Sleep: normal appetite, normal sleep    Review Of Systems:      Constitutional Negative   ENT Negative   Cardiovascular Negative   Respiratory Negative   Gastrointestinal Negative   Genitourinary Negative   Musculoskeletal Negative   Integumentary Negative   Neurological Negative   Endocrine Negative   Other Symptoms Negative and None       Laboratory Results: No results found for this or any previous visit. Substance Abuse History:    Social History     Substance and Sexual Activity   Drug Use Never       Family Psychiatric History:     No family history on file. The following portions of the patient's history were reviewed and updated as appropriate: past family history, past medical history, past social history, past surgical history and problem list.    Social History     Socioeconomic History   • Marital status: Single     Spouse name: Not on file   • Number of children: Not on file   • Years of education: Not on file   • Highest education level: Not on file   Occupational History   • Not on file   Tobacco Use   • Smoking status: Never   • Smokeless tobacco: Current     Types: Chew   Vaping Use   • Vaping Use: Never used   Substance and Sexual Activity   • Alcohol use: Yes     Comment: daily whiskey 4 bottles per week   • Drug use: Never   • Sexual activity: Not Currently   Other Topics Concern   • Not on file   Social History Narrative   • Not on file     Social Determinants of Health     Financial Resource Strain: Not on file   Food Insecurity: Not on file   Transportation Needs: Not on file   Physical Activity: Not on file   Stress: Not on file   Social Connections: Not on file   Intimate Partner Violence: Not on file   Housing Stability: Not on file     Social History     Social History Narrative   • Not on file        Social History     Tobacco History     Smoking Status  Never    Smokeless Tobacco Use  Current Smokeless Tobacco Type  Chew          Alcohol History     Alcohol Use Status  Yes Comment  daily whiskey 4 bottles per week          Drug Use     Drug Use Status  Never          Sexual Activity     Sexually Active  Not Currently          Activities of Daily Living    Not Asked               Additional Substance Use Detail     Questions Responses    Problems Due to Past Use of Alcohol?  Yes    Problems Due to Past Use of Substances? No    Alcohol Use Frequency Daily    Alcohol Drink of Choice Liquor    1st Use of Alcohol 21    Last Use of Alcohol & Amount 15 drinks, 100 proof liquor    Longest Abstinence from Alcohol 2 years    Not reviewed. OBJECTIVE:     Mental Status Evaluation:    Appearance age appropriate, casually dressed   Behavior pleasant, cooperative, calm   Speech normal volume, normal pitch   Mood  neutral   Affect  mood congruent   Thought Processes logical   Associations intact associations   Thought Content normal   Perceptual Disturbances: none   Abnormal Thoughts  Risk Potential Suicidal ideation - None  Homicidal ideation - None  Potential for aggression - No   Orientation oriented to person, place, time/date and situation   Memory recent and remote memory grossly intact   Cosciousness alert and awake   Attention Span attention span and concentration are age appropriate   Intellect Appears to be of Average Intelligence   Insight age appropriate    Judgement good    Muscle Strength and  Gait muscle strength and tone were normal.  No tremor noted. Language no difficulty naming common objects   Fund of Knowledge displays adequate knowledge of current events   Pain none   Pain Scale 0       Assessment/Plan:       Diagnoses and all orders for this visit:    Major depressive disorder, recurrent, in full remission (HCC)  -     venlafaxine (EFFEXOR-XR) 75 mg 24 hr capsule; Take 1 capsule (75 mg total) by mouth daily    Generalized anxiety disorder  -     gabapentin (Neurontin) 300 mg capsule; Take 1 capsule (300 mg total) by mouth 3 (three) times a day  -     venlafaxine (EFFEXOR-XR) 75 mg 24 hr capsule; Take 1 capsule (75 mg total) by mouth daily  -     busPIRone (BUSPAR) 10 mg tablet; Take 1 tablet (10 mg total) by mouth 3 (three) times a day    Alcohol use disorder    Other insomnia  -     cloNIDine (Catapres) 0.1 mg tablet;  Take 1 tablet (0.1 mg total) by mouth daily at bedtime          Treatment Recommendations/Precautions: We continue to review signs and symptoms in which he would need to go to the emergency department for. We reviewed signs and symptoms that he would need to return to detox for. He was encouraged to look at [facility] meetings in his area. He was highly encouraged to call back intake and schedule a therapy appointment. As per patient he is no longer drinking alcohol. A letter was provided that he may return to work at this time. Continue current medications:         Gabapentin 300 mg 3 times daily for anxiety  Clonidine 0.1 mg nightly for sleep  Buspar 10 mg TID for anxiety  Effexor XR 75 mg daily for anxiety and depression     We will follow up in  1 month or sooner if questions or concerns arise. He is aware of emergent vs non-emergent mental health resources. He is able to contract for safety at this time.        Risks/Benefits      Risks, Benefits And Possible Side Effects Of Medications:    Risks, benefits, and possible side effects of medications explained to patient and patient verbalizes understanding and agreement for treatment. Controlled Medication Discussion:     Not applicable    Psychotherapy Provided:     Individual psychotherapy provided: No    This note was completed in part utilizing Dragon dictation Software. Grammatical, translation, syntax errors, random word insertions, spelling mistakes, and incomplete sentences may be an occasional consequence of this system secondary to software limitations with voice recognition, ambient noise, and hardware issues. If you have any questions or concerns about the content, text, or information contained within the body of this dictation, please contact the provider for clarification.

## 2023-09-23 ENCOUNTER — TELEPHONE (OUTPATIENT)
Dept: PSYCHIATRY | Facility: CLINIC | Age: 48
End: 2023-09-23

## 2023-09-23 NOTE — TELEPHONE ENCOUNTER
CLINICAL PHARMACY NOTE: MEDS TO BEDS    Total # of Prescriptions Filled: 2   The following medications were delivered to the patient:  · Motrin 800  · zofran 8    Additional Documentation: CM received a message from pt's provider to assist in locating out patient resources.  Pt's address indicates he lives in [place], [place].

## 2023-09-25 ENCOUNTER — OFFICE VISIT (OUTPATIENT)
Dept: PSYCHIATRY | Facility: CLINIC | Age: 48
End: 2023-09-25
Payer: COMMERCIAL

## 2023-09-25 DIAGNOSIS — F41.1 GENERALIZED ANXIETY DISORDER: ICD-10-CM

## 2023-09-25 DIAGNOSIS — G47.09 OTHER INSOMNIA: ICD-10-CM

## 2023-09-25 DIAGNOSIS — F10.90 ALCOHOL USE DISORDER: ICD-10-CM

## 2023-09-25 DIAGNOSIS — F33.42 MAJOR DEPRESSIVE DISORDER, RECURRENT, IN FULL REMISSION (HCC): Primary | ICD-10-CM

## 2023-09-25 PROCEDURE — 99214 OFFICE O/P EST MOD 30 MIN: CPT | Performed by: PHYSICIAN ASSISTANT

## 2023-09-25 RX ORDER — VENLAFAXINE HYDROCHLORIDE 75 MG/1
75 CAPSULE, EXTENDED RELEASE ORAL DAILY
Qty: 30 CAPSULE | Refills: 2 | Status: SHIPPED | OUTPATIENT
Start: 2023-09-25 | End: 2023-12-24

## 2023-09-25 RX ORDER — GABAPENTIN 300 MG/1
300 CAPSULE ORAL 3 TIMES DAILY
Qty: 90 CAPSULE | Refills: 1 | Status: SHIPPED | OUTPATIENT
Start: 2023-09-25 | End: 2023-11-24

## 2023-09-25 RX ORDER — CLONIDINE HYDROCHLORIDE 0.1 MG/1
0.1 TABLET ORAL
Qty: 30 TABLET | Refills: 2 | Status: SHIPPED | OUTPATIENT
Start: 2023-09-25 | End: 2023-12-24

## 2023-09-25 RX ORDER — BUSPIRONE HYDROCHLORIDE 10 MG/1
10 TABLET ORAL 3 TIMES DAILY
Qty: 90 TABLET | Refills: 2 | Status: SHIPPED | OUTPATIENT
Start: 2023-09-25 | End: 2023-12-24

## 2023-09-25 NOTE — LETTER
September 25, 2023     Patient: Carmela Brown  YOB: 1975  Date of Visit: 9/25/2023      To Whom it May Concern:    Carmela Brown is under my professional care. Frederick Lopez was seen in my office on 9/25/2023. Frederick Lopez may return to work on 9/27/2023. If you have any questions or concerns, please don't hesitate to call.          Sincerely,          Maximus Krause PA-C        CC: No Recipients

## 2023-09-25 NOTE — TELEPHONE ENCOUNTER
[facility] emailed resources to pt at [email or web address]. CM left a message ([phone]) notifying him resources were sent. Requested a call back if they were not received or if he had any questions.

## 2023-09-27 NOTE — TELEPHONE ENCOUNTER
Contacted pt in regards to scheduling an appointment at the [facility] office with Solomon Pedro. MACOM for pt to contact the intake dept.

## 2023-10-28 ENCOUNTER — HOSPITAL ENCOUNTER (INPATIENT)
Facility: HOSPITAL | Age: 48
LOS: 4 days | Discharge: HOME | DRG: 896 | End: 2023-11-01
Attending: EMERGENCY MEDICINE | Admitting: INTERNAL MEDICINE
Payer: COMMERCIAL

## 2023-10-28 ENCOUNTER — APPOINTMENT (EMERGENCY)
Dept: RADIOLOGY | Facility: HOSPITAL | Age: 48
DRG: 896 | End: 2023-10-28
Payer: COMMERCIAL

## 2023-10-28 DIAGNOSIS — R78.81 BACTEREMIA: ICD-10-CM

## 2023-10-28 DIAGNOSIS — E87.8 ELECTROLYTE ABNORMALITY: ICD-10-CM

## 2023-10-28 DIAGNOSIS — F10.20 ALCOHOL DEPENDENCE (HCC): Primary | ICD-10-CM

## 2023-10-28 DIAGNOSIS — K21.9 GERD (GASTROESOPHAGEAL REFLUX DISEASE): ICD-10-CM

## 2023-10-28 DIAGNOSIS — N39.0 UTI (URINARY TRACT INFECTION): ICD-10-CM

## 2023-10-28 DIAGNOSIS — A41.9 SEPSIS (HCC): ICD-10-CM

## 2023-10-28 DIAGNOSIS — K70.10 ALCOHOLIC HEPATITIS WITHOUT ASCITES: ICD-10-CM

## 2023-10-28 DIAGNOSIS — N17.9 AKI (ACUTE KIDNEY INJURY) (HCC): ICD-10-CM

## 2023-10-28 DIAGNOSIS — R79.89 ELEVATED LIVER FUNCTION TESTS: ICD-10-CM

## 2023-10-28 PROBLEM — E87.1 HYPONATREMIA: Status: ACTIVE | Noted: 2023-10-28

## 2023-10-28 PROBLEM — R65.10 SIRS (SYSTEMIC INFLAMMATORY RESPONSE SYNDROME) (HCC): Status: ACTIVE | Noted: 2023-10-28

## 2023-10-28 PROBLEM — G92.8 TOXIC METABOLIC ENCEPHALOPATHY: Status: ACTIVE | Noted: 2023-10-28

## 2023-10-28 PROBLEM — R29.6 MULTIPLE FALLS: Status: ACTIVE | Noted: 2023-10-28

## 2023-10-28 PROBLEM — R74.8 ELEVATED CK: Status: ACTIVE | Noted: 2023-10-28

## 2023-10-28 PROBLEM — F10.931 ALCOHOL WITHDRAWAL DELIRIUM, ACUTE, HYPERACTIVE (HCC): Status: ACTIVE | Noted: 2023-10-28

## 2023-10-28 LAB
ALBUMIN SERPL BCP-MCNC: 5 G/DL (ref 3.5–5)
ALP SERPL-CCNC: 87 U/L (ref 34–104)
ALT SERPL W P-5'-P-CCNC: 335 U/L (ref 7–52)
AMORPH URATE CRY URNS QL MICRO: ABNORMAL /HPF
AMPHETAMINES SERPL QL SCN: NEGATIVE
ANION GAP SERPL CALCULATED.3IONS-SCNC: 27 MMOL/L
APTT PPP: 29 SECONDS (ref 23–37)
AST SERPL W P-5'-P-CCNC: 1530 U/L (ref 13–39)
BACTERIA UR QL AUTO: ABNORMAL /HPF
BARBITURATES UR QL: NEGATIVE
BASOPHILS # BLD AUTO: 0.04 THOUSANDS/ÂΜL (ref 0–0.1)
BASOPHILS NFR BLD AUTO: 0 % (ref 0–1)
BENZODIAZ UR QL: NEGATIVE
BILIRUB SERPL-MCNC: 3.89 MG/DL (ref 0.2–1)
BILIRUB UR QL STRIP: NEGATIVE
BUN SERPL-MCNC: 28 MG/DL (ref 5–25)
CALCIUM SERPL-MCNC: 9.6 MG/DL (ref 8.4–10.2)
CHLORIDE SERPL-SCNC: 84 MMOL/L (ref 96–108)
CK SERPL-CCNC: ABNORMAL U/L (ref 39–308)
CLARITY UR: ABNORMAL
CO2 SERPL-SCNC: 16 MMOL/L (ref 21–32)
COCAINE UR QL: NEGATIVE
COLOR UR: ABNORMAL
CREAT SERPL-MCNC: 2.09 MG/DL (ref 0.6–1.3)
CRP SERPL QL: 125.9 MG/L
EOSINOPHIL # BLD AUTO: 0 THOUSAND/ÂΜL (ref 0–0.61)
EOSINOPHIL NFR BLD AUTO: 0 % (ref 0–6)
ERYTHROCYTE [DISTWIDTH] IN BLOOD BY AUTOMATED COUNT: 12 % (ref 11.6–15.1)
ERYTHROCYTE [SEDIMENTATION RATE] IN BLOOD: 36 MM/HOUR (ref 0–14)
ETHANOL SERPL-MCNC: 35 MG/DL
FINE GRAN CASTS URNS QL MICRO: ABNORMAL /LPF
GFR SERPL CREATININE-BSD FRML MDRD: 36 ML/MIN/1.73SQ M
GLUCOSE SERPL-MCNC: 118 MG/DL (ref 65–140)
GLUCOSE UR STRIP-MCNC: NEGATIVE MG/DL
HCT VFR BLD AUTO: 47.2 % (ref 36.5–49.3)
HGB BLD-MCNC: 17.4 G/DL (ref 12–17)
HGB UR QL STRIP.AUTO: ABNORMAL
IMM GRANULOCYTES # BLD AUTO: 0.1 THOUSAND/UL (ref 0–0.2)
IMM GRANULOCYTES NFR BLD AUTO: 1 % (ref 0–2)
INR PPP: 1.01 (ref 0.84–1.19)
KETONES UR STRIP-MCNC: ABNORMAL MG/DL
LACTATE SERPL-SCNC: 1.8 MMOL/L (ref 0.5–2)
LACTATE SERPL-SCNC: 2.6 MMOL/L (ref 0.5–2)
LEUKOCYTE ESTERASE UR QL STRIP: NEGATIVE
LIPASE SERPL-CCNC: 20 U/L (ref 11–82)
LYMPHOCYTES # BLD AUTO: 0.98 THOUSANDS/ÂΜL (ref 0.6–4.47)
LYMPHOCYTES NFR BLD AUTO: 5 % (ref 14–44)
MAGNESIUM SERPL-MCNC: 2 MG/DL (ref 1.9–2.7)
MCH RBC QN AUTO: 31.2 PG (ref 26.8–34.3)
MCHC RBC AUTO-ENTMCNC: 36.9 G/DL (ref 31.4–37.4)
MCV RBC AUTO: 85 FL (ref 82–98)
METHADONE UR QL: NEGATIVE
MONOCYTES # BLD AUTO: 1.51 THOUSAND/ÂΜL (ref 0.17–1.22)
MONOCYTES NFR BLD AUTO: 7 % (ref 4–12)
NEUTROPHILS # BLD AUTO: 19.28 THOUSANDS/ÂΜL (ref 1.85–7.62)
NEUTS SEG NFR BLD AUTO: 87 % (ref 43–75)
NITRITE UR QL STRIP: POSITIVE
NON-SQ EPI CELLS URNS QL MICRO: ABNORMAL /HPF
NRBC BLD AUTO-RTO: 0 /100 WBCS
OPIATES UR QL SCN: NEGATIVE
OTHER STN SPEC: ABNORMAL
OXYCODONE+OXYMORPHONE UR QL SCN: NEGATIVE
PCP UR QL: NEGATIVE
PH UR STRIP.AUTO: 6.5 [PH]
PLATELET # BLD AUTO: 392 THOUSANDS/UL (ref 149–390)
PMV BLD AUTO: 10.3 FL (ref 8.9–12.7)
POTASSIUM SERPL-SCNC: 3.3 MMOL/L (ref 3.5–5.3)
PROCALCITONIN SERPL-MCNC: 1.76 NG/ML
PROT SERPL-MCNC: 8.4 G/DL (ref 6.4–8.4)
PROT UR STRIP-MCNC: >=300 MG/DL
PROTHROMBIN TIME: 13.5 SECONDS (ref 11.6–14.5)
RBC # BLD AUTO: 5.57 MILLION/UL (ref 3.88–5.62)
RBC #/AREA URNS AUTO: ABNORMAL /HPF
SODIUM SERPL-SCNC: 127 MMOL/L (ref 135–147)
SP GR UR STRIP.AUTO: 1.02 (ref 1–1.03)
THC UR QL: POSITIVE
UROBILINOGEN UR QL STRIP.AUTO: 1 E.U./DL
WBC # BLD AUTO: 21.91 THOUSAND/UL (ref 4.31–10.16)
WBC #/AREA URNS AUTO: ABNORMAL /HPF

## 2023-10-28 PROCEDURE — 99291 CRITICAL CARE FIRST HOUR: CPT | Performed by: EMERGENCY MEDICINE

## 2023-10-28 PROCEDURE — 82550 ASSAY OF CK (CPK): CPT | Performed by: EMERGENCY MEDICINE

## 2023-10-28 PROCEDURE — 83605 ASSAY OF LACTIC ACID: CPT | Performed by: EMERGENCY MEDICINE

## 2023-10-28 PROCEDURE — 80053 COMPREHEN METABOLIC PANEL: CPT | Performed by: NURSE PRACTITIONER

## 2023-10-28 PROCEDURE — 93005 ELECTROCARDIOGRAM TRACING: CPT

## 2023-10-28 PROCEDURE — 71250 CT THORAX DX C-: CPT

## 2023-10-28 PROCEDURE — 96361 HYDRATE IV INFUSION ADD-ON: CPT

## 2023-10-28 PROCEDURE — 80307 DRUG TEST PRSMV CHEM ANLYZR: CPT | Performed by: EMERGENCY MEDICINE

## 2023-10-28 PROCEDURE — 84145 PROCALCITONIN (PCT): CPT | Performed by: EMERGENCY MEDICINE

## 2023-10-28 PROCEDURE — 87040 BLOOD CULTURE FOR BACTERIA: CPT | Performed by: EMERGENCY MEDICINE

## 2023-10-28 PROCEDURE — 80053 COMPREHEN METABOLIC PANEL: CPT | Performed by: EMERGENCY MEDICINE

## 2023-10-28 PROCEDURE — 81001 URINALYSIS AUTO W/SCOPE: CPT | Performed by: EMERGENCY MEDICINE

## 2023-10-28 PROCEDURE — 70450 CT HEAD/BRAIN W/O DYE: CPT

## 2023-10-28 PROCEDURE — 83735 ASSAY OF MAGNESIUM: CPT | Performed by: EMERGENCY MEDICINE

## 2023-10-28 PROCEDURE — 85652 RBC SED RATE AUTOMATED: CPT | Performed by: EMERGENCY MEDICINE

## 2023-10-28 PROCEDURE — 85610 PROTHROMBIN TIME: CPT | Performed by: EMERGENCY MEDICINE

## 2023-10-28 PROCEDURE — 72125 CT NECK SPINE W/O DYE: CPT

## 2023-10-28 PROCEDURE — 82077 ASSAY SPEC XCP UR&BREATH IA: CPT | Performed by: EMERGENCY MEDICINE

## 2023-10-28 PROCEDURE — 36415 COLL VENOUS BLD VENIPUNCTURE: CPT | Performed by: EMERGENCY MEDICINE

## 2023-10-28 PROCEDURE — 85730 THROMBOPLASTIN TIME PARTIAL: CPT | Performed by: EMERGENCY MEDICINE

## 2023-10-28 PROCEDURE — 85025 COMPLETE CBC W/AUTO DIFF WBC: CPT | Performed by: EMERGENCY MEDICINE

## 2023-10-28 PROCEDURE — 96375 TX/PRO/DX INJ NEW DRUG ADDON: CPT

## 2023-10-28 PROCEDURE — 86140 C-REACTIVE PROTEIN: CPT | Performed by: EMERGENCY MEDICINE

## 2023-10-28 PROCEDURE — 99291 CRITICAL CARE FIRST HOUR: CPT | Performed by: INTERNAL MEDICINE

## 2023-10-28 PROCEDURE — 83735 ASSAY OF MAGNESIUM: CPT | Performed by: NURSE PRACTITIONER

## 2023-10-28 PROCEDURE — 74176 CT ABD & PELVIS W/O CONTRAST: CPT

## 2023-10-28 PROCEDURE — 87081 CULTURE SCREEN ONLY: CPT | Performed by: INTERNAL MEDICINE

## 2023-10-28 PROCEDURE — 99285 EMERGENCY DEPT VISIT HI MDM: CPT

## 2023-10-28 PROCEDURE — G1004 CDSM NDSC: HCPCS

## 2023-10-28 PROCEDURE — 83690 ASSAY OF LIPASE: CPT | Performed by: NURSE PRACTITIONER

## 2023-10-28 PROCEDURE — 96365 THER/PROPH/DIAG IV INF INIT: CPT

## 2023-10-28 RX ORDER — LORAZEPAM 2 MG/ML
4 INJECTION INTRAMUSCULAR ONCE
Status: COMPLETED | OUTPATIENT
Start: 2023-10-28 | End: 2023-10-28

## 2023-10-28 RX ORDER — BUSPIRONE HYDROCHLORIDE 10 MG/1
10 TABLET ORAL 3 TIMES DAILY
Status: DISCONTINUED | OUTPATIENT
Start: 2023-10-28 | End: 2023-11-01 | Stop reason: HOSPADM

## 2023-10-28 RX ORDER — SODIUM CHLORIDE 9 MG/ML
100 INJECTION, SOLUTION INTRAVENOUS CONTINUOUS
Status: DISCONTINUED | OUTPATIENT
Start: 2023-10-28 | End: 2023-10-28

## 2023-10-28 RX ORDER — FAMOTIDINE 20 MG/1
20 TABLET, FILM COATED ORAL DAILY
Status: DISCONTINUED | OUTPATIENT
Start: 2023-10-29 | End: 2023-10-28

## 2023-10-28 RX ORDER — CLONIDINE HYDROCHLORIDE 0.1 MG/1
0.1 TABLET ORAL
Status: DISCONTINUED | OUTPATIENT
Start: 2023-10-28 | End: 2023-11-01 | Stop reason: HOSPADM

## 2023-10-28 RX ORDER — ONDANSETRON 2 MG/ML
4 INJECTION INTRAMUSCULAR; INTRAVENOUS ONCE
Status: COMPLETED | OUTPATIENT
Start: 2023-10-28 | End: 2023-10-28

## 2023-10-28 RX ORDER — LANOLIN ALCOHOL/MO/W.PET/CERES
100 CREAM (GRAM) TOPICAL DAILY
Status: DISCONTINUED | OUTPATIENT
Start: 2023-10-29 | End: 2023-11-01 | Stop reason: HOSPADM

## 2023-10-28 RX ORDER — LABETALOL HYDROCHLORIDE 5 MG/ML
20 INJECTION, SOLUTION INTRAVENOUS ONCE
Status: DISCONTINUED | OUTPATIENT
Start: 2023-10-28 | End: 2023-10-28

## 2023-10-28 RX ORDER — FAMOTIDINE 20 MG/1
10 TABLET, FILM COATED ORAL DAILY
Status: DISCONTINUED | OUTPATIENT
Start: 2023-10-29 | End: 2023-10-30

## 2023-10-28 RX ORDER — MAGNESIUM SULFATE HEPTAHYDRATE 40 MG/ML
2 INJECTION, SOLUTION INTRAVENOUS ONCE
Status: COMPLETED | OUTPATIENT
Start: 2023-10-28 | End: 2023-10-28

## 2023-10-28 RX ORDER — CEFTRIAXONE 1 G/50ML
1000 INJECTION, SOLUTION INTRAVENOUS ONCE
Status: COMPLETED | OUTPATIENT
Start: 2023-10-28 | End: 2023-10-28

## 2023-10-28 RX ORDER — POTASSIUM CHLORIDE 20 MEQ/1
40 TABLET, EXTENDED RELEASE ORAL ONCE
Status: COMPLETED | OUTPATIENT
Start: 2023-10-28 | End: 2023-10-28

## 2023-10-28 RX ORDER — LABETALOL HYDROCHLORIDE 5 MG/ML
10 INJECTION, SOLUTION INTRAVENOUS EVERY 4 HOURS PRN
Status: DISCONTINUED | OUTPATIENT
Start: 2023-10-28 | End: 2023-10-28

## 2023-10-28 RX ORDER — FOLIC ACID 1 MG/1
1 TABLET ORAL DAILY
Status: DISCONTINUED | OUTPATIENT
Start: 2023-10-29 | End: 2023-11-01 | Stop reason: HOSPADM

## 2023-10-28 RX ORDER — CEFEPIME HYDROCHLORIDE 2 G/50ML
2000 INJECTION, SOLUTION INTRAVENOUS EVERY 12 HOURS
Status: DISCONTINUED | OUTPATIENT
Start: 2023-10-29 | End: 2023-10-29

## 2023-10-28 RX ORDER — SODIUM CHLORIDE, SODIUM GLUCONATE, SODIUM ACETATE, POTASSIUM CHLORIDE, MAGNESIUM CHLORIDE, SODIUM PHOSPHATE, DIBASIC, AND POTASSIUM PHOSPHATE .53; .5; .37; .037; .03; .012; .00082 G/100ML; G/100ML; G/100ML; G/100ML; G/100ML; G/100ML; G/100ML
2000 INJECTION, SOLUTION INTRAVENOUS ONCE
Status: COMPLETED | OUTPATIENT
Start: 2023-10-28 | End: 2023-10-28

## 2023-10-28 RX ORDER — SODIUM CHLORIDE, SODIUM GLUCONATE, SODIUM ACETATE, POTASSIUM CHLORIDE, MAGNESIUM CHLORIDE, SODIUM PHOSPHATE, DIBASIC, AND POTASSIUM PHOSPHATE .53; .5; .37; .037; .03; .012; .00082 G/100ML; G/100ML; G/100ML; G/100ML; G/100ML; G/100ML; G/100ML
150 INJECTION, SOLUTION INTRAVENOUS CONTINUOUS
Status: DISCONTINUED | OUTPATIENT
Start: 2023-10-28 | End: 2023-11-01 | Stop reason: HOSPADM

## 2023-10-28 RX ORDER — CEFEPIME HYDROCHLORIDE 2 G/50ML
2000 INJECTION, SOLUTION INTRAVENOUS EVERY 12 HOURS
Status: DISCONTINUED | OUTPATIENT
Start: 2023-10-28 | End: 2023-10-28

## 2023-10-28 RX ORDER — GABAPENTIN 300 MG/1
300 CAPSULE ORAL 3 TIMES DAILY
Status: DISCONTINUED | OUTPATIENT
Start: 2023-10-28 | End: 2023-11-01 | Stop reason: HOSPADM

## 2023-10-28 RX ORDER — LORAZEPAM 1 MG/1
2 TABLET ORAL ONCE
Status: COMPLETED | OUTPATIENT
Start: 2023-10-28 | End: 2023-10-28

## 2023-10-28 RX ORDER — POTASSIUM CHLORIDE 20 MEQ/1
40 TABLET, EXTENDED RELEASE ORAL EVERY 4 HOURS
Status: DISCONTINUED | OUTPATIENT
Start: 2023-10-28 | End: 2023-10-28

## 2023-10-28 RX ORDER — VENLAFAXINE HYDROCHLORIDE 75 MG/1
75 CAPSULE, EXTENDED RELEASE ORAL DAILY
Status: DISCONTINUED | OUTPATIENT
Start: 2023-10-29 | End: 2023-11-01 | Stop reason: HOSPADM

## 2023-10-28 RX ORDER — ONDANSETRON 2 MG/ML
4 INJECTION INTRAMUSCULAR; INTRAVENOUS EVERY 4 HOURS PRN
Status: DISCONTINUED | OUTPATIENT
Start: 2023-10-28 | End: 2023-11-01 | Stop reason: HOSPADM

## 2023-10-28 RX ADMIN — PHENOBARBITAL SODIUM 390 MG: 65 INJECTION INTRAMUSCULAR; INTRAVENOUS at 18:18

## 2023-10-28 RX ADMIN — POTASSIUM CHLORIDE 40 MEQ: 1500 TABLET, EXTENDED RELEASE ORAL at 16:42

## 2023-10-28 RX ADMIN — VANCOMYCIN HYDROCHLORIDE 2000 MG: 10 INJECTION, POWDER, LYOPHILIZED, FOR SOLUTION INTRAVENOUS at 17:53

## 2023-10-28 RX ADMIN — BUSPIRONE HYDROCHLORIDE 10 MG: 10 TABLET ORAL at 21:13

## 2023-10-28 RX ADMIN — GABAPENTIN 300 MG: 300 CAPSULE ORAL at 18:50

## 2023-10-28 RX ADMIN — BUSPIRONE HYDROCHLORIDE 10 MG: 10 TABLET ORAL at 18:49

## 2023-10-28 RX ADMIN — CEFTRIAXONE 1000 MG: 1 INJECTION, SOLUTION INTRAVENOUS at 16:24

## 2023-10-28 RX ADMIN — SODIUM CHLORIDE, SODIUM GLUCONATE, SODIUM ACETATE, POTASSIUM CHLORIDE, MAGNESIUM CHLORIDE, SODIUM PHOSPHATE, DIBASIC, AND POTASSIUM PHOSPHATE 250 ML/HR: .53; .5; .37; .037; .03; .012; .00082 INJECTION, SOLUTION INTRAVENOUS at 21:13

## 2023-10-28 RX ADMIN — GABAPENTIN 300 MG: 300 CAPSULE ORAL at 21:16

## 2023-10-28 RX ADMIN — LORAZEPAM 4 MG: 2 INJECTION INTRAMUSCULAR; INTRAVENOUS at 13:19

## 2023-10-28 RX ADMIN — MAGNESIUM SULFATE HEPTAHYDRATE 2 G: 40 INJECTION, SOLUTION INTRAVENOUS at 19:11

## 2023-10-28 RX ADMIN — LORAZEPAM 2 MG: 1 TABLET ORAL at 16:21

## 2023-10-28 RX ADMIN — POTASSIUM CHLORIDE 40 MEQ: 1500 TABLET, EXTENDED RELEASE ORAL at 21:13

## 2023-10-28 RX ADMIN — PHENOBARBITAL SODIUM 260 MG: 65 INJECTION INTRAMUSCULAR; INTRAVENOUS at 21:23

## 2023-10-28 RX ADMIN — CLONIDINE HYDROCHLORIDE 0.1 MG: 0.1 TABLET ORAL at 21:12

## 2023-10-28 RX ADMIN — SODIUM CHLORIDE 1000 ML: 0.9 INJECTION, SOLUTION INTRAVENOUS at 13:15

## 2023-10-28 RX ADMIN — SODIUM CHLORIDE, SODIUM GLUCONATE, SODIUM ACETATE, POTASSIUM CHLORIDE, MAGNESIUM CHLORIDE, SODIUM PHOSPHATE, DIBASIC, AND POTASSIUM PHOSPHATE 2000 ML: .53; .5; .37; .037; .03; .012; .00082 INJECTION, SOLUTION INTRAVENOUS at 18:03

## 2023-10-28 RX ADMIN — SODIUM CHLORIDE, SODIUM LACTATE, POTASSIUM CHLORIDE, AND CALCIUM CHLORIDE 1000 ML: .6; .31; .03; .02 INJECTION, SOLUTION INTRAVENOUS at 14:42

## 2023-10-28 RX ADMIN — ONDANSETRON 4 MG: 2 INJECTION INTRAMUSCULAR; INTRAVENOUS at 13:25

## 2023-10-28 RX ADMIN — ONDANSETRON 4 MG: 2 INJECTION INTRAMUSCULAR; INTRAVENOUS at 18:50

## 2023-10-28 NOTE — ED NOTES
Pt not able to urinate at this time. Directed to use call bell when he can produce a sample.       Kristyn Dsouza RN  10/28/23 [id number]

## 2023-10-28 NOTE — ED PROVIDER NOTES
History  Chief Complaint   Patient presents with    Withdrawal - Alcohol     Pt states he was sober for 35 days, drank last night. Experiencing withdrawal symptoms. Patient is restless, anxious and diaphoretic in triage      Patient is a 23-year-old male with a history of anxiety, depression, alcohol abuse that presents to the emergency department with his mother in withdrawal.  Patient states that he was sober for approximately 35 days and went on a recent binge. He states his last drink was 10 PM yesterday evening. Patient is tremulous on my initial evaluation. He also has numerous bruises of unknown etiology. Patient does not remember falling, however he has bruises on his head, thorax and extremities. Patient is not requesting detox, states that once he feels better he would like to be discharged to home. History provided by:  Patient and parent   used: No    Withdrawal - Alcohol  Associated symptoms: no abdominal pain, no nausea, no palpitations, no shortness of breath and no vomiting        Prior to Admission Medications   Prescriptions Last Dose Informant Patient Reported?  Taking?   busPIRone (BUSPAR) 10 mg tablet 10/28/2023  No Yes   Sig: Take 1 tablet (10 mg total) by mouth 3 (three) times a day   cloNIDine (Catapres) 0.1 mg tablet Not Taking  No No   Sig: Take 1 tablet (0.1 mg total) by mouth daily at bedtime   Patient not taking: Reported on 10/28/2023   famotidine (Pepcid) 20 mg tablet 10/28/2023  Yes Yes   Sig: Take 20 mg by mouth daily   gabapentin (Neurontin) 300 mg capsule 10/28/2023  No Yes   Sig: Take 1 capsule (300 mg total) by mouth 3 (three) times a day   venlafaxine (EFFEXOR-XR) 75 mg 24 hr capsule 10/28/2023  No Yes   Sig: Take 1 capsule (75 mg total) by mouth daily      Facility-Administered Medications: None       Past Medical History:   Diagnosis Date    Anxiety     Depression     ETOH abuse     GERD (gastroesophageal reflux disease)        Past Surgical History:   Procedure Laterality Date    CLOSED REDUCTION FOREARM FRACTURE Right     EGD         History reviewed. No pertinent family history. I have reviewed and agree with the history as documented. E-Cigarette/Vaping    E-Cigarette Use Never User      E-Cigarette/Vaping Substances    Nicotine No     THC No     CBD No     Flavoring No     Other No     Unknown No      Social History     Tobacco Use    Smoking status: Never    Smokeless tobacco: Current     Types: Chew   Vaping Use    Vaping Use: Never used   Substance Use Topics    Alcohol use: Yes     Comment: daily whiskey 4 bottles per week    Drug use: Never       Review of Systems   Constitutional:  Negative for chills and fever. Respiratory:  Negative for cough, shortness of breath and wheezing. Cardiovascular:  Negative for chest pain and palpitations. Gastrointestinal:  Negative for abdominal pain, constipation, diarrhea, nausea and vomiting. Genitourinary:  Negative for dysuria, flank pain, hematuria and urgency. Musculoskeletal:  Negative for back pain. Skin:  Positive for color change. Negative for rash. Neurological:  Positive for tremors. All other systems reviewed and are negative. Physical Exam  Physical Exam  Vitals and nursing note reviewed. Constitutional:       Appearance: He is well-developed. HENT:      Head: Normocephalic and atraumatic. Eyes:      Extraocular Movements: Extraocular movements intact. Pupils: Pupils are equal, round, and reactive to light. Cardiovascular:      Rate and Rhythm: Normal rate and regular rhythm. Heart sounds: Normal heart sounds. Pulmonary:      Effort: Pulmonary effort is normal.      Breath sounds: Normal breath sounds. Abdominal:      General: Bowel sounds are normal. There is no distension. Palpations: Abdomen is soft. There is no mass. Tenderness: There is no abdominal tenderness. There is no guarding or rebound.    Musculoskeletal:         General: No swelling, tenderness or deformity. Skin:     General: Skin is warm and dry. Capillary Refill: Capillary refill takes less than 2 seconds. Findings: Ecchymosis present. Neurological:      General: No focal deficit present. Mental Status: He is alert and oriented to person, place, and time. Psychiatric:         Behavior: Behavior normal.         Thought Content:  Thought content normal.         Judgment: Judgment normal.         Vital Signs  ED Triage Vitals   Temperature Pulse Respirations Blood Pressure SpO2   10/28/23 1256 10/28/23 1256 10/28/23 1256 10/28/23 1301 10/28/23 1256   (!) 96.6 °F (35.9 °C) (!) 125 (!) 24 151/88 95 %      Temp Source Heart Rate Source Patient Position - Orthostatic VS BP Location FiO2 (%)   10/28/23 1256 10/28/23 1400 10/28/23 1301 10/28/23 1301 --   Temporal Monitor Lying Left arm       Pain Score       10/28/23 1750       5           Vitals:    10/29/23 0400 10/29/23 0700 10/29/23 0800 10/29/23 0900   BP: 134/84 117/74 129/81 132/72   Pulse: 79 76 85 94   Patient Position - Orthostatic VS: Lying Lying Lying          Visual Acuity  Visual Acuity      Flowsheet Row Most Recent Value   L Pupil Size (mm) 3   R Pupil Size (mm) 3   L Pupil Shape Round   R Pupil Shape Round            ED Medications  Medications   thiamine tablet 100 mg (100 mg Oral Given 20/55/01 6142)   folic acid (FOLVITE) tablet 1 mg (1 mg Oral Given 10/29/23 0818)   multivitamin-minerals (CENTRUM) tablet 1 tablet (1 tablet Oral Given 10/29/23 0818)   ondansetron (ZOFRAN) injection 4 mg (4 mg Intravenous Given 10/28/23 1850)   busPIRone (BUSPAR) tablet 10 mg (10 mg Oral Given 10/29/23 0817)   cloNIDine (CATAPRES) tablet 0.1 mg (0.1 mg Oral Given 10/28/23 2112)   gabapentin (NEURONTIN) capsule 300 mg (300 mg Oral Given 10/29/23 0818)   venlafaxine (EFFEXOR-XR) 24 hr capsule 75 mg (75 mg Oral Given 10/29/23 0841)   famotidine (PEPCID) tablet 10 mg (10 mg Oral Given 10/29/23 0818)   multi-electrolyte (PLASMALYTE-A/ISOLYTE-S PH 7.4) IV solution (125 mL/hr Intravenous Rate/Dose Verify 10/29/23 0800)   lidocaine (LIDODERM) 5 % patch 1 patch (1 patch Topical Medication Applied 10/29/23 0818)   saccharomyces boulardii (FLORASTOR) capsule 250 mg (250 mg Oral Given 10/29/23 0818)   cefTRIAXone (ROCEPHIN) IVPB (premix in dextrose) 1,000 mg 50 mL (has no administration in time range)   LORazepam (ATIVAN) injection 4 mg (4 mg Intravenous Given 10/28/23 1319)   sodium chloride 0.9 % bolus 1,000 mL (0 mL Intravenous Stopped 10/28/23 1415)   ondansetron (ZOFRAN) injection 4 mg (4 mg Intravenous Given 10/28/23 1325)   lactated ringers bolus 1,000 mL (0 mL Intravenous Stopped 10/28/23 1542)   LORazepam (ATIVAN) tablet 2 mg (2 mg Oral Given 10/28/23 1621)   cefTRIAXone (ROCEPHIN) IVPB (premix in dextrose) 1,000 mg 50 mL (0 mg Intravenous Stopped 10/28/23 1654)   potassium chloride (K-DUR,KLOR-CON) CR tablet 40 mEq (40 mEq Oral Given 10/28/23 1642)   vancomycin (VANCOCIN) 2,000 mg in sodium chloride 0.9 % 500 mL IVPB (0 mg Intravenous Stopped 10/28/23 1930)   magnesium sulfate 2 g/50 mL IVPB (premix) 2 g (0 g Intravenous Stopped 10/28/23 2111)   potassium chloride (K-DUR,KLOR-CON) CR tablet 40 mEq (40 mEq Oral Given 10/28/23 2113)   multi-electrolyte (ISOLYTE-S PH 7.4) bolus 2,000 mL (0 mL Intravenous Stopped 10/28/23 2003)   PHENobarbital 390 mg in sodium chloride 0.9 % 100 mL IVPB (0 mg Intravenous Stopped 10/28/23 1900)     Followed by   PHENobarbital 260 mg in sodium chloride 0.9 % 100 mL IVPB (0 mg Intravenous Stopped 10/28/23 2153)     Followed by   PHENobarbital 260 mg in sodium chloride 0.9 % 100 mL IVPB (0 mg Intravenous Stopped 10/29/23 0034)   potassium chloride (K-DUR,KLOR-CON) CR tablet 40 mEq (40 mEq Oral Given 10/29/23 0818)       Diagnostic Studies  Results Reviewed       Procedure Component Value Units Date/Time    Bilirubin, direct [[id number]]  (Abnormal) Collected: 10/29/23 0549    Lab Status: Final result Specimen: Blood from Arm, Left Updated: 10/29/23 0741     Bilirubin, Direct 0.62 mg/dL     Procalcitonin [[id number]]  (Abnormal) Collected: 10/29/23 0549    Lab Status: Final result Specimen: Blood from Arm, Left Updated: 10/29/23 0633     Procalcitonin 1.10 ng/ml     Lipase [[id number]]  (Normal) Collected: 10/28/23 1318    Lab Status: Final result Specimen: Blood from Arm, Right Updated: 10/28/23 2107     Lipase 20 u/L     MRSA culture [[id number]] Collected: 10/28/23 1807    Lab Status: In process Specimen: Nares from Nose Updated: 10/28/23 1811    Blood culture #1 [[id number]] Collected: 10/28/23 1416    Lab Status: Preliminary result Specimen: Blood from Arm, Left Updated: 10/28/23 1801     Blood Culture Received in Microbiology Lab. Culture in Progress. Blood culture #2 [[id number]] Collected: 10/28/23 1422    Lab Status: Preliminary result Specimen: Blood from Arm, Right Updated: 10/28/23 1801     Blood Culture Received in Microbiology Lab. Culture in Progress. CK [[id number]]  (Abnormal) Collected: 10/28/23 1318    Lab Status: Final result Specimen: Blood from Arm, Right Updated: 10/28/23 1732     Total CK 85,247 U/L     Urine Microscopic [[id number]]  (Abnormal) Collected: 10/28/23 1642    Lab Status: Final result Specimen: Urine, Clean Catch Updated: 10/28/23 1718     RBC, UA 1-2 /hpf      WBC, UA 1-2 /hpf      Epithelial Cells None Seen /hpf      Bacteria, UA Moderate /hpf      Fine granular casts 10-25 /lpf      AMORPH URATES Moderate /hpf      OTHER OBSERVATIONS Renal Tubule Epithelial Cells Present    Lactic acid 2 Hours [[id number]]  (Normal) Collected: 10/28/23 1642    Lab Status: Final result Specimen: Blood from Arm, Right Updated: 10/28/23 1711     LACTIC ACID 1.8 mmol/L     Narrative:      Result may be elevated if tourniquet was used during collection.     Rapid drug screen, urine [[id number]]  (Abnormal) Collected: 10/28/23 1642    Lab Status: Final result Specimen: Urine, Clean Catch Updated: 10/28/23 1708     Amph/Meth UR Negative     Barbiturate Ur Negative     Benzodiazepine Urine Negative     Cocaine Urine Negative     Methadone Urine Negative     Opiate Urine Negative     PCP Ur Negative     THC Urine Positive     Oxycodone Urine Negative    Narrative:      Presumptive report. If requested, specimen will be sent to reference lab for confirmation. FOR MEDICAL PURPOSES ONLY. IF CONFIRMATION NEEDED PLEASE CONTACT THE LAB WITHIN 5 DAYS.     Drug Screen Cutoff Levels:  AMPHETAMINE/METHAMPHETAMINES  1000 ng/mL  BARBITURATES     200 ng/mL  BENZODIAZEPINES     200 ng/mL  COCAINE      300 ng/mL  METHADONE      300 ng/mL  OPIATES      300 ng/mL  PHENCYCLIDINE     25 ng/mL  THC       50 ng/mL  OXYCODONE      100 ng/mL    UA (URINE) with reflex to Scope [[id number]]  (Abnormal) Collected: 10/28/23 1642    Lab Status: Final result Specimen: Urine, Clean Catch Updated: 10/28/23 1657     Color, UA Dark Beata     Clarity, UA Slightly Cloudy     Specific Gravity, UA 1.025     pH, UA 6.5     Leukocytes, UA Negative     Nitrite, UA Positive     Protein, UA >=300 mg/dl      Glucose, UA Negative mg/dl      Ketones, UA 15 (1+) mg/dl      Urobilinogen, UA 1.0 E.U./dl      Bilirubin, UA Negative     Occult Blood, UA Large    Sedimentation rate, automated [[id number]]  (Abnormal) Collected: 10/28/23 1318    Lab Status: Final result Specimen: Blood from Arm, Right Updated: 10/28/23 1635     Sed Rate 36 mm/hour     Procalcitonin [[id number]]  (Abnormal) Collected: 10/28/23 1318    Lab Status: Final result Specimen: Blood from Arm, Right Updated: 10/28/23 1536     Procalcitonin 1.76 ng/ml     C-reactive protein [[id number]]  (Abnormal) Collected: 10/28/23 1318    Lab Status: Final result Specimen: Blood from Arm, Right Updated: 10/28/23 1514     .9 mg/L     Lactic acid, plasma (w/reflex if result > 2.0) [[id number]]  (Abnormal) Collected: 10/28/23 1416    Lab Status: Final result Specimen: Blood from Arm, Left Updated: 10/28/23 1456     LACTIC ACID 2.6 mmol/L     Narrative:      Result may be elevated if tourniquet was used during collection.     Comprehensive metabolic panel [[id number]]  (Abnormal) Collected: 10/28/23 1318    Lab Status: Final result Specimen: Blood from Arm, Right Updated: 10/28/23 1413     Sodium 127 mmol/L      Potassium 3.3 mmol/L      Chloride 84 mmol/L      CO2 16 mmol/L      ANION GAP 27 mmol/L      BUN 28 mg/dL      Creatinine 2.09 mg/dL      Glucose 118 mg/dL      Calcium 9.6 mg/dL      AST 1,530 U/L       U/L      Alkaline Phosphatase 87 U/L      Total Protein 8.4 g/dL      Albumin 5.0 g/dL      Total Bilirubin 3.89 mg/dL      eGFR 36 ml/min/1.73sq m     Narrative:      [facility] guidelines for [facility] Kid[facility] Disease (CKD):     Stage 1 with normal or high GFR (GFR > 90 mL/min/1.73 square meters)    Stage 2 Mild CKD (GFR = 60-89 mL/min/1.73 square meters)    Stage 3A Moderate CKD (GFR = 45-59 mL/min/1.73 square meters)    Stage 3B Moderate CKD (GFR = 30-44 mL/min/1.73 square meters)    Stage 4 Severe CKD (GFR = 15-29 mL/min/1.73 square meters)    Stage 5 End Stage CKD (GFR <15 mL/min/1.73 square meters)  Note: GFR calculation is accurate only with a steady state creatinine    Magnesium [[id number]]  (Normal) Collected: 10/28/23 1318    Lab Status: Final result Specimen: Blood from Arm, Right Updated: 10/28/23 1413     Magnesium 2.0 mg/dL     Ethanol [[id number]]  (Abnormal) Collected: 10/28/23 1318    Lab Status: Final result Specimen: Blood from Arm, Right Updated: 10/28/23 1353     Ethanol Lvl 35 mg/dL     Protime-INR [[id number]]  (Normal) Collected: 10/28/23 1318    Lab Status: Final result Specimen: Blood from Arm, Right Updated: 10/28/23 1348     Protime 13.5 seconds      INR 1.01    APTT [[id number]]  (Normal) Collected: 10/28/23 1318    Lab Status: Final result Specimen: Blood from Arm, Right Updated: 10/28/23 1348     PTT 29 seconds     CBC and differential [[id number]] (Abnormal) Collected: 10/28/23 1318    Lab Status: Final result Specimen: Blood from Arm, Right Updated: 10/28/23 1335     WBC 21.91 Thousand/uL      RBC 5.57 Million/uL      Hemoglobin 17.4 g/dL      Hematocrit 47.2 %      MCV 85 fL      MCH 31.2 pg      MCHC 36.9 g/dL      RDW 12.0 %      MPV 10.3 fL      Platelets 631 Thousands/uL      nRBC 0 /100 WBCs      Neutrophils Relative 87 %      Immat GRANS % 1 %      Lymphocytes Relative 5 %      Monocytes Relative 7 %      Eosinophils Relative 0 %      Basophils Relative 0 %      Neutrophils Absolute 19.28 Thousands/µL      Immature Grans Absolute 0.10 Thousand/uL      Lymphocytes Absolute 0.98 Thousands/µL      Monocytes Absolute 1.51 Thousand/µL      Eosinophils Absolute 0.00 Thousand/µL      Basophils Absolute 0.04 Thousands/µL                    CT head without contrast   Final Result by Selam Shelton MD (10/28 1531)      No acute intracranial abnormality. Workstation performed: [id number]         CT cervical spine without contrast   Final Result by Selam Shelton MD (10/28 1533)      No cervical spine fracture or traumatic malalignment. Workstation performed: [id number]         CT chest abdomen pelvis wo contrast   Final Result by Selam Shelton MD (10/28 1538)      No acute traumatic CT findings. Additional findings as above.                   Workstation performed: [id number]          right upper quadrant    (Results Pending)              Procedures  ECG 12 Lead Documentation Only    Date/Time: 10/28/2023 1:30 PM    Performed by: Gwen Mullins DO  Authorized by: Gwen Mullins DO    Indications / Diagnosis:  Tachycardia  ECG reviewed by me, the ED Provider: yes    Patient location:  ED  Previous ECG:     Previous ECG:  Unavailable    Comparison to cardiac monitor: Yes    Interpretation:     Interpretation: non-specific    Rate:     ECG rate:  109bpm  Rhythm:     Rhythm: sinus tachycardia    Ectopy:     Ectopy: none QRS:     QRS axis:  Normal  Conduction:     Conduction: normal    ST segments:     ST segments:  Normal  T waves:     T waves: normal    CriticalCare Time    Date/Time: 10/28/2023 2:00 PM    Performed by: Shannan Perez DO  Authorized by: Shannan Perez DO    Critical care provider statement:     Critical care time (minutes):  65    Critical care time was exclusive of:  Separately billable procedures and treating other patients and teaching time    Critical care was necessary to treat or prevent imminent or life-threatening deterioration of the following conditions:  Renal failure, dehydration, metabolic crisis and trauma    Critical care was time spent personally by me on the following activities:  Blood draw for specimens, obtaining history from patient or surrogate, development of treatment plan with patient or surrogate, evaluation of patient's response to treatment, examination of patient, interpretation of cardiac output measurements, ordering and performing treatments and interventions, ordering and review of laboratory studies, ordering and review of radiographic studies and re-evaluation of patient's condition    I assumed direction of critical care for this patient from another provider in my specialty: no             ED Course                                             Medical Decision Making  78-year-old male with a history of alcohol dependence, presents in acute alcohol withdrawal.  Patient also with multiple bruises of unknown etiology. CT head, C-spine, chest/abdomen/pelvis ordered and reviewed. No acute traumatic pathology identified. Labs ordered and reviewed. Severe leukocytosis, JORDI, elevated LFTs, hyponatremia, hypokalemia noted. Pt also with elevated inflammatory markers. I suspect that patient may have rhabdomyolysis as well. CK ordered and pending at the time of dispo. +UTI  - patient treated with 1 dose of Rocephin.   Patient requiring multiple boluses of IV Ativan, per MAHOGANY in the ED. Patient will be admitted to the ICU. Amount and/or Complexity of Data Reviewed  Labs: ordered. Radiology: ordered. Risk  Prescription drug management. Decision regarding hospitalization. Disposition  Final diagnoses:   Alcohol dependence ([address])   JORDI (acute kidney injury) ([address])   Bacteremia   Elevated liver function tests   UTI (urinary tract infection)     Time reflects when diagnosis was documented in both MDM as applicable and the Disposition within this note       Time User Action Codes Description Comment    10/28/2023  4:22 PM Sonna Rhyme Add [F10.20] Alcohol dependence ([address])     10/28/2023  4:22 PM Sonna Rhyme Add [N17.9] JORDI (acute kidney injury) ([address])     10/28/2023  4:22 PM Sonna Rhyme O Add [R78.81] Bacteremia     10/28/2023  4:32 PM Sonna Rhyme Add [R79.89] Elevated liver function tests     10/28/2023  4:53 PM Rolinda Gowers Add [A41.9] Sepsis ([address])     10/28/2023  5:12 PM Sonna Rhyme Add [N39.0] UTI (urinary tract infection)           ED Disposition       ED Disposition   Admit    Condition   Stable    Date/Time   Sat Oct 28, 2023  5:10 PM    Comment   Case was discussed with and the patient's admission status was agreed to be Admission Status: inpatient status to the service of Dr. Choco Lawrence  .                Follow-up Information    None         Current Discharge Medication List        CONTINUE these medications which have NOT CHANGED    Details   busPIRone (BUSPAR) 10 mg tablet Take 1 tablet (10 mg total) by mouth 3 (three) times a day  Qty: 90 tablet, Refills: 2    Associated Diagnoses: Generalized anxiety disorder      famotidine (Pepcid) 20 mg tablet Take 20 mg by mouth daily      gabapentin (Neurontin) 300 mg capsule Take 1 capsule (300 mg total) by mouth 3 (three) times a day  Qty: 90 capsule, Refills: 1    Associated Diagnoses: Generalized anxiety disorder      venlafaxine (EFFEXOR-XR) 75 mg 24 hr capsule Take 1 capsule (75 mg total) by mouth daily  Qty: 30 capsule, Refills: 2    Associated Diagnoses: Major depressive disorder, recurrent, in full remission ([address]); Generalized anxiety disorder      cloNIDine (Catapres) 0.1 mg tablet Take 1 tablet (0.1 mg total) by mouth daily at bedtime  Qty: 30 tablet, Refills: 2    Associated Diagnoses: Other insomnia             No discharge procedures on file.     PDMP Review       None            ED Provider  Electronically Signed by             Gwen Mullins DO  10/29/23 5333

## 2023-10-28 NOTE — Clinical Note
Case was discussed with and the patient's admission status was agreed to be Admission Status: inpatient status to the service of Dr. Viveros

## 2023-10-29 LAB
ALBUMIN SERPL BCP-MCNC: 3.3 G/DL (ref 3.5–5)
ALBUMIN SERPL BCP-MCNC: 3.5 G/DL (ref 3.5–5)
ALP SERPL-CCNC: 52 U/L (ref 34–104)
ALP SERPL-CCNC: 53 U/L (ref 34–104)
ALT SERPL W P-5'-P-CCNC: 191 U/L (ref 7–52)
ALT SERPL W P-5'-P-CCNC: 209 U/L (ref 7–52)
ANION GAP SERPL CALCULATED.3IONS-SCNC: 12 MMOL/L
ANION GAP SERPL CALCULATED.3IONS-SCNC: 9 MMOL/L
AST SERPL W P-5'-P-CCNC: 633 U/L (ref 13–39)
AST SERPL W P-5'-P-CCNC: 762 U/L (ref 13–39)
ATRIAL RATE: 109 BPM
BASOPHILS # BLD AUTO: 0.03 THOUSANDS/ÂΜL (ref 0–0.1)
BASOPHILS NFR BLD AUTO: 0 % (ref 0–1)
BILIRUB DIRECT SERPL-MCNC: 0.62 MG/DL (ref 0–0.2)
BILIRUB SERPL-MCNC: 2.95 MG/DL (ref 0.2–1)
BILIRUB SERPL-MCNC: 3.27 MG/DL (ref 0.2–1)
BUN SERPL-MCNC: 21 MG/DL (ref 5–25)
BUN SERPL-MCNC: 25 MG/DL (ref 5–25)
CA-I BLD-SCNC: 1.05 MMOL/L (ref 1.12–1.32)
CALCIUM ALBUM COR SERPL-MCNC: 8.4 MG/DL (ref 8.3–10.1)
CALCIUM SERPL-MCNC: 7.4 MG/DL (ref 8.4–10.2)
CALCIUM SERPL-MCNC: 7.8 MG/DL (ref 8.4–10.2)
CHLORIDE SERPL-SCNC: 97 MMOL/L (ref 96–108)
CHLORIDE SERPL-SCNC: 99 MMOL/L (ref 96–108)
CK SERPL-CCNC: ABNORMAL U/L (ref 39–308)
CO2 SERPL-SCNC: 19 MMOL/L (ref 21–32)
CO2 SERPL-SCNC: 23 MMOL/L (ref 21–32)
CREAT SERPL-MCNC: 1.19 MG/DL (ref 0.6–1.3)
CREAT SERPL-MCNC: 1.31 MG/DL (ref 0.6–1.3)
EOSINOPHIL # BLD AUTO: 0.01 THOUSAND/ÂΜL (ref 0–0.61)
EOSINOPHIL NFR BLD AUTO: 0 % (ref 0–6)
ERYTHROCYTE [DISTWIDTH] IN BLOOD BY AUTOMATED COUNT: 12.5 % (ref 11.6–15.1)
GFR SERPL CREATININE-BSD FRML MDRD: 64 ML/MIN/1.73SQ M
GFR SERPL CREATININE-BSD FRML MDRD: 72 ML/MIN/1.73SQ M
GLUCOSE SERPL-MCNC: 91 MG/DL (ref 65–140)
GLUCOSE SERPL-MCNC: 98 MG/DL (ref 65–140)
HCT VFR BLD AUTO: 34.2 % (ref 36.5–49.3)
HGB BLD-MCNC: 12.5 G/DL (ref 12–17)
IMM GRANULOCYTES # BLD AUTO: 0.05 THOUSAND/UL (ref 0–0.2)
IMM GRANULOCYTES NFR BLD AUTO: 1 % (ref 0–2)
INR PPP: 1.01 (ref 0.84–1.19)
LYMPHOCYTES # BLD AUTO: 0.62 THOUSANDS/ÂΜL (ref 0.6–4.47)
LYMPHOCYTES NFR BLD AUTO: 7 % (ref 14–44)
MAGNESIUM SERPL-MCNC: 2.9 MG/DL (ref 1.9–2.7)
MAGNESIUM SERPL-MCNC: 3 MG/DL (ref 1.9–2.7)
MCH RBC QN AUTO: 32.1 PG (ref 26.8–34.3)
MCHC RBC AUTO-ENTMCNC: 36.5 G/DL (ref 31.4–37.4)
MCV RBC AUTO: 88 FL (ref 82–98)
MONOCYTES # BLD AUTO: 0.6 THOUSAND/ÂΜL (ref 0.17–1.22)
MONOCYTES NFR BLD AUTO: 6 % (ref 4–12)
NEUTROPHILS # BLD AUTO: 8.21 THOUSANDS/ÂΜL (ref 1.85–7.62)
NEUTS SEG NFR BLD AUTO: 86 % (ref 43–75)
NRBC BLD AUTO-RTO: 0 /100 WBCS
P AXIS: 32 DEGREES
PLATELET # BLD AUTO: 170 THOUSANDS/UL (ref 149–390)
PMV BLD AUTO: 10.7 FL (ref 8.9–12.7)
POTASSIUM SERPL-SCNC: 3.6 MMOL/L (ref 3.5–5.3)
POTASSIUM SERPL-SCNC: 3.7 MMOL/L (ref 3.5–5.3)
PR INTERVAL: 100 MS
PROCALCITONIN SERPL-MCNC: 1.1 NG/ML
PROT SERPL-MCNC: 5.6 G/DL (ref 6.4–8.4)
PROT SERPL-MCNC: 5.8 G/DL (ref 6.4–8.4)
PROTHROMBIN TIME: 13.5 SECONDS (ref 11.6–14.5)
QRS AXIS: -4 DEGREES
QRSD INTERVAL: 98 MS
QT INTERVAL: 350 MS
QTC INTERVAL: 471 MS
RBC # BLD AUTO: 3.89 MILLION/UL (ref 3.88–5.62)
SODIUM SERPL-SCNC: 128 MMOL/L (ref 135–147)
SODIUM SERPL-SCNC: 131 MMOL/L (ref 135–147)
T WAVE AXIS: 19 DEGREES
VANCOMYCIN SERPL-MCNC: 11.9 UG/ML (ref 10–20)
VENTRICULAR RATE: 109 BPM
WBC # BLD AUTO: 9.52 THOUSAND/UL (ref 4.31–10.16)

## 2023-10-29 PROCEDURE — 84145 PROCALCITONIN (PCT): CPT | Performed by: INTERNAL MEDICINE

## 2023-10-29 PROCEDURE — 82550 ASSAY OF CK (CPK): CPT | Performed by: PHYSICIAN ASSISTANT

## 2023-10-29 PROCEDURE — 80202 ASSAY OF VANCOMYCIN: CPT | Performed by: INTERNAL MEDICINE

## 2023-10-29 PROCEDURE — 82248 BILIRUBIN DIRECT: CPT | Performed by: INTERNAL MEDICINE

## 2023-10-29 PROCEDURE — 93010 ELECTROCARDIOGRAM REPORT: CPT | Performed by: INTERNAL MEDICINE

## 2023-10-29 PROCEDURE — 80053 COMPREHEN METABOLIC PANEL: CPT | Performed by: NURSE PRACTITIONER

## 2023-10-29 PROCEDURE — 83735 ASSAY OF MAGNESIUM: CPT | Performed by: NURSE PRACTITIONER

## 2023-10-29 PROCEDURE — 82330 ASSAY OF CALCIUM: CPT | Performed by: NURSE PRACTITIONER

## 2023-10-29 PROCEDURE — 85025 COMPLETE CBC W/AUTO DIFF WBC: CPT | Performed by: NURSE PRACTITIONER

## 2023-10-29 PROCEDURE — 85610 PROTHROMBIN TIME: CPT | Performed by: NURSE PRACTITIONER

## 2023-10-29 PROCEDURE — 99232 SBSQ HOSP IP/OBS MODERATE 35: CPT | Performed by: INTERNAL MEDICINE

## 2023-10-29 RX ORDER — SACCHAROMYCES BOULARDII 250 MG
250 CAPSULE ORAL 2 TIMES DAILY
Status: DISCONTINUED | OUTPATIENT
Start: 2023-10-29 | End: 2023-11-01 | Stop reason: HOSPADM

## 2023-10-29 RX ORDER — POTASSIUM CHLORIDE 20 MEQ/1
40 TABLET, EXTENDED RELEASE ORAL ONCE
Status: COMPLETED | OUTPATIENT
Start: 2023-10-29 | End: 2023-10-29

## 2023-10-29 RX ORDER — CEFTRIAXONE 1 G/50ML
1000 INJECTION, SOLUTION INTRAVENOUS EVERY 24 HOURS
Status: DISCONTINUED | OUTPATIENT
Start: 2023-10-29 | End: 2023-10-31

## 2023-10-29 RX ORDER — LIDOCAINE 50 MG/G
1 PATCH TOPICAL DAILY
Status: DISCONTINUED | OUTPATIENT
Start: 2023-10-29 | End: 2023-11-01 | Stop reason: HOSPADM

## 2023-10-29 RX ORDER — LORAZEPAM 2 MG/ML
2 INJECTION INTRAMUSCULAR ONCE
Status: COMPLETED | OUTPATIENT
Start: 2023-10-29 | End: 2023-10-29

## 2023-10-29 RX ADMIN — Medication 250 MG: at 08:18

## 2023-10-29 RX ADMIN — VENLAFAXINE HYDROCHLORIDE 75 MG: 75 CAPSULE, EXTENDED RELEASE ORAL at 08:41

## 2023-10-29 RX ADMIN — THIAMINE HCL TAB 100 MG 100 MG: 100 TAB at 08:17

## 2023-10-29 RX ADMIN — SODIUM CHLORIDE, SODIUM GLUCONATE, SODIUM ACETATE, POTASSIUM CHLORIDE, MAGNESIUM CHLORIDE, SODIUM PHOSPHATE, DIBASIC, AND POTASSIUM PHOSPHATE 250 ML/HR: .53; .5; .37; .037; .03; .012; .00082 INJECTION, SOLUTION INTRAVENOUS at 01:05

## 2023-10-29 RX ADMIN — GABAPENTIN 300 MG: 300 CAPSULE ORAL at 22:00

## 2023-10-29 RX ADMIN — CLONIDINE HYDROCHLORIDE 0.1 MG: 0.1 TABLET ORAL at 22:02

## 2023-10-29 RX ADMIN — LORAZEPAM 2 MG: 2 INJECTION INTRAMUSCULAR; INTRAVENOUS at 17:11

## 2023-10-29 RX ADMIN — SODIUM CHLORIDE, SODIUM GLUCONATE, SODIUM ACETATE, POTASSIUM CHLORIDE, MAGNESIUM CHLORIDE, SODIUM PHOSPHATE, DIBASIC, AND POTASSIUM PHOSPHATE 125 ML/HR: .53; .5; .37; .037; .03; .012; .00082 INJECTION, SOLUTION INTRAVENOUS at 13:27

## 2023-10-29 RX ADMIN — LIDOCAINE 1 PATCH: 50 PATCH CUTANEOUS at 08:18

## 2023-10-29 RX ADMIN — BUSPIRONE HYDROCHLORIDE 10 MG: 10 TABLET ORAL at 22:00

## 2023-10-29 RX ADMIN — FOLIC ACID 1 MG: 1 TABLET ORAL at 08:18

## 2023-10-29 RX ADMIN — CEFTRIAXONE 1000 MG: 1 INJECTION, SOLUTION INTRAVENOUS at 16:41

## 2023-10-29 RX ADMIN — SODIUM CHLORIDE, SODIUM GLUCONATE, SODIUM ACETATE, POTASSIUM CHLORIDE, MAGNESIUM CHLORIDE, SODIUM PHOSPHATE, DIBASIC, AND POTASSIUM PHOSPHATE 125 ML/HR: .53; .5; .37; .037; .03; .012; .00082 INJECTION, SOLUTION INTRAVENOUS at 21:21

## 2023-10-29 RX ADMIN — GABAPENTIN 300 MG: 300 CAPSULE ORAL at 08:18

## 2023-10-29 RX ADMIN — Medication 1 TABLET: at 08:18

## 2023-10-29 RX ADMIN — BUSPIRONE HYDROCHLORIDE 10 MG: 10 TABLET ORAL at 16:41

## 2023-10-29 RX ADMIN — PHENOBARBITAL SODIUM 260 MG: 65 INJECTION INTRAMUSCULAR; INTRAVENOUS at 00:04

## 2023-10-29 RX ADMIN — BUSPIRONE HYDROCHLORIDE 10 MG: 10 TABLET ORAL at 08:17

## 2023-10-29 RX ADMIN — FAMOTIDINE 10 MG: 20 TABLET ORAL at 08:18

## 2023-10-29 RX ADMIN — POTASSIUM CHLORIDE 40 MEQ: 1500 TABLET, EXTENDED RELEASE ORAL at 08:18

## 2023-10-29 RX ADMIN — Medication 250 MG: at 16:42

## 2023-10-29 RX ADMIN — CEFEPIME HYDROCHLORIDE 2000 MG: 2 INJECTION, SOLUTION INTRAVENOUS at 04:05

## 2023-10-29 RX ADMIN — GABAPENTIN 300 MG: 300 CAPSULE ORAL at 16:41

## 2023-10-29 NOTE — ASSESSMENT & PLAN NOTE
Likely 2/2 beer potomania and dehydration  Trend   Encourage alcohol cessation  Consideration for additional lab studies if not improving

## 2023-10-29 NOTE — ASSESSMENT & PLAN NOTE
Tachycardia, tachypnea, hypothermia, and leukocytosis on admission. Unclear source, possibly 2/2 ETOH abuse, withdrawal, and dehydration  Lactic acid 2.6-> 1.8. Received 1L IVF in ED and scheduled for additional 2L now 2/2 grossly dry on exam.  Procalcitonin 1.76. Trend  BC x2 pending  Denies urinary or respiratory symptoms. Does endorse left sided abdominal pain. UA showing largely + for blood, ketones and protein. + nitrites, moderate bacteria and renal tube epithelial cells. Asymptomatic. CT chest/abdomen/pelvis showing distended gallbladder but no stones or other infectious source   Consider RUQ US  S/p Cefepime and Vancomycin in ED.  Will continue for now pending culture data  Trend CBC, temperature curve

## 2023-10-29 NOTE — ASSESSMENT & PLAN NOTE
AST 1,530, , T bili 3.89 on admission in setting of alcohol abuse, now improving  Reports left sided abdominal pain (lipase negative)  CT abdomen with moderate fatty infiltration of the liver. No biliary ductal dilatation.  Distended gallbladder without pericholecystic inflammation or calcified gallstones  Consider US RUQ   Trend CMP

## 2023-10-29 NOTE — ASSESSMENT & PLAN NOTE
Baseline creatinine appears 0.6-0.8  Admission creatinine 2.09-> 1.31  CK 85,247. AM labs pending  Likely combined pre-renal in setting of dehydration, and intra-renal in setting of ATN/elevated CK   UA showing + blood, protein, renal tube epithelial cells  S/p 3L IVF boluses yesterday. Continuous IVF's at 250ml/hr overnight. UOP and HR improving. Now euvolemic.  Rate dropped to 125  Avoid nephrotoxins  Close I&O monitoring  Trend renal indices  Trend CK through clearance

## 2023-10-29 NOTE — ASSESSMENT & PLAN NOTE
Mother reports multiple recent falls over the past few days in setting of intoxication. Suspect CK elevated 2/2 falls/rhabdo/dehydration. No focal swelling or tight compartments on exam. Does have diffuse scattered bruising of all extremities. S/p 3L IVF. Continuous IVF's at 125ml/hr.  UOP improving   Close I&O monitoring  Trend CK

## 2023-10-29 NOTE — ASSESSMENT & PLAN NOTE
Recent detox admissions 8/1-8/3 and 9/2-9/6. Lives with mother who reports relapse this past week.  Currently drinking 1 bottle of [employer]uel[employer]ies daily   Reports previous rehab stays   Started on naltrexone, however stopped taking it 2/2 not feeling well on it    Plan:  Education/reinforce importance of abstaining from ETOH use  Case management for assistance in discharge planning if agreeable after acute treatment   See Alcohol Withdrawal as outlined above

## 2023-10-29 NOTE — ASSESSMENT & PLAN NOTE
Recently admitted to [address] [place] [address] 9/2-9/6 for ETOH detox. Reports 30 days of abstinence thereafter, but drank heavily this past week per mother. Presents with anxiety, tremors, S&S of withdrawal.   Grossly tremulous, diaphoretic, anxious, hyperactive on exam.  CIWA 27. Received 6mg total lorazepam given in ED  ETOH on admission 35, last drink was 2100 10/27  Brought to ICU for phenobarbital loading  CIWA improved to 6 on my calculation this AM s/p total of 910mg phenobarbital. Tremor currently absent.   Additional phenobarb PRN pending CIWA trending  Folic acid, thiamine, MVI   Electrolyte repletion as needed  Supportive care

## 2023-10-29 NOTE — ASSESSMENT & PLAN NOTE
Hypokalemia on presentation and at risk for hypomagnesemia in setting of alcohol abuse  Trend and replete

## 2023-10-29 NOTE — ASSESSMENT & PLAN NOTE
Recent detox admissions 8/1-8/3 and 9/2-9/6. Lives with mother who reports relapse this past week.  Currently drinking 1 bottle of Rodri Gola daily   Reports previous rehab stays   Started on naltrexone, however stopped taking it 2/2 not feeling well on it    Plan:  Education/reinforce importance of abstaining from ETOH use  Case management for assistance in discharge planning if agreeable after acute treatment   See Alcohol Withdrawal as outlined above

## 2023-10-29 NOTE — ASSESSMENT & PLAN NOTE
Has mild confusion in setting of ETOH withdrawal. Unable to state the date. Otherwise oriented, but impulsive and hyperactive.     CT head negative for acute findings  Continued treatment for withdrawal as outlined above  CAM ICU, fall precautions, delirium precautions  Supportive care

## 2023-10-29 NOTE — CONSULTS
The patient's vancomycin therapy has been discontinued. Pharmacy will sign off now. Thank you for this consult.      Roosevelt Andrews, Pharmacist

## 2023-10-29 NOTE — ASSESSMENT & PLAN NOTE
Baseline creatinine appears 0.6-0.8  Admission creatinine 2.09  CK 85,247  Likely combined pre-renal in setting of dehydration, and intra-renal in setting of ATN/rhabdo   UA showing + blood, protein, renal tube epithelial cells  S/p 1L IVF in ED. Clinically appears dry, tachycardic.  Additional 2L IVF ordered  Continuous IVF's at 250ml/hr thereafter  Avoid nephrotoxins  Close I&O monitoring  Trend renal indices  Trend CK through clearance

## 2023-10-29 NOTE — ASSESSMENT & PLAN NOTE
Presented with acute alcohol withdrawal with multiple, recent falls over the past few days when intoxicated  Scattered flat bruising noted on all extremities, left flank, and back. Appear to be in multiple stages of healing  CT head, c-spine, C/A/P all negative for acute traumatic injury  Reports only left flank pain at site of ecchymosis. Tender to palpation. PRN ice packs.   Fall precautions  Treatment for ETOH withdrawal/abuse as outlined

## 2023-10-29 NOTE — QUICK NOTE
Progress Note - ICU Transfer to Step down/med. surg. - Dutch Posada 53 y.o. male MRN: [id number]    Unit/Bed#: ICU 11 Encounter: [id number]    Code Status: Level 1 - Full Code  POA:    POLST:      Reason for ICU adm: alcohol withdrawal    Active problems:   Principal Problem:    Alcohol withdrawal syndrome with complication ([address])  Active Problems:    SIRS (systemic inflammatory response syndrome) (HCC)    JORDI (acute kidney injury) ([address])    Alcohol use disorder, severe, dependence ([address])    Transaminitis    Hyponatremia    Toxic metabolic encephalopathy    Alcoholic hepatitis without ascites    Elevated CK    Electrolyte abnormality    GERD without esophagitis    Multiple falls    Generalized anxiety disorder  Resolved Problems:    * No resolved hospital problems. *      Consultants: none    History of Present Illness: "Dutch Posada is a 53 y.o. with PMHx of alcohol abuse, GERD, anxiety and depression. He presented today with withdrawal symptoms that began last night; his last drink was around 2100. Endorses drinking approximately 1 bottle of Marvel Pancho daily. Per his mother, he was sober since discharge from [facility] last month but began drinking heavily this past week. He endorses diaphoresis, anxiety, left flank/abdominal pain, and tremors. His CIWA in the ED was 27 and he received a total of 6ml lorazepam. He has experienced numerous falls in recent days of which he does not remember. Pertinent labs include WBC 21.9, procal 1.76, lactic acid 2.6, CK 85,247, sodium 127, potassium 3.3, AST 1,530, , T. Bili 3.89. CRP was 125.9 and Sed rate 36. He met SIRS with leukocytosis, tachycardia, tachypnea and hypothermia. CT head, c-spine, and chest/abdomen/pelvis were negative for traumatic findings. CT abdomen did show mild distension of his gallbladder without evidence of obstruction. No source of infection was identified. He was given cefepime and vancomycin. Lactic acid cleared after 1L IVF's.  On exam he remained grossly tremulous, tachycardic, tachypneic and restless and will be loaded with phenobarbital. He also appears dry on exam and will receive an additional 2L IVFs."    Summary of clinical course: Admitted to ICU. He was given cefepime and vancomycin, transitioned to ceftriaxone although no source of infection so likely can discontinue. Lactic acid cleared after 1L IVF's. He received phenobarbital loading yesterday with significant improvement in withdrawal symptoms, stable today. LFTs improved, [place] [address], renal function almost back to baseline. Recent or scheduled procedures: RUQ ultrasound    Outstanding/pending diagnostics: RUQ ultrasound       Mobilization Plan: OOB    Nutrition Plan: regular diet    Discharge Plan: home in next 24-48 hrs        Specific Diagnosis Plan:      Spoke with Dr. Kapil Edwards at 11:30 regarding transfer. Portions of the record may have been created with voice recognition software. Occasional wrong word or "sound a like" substitutions may have occurred due to the inherent limitations of voice recognition software. Read the chart carefully and recognize, using context, where substitutions have occurred.     Celia Mendes PA-C

## 2023-10-29 NOTE — ASSESSMENT & PLAN NOTE
Query evolving alcoholic hepatitis: MDF only 10.8, MELD-Na 26  Hold off on steroids at this time  Trend LFTs, coag studies, MDF/MELD-Na  Additional plan as outlined

## 2023-10-29 NOTE — ASSESSMENT & PLAN NOTE
Tachycardia, tachypnea, hypothermia, and leukocytosis on admission. Unclear source, possibly 2/2 ETOH abuse, withdrawal, and dehydration  Lactic acid 2.6-> 1.8. Received 1L IVF in ED and scheduled for additional 2L now 2/2 grossly dry on exam.  Procalcitonin 1.76. Trend  BC x2 pending  Denies urinary or respiratory symptoms. Does endorse left sided abdominal pain. UA showing largely + for blood, ketones and protein. + nitrites, moderate bacteria and renal tube epithelial cells. Asymptomatic. CT chest/abdomen/pelvis showing distended gallbladder but no stones or other infectious source   Check US RUQ  Check lipase  S/p Cefepime in ED.  Will continue for now pending culture data  Trend CBC, temperature curve

## 2023-10-29 NOTE — H&P
[address]  H&P  Name: Dutch Posada 53 y.o. male I MRN: [id number]  Unit/Bed#: ICU 6 I Date of Admission: 10/28/2023   Date of Service: 10/28/2023 I Hospital Day: 0      Assessment/Plan   * Alcohol withdrawal syndrome with complication [facility])  Assessment & Plan  Recently admitted to [facility] 9/2-9/6 for ETOH detox. Reports 30 days of abstinence thereafter, but drank heavily this past week per mother. Presents with anxiety, tremors, S&S of withdrawal.   Grossly tremulous, diaphoretic, anxious, hyperactive on exam.  CIWA 27. Received 6mg total lorazepam given in ED  ETOH on admission 35, last drink was 2100 10/27  Brought to ICU for phenobarbital loading  Additional phenobarb PRN pending CIWA trending  Folic acid, thiamine, MVI   Electrolyte repletion as outlined  Supportive care    SIRS (systemic inflammatory response syndrome) (HCC)  Assessment & Plan  Tachycardia, tachypnea, hypothermia, and leukocytosis on admission. Unclear source, possibly 2/2 ETOH abuse, withdrawal, and dehydration  Lactic acid 2.6-> 1.8. Received 1L IVF in ED and scheduled for additional 2L now 2/2 grossly dry on exam.  Procalcitonin 1.76. Trend  BC x2 pending  Denies urinary or respiratory symptoms. Does endorse left sided abdominal pain. UA showing largely + for blood, ketones and protein. + nitrites, moderate bacteria and renal tube epithelial cells. Asymptomatic. CT chest/abdomen/pelvis showing distended gallbladder but no stones or other infectious source   Check US RUQ  Check lipase  S/p Cefepime in ED. Will continue for now pending culture data  Trend CBC, temperature curve    JORDI (acute kidney injury) ([address])  Assessment & Plan  Baseline creatinine appears 0.6-0.8  Admission creatinine 2.09  CK 85,247  Likely combined pre-renal in setting of dehydration, and intra-renal in setting of ATN/rhabdo   UA showing + blood, protein, renal tube epithelial cells  S/p 1L IVF in ED. Clinically appears dry, tachycardic. Additional 2L IVF ordered  Continuous IVF's at 250ml/hr thereafter  Avoid nephrotoxins  Close I&O monitoring  Trend renal indices  Trend CK through clearance    Alcohol use disorder, severe, dependence ([address])  Assessment & Plan  Recent detox admissions 8/1-8/3 and 9/2-9/6. Lives with mother who reports relapse this past week. Currently drinking 1 bottle of Stephanie Puffer daily   Reports previous rehab stays   Started on naltrexone, however stopped taking it 2/2 not feeling well on it    Plan:  Education/reinforce importance of abstaining from ETOH use  Case management for assistance in discharge planning if agreeable after acute treatment   See Alcohol Withdrawal as outlined above    Transaminitis  Assessment & Plan  AST 1,530, , T bili 3.89 on admission in setting of alcohol abuse  Reports left sided abdominal pain  CT abdomen with moderate fatty infiltration of the liver. No biliary ductal dilatation. Distended gallbladder without pericholecystic inflammation or calcified gallstones  US RUQ pending  Trend CMP    Hyponatremia  Assessment & Plan  Likely 2/2 beer potomania and dehydration  Trend   Encourage alcohol cessation  Consideration for additional lab studies if not improving    Alcoholic hepatitis without ascites  Assessment & Plan  Query evolving alcoholic hepatitis: MDF only 10.8, MELD-Na 26  Hold off on steroids at this time  Trend LFTs, coag studies, MDF/MELD-Na  Additional plan as outlined    Elevated CK  Assessment & Plan  Mother reports multiple recent falls over the past few days in setting of intoxication. Suspect CK elevated 2/2 falls/rhabdo/dehydration. No focal swelling or tight compartments on exam. Does have diffuse scattered bruising of all extremities. S/p 1L IVF. Still appearing dry on exam. Pending additional 2 liters IVFs  Continuous IVF's at 250ml/hr overnight.    Close I&O monitoring  Trend CK    Electrolyte abnormality  Assessment & Plan  Hypokalemia on presentation and at risk for hypomagnesemia in setting of alcohol abuse  Trend and replete    GERD without esophagitis  Assessment & Plan  Continue home pepcid    Multiple falls  Assessment & Plan  Presented with acute alcohol withdrawal with multiple, recent falls over the past few days when intoxicated  Scattered flat bruising noted on all extremities, left flank, and back. Appear to be in multiple stages of healing  CT head, c-spine, C/A/P all negative for acute traumatic injury  Reports only left flank pain at site of ecchymosis. Tender to palpation. PRN ice packs. Fall precautions  Treatment for ETOH withdrawal/abuse as outlined    Generalized anxiety disorder  Assessment & Plan  Continue home Buspar, Effexor, clonidine            History of Present Illness     HPI: Melissa Cardenas is a 53 y.o. with PMHx of alcohol abuse, GERD, anxiety and depression. He presented today with withdrawal symptoms that began last night; his last drink was around 2100. Endorses drinking approximately 1 bottle of Jarek Coma daily. Per his mother, he was sober since discharge from [facility] last month but began drinking heavily this past week. He endorses diaphoresis, anxiety, left flank/abdominal pain, and tremors. His CIWA in the ED was 27 and he received a total of 6ml lorazepam. He has experienced numerous falls in recent days of which he does not remember. Pertinent labs include WBC 21.9, procal 1.76, lactic acid 2.6, CK 85,247, sodium 127, potassium 3.3, AST 1,530, , T. Bili 3.89. CRP was 125.9 and Sed rate 36. He met SIRS with leukocytosis, tachycardia, tachypnea and hypothermia. CT head, c-spine, and chest/abdomen/pelvis were negative for traumatic findings. CT abdomen did show mild distension of his gallbladder without evidence of obstruction. No source of infection was identified. He was given cefepime and vancomycin. Lactic acid cleared after 1L IVF's.  On exam he remained grossly tremulous, tachycardic, tachypneic and restless and will be loaded with phenobarbital. He also appears dry on exam and will receive an additional 2L IVFs. History obtained from mother, chart review, and the patient. Review of Systems   Constitutional: Negative. Respiratory:  Negative for cough and shortness of breath. Cardiovascular:  Negative for chest pain and palpitations. Gastrointestinal:  Positive for abdominal pain (left flank). Negative for abdominal distention, diarrhea, nausea and vomiting. Genitourinary: Negative. Negative for dysuria. Musculoskeletal:  Positive for back pain (left flank/mid back). Reports recent falls when intoxicated     Skin:  Positive for color change. Scattered bruising of different stages of healing throughout his arms, legs, back, and left flank. Neurological:  Positive for tremors. Negative for dizziness, seizures, weakness and headaches. Psychiatric/Behavioral:  Positive for confusion. The patient is nervous/anxious and is hyperactive. All other systems reviewed and are negative. Historical Information   Past Medical History:  No date: Anxiety  No date: Depression  No date: ETOH abuse  No date: GERD (gastroesophageal reflux disease) Past Surgical History:  No date: CLOSED REDUCTION FOREARM FRACTURE; Right  No date: EGD   Current Outpatient Medications   Medication Instructions    busPIRone (BUSPAR) 10 mg, Oral, 3 times daily    cloNIDine (CATAPRES) 0.1 mg, Oral, Daily at bedtime    famotidine (PEPCID) 20 mg, Oral, Daily    gabapentin (NEURONTIN) 300 mg, Oral, 3 times daily    venlafaxine (EFFEXOR-XR) 75 mg, Oral, Daily    No Known Allergies   Social History     Tobacco Use    Smoking status: Never    Smokeless tobacco: Current     Types: Chew   Vaping Use    Vaping Use: Never used   Substance Use Topics    Alcohol use: Yes     Comment: daily whiskey 4 bottles per week    Drug use: Never    History reviewed. No pertinent family history.        Objective                            Vitals I/O Most Recent Min/Max in 24hrs   Temp 98.4 °F (36.9 °C) Temp  Min: 96.6 °F (35.9 °C)  Max: 99.9 °F (37.7 °C)   Pulse 117 Pulse  Min: 113 Max: 125   Resp 18 Resp  Min: 22  Max: 29   /79 BP  Min: 131/79 Max: 173/84   O2 Sat 99 % SpO2  Min: 94 %  Max: 99 %      Intake/Output Summary (Last 24 hours) at 10/28/2023 2312  Last data filed at 10/28/2023 1900  Gross per 24 hour   Intake 2150 ml   Output --   Net 2150 ml         Diet Regular; Regular House; Fluid Restriction 1800 ML     Invasive Monitoring Physical exam   N/A Physical Exam  Eyes:      General: Lids are normal. Scleral icterus (mild) present. Extraocular Movements: Extraocular movements intact. Conjunctiva/sclera:      Right eye: Right conjunctiva is injected. Left eye: Left conjunctiva is injected. Pupils: Pupils are equal, round, and reactive to light. Skin:     General: Skin is cool and dry. HENT:      Head: Normocephalic. Comments: Face flushed     Mouth/Throat:      Mouth: Mucous membranes are dry. Cardiovascular:      Rate and Rhythm: Regular rhythm. Tachycardia present. Pulses:           Radial pulses are 2+ on the right side and 2+ on the left side. Dorsalis pedis pulses are 2+ on the right side and 2+ on the left side. Heart sounds: Normal heart sounds. Musculoskeletal:      Right lower leg: No edema. Left lower leg: No edema. Abdominal:      General: Bowel sounds are decreased. There is no distension. Comments: Abdomen taut, not distended. Constitutional:       Appearance: He is ill-appearing and toxic-appearing. Pulmonary:      Effort: Tachypnea present. No accessory muscle usage, respiratory distress or accessory muscle usage. Breath sounds: Normal breath sounds. Psychiatric:         Attention and Perception: Attention normal.         Mood and Affect: Mood is anxious. Speech: Speech normal.         Behavior: Behavior is hyperactive.          Thought Content: Thought content normal.         Judgment: Judgment is impulsive. Neurological:      General: No focal deficit present. Mental Status: He is alert. GCS: GCS eye subscore is 4. GCS verbal subscore is 4. GCS motor subscore is 6. Motor: gross motor function is at baseline for patient. Strength full and intact in all extremities. Comments: Unable to state date. Oriented to year/place/situation. Vitals and nursing note reviewed. Diagnostic Studies      EKG: Sinus tachycardia, rate 109  Imaging:      10/28 CT head: No acute intracranial abnormality. 10/28 CT C-spine: No cervical spine fracture or traumatic malalignment   10/28 CT Chest/abdomen/pelvis without contrast:     LUNGS:  Lungs are clear. There is no tracheal or endobronchial lesion. PLEURA:  Unremarkable. HEART/GREAT VESSELS: Heart is unremarkable for patient's age. No thoracic aortic aneurysm. No pericardial effusion. MEDIASTINUM AND RUFINA: No mediastinal hematoma. No mediastinal, hilar, or axillary lymphadenopathy. CHEST WALL AND LOWER NECK:  Unremarkable. ABDOMEN     LIVER/BILIARY TREE: Moderate fatty infiltration of the liver. No biliary ductal dilatation. GALLBLADDER: Distended gallbladder without pericholecystic inflammation or calcified gallstones. SPLEEN:  Unremarkable. PANCREAS:  Unremarkable. ADRENAL GLANDS:  Unremarkable. KIDNEYS/URETERS:  Unremarkable. No hydronephrosis. STOMACH AND BOWEL: No bowel obstruction or focal inflammatory process. APPENDIX:  No findings to suggest appendicitis. ABDOMINOPELVIC CAVITY:  No ascites. No pneumoperitoneum. No lymphadenopathy. VESSELS:  Unremarkable for patient's age. Retroaortic left renal vein. PELVIS     REPRODUCTIVE ORGANS:  Unremarkable for patient's age. URINARY BLADDER:  Unremarkable. ABDOMINAL WALL/INGUINAL REGIONS:  Unremarkable.      OSSEOUS STRUCTURES:  No acute fracture or destructive osseous lesion. IMPRESSION:     No acute traumatic CT findings. I have personally reviewed pertinent reports. Medications:  Scheduled PRN   busPIRone, 10 mg, TID  [START ON 10/29/2023] cefepime, 2,000 mg, Q12H  cloNIDine, 0.1 mg, HS  [START ON 10/29/2023] famotidine, 10 mg, Daily  [START ON 13/40/7730] folic acid, 1 mg, Daily  gabapentin, 300 mg, TID  [START ON 10/29/2023] multivitamin-minerals, 1 tablet, Daily  [START ON 10/29/2023] PHENobarbital, 260 mg, Once  [START ON 10/29/2023] thiamine, 100 mg, Daily  [START ON 10/29/2023] venlafaxine, 75 mg, Daily      ondansetron, 4 mg, Q4H PRN  [START ON 10/29/2023] vancomycin, 12.5 mg/kg, Daily PRN       Continuous    multi-electrolyte, 250 mL/hr, Last Rate: 250 mL/hr (10/28/23 2113)         Labs:    CBC    Recent Labs     10/28/23  1318   WBC 21.91*   HGB 17.4*   HCT 47.2   *     BMP    Recent Labs     10/28/23  1318   SODIUM 127*   K 3.3*   CL 84*   CO2 16*   AGAP 27   BUN 28*   CREATININE 2.09*   CALCIUM 9.6       Coags    Recent Labs     10/28/23  1318   INR 1.01   PTT 29        Additional Electrolytes  Recent Labs     10/28/23  1318   MG 2.0          Blood Gas    No recent results  No recent results LFTs  Recent Labs     10/28/23  1318   *   AST 1,530*   ALKPHOS 87   ALB 5.0   TBILI 3.89*       Infectious  Recent Labs     10/28/23  1318   PROCALCITONI 1.76*     Glucose  Recent Labs     10/28/23  1318   GLUC 118             Critical Care Time Delivered : Upon my evaluation, this patient had a high probability of imminent or life-threatening deterioration due to acute alcohol withdrawal, JORDI, toxic metabolic encephalopathy, electrolyte abnormalities, which required my direct attention, intervention, and personal management. I have personally provided 45 minutes of critical care time, exclusive of procedures, teaching, family meetings, and any prior time recorded by providers other than myself.    Anticipated Length of Stay is > 2 midnights  Luanne FERRARA Kimberly Lynch

## 2023-10-29 NOTE — ASSESSMENT & PLAN NOTE
AST 1,530, , T bili 3.89 on admission in setting of alcohol abuse  Reports left sided abdominal pain  CT abdomen with moderate fatty infiltration of the liver. No biliary ductal dilatation.  Distended gallbladder without pericholecystic inflammation or calcified gallstones  US RUQ pending  Trend CMP

## 2023-10-29 NOTE — PROGRESS NOTES
Tess Weller is a 53 y.o. male who is currently ordered Vancomycin IV with management by the Pharmacy Consult service. Relevant clinical data and objective / subjective history reviewed. Vancomycin Assessment:  Indication and Goal AUC/Trough: Bacteremia (goal -600, trough >10)  Clinical Status: new  Micro:     Renal Function:  SCr: 2.09 mg/dL  CrCl: 49.4 mL/min  Renal replacement: Not on dialysis  Days of Therapy: 1  Current Dose: 2,000 mg x1  Vancomycin Plan:  New Dosin,250 mg daily PRN  Estimated AUC: 490 mcg*hr/mL  Estimated Trough: 15.3 mcg/mL  Next Level: 0600 on 10/29/23  Renal Function Monitoring: Daily BMP and [place] will continue to follow closely for s/sx of nephrotoxicity, infusion reactions and appropriateness of therapy. BMP and CBC will be ordered per protocol. We will continue to follow the patient’s culture results and clinical progress daily.     Sahara Zayas, Pharmacist

## 2023-10-29 NOTE — UTILIZATION REVIEW
Initial Clinical Review    Admission: Date/Time/Statement:   Admission Orders (From admission, onward)       Ordered        10/28/23 1711  [address]  Once            10/28/23 1633  INPATIENT ADMISSION  Once,   Status:  Canceled                          Orders Placed This Encounter   Procedures    INPATIENT ADMISSION     Standing Status:   Standing     Number of Occurrences:   1     Order Specific Question:   Level of Care     Answer:   Critical Care [15]     Order Specific Question:   Estimated length of stay     Answer:   More than 2 Midnights     Order Specific Question:   Certification     Answer:   I certify that inpatient services are medically necessary for this patient for a duration of greater than two midnights. See H&P and MD Progress Notes for additional information about the patient's course of treatment. ED Arrival Information       Expected   -    Arrival   10/28/2023 12:41    Acuity   Emergent              Means of arrival   Walk-In    Escorted by   Family Member    Service   Hospitalist    Admission type   Emergency              Arrival complaint   Alcohol withdraw             Chief Complaint   Patient presents with    Withdrawal - Alcohol     Pt states he was sober for 35 days, drank last night. Experiencing withdrawal symptoms. Patient is restless, anxious and diaphoretic in triage        Initial Presentation: 53 y.o. male  presented to ED from home as inpatient admission for alcohol withdrawal syndrome with complication. H istory of anxiety, depression, alcohol abuse that presents to the emergency department with his mother in withdrawal.  Patient states that he was sober for approximately 35 days and went on a recent binge. He states his last drink was 10 PM yesterday evening. Patient is tremulous on my initial evaluation. He also has numerous bruises of unknown etiology. Patient does not remember falling, however he has bruises on his head, thorax and extremities.  CIWA 27 in ED On exam ill appearing and toxic appearing grossly tremulous, diaphoretic, anxious, hyperactive last drink 10-27-23 @ 2100 , scleral icterus,face flushed dry MM ,tachycardia abdomen taut not distended, BS decreased      Plan ICU for phenobarbital loading Patient also with JORDI creatinine 2.09 baseline 0.6-0.8  CK 85,247 Plan IVF @ 200 mlhr. Urine showing s/s of infection will start cefepime. Date: 10-29-23   Continue on Cefepime, awaiting  urine cultures IVF decreased to 125 ml/hr   CIWA improved to 6 --8  s/p total of 910mg phenobarbital. Tremor and sweats,anxiety and headache noted, Plan will transfer to medical for continuation for care .   ED Triage Vitals   Temperature Pulse Respirations Blood Pressure SpO2   10/28/23 1256 10/28/23 1256 10/28/23 1256 10/28/23 1301 10/28/23 1256   (!) 96.6 °F (35.9 °C) (!) 125 (!) 24 151/88 95 %      Temp Source Heart Rate Source Patient Position - Orthostatic VS BP Location FiO2 (%)   10/28/23 1256 10/28/23 1400 10/28/23 1301 10/28/23 1301 --   Temporal Monitor Lying Left arm       Pain Score       10/28/23 1750       5          Wt Readings from Last 1 Encounters:   10/28/23 93.6 kg (206 lb 5.6 oz)     Additional Vital Signs:     Date/Time Temp Pulse Resp BP MAP (mmHg) SpO2 O2 Device Patient Position - Orthostatic VS   10/29/23 1200 97.9 °F (36.6 °C) 96 18 129/69 91 100 % -- Sitting   10/29/23 1100 -- 88 15 119/73 92 99 % -- --   10/29/23 1000 -- 96 17 122/71 92 98 % -- --   10/29/23 0900 -- 94 16 132/72 94 97 % -- --   10/29/23 0800 97.8 °F (36.6 °C) 85 17 129/81 101 100 % None (Room air) Lying   10/29/23 0700 97.5 °F (36.4 °C) 76 17 117/74 91 100 % None (Room air) Lying   10/29/23 0400 97.7 °F (36.5 °C) 79 17 134/84 103 100 % None (Room air) Lying   10/29/23 0300 -- 76 13 111/64 83 99 % -- --   10/29/23 0200 -- 82 14 112/67 84 97 % -- --   10/29/23 0000 98.5 °F (36.9 °C) 92 18 112/64 81 94 % None (Room air) --   10/28/23 2300 -- 95 18 123/76 94 94 % -- --   10/28/23 2200 -- 96 18 128/74 94 94 % -- --   10/28/23 2100 -- 98 18 130/80 95 95 % -- --   10/28/23 2000 98.4 °F (36.9 °C) 101 27 Abnormal  140/83 105 97 % None (Room air) Lying   10/28/23 1900 -- 109 Abnormal  29 Abnormal  138/78 101 99 % -- --   10/28/23 1800 -- 117 Abnormal  23 Abnormal  131/79 102 99 % -- --   10/28/23 1750 99.9 °F (37.7 °C) 115 Abnormal  24 Abnormal  147/91 112 99 % None (Room air) Lying   10/28/23 1700 -- 113 Abnormal  26 Abnormal  173/84 Abnormal  109 98 % None (Room air) Lying   10/28/23 1630 -- 113 Abnormal  24 Abnormal  158/81 111 97 % None (Room air) Lying   10/28/23 1600 -- 114 Abnormal  24 Abnormal  152/87 114 99 % None (Room air) Lying   10/28/23 1549 -- 104 -- 145/81 -- -- -- --   10/28/23 1545 -- 109 Abnormal  22 145/81 105 97 % None (Room air) Lying   10/28/23 1400 -- 114 Abnormal  22 136/89 105 96 % None (Room air) Lying   10/28/23 1340 -- 112 Abnormal  -- 136/89 -- -- -- --   10/28/23 1308 -- 112 Abnormal  -- 156/85 -- -- -- --   10/28/23 1301 -- -- -- 151/88 -- -- -- Lying   10/28/23 1257 96.6 °F (35.9 °C) Abnormal  125 Abnormal  24 Abnormal  -- -- 95 % None (Room air) --           Pertinent Labs/Diagnostic Test Results:   CT head without contrast    (10/28 1531)      No acute intracranial abnormality. CT cervical spine without contrast    (10/28 1533)      No cervical spine fracture or traumatic malalignment. CT chest abdomen pelvis wo contrast   (10/28 1538)      No acute traumatic CT findings.    US right upper quadrant    (Results Pending)         Results from last 7 days   Lab Units 10/29/23  0549 10/28/23  1318   WBC Thousand/uL 9.52 21.91*   HEMOGLOBIN g/dL 12.5 17.4*   HEMATOCRIT % 34.2* 47.2   PLATELETS Thousands/uL 170 392*   NEUTROS ABS Thousands/µL 8.21* 19.28*         Results from last 7 days   Lab Units 10/29/23  0549 10/28/23  2322 10/28/23  1318   SODIUM mmol/L 131* 128* 127*   POTASSIUM mmol/L 3.6 3.7 3.3*   CHLORIDE mmol/L 99 97 84*   CO2 mmol/L 23 19* 16*   ANION GAP mmol/L 9 12 27   BUN mg/dL 21 25 28*   CREATININE mg/dL 1.19 1.31* 2.09*   EGFR ml/min/1.73sq m 72 64 36   CALCIUM mg/dL 7.8* 7.4* 9.6   CALCIUM, IONIZED mmol/L 1.05*  --   --    MAGNESIUM mg/dL 3.0* 2.9* 2.0     Results from last 7 days   Lab Units 10/29/23  0549 10/28/23  2322 10/28/23  1318   AST U/L 633* 762* 1,530*   ALT U/L 191* 209* 335*   ALK PHOS U/L 53 52 87   TOTAL PROTEIN g/dL 5.6* 5.8* 8.4   ALBUMIN g/dL 3.3* 3.5 5.0   TOTAL BILIRUBIN mg/dL 2.95* 3.27* 3.89*   BILIRUBIN DIRECT mg/dL 0.62*  --   --          Results from last 7 days   Lab Units 10/29/23  0549 10/28/23  2322 10/28/23  1318   GLUCOSE RANDOM mg/dL 91 98 118       Results from last 7 days   Lab Units 10/29/23  0549 10/28/23  1318   CK TOTAL U/L 27,253* 85,247*       Results from last 7 days   Lab Units 10/29/23  0549 10/28/23  1318   PROTIME seconds 13.5 13.5   INR  1.01 1.01   PTT seconds  --  29         Results from last 7 days   Lab Units 10/29/23  0549 10/28/23  1318   PROCALCITONIN ng/ml 1.10* 1.76*     Results from last 7 days   Lab Units 10/28/23  1642 10/28/23  1416   LACTIC ACID mmol/L 1.8 2.6*       Results from last 7 days   Lab Units 10/28/23  1318   LIPASE u/L 20     Results from last 7 days   Lab Units 10/28/23  1318   CRP mg/L 125.9*   SED RATE mm/hour 36*             Results from last 7 days   Lab Units 10/28/23  1642   CLARITY UA  Slightly Cloudy   COLOR UA  Dark Beata   SPEC GRAV UA  1.025   PH UA  6.5   GLUCOSE UA mg/dl Negative   KETONES UA mg/dl 15 (1+)*   BLOOD UA  Large*   PROTEIN UA mg/dl >=300*   NITRITE UA  Positive*   BILIRUBIN UA  Negative   UROBILINOGEN UA E.U./dl 1.0   LEUKOCYTES UA  Negative   WBC UA /hpf 1-2   RBC UA /hpf 1-2   BACTERIA UA /hpf Moderate*   EPITHELIAL CELLS WET PREP /hpf None Seen             Results from last 7 days   Lab Units 10/28/23  1642   AMPH/METH  Negative   BARBITURATE UR  Negative   BENZODIAZEPINE UR  Negative   COCAINE UR  Negative   METHADONE URINE  Negative   OPIATE UR  Negative PCP UR  Negative   THC UR  Positive*     Results from last 7 days   Lab Units 10/28/23  1318   ETHANOL LVL mg/dL 35*     Results from last 7 days   Lab Units 10/28/23  1422 10/28/23  1416   BLOOD CULTURE  Received in Microbiology Lab. Culture in Progress. Received in Microbiology Lab. Culture in Progress.      Results from last 7 days   Lab Units 10/29/23  0549   VANCOMYCIN RM ug/mL 11.9       ED Treatment:   Medication Administration from 10/28/2023 1240 to 10/28/2023 1749         Date/Time Order Dose Route Action     10/28/2023 1319 EDT LORazepam (ATIVAN) injection 4 mg 4 mg Intravenous Given     10/28/2023 1315 EDT sodium chloride 0.9 % bolus 1,000 mL 1,000 mL Intravenous New Bag     10/28/2023 1325 EDT ondansetron (ZOFRAN) injection 4 mg 4 mg Intravenous Given     10/28/2023 1442 EDT lactated ringers bolus 1,000 mL 1,000 mL Intravenous New Bag     10/28/2023 1621 EDT LORazepam (ATIVAN) tablet 2 mg 2 mg Oral Given     10/28/2023 1624 EDT cefTRIAXone (ROCEPHIN) IVPB (premix in dextrose) 1,000 mg 50 mL 1,000 mg Intravenous New Bag     10/28/2023 1732 EDT labetalol (NORMODYNE) injection 20 mg 20 mg Intravenous Not Given          Past Medical History:   Diagnosis Date    Anxiety     Depression     ETOH abuse     GERD (gastroesophageal reflux disease)      Present on Admission:   Alcohol use disorder, severe, dependence ([address])   GERD without esophagitis   Generalized anxiety disorder   Alcohol withdrawal syndrome with complication (HCC)   Transaminitis   SIRS (systemic inflammatory response syndrome) (HCC)   Alcoholic hepatitis without ascites   Hyponatremia   Elevated CK   JORDI (acute kidney injury) ([address])   Electrolyte abnormality   Toxic metabolic encephalopathy      Admitting Diagnosis: Bacteremia [R78.81]  Alcohol dependence ([address]) [F10.20]  UTI (urinary tract infection) [N39.0]  Elevated liver function tests [R79.89]  JORDI (acute kidney injury) ([address]) [N17.9]  Withdrawal symptoms, alcohol ([address]) [F10.939]  Sepsis [facility]) [A41.9]  Age/Sex: 53 y.o. male    Admission Orders: Forced warm air blanket  SCD  CIWA  Continuous pulse oximetry              Scheduled Medications: PHENobarbital 260 mg in sodium chloride 0.9 % 100 mL IVPB X2  PHENobarbital 390 mg in sodium chloride 0.9 % 100 mL IVPB  X 1  busPIRone, 10 mg, Oral, TID  cefTRIAXone, 1,000 mg, Intravenous, Q24H  cloNIDine, 0.1 mg, Oral, HS  famotidine, 10 mg, Oral, Daily  folic acid, 1 mg, Oral, Daily  gabapentin, 300 mg, Oral, TID  lidocaine, 1 patch, Topical, Daily  multivitamin-minerals, 1 tablet, Oral, Daily  saccharomyces boulardii, 250 mg, Oral, BID  thiamine, 100 mg, Oral, Daily  venlafaxine, 75 mg, Oral, Daily      Continuous IV Infusions:  multi-electrolyte, 125 mL/hr, Intravenous, Continuous      PRN Meds:  ondansetron, 4 mg, Intravenous, Q4H PRN        None    Network Utilization Review Department  ATTENTION: Please call with any questions or concerns to [phone] and carefully listen to the prompts so that you are directed to the right person. All voicemails are confidential.   For Discharge needs, contact Care Management DC Support Team at [phone] opt. 2  Send all requests for admission clinical reviews, approved or denied determinations and any other requests to dedicated fax number below belonging to the campus where the patient is receiving treatment.  List of dedicated fax numbers for the Facilities:  [place] DENIALS (Administrative/Medical Necessity) [phone]   DISCHARGE SUPPORT TEAM (NETWORK) [id number] (Maternity/NICU/Pediatrics) [phone]   [address] [place] [address] Drive 15[address] [facility] 1000 [facility] [phone] [address] [place] [id number] [facility] [address]  [facility] [phone]

## 2023-10-29 NOTE — PROGRESS NOTES
[id number] [facility]  Progress Note  Name: Micaela Staley  MRN: [id number]  Unit/Bed#: ICU 11 I Date of Admission: 10/28/2023   Date of Service: 10/29/2023 I Hospital Day: 1    Assessment/Plan   * Alcohol withdrawal syndrome with complication [facility])  Assessment & Plan  Recently admitted to [address] [place] [address] 9/2-9/6 for ETOH detox. Reports 30 days of abstinence thereafter, but drank heavily this past week per mother. Presents with anxiety, tremors, S&S of withdrawal.   Grossly tremulous, diaphoretic, anxious, hyperactive on exam.  CIWA 27. Received 6mg total lorazepam given in ED  ETOH on admission 35, last drink was 2100 10/27  Brought to ICU for phenobarbital loading  CIWA improved to 6 on my calculation this AM s/p total of 910mg phenobarbital. Tremor currently absent. Additional phenobarb PRN pending CIWA trending  Folic acid, thiamine, MVI   Electrolyte repletion as needed  Supportive care    SIRS (systemic inflammatory response syndrome) (HCC)  Assessment & Plan  Tachycardia, tachypnea, hypothermia, and leukocytosis on admission. Unclear source, possibly 2/2 ETOH abuse, withdrawal, and dehydration  Lactic acid 2.6-> 1.8. Received 1L IVF in ED and scheduled for additional 2L now 2/2 grossly dry on exam.  Procalcitonin 1.76. Trend  BC x2 pending  Denies urinary or respiratory symptoms. Does endorse left sided abdominal pain. UA showing largely + for blood, ketones and protein. + nitrites, moderate bacteria and renal tube epithelial cells. Asymptomatic. CT chest/abdomen/pelvis showing distended gallbladder but no stones or other infectious source   Consider RUQ US  S/p Cefepime and Vancomycin in ED. Will continue for now pending culture data  Trend CBC, temperature curve    JORDI (acute kidney injury) ([address])  Assessment & Plan  Baseline creatinine appears 0.6-0.8  Admission creatinine 2.09-> 1.31  CK 85,247.  AM labs pending  Likely combined pre-renal in setting of dehydration, and intra-renal in setting of ATN/elevated CK   UA showing + blood, protein, renal tube epithelial cells  S/p 3L IVF boluses yesterday. Continuous IVF's at 250ml/hr overnight. UOP and HR improving. Now euvolemic. Rate dropped to 125  Avoid nephrotoxins  Close I&O monitoring  Trend renal indices  Trend CK through clearance    Alcohol use disorder, severe, dependence ([address])  Assessment & Plan  Recent detox admissions 8/1-8/3 and 9/2-9/6. Lives with mother who reports relapse this past week. Currently drinking 1 bottle of Kobe Mike daily   Reports previous rehab stays   Started on naltrexone, however stopped taking it 2/2 not feeling well on it    Plan:  Education/reinforce importance of abstaining from ETOH use  Case management for assistance in discharge planning if agreeable after acute treatment   See Alcohol Withdrawal as outlined above    Transaminitis  Assessment & Plan  AST 1,530, , T bili 3.89 on admission in setting of alcohol abuse, now improving  Reports left sided abdominal pain (lipase negative)  CT abdomen with moderate fatty infiltration of the liver. No biliary ductal dilatation. Distended gallbladder without pericholecystic inflammation or calcified gallstones  Consider US RUQ   Trend CMP    Toxic metabolic encephalopathy  Assessment & Plan  Has mild confusion in setting of ETOH withdrawal. Unable to state the date. Otherwise oriented, but impulsive and hyperactive.     CT head negative for acute findings  Continued treatment for withdrawal as outlined above  CAM ICU, fall precautions, delirium precautions  Supportive care    Hyponatremia  Assessment & Plan  Likely 2/2 beer potomania and dehydration  Trend   Encourage alcohol cessation  Consideration for additional lab studies if not improving    Alcoholic hepatitis without ascites  Assessment & Plan  Query evolving alcoholic hepatitis: MDF only 10.8, MELD-Na 26  Hold off on steroids at this time  Trend LFTs, coag studies, MDF/MELD-Na- AM labs pending  Additional plan as outlined    Elevated CK  Assessment & Plan  Mother reports multiple recent falls over the past few days in setting of intoxication. Suspect CK elevated 2/2 falls/rhabdo/dehydration. No focal swelling or tight compartments on exam. Does have diffuse scattered bruising of all extremities. S/p 3L IVF. Continuous IVF's at 125ml/hr. UOP improving   Close I&O monitoring  Trend CK    Electrolyte abnormality  Assessment & Plan  Hypokalemia on presentation and at risk for hypomagnesemia in setting of alcohol abuse  Trend and replete    GERD without esophagitis  Assessment & Plan  Continue home pepcid    Multiple falls  Assessment & Plan  Presented with acute alcohol withdrawal with multiple, recent falls over the past few days when intoxicated  Scattered flat bruising noted on all extremities, left flank, and back. Appear to be in multiple stages of healing  CT head, c-spine, C/A/P all negative for acute traumatic injury  Reports only left flank pain at site of ecchymosis. Tender to palpation. PRN ice packs. Fall precautions  Treatment for ETOH withdrawal/abuse as outlined    Generalized anxiety disorder  Assessment & Plan  Continue home Buspar, Effexor, clonidine              ICU Core Measures     A: Assess, Prevent, and Manage Pain Has pain been assessed? Yes  Need for changes to pain regimen? No   B: Both SAT/SAT  N/A   C: Choice of Sedation RASS Goal: N/A patient not on sedation  Need for changes to sedation or analgesia regimen? No   D: Delirium CAM-ICU: Negative   E: Early Mobility  Plan for early mobility? Yes   F: Family Engagement Plan for family engagement today? Yes       Antibiotic Review: Awaiting culture results. Prophylaxis:  VTE Contraindicated secondary to: liver dysfunction. AM labs pending. Start heparin today if LFTs improving.     Stress Ulcer  covered by famotidine (PEPCID) tablet 10 mg [[id number]]       Subjective    HPI/24hr events: Admitted yesterday in setting of acute ETOH withdrawal. CIWA initially 27 and no significant improvement with lorazepam in ED. Brought to ICU for phenobarbital loading. CIWA improved to 6 on my exam; tremors currently absent. Is alert and oriented. UOP marginal last evening with only 200ml output around midnight but has since picked up this morning. Total UOP since admission 1.3L. No acute events overnight. Review of Systems   Respiratory:  Negative for shortness of breath. Gastrointestinal:  Positive for diarrhea and nausea. Negative for abdominal distention, abdominal pain and vomiting. Musculoskeletal:  Positive for back pain (back and left flank). Neurological:  Positive for tremors (improved overnight) and headaches. Negative for dizziness and speech difficulty. Psychiatric/Behavioral:  The patient is nervous/anxious. Objective                            Vitals I/O      Most Recent Min/Max in 24hrs   Temp 97.7 °F (36.5 °C) Temp  Min: 96.6 °F (35.9 °C)  Max: 99.9 °F (37.7 °C)   Pulse 79 Pulse  Min: 76  Max: 125   Resp 17 Resp  Min: 13  Max: 29   /84 BP  Min: 111/64  Max: 173/84   O2 Sat 100 % SpO2  Min: 94 %  Max: 100 %      Intake/Output Summary (Last 24 hours) at 10/29/2023 0550  Last data filed at 10/29/2023 0427  Gross per 24 hour   Intake 5616.67 ml   Output 1350 ml   Net 4266.67 ml         Diet Regular; Regular House; Fluid Restriction 1800 ML     Invasive Monitoring Physical exam   N/A Physical Exam  Eyes:      General: Lids are normal.   Skin:     General: Skin is warm and dry. HENT:      Head: Normocephalic. Mouth/Throat:      Mouth: Mucous membranes are moist.   Cardiovascular:      Rate and Rhythm: Normal rate and regular rhythm. Pulses: Normal pulses. Radial pulses are 2+ on the right side and 2+ on the left side. Dorsalis pedis pulses are 2+ on the right side and 2+ on the left side. Heart sounds: Normal heart sounds. Musculoskeletal:      Right lower leg: No edema.       Left lower leg: No edema. Abdominal:      General: Bowel sounds are normal.      Palpations: Abdomen is soft. Tenderness: There is no abdominal tenderness. Constitutional:       General: He is not in acute distress. Appearance: He is ill-appearing and toxic-appearing. Pulmonary:      Effort: Pulmonary effort is normal.      Breath sounds: Normal breath sounds. Psychiatric:         Attention and Perception: Attention normal.         Mood and Affect: Mood normal.         Speech: Speech normal.         Behavior: Behavior is cooperative. Neurological:      General: No focal deficit present. Mental Status: He is alert and oriented to person, place and time. GCS: GCS eye subscore is 4. GCS verbal subscore is 5. GCS motor subscore is 6. Motor: gross motor function is at baseline for patient. Strength full and intact in all extremities. Vitals and nursing note reviewed. Diagnostic Studies      EKG: NSR on monitor, rate 80-90s  Imaging: No new imaging I have personally reviewed pertinent reports.        Medications:  Scheduled PRN   busPIRone, 10 mg, TID  cefepime, 2,000 mg, Q12H  cloNIDine, 0.1 mg, HS  famotidine, 10 mg, Daily  folic acid, 1 mg, Daily  gabapentin, 300 mg, TID  lidocaine, 1 patch, Daily  multivitamin-minerals, 1 tablet, Daily  thiamine, 100 mg, Daily  venlafaxine, 75 mg, Daily      ondansetron, 4 mg, Q4H PRN  vancomycin, 12.5 mg/kg, Daily PRN       Continuous    multi-electrolyte, 125 mL/hr, Last Rate: 125 mL/hr (10/29/23 0520)         Labs:    CBC    Recent Labs     10/28/23  1318   WBC 21.91*   HGB 17.4*   HCT 47.2   *     BMP    Recent Labs     10/28/23  1318 10/28/23  2322   SODIUM 127* 128*   K 3.3* 3.7   CL 84* 97   CO2 16* 19*   AGAP 27 12   BUN 28* 25   CREATININE 2.09* 1.31*   CALCIUM 9.6 7.4*       Coags    Recent Labs     10/28/23  1318   INR 1.01   PTT 29        Additional Electrolytes  Recent Labs     10/28/23  1318 10/28/23  2322   MG 2.0 2.9*          Blood Gas    No recent results  No recent results LFTs  Recent Labs     10/28/23  1318 10/28/23  2322   * 209*   AST 1,530* 762*   ALKPHOS 87 52   ALB 5.0 3.5   TBILI 3.89* 3.27*       Infectious  Recent Labs     10/28/23  1318   PROCALCITONI 1.76*     Glucose  Recent Labs     10/28/23  1318 10/28/23  2322   GLUC [phone] [facility]

## 2023-10-29 NOTE — ASSESSMENT & PLAN NOTE
Query evolving alcoholic hepatitis: MDF only 10.8, MELD-Na 26  Hold off on steroids at this time  Trend LFTs, coag studies, MDF/MELD-Na- AM labs pending  Additional plan as outlined

## 2023-10-29 NOTE — ASSESSMENT & PLAN NOTE
Recently admitted to [address] [place] [address] 9/2-9/6 for ETOH detox. Reports 30 days of abstinence thereafter, but drank heavily this past week per mother.  Presents with anxiety, tremors, S&S of withdrawal.   Grossly tremulous, diaphoretic, anxious, hyperactive on exam.  CIWA 27. Received 6mg total lorazepam given in ED  ETOH on admission 35, last drink was 2100 10/27  Brought to ICU for phenobarbital loading  Additional phenobarb PRN pending CIWA trending  Folic acid, thiamine, MVI   Electrolyte repletion as outlined  Supportive care

## 2023-10-29 NOTE — ASSESSMENT & PLAN NOTE
Mother reports multiple recent falls over the past few days in setting of intoxication. Suspect CK elevated 2/2 falls/rhabdo/dehydration. No focal swelling or tight compartments on exam. Does have diffuse scattered bruising of all extremities. S/p 1L IVF. Still appearing dry on exam. Pending additional 2 liters IVFs  Continuous IVF's at 250ml/hr overnight.    Close I&O monitoring  Trend CK

## 2023-10-30 ENCOUNTER — APPOINTMENT (INPATIENT)
Dept: RADIOLOGY | Facility: HOSPITAL | Age: 48
DRG: 896 | End: 2023-10-30
Payer: COMMERCIAL

## 2023-10-30 PROBLEM — M62.82 RHABDOMYOLYSIS: Status: ACTIVE | Noted: 2023-10-28

## 2023-10-30 LAB
ALBUMIN SERPL BCP-MCNC: 3 G/DL (ref 3.5–5)
ALP SERPL-CCNC: 47 U/L (ref 34–104)
ALT SERPL W P-5'-P-CCNC: 149 U/L (ref 7–52)
ANION GAP SERPL CALCULATED.3IONS-SCNC: 9 MMOL/L
AST SERPL W P-5'-P-CCNC: 363 U/L (ref 13–39)
BASOPHILS # BLD AUTO: 0.03 THOUSANDS/ÂΜL (ref 0–0.1)
BASOPHILS NFR BLD AUTO: 1 % (ref 0–1)
BILIRUB SERPL-MCNC: 0.85 MG/DL (ref 0.2–1)
BUN SERPL-MCNC: 11 MG/DL (ref 5–25)
CALCIUM ALBUM COR SERPL-MCNC: 8.2 MG/DL (ref 8.3–10.1)
CALCIUM SERPL-MCNC: 7.4 MG/DL (ref 8.4–10.2)
CHLORIDE SERPL-SCNC: 100 MMOL/L (ref 96–108)
CK SERPL-CCNC: 9420 U/L (ref 39–308)
CO2 SERPL-SCNC: 23 MMOL/L (ref 21–32)
CREAT SERPL-MCNC: 0.76 MG/DL (ref 0.6–1.3)
EOSINOPHIL # BLD AUTO: 0.05 THOUSAND/ÂΜL (ref 0–0.61)
EOSINOPHIL NFR BLD AUTO: 1 % (ref 0–6)
ERYTHROCYTE [DISTWIDTH] IN BLOOD BY AUTOMATED COUNT: 12.4 % (ref 11.6–15.1)
GFR SERPL CREATININE-BSD FRML MDRD: 108 ML/MIN/1.73SQ M
GLUCOSE SERPL-MCNC: 81 MG/DL (ref 65–140)
HCT VFR BLD AUTO: 31 % (ref 36.5–49.3)
HGB BLD-MCNC: 11.3 G/DL (ref 12–17)
IMM GRANULOCYTES # BLD AUTO: 0.03 THOUSAND/UL (ref 0–0.2)
IMM GRANULOCYTES NFR BLD AUTO: 1 % (ref 0–2)
LYMPHOCYTES # BLD AUTO: 0.71 THOUSANDS/ÂΜL (ref 0.6–4.47)
LYMPHOCYTES NFR BLD AUTO: 13 % (ref 14–44)
MCH RBC QN AUTO: 32.5 PG (ref 26.8–34.3)
MCHC RBC AUTO-ENTMCNC: 36.5 G/DL (ref 31.4–37.4)
MCV RBC AUTO: 89 FL (ref 82–98)
MONOCYTES # BLD AUTO: 0.44 THOUSAND/ÂΜL (ref 0.17–1.22)
MONOCYTES NFR BLD AUTO: 8 % (ref 4–12)
MRSA NOSE QL CULT: NORMAL
NEUTROPHILS # BLD AUTO: 4.22 THOUSANDS/ÂΜL (ref 1.85–7.62)
NEUTS SEG NFR BLD AUTO: 76 % (ref 43–75)
NRBC BLD AUTO-RTO: 0 /100 WBCS
PLATELET # BLD AUTO: 148 THOUSANDS/UL (ref 149–390)
PMV BLD AUTO: 10.5 FL (ref 8.9–12.7)
POTASSIUM SERPL-SCNC: 3 MMOL/L (ref 3.5–5.3)
PROT SERPL-MCNC: 5.1 G/DL (ref 6.4–8.4)
RBC # BLD AUTO: 3.48 MILLION/UL (ref 3.88–5.62)
SODIUM SERPL-SCNC: 132 MMOL/L (ref 135–147)
WBC # BLD AUTO: 5.48 THOUSAND/UL (ref 4.31–10.16)

## 2023-10-30 PROCEDURE — 80053 COMPREHEN METABOLIC PANEL: CPT | Performed by: PHYSICIAN ASSISTANT

## 2023-10-30 PROCEDURE — 85025 COMPLETE CBC W/AUTO DIFF WBC: CPT | Performed by: PHYSICIAN ASSISTANT

## 2023-10-30 PROCEDURE — 99223 1ST HOSP IP/OBS HIGH 75: CPT | Performed by: PHYSICIAN ASSISTANT

## 2023-10-30 PROCEDURE — 82550 ASSAY OF CK (CPK): CPT | Performed by: PHYSICIAN ASSISTANT

## 2023-10-30 PROCEDURE — 99232 SBSQ HOSP IP/OBS MODERATE 35: CPT | Performed by: STUDENT IN AN ORGANIZED HEALTH CARE EDUCATION/TRAINING PROGRAM

## 2023-10-30 PROCEDURE — 76705 ECHO EXAM OF ABDOMEN: CPT

## 2023-10-30 RX ORDER — LORAZEPAM 2 MG/ML
2 INJECTION INTRAMUSCULAR EVERY 6 HOURS PRN
Status: DISCONTINUED | OUTPATIENT
Start: 2023-10-30 | End: 2023-11-01 | Stop reason: HOSPADM

## 2023-10-30 RX ORDER — PANTOPRAZOLE SODIUM 40 MG/1
40 TABLET, DELAYED RELEASE ORAL
Status: DISCONTINUED | OUTPATIENT
Start: 2023-10-31 | End: 2023-11-01 | Stop reason: HOSPADM

## 2023-10-30 RX ORDER — CHLORDIAZEPOXIDE HYDROCHLORIDE 25 MG/1
25 CAPSULE, GELATIN COATED ORAL EVERY 8 HOURS SCHEDULED
Status: COMPLETED | OUTPATIENT
Start: 2023-10-31 | End: 2023-10-31

## 2023-10-30 RX ORDER — CHLORDIAZEPOXIDE HYDROCHLORIDE 25 MG/1
50 CAPSULE, GELATIN COATED ORAL EVERY 6 HOURS SCHEDULED
Status: COMPLETED | OUTPATIENT
Start: 2023-10-30 | End: 2023-10-30

## 2023-10-30 RX ADMIN — BUSPIRONE HYDROCHLORIDE 10 MG: 10 TABLET ORAL at 21:11

## 2023-10-30 RX ADMIN — CHLORDIAZEPOXIDE HYDROCHLORIDE 50 MG: 25 CAPSULE ORAL at 23:27

## 2023-10-30 RX ADMIN — CHLORDIAZEPOXIDE HYDROCHLORIDE 50 MG: 25 CAPSULE ORAL at 17:34

## 2023-10-30 RX ADMIN — CEFTRIAXONE 1000 MG: 1 INJECTION, SOLUTION INTRAVENOUS at 16:42

## 2023-10-30 RX ADMIN — THIAMINE HCL TAB 100 MG 100 MG: 100 TAB at 08:28

## 2023-10-30 RX ADMIN — GABAPENTIN 300 MG: 300 CAPSULE ORAL at 08:28

## 2023-10-30 RX ADMIN — Medication 1 TABLET: at 08:25

## 2023-10-30 RX ADMIN — CHLORDIAZEPOXIDE HYDROCHLORIDE 50 MG: 25 CAPSULE ORAL at 13:26

## 2023-10-30 RX ADMIN — Medication 250 MG: at 08:25

## 2023-10-30 RX ADMIN — BUSPIRONE HYDROCHLORIDE 10 MG: 10 TABLET ORAL at 16:42

## 2023-10-30 RX ADMIN — SODIUM CHLORIDE, SODIUM GLUCONATE, SODIUM ACETATE, POTASSIUM CHLORIDE, MAGNESIUM CHLORIDE, SODIUM PHOSPHATE, DIBASIC, AND POTASSIUM PHOSPHATE 125 ML/HR: .53; .5; .37; .037; .03; .012; .00082 INJECTION, SOLUTION INTRAVENOUS at 06:00

## 2023-10-30 RX ADMIN — FOLIC ACID 1 MG: 1 TABLET ORAL at 08:25

## 2023-10-30 RX ADMIN — LIDOCAINE 1 PATCH: 50 PATCH CUTANEOUS at 08:25

## 2023-10-30 RX ADMIN — SODIUM CHLORIDE, SODIUM GLUCONATE, SODIUM ACETATE, POTASSIUM CHLORIDE, MAGNESIUM CHLORIDE, SODIUM PHOSPHATE, DIBASIC, AND POTASSIUM PHOSPHATE 125 ML/HR: .53; .5; .37; .037; .03; .012; .00082 INJECTION, SOLUTION INTRAVENOUS at 21:13

## 2023-10-30 RX ADMIN — GABAPENTIN 300 MG: 300 CAPSULE ORAL at 21:11

## 2023-10-30 RX ADMIN — SODIUM CHLORIDE, SODIUM GLUCONATE, SODIUM ACETATE, POTASSIUM CHLORIDE, MAGNESIUM CHLORIDE, SODIUM PHOSPHATE, DIBASIC, AND POTASSIUM PHOSPHATE 125 ML/HR: .53; .5; .37; .037; .03; .012; .00082 INJECTION, SOLUTION INTRAVENOUS at 00:43

## 2023-10-30 RX ADMIN — FAMOTIDINE 10 MG: 20 TABLET ORAL at 08:25

## 2023-10-30 RX ADMIN — GABAPENTIN 300 MG: 300 CAPSULE ORAL at 16:42

## 2023-10-30 RX ADMIN — VENLAFAXINE HYDROCHLORIDE 75 MG: 75 CAPSULE, EXTENDED RELEASE ORAL at 08:28

## 2023-10-30 RX ADMIN — CLONIDINE HYDROCHLORIDE 0.1 MG: 0.1 TABLET ORAL at 21:11

## 2023-10-30 RX ADMIN — Medication 250 MG: at 17:34

## 2023-10-30 RX ADMIN — BUSPIRONE HYDROCHLORIDE 10 MG: 10 TABLET ORAL at 08:25

## 2023-10-30 NOTE — ASSESSMENT & PLAN NOTE
Patient states he had been sober for 35 days, but recently relapsed, which resulted in the withdrawal symptoms that brought him to the ED.    In the ED, patient was noted to be "Grossly tremulous, diaphoretic, anxious, hyperactive on exam."  Patient received 6mg total lorazepam.  ETOH on admission 35, last drink was 2100 10/27  Brought to ICU for phenobarbital loading of 910 mg phenobarbital.   Additional phenobarb PRN pending CIWA trending  Folic acid, thiamine, MVI   Patient still complaining of diarrhea  Supportive care

## 2023-10-30 NOTE — ASSESSMENT & PLAN NOTE
Considering hepatitis and rhabdo as potential causes. AST 1,530, , T bili 3.89 on admission in setting of alcohol abuse  CT abdomen shows moderate fatty infiltration of the liver without biliary ductal dilatation.  Gallbladder is distended without pericholecystic inflammation or calcified gallstones  Current AST: 363, ALT: 149, Total Bilirubin: 0.85  US pending  Will follow CMP  GI consulted

## 2023-10-30 NOTE — ASSESSMENT & PLAN NOTE
Considering elevated CK and dehydration as potential causes. UA shows renal tube epithelial cells   Cr 0.76, down from 2.09 on admission. Continue IV fluids and I&O monitoring.

## 2023-10-30 NOTE — ASSESSMENT & PLAN NOTE
Considering dehydration and beer potomania as potential causes.    Sodium 127 on admission  Currently 132  Continue fluids   Will monitor CMP

## 2023-10-30 NOTE — PLAN OF CARE
Problem: PAIN - ADULT  Goal: Verbalizes/displays adequate comfort level or baseline comfort level  Description: Interventions:  - Encourage patient to monitor pain and request assistance  - Assess pain using appropriate pain scale  - Administer analgesics based on type and severity of pain and evaluate response  - Implement non-pharmacological measures as appropriate and evaluate response  - Consider cultural and social influences on pain and pain management  - Notify physician/advanced practitioner if interventions unsuccessful or patient reports new pain  Outcome: Progressing     Problem: INFECTION - ADULT  Goal: Absence or prevention of progression during hospitalization  Description: INTERVENTIONS:  - Assess and monitor for signs and symptoms of infection  - Monitor lab/diagnostic results  - Monitor all insertion sites, i.e. indwelling lines, tubes, and drains  - Monitor endotracheal if appropriate and nasal secretions for changes in amount and color  - [place] appropriate cooling/warming therapies per order  - Administer medications as ordered  - Instruct and encourage patient and family to use good hand hygiene technique  - Identify and instruct in appropriate isolation precautions for identified infection/condition  Outcome: Progressing     Problem: NEUROSENSORY - ADULT  Goal: Achieves stable or improved neurological status  Description: INTERVENTIONS  - Monitor and report changes in neurological status  - Monitor vital signs such as temperature, blood pressure, glucose, and any other labs ordered   - Initiate measures to prevent increased intracranial pressure  - Monitor for seizure activity and implement precautions if appropriate      Outcome: Progressing     Problem: GASTROINTESTINAL - ADULT  Goal: Minimal or absence of nausea and/or vomiting  Description: INTERVENTIONS:  - Administer IV fluids if ordered to ensure adequate hydration  - Maintain NPO status until nausea and vomiting are resolved  - Nasogastric tube if ordered  - Administer ordered antiemetic medications as needed  - Provide nonpharmacologic comfort measures as appropriate  - Advance diet as tolerated, if ordered  - Consider nutrition services referral to assist patient with adequate nutrition and appropriate food choices  Outcome: Progressing

## 2023-10-30 NOTE — ASSESSMENT & PLAN NOTE
Admitted for detox 8/1-3 and 9/2-6. Not currently taking naltrexone.    Continue ETOH use education   [facility] protocol in place

## 2023-10-30 NOTE — CONSULTS
Consultation -  [facility]  Mabel Gambino 53 y.o. male MRN: [id number]  Unit/Bed#: [address] [place] [address] Encounter: [id number]            Reason for Consult / Principal Problem:   Alcoholic hepatitis        ASSESSMENT AND PLAN:    44-year-old male with elevated LFTs which is likely multifactorial in setting of alcohol abuse along with rhabdomyolysis. -Patient's Anca discriminant function is: 3.2 which indicates low short-term mortality and treatment with glucocorticoids is not indicated  -Monitor MELD labs, no INR today so therefore MELD 3.0 cannot be calculated, as above Maddrey's discriminant function is well under 32  -Will order acute hepatitis panel  -Right upper quadrant ultrasound pending  -Monitor output of stool, will order PPI while here due to possible black stool  -Follow serial H&H  -Spoke with patient and cousin at bedside  ______________________________________________________________________    HPI:     This is a 53 y.o. with PMHx of alcohol abuse, GERD, anxiety and depression. He presented on 10/28 with withdrawal symptoms. Endorses drinking approximately 1 bottle of [place] [employer] daily. He was sober since discharge from [address] [facility] last month but began drinking heavily this past week. He endorses diaphoresis, anxiety, left flank/abdominal pain, and tremors. He has experienced numerous falls in recent days of which he does not remember. Patient noted to have significantly elevated LFTs with subsequent improvement since admission with normal bilirubin today. Patient also noted to have rhabdomyolysis which also has been improving along with the LFTs. CAT scan of chest abdomen and pelvis on admission showed no acute traumatic findings and right upper quadrant ultrasound has been ordered.   Patient reports that he also thought he had some dark stool prior to admission and he did have an EGD back in 2019 which did show normal duodenum and mild gastritis which was biopsied and mild esophagitis and some linear furrows in the proximal to mid esophagus, patient did not have eosinophilic esophagitis on biopsies and likely related to reflux. Patient reports that he is having some left-sided pain but has bruising in the area and has a Lidoderm patch in that area. He reports that his reflux symptoms are better after he did not drink for 35 days and generally takes Pepcid at home. Reports that he was having some loose stools just since hospitalization but denies any chronic diarrhea but would have it on occasion at home depending upon if he did not eat. REVIEW OF SYSTEMS:    CONSTITUTIONAL: Denies any fever, chills, rigors, and weight loss. HEENT: No earache or tinnitus. Denies hearing loss or visual disturbances. CARDIOVASCULAR: No chest pain or palpitations. RESPIRATORY: Denies any cough, hemoptysis, shortness of breath or dyspnea on exertion. GASTROINTESTINAL: As noted in the History of Present Illness. GENITOURINARY: No problems with urination. Denies any hematuria or dysuria. NEUROLOGIC: No dizziness or vertigo, denies headaches. MUSCULOSKELETAL: Denies any muscle or joint pain. SKIN: Denies skin rashes or itching. ENDOCRINE: Denies excessive thirst. Denies intolerance to heat or cold. PSYCHOSOCIAL: Denies depression or anxiety. Denies any recent memory loss. Historical Information   Past Medical History:   Diagnosis Date    Anxiety     Depression     ETOH abuse     GERD (gastroesophageal reflux disease)      Past Surgical History:   Procedure Laterality Date    CLOSED REDUCTION FOREARM FRACTURE Right     EGD       Social History   Social History     Substance and Sexual Activity   Alcohol Use Yes    Comment: daily whiskey 4 bottles per week     Social History     Substance and Sexual Activity   Drug Use Never     Social History     Tobacco Use   Smoking Status Never   Smokeless Tobacco Current    Types: Chew     History reviewed.  No pertinent family history. Meds/Allergies     Medications Prior to Admission   Medication    busPIRone (BUSPAR) 10 mg tablet    famotidine (Pepcid) 20 mg tablet    gabapentin (Neurontin) 300 mg capsule    venlafaxine (EFFEXOR-XR) 75 mg 24 hr capsule    cloNIDine (Catapres) 0.1 mg tablet     Current Facility-Administered Medications   Medication Dose Route Frequency    busPIRone (BUSPAR) tablet 10 mg  10 mg Oral TID    cefTRIAXone (ROCEPHIN) IVPB (premix in dextrose) 1,000 mg 50 mL  1,000 mg Intravenous Q24H    cloNIDine (CATAPRES) tablet 0.1 mg  0.1 mg Oral HS    famotidine (PEPCID) tablet 10 mg  10 mg Oral Daily    folic acid (FOLVITE) tablet 1 mg  1 mg Oral Daily    gabapentin (NEURONTIN) capsule 300 mg  300 mg Oral TID    lidocaine (LIDODERM) 5 % patch 1 patch  1 patch Topical Daily    multi-electrolyte (PLASMALYTE-A/ISOLYTE-S PH 7.4) IV solution  125 mL/hr Intravenous Continuous    multivitamin-minerals (CENTRUM) tablet 1 tablet  1 tablet Oral Daily    ondansetron (ZOFRAN) injection 4 mg  4 mg Intravenous Q4H PRN    saccharomyces boulardii (FLORASTOR) capsule 250 mg  250 mg Oral BID    thiamine tablet 100 mg  100 mg Oral Daily    venlafaxine (EFFEXOR-XR) 24 hr capsule 75 mg  75 mg Oral Daily       No Known Allergies        Objective     Blood pressure 123/71, pulse 73, temperature 97.7 °F (36.5 °C), temperature source Oral, resp. rate (!) 11, height 6' 1" (1.854 m), weight 93.6 kg (206 lb 5.6 oz), SpO2 99 %. Body mass index is 27.22 kg/m². Intake/Output Summary (Last 24 hours) at 10/30/2023 1010  Last data filed at 10/29/2023 1720  Gross per 24 hour   Intake 875 ml   Output 500 ml   Net 375 ml         PHYSICAL EXAM:      General Appearance:   Alert, cooperative, no distress   HEENT:   Normocephalic, atraumatic, anicteric.      Neck:  Supple, symmetrical, trachea midline   Lungs:   Clear to auscultation bilaterally; no rales, rhonchi or wheezing; respirations unlabored    Heart[de-identified]   Regular rate and rhythm; no murmur, rub, or gallop.    Abdomen:   Soft, non-tender, non-distended; normal bowel sounds; no masses, no organomegaly    Genitalia:   Deferred    Rectal:   Deferred    Extremities:  No cyanosis, clubbing or edema    Pulses:  2+ and symmetric all extremities    Skin:  No jaundice, rashes, or lesions    Lymph nodes:  No palpable cervical lymphadenopathy        Lab Results:   Admission on 10/28/2023   Component Date Value    WBC 10/28/2023 21.91 (H)     RBC 10/28/2023 5.57     Hemoglobin 10/28/2023 17.4 (H)     Hematocrit 10/28/2023 47.2     MCV 10/28/2023 85     MCH 10/28/2023 31.2     MCHC 10/28/2023 36.9     RDW 10/28/2023 12.0     MPV 10/28/2023 10.3     Platelets 61/68/8527 392 (H)     nRBC 10/28/2023 0     Neutrophils Relative 10/28/2023 87 (H)     Immat GRANS % 10/28/2023 1     Lymphocytes Relative 10/28/2023 5 (L)     Monocytes Relative 10/28/2023 7     Eosinophils Relative 10/28/2023 0     Basophils Relative 10/28/2023 0     Neutrophils Absolute 10/28/2023 19.28 (H)     Immature Grans Absolute 10/28/2023 0.10     Lymphocytes Absolute 10/28/2023 0.98     Monocytes Absolute 10/28/2023 1.51 (H)     Eosinophils Absolute 10/28/2023 0.00     Basophils Absolute 10/28/2023 0.04     Sodium 10/28/2023 127 (L)     Potassium 10/28/2023 3.3 (L)     Chloride 10/28/2023 84 (L)     CO2 10/28/2023 16 (L)     ANION GAP 10/28/2023 27     BUN 10/28/2023 28 (H)     Creatinine 10/28/2023 2.09 (H)     Glucose 10/28/2023 118     Calcium 10/28/2023 9.6     AST 10/28/2023 1,530 (H)     ALT 10/28/2023 335 (H)     Alkaline Phosphatase 10/28/2023 87     Total Protein 10/28/2023 8.4     Albumin 10/28/2023 5.0     Total Bilirubin 10/28/2023 3.89 (H)     eGFR 10/28/2023 36     Protime 10/28/2023 13.5     INR 10/28/2023 1.01     PTT 10/28/2023 29     Ethanol Lvl 10/28/2023 35 (H)     Amph/Meth UR 10/28/2023 Negative     Barbiturate Ur 10/28/2023 Negative     Benzodiazepine Urine 10/28/2023 Negative     Cocaine Urine 10/28/2023 Negative     Methadone Urine 10/28/2023 Negative     Opiate Urine 10/28/2023 Negative     PCP Ur 10/28/2023 Negative     THC Urine 10/28/2023 Positive (A)     Oxycodone Urine 10/28/2023 Negative     Color, UA 10/28/2023 Dark [place]ata     Clarity, UA 10/28/2023 Slightly Cloudy     Specific Gravity, UA 10/28/2023 1.025     pH, UA 10/28/2023 6.5     Leukocytes, UA 10/28/2023 Negative     Nitrite, UA 10/28/2023 Positive (A)     Protein, UA 10/28/2023 >=300 (A)     Glucose, UA 10/28/2023 Negative     Ketones, UA 10/28/2023 15 (1+) (A)     Urobilinogen, UA 10/28/2023 1.0     Bilirubin, UA 10/28/2023 Negative     Occult Blood, UA 10/28/2023 Large (A)     Magnesium 10/28/2023 2.0     Blood Culture 10/28/2023 No Growth at 24 hrs. Blood Culture 10/28/2023 No Growth at 24 hrs.      LACTIC ACID 10/28/2023 2.6 (HH)     CRP 10/28/2023 125.9 (H)     Sed Rate 10/28/2023 36 (H)     Procalcitonin 10/28/2023 1.76 (H)     LACTIC ACID 10/28/2023 1.8     Total CK 10/28/2023 85,247 (H)     RBC, UA 10/28/2023 1-2     WBC, UA 10/28/2023 1-2     Epithelial Cells 10/28/2023 None Seen     Bacteria, UA 10/28/2023 Moderate (A)     Fine granular casts 10/28/2023 10-25     AMORPH URATES 10/28/2023 Moderate     OTHER OBSERVATIONS 10/28/2023 Renal Tubule Epithelial Cells Present     Lipase 10/28/2023 20     Sodium 10/28/2023 128 (L)     Potassium 10/28/2023 3.7     Chloride 10/28/2023 97     CO2 10/28/2023 19 (L)     ANION GAP 10/28/2023 12     BUN 10/28/2023 25     Creatinine 10/28/2023 1.31 (H)     Glucose 10/28/2023 98     Calcium 10/28/2023 7.4 (L)     AST 10/28/2023 762 (H)     ALT 10/28/2023 209 (H)     Alkaline Phosphatase 10/28/2023 52     Total Protein 10/28/2023 5.8 (L)     Albumin 10/28/2023 3.5     Total Bilirubin 10/28/2023 3.27 (H)     eGFR 10/28/2023 64     Magnesium 10/28/2023 2.9 (H)     Procalcitonin 10/29/2023 1.10 (H)     Bilirubin, Direct 10/29/2023 0.62 (H)     Total CK 10/29/2023 27,253 (H)     Vancomycin Rm 10/29/2023 11.9     WBC 10/29/2023 9.52     RBC 10/29/2023 3.89     Hemoglobin 10/29/2023 12.5     Hematocrit 10/29/2023 34.2 (L)     MCV 10/29/2023 88     MCH 10/29/2023 32.1     MCHC 10/29/2023 36.5     RDW 10/29/2023 12.5     MPV 10/29/2023 10.7     Platelets 90/74/5947 170     nRBC 10/29/2023 0     Neutrophils Relative 10/29/2023 86 (H)     Immat GRANS % 10/29/2023 1     Lymphocytes Relative 10/29/2023 7 (L)     Monocytes Relative 10/29/2023 6     Eosinophils Relative 10/29/2023 0     Basophils Relative 10/29/2023 0     Neutrophils Absolute 10/29/2023 8.21 (H)     Immature Grans Absolute 10/29/2023 0.05     Lymphocytes Absolute 10/29/2023 0.62     Monocytes Absolute 10/29/2023 0.60     Eosinophils Absolute 10/29/2023 0.01     Basophils Absolute 10/29/2023 0.03     Protime 10/29/2023 13.5     INR 10/29/2023 1.01     Sodium 10/29/2023 131 (L)     Potassium 10/29/2023 3.6     Chloride 10/29/2023 99     CO2 10/29/2023 23     ANION GAP 10/29/2023 9     BUN 10/29/2023 21     Creatinine 10/29/2023 1.19     Glucose 10/29/2023 91     Calcium 10/29/2023 7.8 (L)     Corrected Calcium 10/29/2023 8.4     AST 10/29/2023 633 (H)     ALT 10/29/2023 191 (H)     Alkaline Phosphatase 10/29/2023 53     Total Protein 10/29/2023 5.6 (L)     Albumin 10/29/2023 3.3 (L)     Total Bilirubin 10/29/2023 2.95 (H)     eGFR 10/29/2023 72     Magnesium 10/29/2023 3.0 (H)     Calcium, Ionized 10/29/2023 1.05 (L)     Ventricular Rate 10/28/2023 109     Atrial Rate 10/28/2023 109     RI Interval 10/28/2023 100     QRSD Interval 10/28/2023 98     QT Interval 10/28/2023 350     QTC Interval 10/28/2023 471     P Axis 10/28/2023 32     QRS [employer] 10/28/2023 -4     T Wave [employer] 10/28/2023 19     WBC 10/30/2023 5.48     RBC 10/30/2023 3.48 (L)     Hemoglobin 10/30/2023 11.3 (L)     Hematocrit 10/30/2023 31.0 (L)     MCV 10/30/2023 89     MCH 10/30/2023 32.5     MCHC 10/30/2023 36.5     RDW 10/30/2023 12.4     MPV 10/30/2023 10.5     Platelets 56/79/8394 148 (L)     nRBC 10/30/2023 0     Neutrophils Relative 10/30/2023 76 (H)     Immat GRANS % 10/30/2023 1     Lymphocytes Relative 10/30/2023 13 (L)     Monocytes Relative 10/30/2023 8     Eosinophils Relative 10/30/2023 1     Basophils Relative 10/30/2023 1     Neutrophils Absolute 10/30/2023 4.22     Immature Grans Absolute 10/30/2023 0.03     Lymphocytes Absolute 10/30/2023 0.71     Monocytes Absolute 10/30/2023 0.44     Eosinophils Absolute 10/30/2023 0.05     Basophils Absolute 10/30/2023 0.03     Sodium 10/30/2023 132 (L)     Potassium 10/30/2023 3.0 (L)     Chloride 10/30/2023 100     CO2 10/30/2023 23     ANION GAP 10/30/2023 9     BUN 10/30/2023 11     Creatinine 10/30/2023 0.76     Glucose 10/30/2023 81     Calcium 10/30/2023 7.4 (L)     Corrected Calcium 10/30/2023 8.2 (L)     AST 10/30/2023 363 (H)     ALT 10/30/2023 149 (H)     Alkaline Phosphatase 10/30/2023 47     Total Protein 10/30/2023 5.1 (L)     Albumin 10/30/2023 3.0 (L)     Total Bilirubin 10/30/2023 0.85     eGFR 10/30/2023 108     Total CK 10/30/2023 9,420 (H)        Imaging Studies: CT chest abdomen pelvis wo contrast    Result Date: 10/28/2023  Narrative: CT CHEST, ABDOMEN AND PELVIS WITHOUT IV CONTRAST INDICATION:   trauma. COMPARISON: Correlation is made with the abdominal ultrasound dated 10/23/2019. TECHNIQUE: CT examination of the chest, abdomen and pelvis was performed without intravenous contrast. Multiplanar 2D reformatted images were created from the source data. 3D reconstructions of the bony thorax were performed in order to improve sensitivity of evaluation for rib fractures. This examination, like all CT scans performed in the [facility], was performed utilizing techniques to minimize radiation dose exposure, including the use of iterative reconstruction and automated exposure control. Radiation dose length product (DLP) for this visit:  723 mGy-cm Enteric contrast was not administered. FINDINGS: CHEST LUNGS:  Lungs are clear.   There is no tracheal or endobronchial lesion. PLEURA:  Unremarkable. HEART/GREAT VESSELS: Heart is unremarkable for patient's age. No thoracic aortic aneurysm. No pericardial effusion. MEDIASTINUM AND RUFINA: No mediastinal hematoma. No mediastinal, hilar, or axillary lymphadenopathy. CHEST WALL AND LOWER NECK:  Unremarkable. ABDOMEN LIVER/BILIARY TREE: Moderate fatty infiltration of the liver. No biliary ductal dilatation. GALLBLADDER: Distended gallbladder without pericholecystic inflammation or calcified gallstones. SPLEEN:  Unremarkable. PANCREAS:  Unremarkable. ADRENAL GLANDS:  Unremarkable. KIDNEYS/URETERS:  Unremarkable. No hydronephrosis. STOMACH AND BOWEL: No bowel obstruction or focal inflammatory process. APPENDIX:  No findings to suggest appendicitis. ABDOMINOPELVIC CAVITY:  No ascites. No pneumoperitoneum. No lymphadenopathy. VESSELS:  Unremarkable for patient's age. Retroaortic left renal vein. PELVIS REPRODUCTIVE ORGANS:  Unremarkable for patient's age. URINARY BLADDER:  Unremarkable. ABDOMINAL WALL/INGUINAL REGIONS:  Unremarkable. OSSEOUS STRUCTURES:  No acute fracture or destructive osseous lesion. Impression: No acute traumatic CT findings. Additional findings as above. Workstation performed: [id number]     CT cervical spine without contrast    Result Date: 10/28/2023  Narrative: CT CERVICAL SPINE - WITHOUT CONTRAST INDICATION:   trauma. COMPARISON:  None. TECHNIQUE:  CT examination of the cervical spine was performed without intravenous contrast.  Contiguous axial images were obtained. Multiplanar 2D reformatted images were created from the source data. Radiation dose length product (DLP) for this visit:  459 mGy-cm . This examination, like all CT scans performed in the [facility], was performed utilizing techniques to minimize radiation dose exposure, including the use of iterative reconstruction and automated exposure control. IMAGE QUALITY:  Diagnostic.  FINDINGS: ALIGNMENT:  Normal alignment of the cervical spine. No subluxation. VERTEBRAE:  No fracture. DEGENERATIVE CHANGES: Mild multilevel cervical degenerative changes are noted without critical central canal stenosis. PREVERTEBRAL AND PARASPINAL SOFT TISSUES: No prevertebral soft tissue edema. Unremarkable THORACIC INLET:  Normal.     Impression: No cervical spine fracture or traumatic malalignment. Workstation performed: [id number]     CT head without contrast    Result Date: 10/28/2023  Narrative: CT BRAIN - WITHOUT CONTRAST INDICATION:   Head trauma, moderate-severe trauma. COMPARISON:  None. TECHNIQUE:  CT examination of the brain was performed. Multiplanar 2D reformatted images were created from the source data. Radiation dose length product (DLP) for this visit:  926 mGy-cm . This examination, like all CT scans performed in the [facility], was performed utilizing techniques to minimize radiation dose exposure, including the use of iterative reconstruction and automated exposure control. IMAGE QUALITY:  Diagnostic. FINDINGS: PARENCHYMA:  No intracranial mass, mass effect or midline shift. No CT signs of acute infarction. No acute parenchymal hemorrhage. VENTRICLES AND EXTRA-AXIAL SPACES:  Normal for the patient's age. VISUALIZED ORBITS: Normal visualized orbits. PARANASAL SINUSES: Normal visualized paranasal sinuses. CALVARIUM AND EXTRACRANIAL SOFT TISSUES:  Normal.     Impression: No acute intracranial abnormality.  Workstation performed: [id number]

## 2023-10-30 NOTE — PLAN OF CARE
Problem: MOBILITY - ADULT  Goal: Maintain or return to baseline ADL function  Description: INTERVENTIONS:  -  Assess patient's ability to carry out ADLs; assess patient's baseline for ADL function and identify physical deficits which impact ability to perform ADLs (bathing, care of mouth/teeth, toileting, grooming, dressing, etc.)  - Assess/evaluate cause of self-care deficits   - Assess range of motion  - Assess patient's mobility; develop plan if impaired  - Assess patient's need for assistive devices and provide as appropriate  - Encourage maximum independence but intervene and supervise when necessary  - Involve family in performance of ADLs  - Assess for home care needs following discharge   - Consider OT consult to assist with ADL evaluation and planning for discharge  - Provide patient education as appropriate  Outcome: Progressing     Problem: PAIN - ADULT  Goal: Verbalizes/displays adequate comfort level or baseline comfort level  Description: Interventions:  - Encourage patient to monitor pain and request assistance  - Assess pain using appropriate pain scale  - Administer analgesics based on type and severity of pain and evaluate response  - Implement non-pharmacological measures as appropriate and evaluate response  - Consider cultural and social influences on pain and pain management  - Notify physician/advanced practitioner if interventions unsuccessful or patient reports new pain  Outcome: Progressing     Problem: INFECTION - ADULT  Goal: Absence or prevention of progression during hospitalization  Description: INTERVENTIONS:  - Assess and monitor for signs and symptoms of infection  - Monitor lab/diagnostic results  - Monitor all insertion sites, i.e. indwelling lines, tubes, and drains  - Monitor endotracheal if appropriate and nasal secretions for changes in amount and color  - [place] appropriate cooling/warming therapies per order  - Administer medications as ordered  - Instruct and encourage patient and family to use good hand hygiene technique  - Identify and instruct in appropriate isolation precautions for identified infection/condition  Outcome: Progressing     Problem: SAFETY ADULT  Goal: Maintain or return to baseline ADL function  Description: INTERVENTIONS:  -  Assess patient's ability to carry out ADLs; assess patient's baseline for ADL function and identify physical deficits which impact ability to perform ADLs (bathing, care of mouth/teeth, toileting, grooming, dressing, etc.)  - Assess/evaluate cause of self-care deficits   - Assess range of motion  - Assess patient's mobility; develop plan if impaired  - Assess patient's need for assistive devices and provide as appropriate  - Encourage maximum independence but intervene and supervise when necessary  - Involve family in performance of ADLs  - Assess for home care needs following discharge   - Consider OT consult to assist with ADL evaluation and planning for discharge  - Provide patient education as appropriate  Outcome: Progressing     Problem: NEUROSENSORY - ADULT  Goal: Achieves stable or improved neurological status  Description: INTERVENTIONS  - Monitor and report changes in neurological status  - Monitor vital signs such as temperature, blood pressure, glucose, and any other labs ordered   - Initiate measures to prevent increased intracranial pressure  - Monitor for seizure activity and implement precautions if appropriate      Outcome: Progressing

## 2023-10-30 NOTE — ASSESSMENT & PLAN NOTE
Patient reports a history of several falls while intoxicated over the last few days. Consider fall, rhabdo and dehydration as potential causes. CK 9,420, down from 27,253.   Continue fluids and I&O monitoring  Trend CK

## 2023-10-30 NOTE — PROGRESS NOTES
[id number] [facility]  Progress Note  Name: Brice Costa  MRN: [id number]  Unit/Bed#: 3 [address] I Date of Admission: 10/28/2023   Date of Service: 10/30/2023 I Hospital Day: 2    Assessment/Plan   * Alcohol withdrawal syndrome with complication [facility])  Assessment & Plan  Patient states he had been sober for 35 days, but recently relapsed, which resulted in the withdrawal symptoms that brought him to the ED. In the ED, patient was noted to be "Grossly tremulous, diaphoretic, anxious, hyperactive on exam."  Patient received 6mg total lorazepam.  ETOH on admission 35, last drink was 2100 10/27  Brought to ICU for phenobarbital loading of 910 mg phenobarbital.   Additional phenobarb PRN pending CIWA trending  Folic acid, thiamine, MVI   Patient still complaining of diarrhea  Supportive care    SIRS (systemic inflammatory response syndrome) ([address])  Assessment & Plan  Possibly 2/2 ETOH abuse, withdrawal, and dehydration  On admission, HR: 125, RR; 24, Temp 96.6 and WBC: 21.91. Lactic acid 2.6-> 1.8. Continue IV fluids   Procalcitonin 1.76->1. 10. Continue to trend  [facility] x2 pending  Denies urinary or respiratory symptoms. Does endorse left sided abdominal pain. UA showing largely + for blood, ketones and protein. + nitrites, moderate bacteria and renal tube epithelial cells. Asymptomatic. CT chest/abdomen/pelvis showing distended gallbladder but no stones or other infectious source   Cefepime and Vancomycin given in ED, vancomycin discontinued with negative MRSA results. Trend CBC, temperature curve  RUQ US pending    Rhabdomyolysis  Assessment & Plan   Patient reports a history of several falls while intoxicated over the last few days. Consider fall, rhabdo and dehydration as potential causes. CK 9,420, down from 27,253. Continue fluids and I&O monitoring  Trend CK    Multiple falls  Assessment & Plan  Consider alcohol use as and intoxication as cause.   Scattered flat bruising noted on all extremities, left flank, and back. Appear to be in multiple stages of healing  CT head, c-spine, C/A/P all negative for acute traumatic injury  Pain only present over bruising on left flank. Fall precautions  Continue education for alcohol abstinence. JORDI (acute kidney injury) ([address])  Assessment & Plan  Considering elevated CK and dehydration as potential causes. UA shows renal tube epithelial cells   Cr 0.76, down from 2.09 on admission. Continue IV fluids and I&O monitoring. Hyponatremia  Assessment & Plan  Considering dehydration and beer potomania as potential causes. Sodium 127 on admission  Currently 132  Continue fluids   Will monitor CMP    Alcoholic hepatitis without ascites  Assessment & Plan  Query evolving alcoholic hepatitis: MDF only 10.8, MELD-Na 26  Hold off on steroids at this time  Trend LFTs, coag studies, MDF/MELD-Na  Additional plan as outlined  Consult GI    Transaminitis  Assessment & Plan  Considering hepatitis and rhabdo as potential causes. AST 1,530, , T bili 3.89 on admission in setting of alcohol abuse  CT abdomen shows moderate fatty infiltration of the liver without biliary ductal dilatation. Gallbladder is distended without pericholecystic inflammation or calcified gallstones  Current AST: 363, ALT: 149, Total Bilirubin: 0.85  US pending  Will follow CMP  GI consulted        Alcohol use disorder, severe, dependence ([address])  Assessment & Plan  Admitted for detox 8/1-3 and 9/2-6. Not currently taking naltrexone. Continue ETOH use education   CIWA protocol in place    Generalized anxiety disorder  Assessment & Plan  Continue home medications:  Buspar 10 mg TID  Effexor 75 mg q d    Start medication:  Librium and PRN Ativan    GERD without esophagitis  Assessment & Plan  Continue Pepcid 10 mg               VTE Pharmacologic Prophylaxis:   High Risk (Score >/= 5) - Pharmacological DVT Prophylaxis Contraindicated.  Sequential Compression Devices Ordered. oob    Patient Centered Rounds: I performed bedside rounds with nursing staff today. Discussions with Specialists or Other Care Team Provider: Yes    Education and Discussions with Family / Patient: Updated  (Cousin) at bedside. Total Time Spent on Date of Encounter in care of patient: 20 mins. This time was spent on one or more of the following: performing physical exam; counseling and coordination of care; obtaining or reviewing history; documenting in the medical record; reviewing/ordering tests, medications or procedures; communicating with other healthcare professionals and discussing with patient's family/caregivers. Current Length of Stay: 2 day(s)  Current Patient Status: Inpatient   Certification Statement: The patient will continue to require additional inpatient hospital stay due to JORDI and elevated CK  Discharge Plan: Anticipate discharge in 24-48 hrs to pending hospital course. Code Status: Level 1 - Full Code    Subjective:   Patient reports he still has pain his left side, over bruises as previously noted, likely from a fall while intoxication. He also complains of loose, watery diarrhea that has been present since admission. Objective:     Vitals:   Temp (24hrs), Av.1 °F (36.7 °C), Min:97.4 °F (36.3 °C), Max:98.7 °F (37.1 °C)    Temp:  [97.4 °F (36.3 °C)-98.7 °F (37.1 °C)] 98.7 °F (37.1 °C)  HR:  [] 89  Resp:  [11-20] 16  BP: (110-146)/(66-89) 146/89  SpO2:  [94 %-100 %] 99 %  Body mass index is 27.22 kg/m². Input and Output Summary (last 24 hours): Intake/Output Summary (Last 24 hours) at 10/30/2023 1855  Last data filed at 10/30/2023 1201  Gross per 24 hour   Intake --   Output 800 ml   Net -800 ml       Physical Exam:   Physical Exam  Constitutional:       Appearance: He is obese. HENT:      Head: Normocephalic and atraumatic. Cardiovascular:      Rate and Rhythm: Normal rate and regular rhythm.    Pulmonary:      Effort: Pulmonary effort is normal.      Breath sounds: Normal breath sounds. No wheezing or rhonchi. Abdominal:      General: Abdomen is flat. Bowel sounds are normal. There is no distension. Tenderness: There is no abdominal tenderness. Neurological:      Mental Status: He is alert. Additional Data:     Labs:  Results from last 7 days   Lab Units 10/30/23  0559   WBC Thousand/uL 5.48   HEMOGLOBIN g/dL 11.3*   HEMATOCRIT % 31.0*   PLATELETS Thousands/uL 148*   NEUTROS PCT % 76*   LYMPHS PCT % 13*   MONOS PCT % 8   EOS PCT % 1     Results from last 7 days   Lab Units 10/30/23  0559   SODIUM mmol/L 132*   POTASSIUM mmol/L 3.0*   CHLORIDE mmol/L 100   CO2 mmol/L 23   BUN mg/dL 11   CREATININE mg/dL 0.76   ANION GAP mmol/L 9   CALCIUM mg/dL 7.4*   ALBUMIN g/dL 3.0*   TOTAL BILIRUBIN mg/dL 0.85   ALK PHOS U/L 47   ALT U/L 149*   AST U/L 363*   GLUCOSE RANDOM mg/dL 81     Results from last 7 days   Lab Units 10/29/23  0549   INR  1.01             Results from last 7 days   Lab Units 10/29/23  0549 10/28/23  1642 10/28/23  1416 10/28/23  1318   LACTIC ACID mmol/L  --  1.8 2.6*  --    PROCALCITONIN ng/ml 1.10*  --   --  1.76*       Lines/Drains:  Invasive Devices       Peripheral Intravenous Line  Duration             Peripheral IV 10/28/23 Right Forearm 2 days                          Imaging: Reviewed radiology reports from this admission including: chest CT scan, abdominal/pelvic CT, and CT head    Recent Cultures (last 7 days):   Results from last 7 days   Lab Units 10/28/23  1422 10/28/23  1416   BLOOD CULTURE  No Growth at 48 hrs. No Growth at 48 hrs.        Last 24 Hours Medication List:   Current Facility-Administered Medications   Medication Dose Route Frequency Provider Last Rate    busPIRone  10 mg Oral TID Tnai Malone PA-C      cefTRIAXone  1,000 mg Intravenous Q45J Tani Malone PA-C 8,617 mg (10/30/23 1642)    chlordiazePOXIDE  50 mg Oral Q6H [address] Betito Barr MD      Followed by    Olayinka Olivas ON 10/31/2023] chlordiazePOXIDE  25 mg Oral Q8H [address] Virgil Sanchez MD      cloNIDine  0.1 mg Oral HS Juanita Malone PA-C      folic acid  1 mg Oral Daily Juanita Belt Antonia Malone      gabapentin  300 mg Oral TID Juanita Belt VERNA Mlaone      lidocaine  1 patch Topical Daily Juanita Sandeep Malone PA-C      LORazepam  2 mg Intravenous Q6H PRN Virgil Sanchez MD      multi-electrolyte  125 mL/hr Intravenous Continuous Juanita Antonia Davison 093 mL/hr (10/30/23 0600)    multivitamin-minerals  1 tablet Oral Daily Anna Dodie VERNA Malone      ondansetron  4 mg Intravenous W8G PRN Juanita Malone PA-C      [START ON 10/31/2023] pantoprazole  40 mg Oral Early Morning Pat Garcia PA-C      saccharomyces boulardii  250 mg Oral BID Juanita Malone PA-C      thiamine  100 mg Oral Daily Jh Pacheco PA-C      venlafaxine  75 mg Oral Daily Jh Pacheco PA-C          Today, Patient Was Seen By: Virgil Sanchez MD    **Please Note: This note may have been constructed using a voice recognition system. **

## 2023-10-30 NOTE — ASSESSMENT & PLAN NOTE
Continue home medications:  Buspar 10 mg TID  Effexor 75 mg q d    Start medication:  Librium and PRN Ativan

## 2023-10-30 NOTE — ASSESSMENT & PLAN NOTE
Query evolving alcoholic hepatitis: MDF only 10.8, MELD-Na 26  Hold off on steroids at this time  Trend LFTs, coag studies, MDF/MELD-Na  Additional plan as outlined  Consult GI

## 2023-10-30 NOTE — UTILIZATION REVIEW
NOTIFICATION OF INPATIENT ADMISSION   AUTHORIZATION REQUEST   SERVICING FACILITY:   [address] [place] [address]  Tax ID: [id number]  NPI: [id number] ATTENDING PROVIDER:  Attending Name and NPI#: Brii Moreno Md [[id number]]  Address: [address] [place] [address]  Phone: [phone]   ADMISSION INFORMATION:  Place of Service: Inpatient [address] [facility]  Place of Service Code: 21  Inpatient Admission Date/Time: 10/28/23  4:33 PM  Discharge Date/Time: No discharge date for patient encounter. Admitting Diagnosis Code/Description:  Bacteremia [R78.81]  Alcohol dependence ([address]) [F10.20]  UTI (urinary tract infection) [N39.0]  Elevated liver function tests [R79.89]  JORDI (acute kidney injury) ([address]) [N17.9]  Withdrawal symptoms, alcohol ([address]) [F10.939]  Sepsis ([address]) [A41.9]     UTILIZATION REVIEW CONTACT:  René Torres Utilization   Network Utilization Review Department  Phone: [phone]  Fax [phone]  Email: [email or web address]  Contact for approvals/pending authorizations, clinical reviews, and discharge. PHYSICIAN ADVISORY SERVICES:  Medical Necessity Denial & [facility] Review  Phone: [phone]  Fax: [phone]  Email: [email or web address]     DISCHARGE SUPPORT TEAM:  For Patients Discharge Needs & Updates  Phone: [phone] opt. 2 Fax: [phone]  Email: [email or web address]

## 2023-10-30 NOTE — ASSESSMENT & PLAN NOTE
Consider alcohol use as and intoxication as cause. Scattered flat bruising noted on all extremities, left flank, and back. Appear to be in multiple stages of healing  CT head, c-spine, C/A/P all negative for acute traumatic injury  Pain only present over bruising on left flank. Fall precautions  Continue education for alcohol abstinence.

## 2023-10-31 PROBLEM — N17.9 AKI (ACUTE KIDNEY INJURY) (HCC): Status: RESOLVED | Noted: 2023-10-28 | Resolved: 2023-10-31

## 2023-10-31 PROBLEM — E87.1 HYPONATREMIA: Status: RESOLVED | Noted: 2023-10-28 | Resolved: 2023-10-31

## 2023-10-31 LAB
ALBUMIN SERPL BCP-MCNC: 3 G/DL (ref 3.5–5)
ALP SERPL-CCNC: 46 U/L (ref 34–104)
ALT SERPL W P-5'-P-CCNC: 134 U/L (ref 7–52)
ANION GAP SERPL CALCULATED.3IONS-SCNC: 6 MMOL/L
AST SERPL W P-5'-P-CCNC: 247 U/L (ref 13–39)
BASOPHILS # BLD AUTO: 0.04 THOUSANDS/ÂΜL (ref 0–0.1)
BASOPHILS NFR BLD AUTO: 1 % (ref 0–1)
BILIRUB SERPL-MCNC: 0.55 MG/DL (ref 0.2–1)
BUN SERPL-MCNC: 6 MG/DL (ref 5–25)
CALCIUM ALBUM COR SERPL-MCNC: 8.6 MG/DL (ref 8.3–10.1)
CALCIUM SERPL-MCNC: 7.8 MG/DL (ref 8.4–10.2)
CHLORIDE SERPL-SCNC: 101 MMOL/L (ref 96–108)
CK SERPL-CCNC: 3702 U/L (ref 39–308)
CO2 SERPL-SCNC: 29 MMOL/L (ref 21–32)
CREAT SERPL-MCNC: 0.76 MG/DL (ref 0.6–1.3)
EOSINOPHIL # BLD AUTO: 0.12 THOUSAND/ÂΜL (ref 0–0.61)
EOSINOPHIL NFR BLD AUTO: 2 % (ref 0–6)
ERYTHROCYTE [DISTWIDTH] IN BLOOD BY AUTOMATED COUNT: 12.4 % (ref 11.6–15.1)
GFR SERPL CREATININE-BSD FRML MDRD: 108 ML/MIN/1.73SQ M
GLUCOSE SERPL-MCNC: 77 MG/DL (ref 65–140)
HAV IGM SER QL: NORMAL
HBV CORE IGM SER QL: NORMAL
HBV SURFACE AG SER QL: NORMAL
HCT VFR BLD AUTO: 32 % (ref 36.5–49.3)
HCV AB SER QL: NORMAL
HGB BLD-MCNC: 11.8 G/DL (ref 12–17)
IMM GRANULOCYTES # BLD AUTO: 0.04 THOUSAND/UL (ref 0–0.2)
IMM GRANULOCYTES NFR BLD AUTO: 1 % (ref 0–2)
INR PPP: 1.04 (ref 0.84–1.19)
LYMPHOCYTES # BLD AUTO: 0.93 THOUSANDS/ÂΜL (ref 0.6–4.47)
LYMPHOCYTES NFR BLD AUTO: 19 % (ref 14–44)
MAGNESIUM SERPL-MCNC: 2.2 MG/DL (ref 1.9–2.7)
MCH RBC QN AUTO: 33.3 PG (ref 26.8–34.3)
MCHC RBC AUTO-ENTMCNC: 36.9 G/DL (ref 31.4–37.4)
MCV RBC AUTO: 90 FL (ref 82–98)
MONOCYTES # BLD AUTO: 0.47 THOUSAND/ÂΜL (ref 0.17–1.22)
MONOCYTES NFR BLD AUTO: 9 % (ref 4–12)
NEUTROPHILS # BLD AUTO: 3.38 THOUSANDS/ÂΜL (ref 1.85–7.62)
NEUTS SEG NFR BLD AUTO: 68 % (ref 43–75)
NRBC BLD AUTO-RTO: 0 /100 WBCS
PLATELET # BLD AUTO: 168 THOUSANDS/UL (ref 149–390)
PMV BLD AUTO: 10.4 FL (ref 8.9–12.7)
POTASSIUM SERPL-SCNC: 3 MMOL/L (ref 3.5–5.3)
PROCALCITONIN SERPL-MCNC: 0.46 NG/ML
PROT SERPL-MCNC: 5.2 G/DL (ref 6.4–8.4)
PROTHROMBIN TIME: 13.9 SECONDS (ref 11.6–14.5)
RBC # BLD AUTO: 3.54 MILLION/UL (ref 3.88–5.62)
SODIUM SERPL-SCNC: 136 MMOL/L (ref 135–147)
VIT B12 SERPL-MCNC: 268 PG/ML (ref 180–914)
WBC # BLD AUTO: 4.98 THOUSAND/UL (ref 4.31–10.16)

## 2023-10-31 PROCEDURE — 99232 SBSQ HOSP IP/OBS MODERATE 35: CPT | Performed by: FAMILY MEDICINE

## 2023-10-31 PROCEDURE — 84145 PROCALCITONIN (PCT): CPT | Performed by: STUDENT IN AN ORGANIZED HEALTH CARE EDUCATION/TRAINING PROGRAM

## 2023-10-31 PROCEDURE — 82607 VITAMIN B-12: CPT | Performed by: STUDENT IN AN ORGANIZED HEALTH CARE EDUCATION/TRAINING PROGRAM

## 2023-10-31 PROCEDURE — 80074 ACUTE HEPATITIS PANEL: CPT | Performed by: PHYSICIAN ASSISTANT

## 2023-10-31 PROCEDURE — 97166 OT EVAL MOD COMPLEX 45 MIN: CPT

## 2023-10-31 PROCEDURE — 80053 COMPREHEN METABOLIC PANEL: CPT | Performed by: PHYSICIAN ASSISTANT

## 2023-10-31 PROCEDURE — 85610 PROTHROMBIN TIME: CPT | Performed by: PHYSICIAN ASSISTANT

## 2023-10-31 PROCEDURE — 97530 THERAPEUTIC ACTIVITIES: CPT

## 2023-10-31 PROCEDURE — 97162 PT EVAL MOD COMPLEX 30 MIN: CPT

## 2023-10-31 PROCEDURE — 82550 ASSAY OF CK (CPK): CPT | Performed by: STUDENT IN AN ORGANIZED HEALTH CARE EDUCATION/TRAINING PROGRAM

## 2023-10-31 PROCEDURE — 99223 1ST HOSP IP/OBS HIGH 75: CPT | Performed by: PHYSICIAN ASSISTANT

## 2023-10-31 PROCEDURE — 87086 URINE CULTURE/COLONY COUNT: CPT | Performed by: FAMILY MEDICINE

## 2023-10-31 PROCEDURE — 85025 COMPLETE CBC W/AUTO DIFF WBC: CPT | Performed by: STUDENT IN AN ORGANIZED HEALTH CARE EDUCATION/TRAINING PROGRAM

## 2023-10-31 PROCEDURE — 83735 ASSAY OF MAGNESIUM: CPT | Performed by: FAMILY MEDICINE

## 2023-10-31 PROCEDURE — 97535 SELF CARE MNGMENT TRAINING: CPT

## 2023-10-31 RX ORDER — POTASSIUM CHLORIDE 14.9 MG/ML
20 INJECTION INTRAVENOUS ONCE
Status: COMPLETED | OUTPATIENT
Start: 2023-10-31 | End: 2023-11-01

## 2023-10-31 RX ORDER — POTASSIUM CHLORIDE 29.8 MG/ML
40 INJECTION INTRAVENOUS ONCE
Status: DISCONTINUED | OUTPATIENT
Start: 2023-10-31 | End: 2023-10-31 | Stop reason: SDUPTHER

## 2023-10-31 RX ADMIN — CHLORDIAZEPOXIDE HYDROCHLORIDE 25 MG: 25 CAPSULE ORAL at 05:19

## 2023-10-31 RX ADMIN — BUSPIRONE HYDROCHLORIDE 10 MG: 10 TABLET ORAL at 21:35

## 2023-10-31 RX ADMIN — POTASSIUM CHLORIDE 20 MEQ: 14.9 INJECTION, SOLUTION INTRAVENOUS at 12:07

## 2023-10-31 RX ADMIN — THIAMINE HCL TAB 100 MG 100 MG: 100 TAB at 08:27

## 2023-10-31 RX ADMIN — VENLAFAXINE HYDROCHLORIDE 75 MG: 75 CAPSULE, EXTENDED RELEASE ORAL at 08:27

## 2023-10-31 RX ADMIN — GABAPENTIN 300 MG: 300 CAPSULE ORAL at 21:36

## 2023-10-31 RX ADMIN — GABAPENTIN 300 MG: 300 CAPSULE ORAL at 16:41

## 2023-10-31 RX ADMIN — LIDOCAINE 1 PATCH: 50 PATCH CUTANEOUS at 08:27

## 2023-10-31 RX ADMIN — POTASSIUM CHLORIDE 20 MEQ: 14.9 INJECTION, SOLUTION INTRAVENOUS at 10:31

## 2023-10-31 RX ADMIN — CHLORDIAZEPOXIDE HYDROCHLORIDE 25 MG: 25 CAPSULE ORAL at 13:10

## 2023-10-31 RX ADMIN — CEFTRIAXONE 1000 MG: 1 INJECTION, SOLUTION INTRAVENOUS at 16:41

## 2023-10-31 RX ADMIN — BUSPIRONE HYDROCHLORIDE 10 MG: 10 TABLET ORAL at 16:41

## 2023-10-31 RX ADMIN — Medication 250 MG: at 08:27

## 2023-10-31 RX ADMIN — Medication 250 MG: at 17:01

## 2023-10-31 RX ADMIN — PANTOPRAZOLE SODIUM 40 MG: 40 TABLET, DELAYED RELEASE ORAL at 05:19

## 2023-10-31 RX ADMIN — BUSPIRONE HYDROCHLORIDE 10 MG: 10 TABLET ORAL at 08:27

## 2023-10-31 RX ADMIN — FOLIC ACID 1 MG: 1 TABLET ORAL at 08:27

## 2023-10-31 RX ADMIN — CHLORDIAZEPOXIDE HYDROCHLORIDE 25 MG: 25 CAPSULE ORAL at 21:35

## 2023-10-31 RX ADMIN — CLONIDINE HYDROCHLORIDE 0.1 MG: 0.1 TABLET ORAL at 21:35

## 2023-10-31 RX ADMIN — SODIUM CHLORIDE, SODIUM GLUCONATE, SODIUM ACETATE, POTASSIUM CHLORIDE, MAGNESIUM CHLORIDE, SODIUM PHOSPHATE, DIBASIC, AND POTASSIUM PHOSPHATE 125 ML/HR: .53; .5; .37; .037; .03; .012; .00082 INJECTION, SOLUTION INTRAVENOUS at 05:28

## 2023-10-31 RX ADMIN — GABAPENTIN 300 MG: 300 CAPSULE ORAL at 08:27

## 2023-10-31 RX ADMIN — Medication 1 TABLET: at 08:27

## 2023-10-31 NOTE — PROGRESS NOTES
[address] [facility]  Progress Note  Name: Delbra Barthel  MRN: [id number]  Unit/Bed#: 3 [address] I Date of Admission: 10/28/2023   Date of Service: 10/31/2023 I Hospital Day: 3    Assessment/Plan   * Alcohol withdrawal syndrome with complication [facility])  Assessment & Plan  Patient states he had been sober for 35 days, but recently relapsed, which resulted in the withdrawal symptoms that brought him to the ED. In the ED, patient was noted to be "Grossly tremulous, diaphoretic, anxious, hyperactive on exam."  Patient received 6mg total lorazepam.  ETOH on admission 35, last drink was 2100 10/27  Brought to ICU for phenobarbital loading of 910 mg phenobarbital.   Completing course of Librium. Continue PRN ativan for agitation  Folic acid, thiamine, MVI   Diarrhea improving   Not interested in inpatient rehab but agreeable with outpatient counseling    Transaminitis  Assessment & Plan  Likely due to alcohol abuse, rhabdo as potential causes. AST 1,530, , T bili 3.89 on admission in setting of alcohol abuse  CT abdomen shows moderate fatty infiltration of the liver without biliary ductal dilatation. Gallbladder is distended without pericholecystic inflammation or calcified gallstones  RUQ US reveals hepatic steatosis  Current AST: 247, ALT: 134, Total Bilirubin: 0.55, all continuing to trend downwards. Maddrey discriminant function was 3.2 per GI note. Hepatitis panel is pending. Follow-up outpatient if persistently elevated LFTs or if has persistent symptoms of GERD  Will follow [facility]  GI input appreciated        SIRS (systemic inflammatory response syndrome) (HCC)  Assessment & Plan  Possibly 2/2 ETOH abuse, withdrawal, and dehydration  On admission, HR: 125, RR; 24, Temp 96.6 and WBC: 21.91. Lactic acid 2.6-> 1.8. Continue IV fluids   Procalcitonin 1.10->0. 46. BC x2 negative  Denies urinary or respiratory symptoms. Does endorse left sided abdominal pain.   UA showing largely + for blood, ketones and protein. + nitrites, moderate bacteria and renal tube epithelial cells. Pt asymptomatic for UTI. Discontinue Rocephin as patient has received 4 days of antibiotics with no obvious source of infection  CT chest/abdomen/pelvis showing distended gallbladder but no stones or other infectious source   RUQ US shows hepatic steatosis   Cefepime and Vancomycin given in ED, vancomycin was previously discontinued with negative MRSA results. Trend CBC, temperature curve    Rhabdomyolysis  Assessment & Plan  Patient reports a history of several falls while intoxicated over the last few days. Consider fall, rhabdo and dehydration as potential causes. CK 3,702, down from 9,420  Continue fluids and I&O monitoring  Trend CK    Alcohol use disorder, severe, dependence ([address])  Assessment & Plan  Admitted for detox 8/1-3 and 9/2-6. Not currently taking naltrexone. Alcohol cessation discussed  CIWA protocol in place    Multiple falls  Assessment & Plan  Consider alcohol use as and intoxication as cause. Scattered flat bruising noted on all extremities, left flank, and back. Appear to be in multiple stages of healing  CT head, c-spine, C/A/P all negative for acute traumatic injury  Pain only present over bruising on left flank. Fall precautions  Continue education for alcohol abstinence. Generalized anxiety disorder  Assessment & Plan  Continue home medications:  Buspar 10 mg TID  Effexor 75 mg daily    GERD without esophagitis  Assessment & Plan  Continue Protonix    JORDI (acute kidney injury) (HCC)-resolved as of 10/31/2023  Assessment & Plan  Considering elevated CK and dehydration as potential causes. UA shows renal tube epithelial cells   Cr remains 0.76, down from 2.09 on admission. Continue IV fluids and I&O monitoring. Hyponatremia-resolved as of 10/31/2023  Assessment & Plan  Considering dehydration and beer potomania as potential causes.    Sodium 127 on admission  Currently 136  Continue fluids   Will monitor CMP         VTE Pharmacologic Prophylaxis:   Low Risk (Score 0-2) - Encourage Ambulation. Patient Centered Rounds: I performed bedside rounds with nursing staff today. Discussions with Specialists or Other Care Team Provider: none    Education and Discussions with Family / Patient: Patient declined call to . Total Time Spent on Date of Encounter in care of patient: This time was spent on one or more of the following: performing physical exam; counseling and coordination of care; obtaining or reviewing history; documenting in the medical record; reviewing/ordering tests, medications or procedures; communicating with other healthcare professionals and discussing with patient's family/caregivers. Current Length of Stay: 3 day(s)  Current Patient Status: Inpatient   Certification Statement: The patient will continue to require additional inpatient hospital stay due to resolving JORDI, alcohol withdrawal.  Discharge Plan: Anticipate discharge in 24-48 hrs to home. Code Status: Level 1 - Full Code    Subjective:   Patient states he his feeling better today, and that the pain in his right side has decreased with the lidocaine patches. Today, he rates it as a 3.5/10. He also states that his diarrhea has improved from yesterday. Patient states he has been sleeping, eating and drinking. He denies any dysuria, changes in frequency or urgency of urination. Objective:     Vitals:   Temp (24hrs), Av.2 °F (36.8 °C), Min:97.5 °F (36.4 °C), Max:98.7 °F (37.1 °C)    Temp:  [97.5 °F (36.4 °C)-98.7 °F (37.1 °C)] 97.5 °F (36.4 °C)  HR:  [75-89] 75  Resp:  [16-19] 18  BP: (120-147)/(68-89) 120/85  SpO2:  [97 %-99 %] 97 %  Body mass index is 27.22 kg/m². Input and Output Summary (last 24 hours):      Intake/Output Summary (Last 24 hours) at 10/31/2023 1345  Last data filed at 10/31/2023 0601  Gross per 24 hour   Intake --   Output 1800 ml   Net -1800 ml Physical Exam:   Physical Exam  Constitutional:       General: He is not in acute distress. Appearance: Normal appearance. He is not toxic-appearing. HENT:      Head: Normocephalic and atraumatic. Eyes:      General:         Right eye: No discharge. Left eye: No discharge. Cardiovascular:      Rate and Rhythm: Normal rate and regular rhythm. Pulmonary:      Effort: Pulmonary effort is normal. No respiratory distress. Breath sounds: Normal breath sounds. No wheezing or rales. Abdominal:      General: Bowel sounds are normal. There is no distension. Palpations: Abdomen is soft. Tenderness: There is no abdominal tenderness. Comments: Bruise present over left flank with some tenderness to the area   Musculoskeletal:      Right lower leg: No edema. Left lower leg: No edema. Comments: No tremors noted   Neurological:      Mental Status: He is alert and oriented to person, place, and time.    Psychiatric:         Attention and Perception: Attention normal.         Mood and Affect: Mood normal.         Speech: Speech normal.          Additional Data:     Labs:  Results from last 7 days   Lab Units 10/31/23  0526   WBC Thousand/uL 4.98   HEMOGLOBIN g/dL 11.8*   HEMATOCRIT % 32.0*   PLATELETS Thousands/uL 168   NEUTROS PCT % 68   LYMPHS PCT % 19   MONOS PCT % 9   EOS PCT % 2     Results from last 7 days   Lab Units 10/31/23  0526   SODIUM mmol/L 136   POTASSIUM mmol/L 3.0*   CHLORIDE mmol/L 101   CO2 mmol/L 29   BUN mg/dL 6   CREATININE mg/dL 0.76   ANION GAP mmol/L 6   CALCIUM mg/dL 7.8*   ALBUMIN g/dL 3.0*   TOTAL BILIRUBIN mg/dL 0.55   ALK PHOS U/L 46   ALT U/L 134*   AST U/L 247*   GLUCOSE RANDOM mg/dL 77     Results from last 7 days   Lab Units 10/31/23  0526   INR  1.04             Results from last 7 days   Lab Units 10/31/23  0526 10/29/23  0549 10/28/23  1642 10/28/23  1416 10/28/23  1318   LACTIC ACID mmol/L  --   --  1.8 2.6*  --    PROCALCITONIN ng/ml 0.46* 1.10*  --   --  1.76*       Lines/Drains:  Invasive Devices       Peripheral Intravenous Line  Duration             Peripheral IV 10/28/23 Right Forearm 2 days                          Imaging: Reviewed radiology reports from this admission including: ultrasound(s)    Recent Cultures (last 7 days):   Results from last 7 days   Lab Units 10/28/23  1422 10/28/23  1416   BLOOD CULTURE  No Growth at 48 hrs. No Growth at 48 hrs. Last 24 Hours Medication List:   Current Facility-Administered Medications   Medication Dose Route Frequency Provider Last Rate    busPIRone  10 mg Oral TID Avelino Malone PA-C      cefTRIAXone  1,000 mg Intravenous S80R Avelino Malone PA-C 9,850 mg (10/30/23 1642)    chlordiazePOXIDE  25 mg Oral Q8H [facility] Ayah Flood MD      cloNIDine  0.1 mg Oral HS Avelino Malone PA-C      folic acid  1 mg Oral Daily Avelino Malone PA-C      gabapentin  300 mg Oral TID Avelino Malone PA-C      lidocaine  1 patch Topical Daily Avelino Malone PA-C      LORazepam  2 mg Intravenous Q6H PRN Ayah Flood MD      multi-electrolyte  150 mL/hr Intravenous Continuous Bonnie Revankar,  mL/hr (10/31/23 1026)    multivitamin-minerals  1 tablet Oral Daily Anna Malone PA-C      ondansetron  4 mg Intravenous G5U PRN Avelino Malone PA-C      pantoprazole  40 mg Oral Early Morning Pat Garcia PA-C      potassium chloride  20 mEq Intravenous Once Bonnie Revankar, DO 20 mEq (10/31/23 1207)    saccharomyces boulardii  250 mg Oral BID Avelino Malone PA-C      thiamine  100 mg Oral Daily Rae Pa PA-C      venlafaxine  75 mg Oral Daily Antonia Chappell          Today, Patient Was Seen By: Florina Chadwick    **Please Note: This note may have been constructed using a voice recognition system. **

## 2023-10-31 NOTE — PLAN OF CARE
Problem: MOBILITY - ADULT  Goal: Maintain or return to baseline ADL function  Description: INTERVENTIONS:  -  Assess patient's ability to carry out ADLs; assess patient's baseline for ADL function and identify physical deficits which impact ability to perform ADLs (bathing, care of mouth/teeth, toileting, grooming, dressing, etc.)  - Assess/evaluate cause of self-care deficits   - Assess range of motion  - Assess patient's mobility; develop plan if impaired  - Assess patient's need for assistive devices and provide as appropriate  - Encourage maximum independence but intervene and supervise when necessary  - Involve family in performance of ADLs  - Assess for home care needs following discharge   - Consider OT consult to assist with ADL evaluation and planning for discharge  - Provide patient education as appropriate  Outcome: Progressing  Goal: Maintains/Returns to pre admission functional level  Description: INTERVENTIONS:  - Perform BMAT or MOVE assessment daily.   - Set and communicate daily mobility goal to care team and patient/family/caregiver. - Collaborate with rehabilitation services on mobility goals if consulted  - Perform Range of Motion  times a day. - Reposition patient every  hours.   - Dangle patient  times a day  - Stand patient  times a day  - Ambulate patient  times a day  - Out of bed to chair  times a day   - Out of bed for meals  times a day  - Out of bed for toileting  - Record patient progress and toleration of activity level   Outcome: Progressing     Problem: PAIN - ADULT  Goal: Verbalizes/displays adequate comfort level or baseline comfort level  Description: Interventions:  - Encourage patient to monitor pain and request assistance  - Assess pain using appropriate pain scale  - Administer analgesics based on type and severity of pain and evaluate response  - Implement non-pharmacological measures as appropriate and evaluate response  - Consider cultural and social influences on pain and pain management  - Notify physician/advanced practitioner if interventions unsuccessful or patient reports new pain  Outcome: Progressing     Problem: INFECTION - ADULT  Goal: Absence or prevention of progression during hospitalization  Description: INTERVENTIONS:  - Assess and monitor for signs and symptoms of infection  - Monitor lab/diagnostic results  - Monitor all insertion sites, i.e. indwelling lines, tubes, and drains  - Monitor endotracheal if appropriate and nasal secretions for changes in amount and color  - [place] appropriate cooling/warming therapies per order  - Administer medications as ordered  - Instruct and encourage patient and family to use good hand hygiene technique  - Identify and instruct in appropriate isolation precautions for identified infection/condition  Outcome: Progressing     Problem: SAFETY ADULT  Goal: Maintain or return to baseline ADL function  Description: INTERVENTIONS:  -  Assess patient's ability to carry out ADLs; assess patient's baseline for ADL function and identify physical deficits which impact ability to perform ADLs (bathing, care of mouth/teeth, toileting, grooming, dressing, etc.)  - Assess/evaluate cause of self-care deficits   - Assess range of motion  - Assess patient's mobility; develop plan if impaired  - Assess patient's need for assistive devices and provide as appropriate  - Encourage maximum independence but intervene and supervise when necessary  - Involve family in performance of ADLs  - Assess for home care needs following discharge   - Consider OT consult to assist with ADL evaluation and planning for discharge  - Provide patient education as appropriate  Outcome: Progressing  Goal: Maintains/Returns to pre admission functional level  Description: INTERVENTIONS:  - Perform BMAT or MOVE assessment daily.   - Set and communicate daily mobility goal to care team and patient/family/caregiver.    - Collaborate with rehabilitation services on mobility goals if consulted  - Perform Range of Motion  times a day. - Reposition patient every  hours.   - Dangle patient  times a day  - Stand patient  times a day  - Ambulate patient  times a day  - Out of bed to chair  times a day   - Out of bed for meals  times a day  - Out of bed for toileting  - Record patient progress and toleration of activity level   Outcome: Progressing  Goal: Patient will remain free of falls  Description: INTERVENTIONS:  - Educate patient/family on patient safety including physical limitations  - Instruct patient to call for assistance with activity   - Consult OT/PT to assist with strengthening/mobility   - Keep Call bell within reach  - Keep bed low and locked with side rails adjusted as appropriate  - Keep care items and personal belongings within reach  - Initiate and maintain comfort rounds  - Make Fall Risk Sign visible to staff  - Offer Toileting every  Hours, in advance of need  - Initiate/Maintain alarm  - Obtain necessary fall risk management equipment:   - Apply yellow socks and bracelet for high fall risk patients  - Consider moving patient to room near nurses station  Outcome: Progressing     Problem: DISCHARGE PLANNING  Goal: Discharge to home or other facility with appropriate resources  Description: INTERVENTIONS:  - Identify barriers to discharge w/patient and caregiver  - Arrange for needed discharge resources and transportation as appropriate  - Identify discharge learning needs (meds, wound care, etc.)  - Arrange for interpretive services to assist at discharge as needed  - Refer to Case Management Department for coordinating discharge planning if the patient needs post-hospital services based on physician/advanced practitioner order or complex needs related to functional status, cognitive ability, or social support system  Outcome: Progressing     Problem: Knowledge Deficit  Goal: Patient/family/caregiver demonstrates understanding of disease process, treatment plan, medications, and discharge instructions  Description: Complete learning assessment and assess knowledge base. Interventions:  - Provide teaching at level of understanding  - Provide teaching via preferred learning methods  Outcome: Progressing     Problem: NEUROSENSORY - ADULT  Goal: Achieves stable or improved neurological status  Description: INTERVENTIONS  - Monitor and report changes in neurological status  - Monitor vital signs such as temperature, blood pressure, glucose, and any other labs ordered   - Initiate measures to prevent increased intracranial pressure  - Monitor for seizure activity and implement precautions if appropriate      Outcome: Progressing  Goal: Remains free of injury related to seizures activity  Description: INTERVENTIONS  - Maintain airway, patient safety  and administer oxygen as ordered  - Monitor patient for seizure activity, document and report duration and description of seizure to physician/advanced practitioner  - If seizure occurs,  ensure patient safety during seizure  - Reorient patient post seizure  - Seizure pads on all 4 side rails  - Instruct patient/family to notify RN of any seizure activity including if an aura is experienced  - Instruct patient/family to call for assistance with activity based on nursing assessment  - Administer anti-seizure medications if ordered    Outcome: Progressing  Goal: Achieves maximal functionality and self care  Description: INTERVENTIONS  - Monitor swallowing and airway patency with patient fatigue and changes in neurological status  - Encourage and assist patient to increase activity and self care.    - Encourage visually impaired, hearing impaired and aphasic patients to use assistive/communication devices  Outcome: Progressing     Problem: CARDIOVASCULAR - ADULT  Goal: Absence of cardiac dysrhythmias or at baseline rhythm  Description: INTERVENTIONS:  - Continuous cardiac monitoring, vital signs, obtain 12 lead EKG if ordered  - Administer antiarrhythmic and heart rate control medications as ordered  - Monitor electrolytes and administer replacement therapy as ordered  Outcome: Progressing     Problem: RESPIRATORY - ADULT  Goal: Achieves optimal ventilation and oxygenation  Description: INTERVENTIONS:  - Assess for changes in respiratory status  - Assess for changes in mentation and behavior  - Position to facilitate oxygenation and minimize respiratory effort  - Oxygen administered by appropriate delivery if ordered  - Initiate smoking cessation education as indicated  - Encourage broncho-pulmonary hygiene including cough, deep breathe, Incentive Spirometry  - Assess the need for suctioning and aspirate as needed  - Assess and instruct to report SOB or any respiratory difficulty  - Respiratory Therapy support as indicated  Outcome: Progressing     Problem: GASTROINTESTINAL - ADULT  Goal: Minimal or absence of nausea and/or vomiting  Description: INTERVENTIONS:  - Administer IV fluids if ordered to ensure adequate hydration  - Maintain NPO status until nausea and vomiting are resolved  - Nasogastric tube if ordered  - Administer ordered antiemetic medications as needed  - Provide nonpharmacologic comfort measures as appropriate  - Advance diet as tolerated, if ordered  - Consider nutrition services referral to assist patient with adequate nutrition and appropriate food choices  Outcome: Progressing  Goal: Maintains adequate nutritional intake  Description: INTERVENTIONS:  - Monitor percentage of each meal consumed  - Identify factors contributing to decreased intake, treat as appropriate  - Assist with meals as needed  - Monitor I&O, weight, and lab values if indicated  - Obtain nutrition services referral as needed  Outcome: Progressing  Goal: Oral mucous membranes remain intact  Description: INTERVENTIONS  - Assess oral mucosa and hygiene practices  - Implement preventative oral hygiene regimen  - Implement oral medicated treatments as ordered  - Initiate Nutrition services referral as needed  Outcome: Progressing     Problem: SKIN/TISSUE INTEGRITY - ADULT  Goal: Skin Integrity remains intact(Skin Breakdown Prevention)  Description: Assess:  -Perform [place] assessment every   -Clean and moisturize skin every   -Inspect skin when repositioning, toileting, and assisting with ADLS  -Assess under medical devices such as  every   -Assess extremities for adequate circulation and sensation     Bed Management:  -Have minimal linens on bed & keep smooth, unwrinkled  -Change linens as needed when moist or perspiring  -Avoid sitting or lying in one position for more than  hours while in bed  -Keep HOB at degrees     Toileting:  -Offer bedside commode  -Assess for incontinence every   -Use incontinent care products after each incontinent episode such as    Activity:  -Mobilize patient  times a day  -Encourage activity and walks on unit  -Encourage or provide ROM exercises   -Turn and reposition patient every  Hours  -Use appropriate equipment to lift or move patient in bed  -Instruct/ Assist with weight shifting every  when out of bed in chair  -Consider limitation of chair time  hour intervals    Skin Care:  -Avoid use of baby powder, tape, friction and shearing, hot water or constrictive clothing  -Relieve pressure over bony prominences using   -Do not massage red bony areas    Next Steps:  -Teach patient strategies to minimize risks such as    -Consider consults to  interdisciplinary teams such as   Outcome: Progressing     Problem: MUSCULOSKELETAL - ADULT  Goal: Maintain or return mobility to safest level of function  Description: INTERVENTIONS:  - Assess patient's ability to carry out ADLs; assess patient's baseline for ADL function and identify physical deficits which impact ability to perform ADLs (bathing, care of mouth/teeth, toileting, grooming, dressing, etc.)  - Assess/evaluate cause of self-care deficits   - Assess range of motion  - Assess patient's mobility  - Assess patient's need for assistive devices and provide as appropriate  - Encourage maximum independence but intervene and supervise when necessary  - Involve family in performance of ADLs  - Assess for home care needs following discharge   - Consider OT consult to assist with ADL evaluation and planning for discharge  - Provide patient education as appropriate  Outcome: Progressing     Problem: Nutrition/Hydration-ADULT  Goal: Nutrient/Hydration intake appropriate for improving, restoring or maintaining nutritional needs  Description: Monitor and assess patient's nutrition/hydration status for malnutrition. Collaborate with interdisciplinary team and initiate plan and interventions as ordered. Monitor patient's weight and dietary intake as ordered or per policy. Utilize nutrition screening tool and intervene as necessary. Determine patient's food preferences and provide high-protein, high-caloric foods as appropriate.      INTERVENTIONS:  - Monitor oral intake, urinary output, labs, and treatment plans  - Assess nutrition and hydration status and recommend course of action  - Evaluate amount of meals eaten  - Assist patient with eating if necessary   - Allow adequate time for meals  - Recommend/ encourage appropriate diets, oral nutritional supplements, and vitamin/mineral supplements  - Order, calculate, and assess calorie counts as needed  - Recommend, monitor, and adjust tube feedings and TPN/PPN based on assessed needs  - Assess need for intravenous fluids  - Provide specific nutrition/hydration education as appropriate  - Include patient/family/caregiver in decisions related to nutrition  Outcome: Progressing

## 2023-10-31 NOTE — ASSESSMENT & PLAN NOTE
Patient states he had been sober for 35 days, but recently relapsed, which resulted in the withdrawal symptoms that brought him to the ED. In the ED, patient was noted to be "Grossly tremulous, diaphoretic, anxious, hyperactive on exam."  Patient received 6mg total lorazepam.  ETOH on admission 35, last drink was 2100 10/27  Brought to ICU for phenobarbital loading of 910 mg phenobarbital.   Completing course of Librium.   Continue PRN ativan for agitation  Folic acid, thiamine, MVI   Diarrhea improving   Not interested in inpatient rehab but agreeable with outpatient counseling

## 2023-10-31 NOTE — PLAN OF CARE
Problem: PHYSICAL THERAPY ADULT  Goal: Performs mobility at highest level of function for planned discharge setting. See evaluation for individualized goals. Description: Treatment/Interventions: LE strengthening/ROM, Elevations, Therapeutic exercise, Gait training          See flowsheet documentation for full assessment, interventions and recommendations. Note: Prognosis: Good  Problem List: Decreased strength, Impaired balance, Decreased mobility, Decreased endurance, Pain  Assessment: Patient is an 53y.o. year old male seen for Physical Therapy evaluation. Patient admitted with Alcohol withdrawal syndrome with complication ([address]). Comorbidities affecting patient's physical performance include: TME, multiple falls, alcohol dependence. Personal factors affecting patient at time of initial evaluation include: stairs to enter home, inability to perform dynamic tasks in community, and limited home support. Prior to admission, patient was independent with functional mobility without assistive device, independent with ADLS, and works full time. Please find objective findings from Physical Therapy assessment regarding body systems outlined above with impairments and limitations including impaired balance, decreased endurance, gait deviations, decreased activity tolerance, decreased functional mobility tolerance, and fall risk. The Barthel Index was used as a functional outcome tool presenting with a score of Barthel Index Score: 75 today indicating moderate limitations of functional mobility and ADLS. Patient's clinical presentation is currently evolving as seen in patient's presentation of changing level of pain, increased fall risk, new onset of impairment of functional mobility, decreased endurance, and new onset of weakness. Pt would benefit from continued Physical Therapy treatment to address deficits as defined above and maximize level of functional mobility.  As demonstrated by objective findings, the assigned level of complexity for this evaluation is moderate. The patient's AM-PAC Basic Mobility Inpatient Short Form Raw Score is 22. A Raw score of greater than 16 suggests the patient may benefit from discharge to home. Rehab Resource Intensity Level, PT: No post-acute rehabilitation needs    See flowsheet documentation for full assessment.

## 2023-10-31 NOTE — CASE MANAGEMENT
Case Management Assessment & Discharge Planning Note    Patient name Mabel Gambino  Location 3 [facility] 329/3 [address]-* MRN [id number]  : 1975 Date 10/31/2023       Current Admission Date: 10/28/2023  Current Admission Diagnosis:Alcohol withdrawal syndrome with complication [facility])   Patient Active Problem List    Diagnosis Date Noted    Alcohol withdrawal delirium, acute, hyperactive ([address]) 10/28/2023    SIRS (systemic inflammatory response syndrome) ([address])     Alcoholic hepatitis without ascites 10/28/2023    Hyponatremia 10/28/2023    Rhabdomyolysis 10/28/2023    JORDI (acute kidney injury) ([address]) 10/28/2023    Electrolyte abnormality 10/28/2023    Multiple falls     Toxic metabolic encephalopathy     Alcohol withdrawal syndrome with complication ([address])     Alcohol use disorder, severe, dependence ([address]) 2023    Dehydration 2023    Transaminitis 2023    Insomnia 2023    Major depressive disorder, recurrent, in full remission ([address]) 2021    Generalized anxiety disorder 2020    Epigastric pain 10/18/2019    GERD without esophagitis 2019      LOS (days): 3  Geometric Mean LOS (GMLOS) (days):   Days to GMLOS:     OBJECTIVE:    Risk of Unplanned Readmission Score: 18.33       Current admission status: Inpatient  Referral Reason: Other (Discharge planning)    Preferred Pharmacy:   [address] [place] [address] [address]", [address] [place] [id number]  Phone: [phone] Fax: [phone]    Primary Care Provider: No primary care provider on file. Primary Insurance: [employer]  Secondary Insurance:     ASSESSMENT:  [place] Proxies    There are no active Health Care Proxies on file.         Readmission Root Cause  30 Day Readmission: No    Patient Information  Admitted from[de-identified] Home  Mental Status: Alert  During Assessment patient was accompanied by: Not accompanied during assessment  Assessment information provided by[de-identified] Patient  Support Systems: Family members  [place] of Residence: [address] [place] [address] do you live in?: [place]  Type of Current Residence: Apartment  Upon entering residence, is there a bedroom on the main floor (no further steps)?: Yes  Upon entering residence, is there a bathroom on the main floor (no further steps)?: Yes  In the last 12 months, was there a time when you were not able to pay the mortgage or rent on time?: No  In the last 12 months, was there a time when you did not have a steady place to sleep or slept in a shelter (including now)?: No  Homeless/housing insecurity resource given?: N/A  Living Arrangements: Lives Alone    Activities of Daily Living Prior to Admission  Functional Status: Independent  Completes ADLs independently?: Yes  Ambulates independently?: Yes  Does patient use assisted devices?: No  Does patient currently own DME?: No  Does the patient have a history of Short-Term Rehab?: No  Does patient have a history of HHC?: No  Does patient currently have [address] [address] [address]?: No    Patient Information Continued  Income Source: Employed  Does patient have prescription coverage?: Yes  Within the past 12 months, you worried that your food would run out before you got the money to buy more.: Never true  Within the past 12 months, the food you bought just didn't last and you didn't have money to get more.: Never true  Food insecurity resource given?: N/A  Does patient receive dialysis treatments?: No  Does patient have a history of substance abuse?: Yes  Historical substance use preference: Alcohol/ETOH  Is patient currently in treatment for substance abuse?: No. Patient declined treatment information.   Does patient have a history of Mental Health Diagnosis?: Yes (Generalized anxiety disorder, major depressive disorder)    Means of Transportation  Means of Transport to Appts[de-identified] Drives Self  In the past 12 months, has lack of transportation kept you from medical appointments or from getting medications?: No  In the past 12 months, has lack of transportation kept you from meetings, work, or from getting things needed for daily living?: No  Was application for public transport provided?: N/A      DISCHARGE DETAILS:    Discharge planning discussed with[de-identified] Patient  Freedom of Choice: Yes    Comments - Freedom of Choice: SW spoke with patient at bedside to introduce role of CM, conduct assessment and discuss discharge planning. Patient is independent, lives alone, works and drives. His preference is to return home when medically cleared. SW offered resources for substance abuse but patient declined. SW will continue to follow.       CM contacted family/caregiver?: No- see comments (Patient declined call to emergency contact)  Were Treatment Team discharge recommendations reviewed with patient/caregiver?: Yes  Did patient/caregiver verbalize understanding of patient care needs?: Yes  Were patient/caregiver advised of the risks associated with not following Treatment Team discharge recommendations?: Yes    Contacts  Patient Contacts: Harrison Linares (father)  Relationship to Patient[de-identified] Family  Contact Method: Phone  Phone Number: [phone]    [address] [place] [address]         Is the patient interested in [facility] at discharge?: No    DME Referral Provided  Referral made for DME?: No    Other Referral/Resources/Interventions Provided:  Interventions: None Indicated    Would you like to participate in our [address] service program?  : No - Declined    Treatment Team Recommendation: Home  Discharge Destination Plan[de-identified] Home

## 2023-10-31 NOTE — UTILIZATION REVIEW
Initial Clinical Review    Admission: Date/Time/Statement:   Admission Orders (From admission, onward)       Ordered        10/28/23 1711  [address]  Once            10/28/23 1633  INPATIENT ADMISSION  Once,   Status:  Canceled                          Orders Placed This Encounter   Procedures    INPATIENT ADMISSION     Standing Status:   Standing     Number of Occurrences:   1     Order Specific Question:   Level of Care     Answer:   Critical Care [15]     Order Specific Question:   Estimated length of stay     Answer:   More than 2 Midnights     Order Specific Question:   Certification     Answer:   I certify that inpatient services are medically necessary for this patient for a duration of greater than two midnights. See H&P and MD Progress Notes for additional information about the patient's course of treatment. ED Arrival Information       Expected   -    Arrival   10/28/2023 12:41    Acuity   Emergent              Means of arrival   Walk-In    Escorted by   Family Member    Service   Hospitalist    Admission type   Emergency              Arrival complaint   Alcohol withdraw             Chief Complaint   Patient presents with    Withdrawal - Alcohol     Pt states he was sober for 35 days, drank last night. Experiencing withdrawal symptoms. Patient is restless, anxious and diaphoretic in triage        Initial Presentation: 53 y.o. male  presented to ED from home as inpatient admission for alcohol withdrawal syndrome with complication. H istory of anxiety, depression, alcohol abuse that presents to the emergency department with his mother in withdrawal.  Patient states that he was sober for approximately 35 days and went on a recent binge. He states his last drink was 10 PM yesterday evening. Patient is tremulous on my initial evaluation. He also has numerous bruises of unknown etiology. Patient does not remember falling, however he has bruises on his head, thorax and extremities.  CIWA 27 in ED On exam ill appearing and toxic appearing grossly tremulous, diaphoretic, anxious, hyperactive last drink 10-27-23 @ 2100 , scleral icterus,face flushed dry MM ,tachycardia abdomen taut not distended, BS decreased      Plan ICU for phenobarbital loading Patient also with JORDI creatinine 2.09 baseline 0.6-0.8  CK 85,247 Plan IVF @ 200 mlhr. Urine showing s/s of infection will start cefepime. Date: 10-29-23   Continue on Cefepime, awaiting  urine cultures IVF decreased to 125 ml/hr   CIWA improved to 6 --8  s/p total of 910mg phenobarbital. Tremor and sweats,anxiety and headache noted, Plan will transfer to medical for continuation for care .   ED Triage Vitals   Temperature Pulse Respirations Blood Pressure SpO2   10/28/23 1256 10/28/23 1256 10/28/23 1256 10/28/23 1301 10/28/23 1256   (!) 96.6 °F (35.9 °C) (!) 125 (!) 24 151/88 95 %      Temp Source Heart Rate Source Patient Position - Orthostatic VS BP Location FiO2 (%)   10/28/23 1256 10/28/23 1400 10/28/23 1301 10/28/23 1301 --   Temporal Monitor Lying Left arm       Pain Score       10/28/23 1750       5          Wt Readings from Last 1 Encounters:   10/28/23 93.6 kg (206 lb 5.6 oz)     Additional Vital Signs:     Date/Time Temp Pulse Resp BP MAP (mmHg) SpO2 O2 Device Patient Position - Orthostatic VS   10/29/23 1200 97.9 °F (36.6 °C) 96 18 129/69 91 100 % -- Sitting   10/29/23 1100 -- 88 15 119/73 92 99 % -- --   10/29/23 1000 -- 96 17 122/71 92 98 % -- --   10/29/23 0900 -- 94 16 132/72 94 97 % -- --   10/29/23 0800 97.8 °F (36.6 °C) 85 17 129/81 101 100 % None (Room air) Lying   10/29/23 0700 97.5 °F (36.4 °C) 76 17 117/74 91 100 % None (Room air) Lying   10/29/23 0400 97.7 °F (36.5 °C) 79 17 134/84 103 100 % None (Room air) Lying   10/29/23 0300 -- 76 13 111/64 83 99 % -- --   10/29/23 0200 -- 82 14 112/67 84 97 % -- --   10/29/23 0000 98.5 °F (36.9 °C) 92 18 112/64 81 94 % None (Room air) --   10/28/23 2300 -- 95 18 123/76 94 94 % -- --   10/28/23 2200 -- 96 18 128/74 94 94 % -- --   10/28/23 2100 -- 98 18 130/80 95 95 % -- --   10/28/23 2000 98.4 °F (36.9 °C) 101 27 Abnormal  140/83 105 97 % None (Room air) Lying   10/28/23 1900 -- 109 Abnormal  29 Abnormal  138/78 101 99 % -- --   10/28/23 1800 -- 117 Abnormal  23 Abnormal  131/79 102 99 % -- --   10/28/23 1750 99.9 °F (37.7 °C) 115 Abnormal  24 Abnormal  147/91 112 99 % None (Room air) Lying   10/28/23 1700 -- 113 Abnormal  26 Abnormal  173/84 Abnormal  109 98 % None (Room air) Lying   10/28/23 1630 -- 113 Abnormal  24 Abnormal  158/81 111 97 % None (Room air) Lying   10/28/23 1600 -- 114 Abnormal  24 Abnormal  152/87 114 99 % None (Room air) Lying   10/28/23 1549 -- 104 -- 145/81 -- -- -- --   10/28/23 1545 -- 109 Abnormal  22 145/81 105 97 % None (Room air) Lying   10/28/23 1400 -- 114 Abnormal  22 136/89 105 96 % None (Room air) Lying   10/28/23 1340 -- 112 Abnormal  -- 136/89 -- -- -- --   10/28/23 1308 -- 112 Abnormal  -- 156/85 -- -- -- --   10/28/23 1301 -- -- -- 151/88 -- -- -- Lying   10/28/23 1257 96.6 °F (35.9 °C) Abnormal  125 Abnormal  24 Abnormal  -- -- 95 % None (Room air) --           Pertinent Labs/Diagnostic Test Results:   CT head without contrast    (10/28 1531)      No acute intracranial abnormality. CT cervical spine without contrast    (10/28 1533)      No cervical spine fracture or traumatic malalignment. CT chest abdomen pelvis wo contrast   (10/28 1538)      No acute traumatic CT findings.    US right upper quadrant    (Results Pending)         Results from last 7 days   Lab Units 10/31/23  0526 10/30/23  0559 10/29/23  0549 10/28/23  1318   WBC Thousand/uL 4.98 5.48 9.52 21.91*   HEMOGLOBIN g/dL 11.8* 11.3* 12.5 17.4*   HEMATOCRIT % 32.0* 31.0* 34.2* 47.2   PLATELETS Thousands/uL 168 148* 170 392*   NEUTROS ABS Thousands/µL 3.38 4.22 8.21* 19.28*         Results from last 7 days   Lab Units 10/31/23  0526 10/30/23  0559 10/29/23  0549 10/28/23  2322 10/28/23  1318   SODIUM mmol/L 136 132* 131* 128* 127*   POTASSIUM mmol/L 3.0* 3.0* 3.6 3.7 3.3*   CHLORIDE mmol/L 101 100 99 97 84*   CO2 mmol/L 29 23 23 19* 16*   ANION GAP mmol/L 6 9 9 12 27   BUN mg/dL 6 11 21 25 28*   CREATININE mg/dL 0.76 0.76 1.19 1.31* 2.09*   EGFR ml/min/1.73sq m 108 108 72 64 36   CALCIUM mg/dL 7.8* 7.4* 7.8* 7.4* 9.6   CALCIUM, IONIZED mmol/L  --   --  1.05*  --   --    MAGNESIUM mg/dL 2.2  --  3.0* 2.9* 2.0     Results from last 7 days   Lab Units 10/31/23  0526 10/30/23  0559 10/29/23  0549 10/28/23  2322 10/28/23  1318   AST U/L 247* 363* 633* 762* 1,530*   ALT U/L 134* 149* 191* 209* 335*   ALK PHOS U/L 46 47 53 52 87   TOTAL PROTEIN g/dL 5.2* 5.1* 5.6* 5.8* 8.4   ALBUMIN g/dL 3.0* 3.0* 3.3* 3.5 5.0   TOTAL BILIRUBIN mg/dL 0.55 0.85 2.95* 3.27* 3.89*   BILIRUBIN DIRECT mg/dL  --   --  0.62*  --   --          Results from last 7 days   Lab Units 10/31/23  0526 10/30/23  0559 10/29/23  0549 10/28/23  2322 10/28/23  1318   GLUCOSE RANDOM mg/dL 77 81 91 98 118       Results from last 7 days   Lab Units 10/31/23  0526 10/30/23  0559 10/29/23  0549   CK TOTAL U/L 3,702* 9,420* 27,253*       Results from last 7 days   Lab Units 10/31/23  0526 10/29/23  0549 10/28/23  1318   PROTIME seconds 13.9 13.5 13.5   INR  1.04 1.01 1.01   PTT seconds  --   --  29         Results from last 7 days   Lab Units 10/31/23  0526 10/29/23  0549 10/28/23  1318   PROCALCITONIN ng/ml 0.46* 1.10* 1.76*     Results from last 7 days   Lab Units 10/28/23  1642 10/28/23  1416   LACTIC ACID mmol/L 1.8 2.6*       Results from last 7 days   Lab Units 10/28/23  1318   LIPASE u/L 20     Results from last 7 days   Lab Units 10/28/23  1318   CRP mg/L 125.9*   SED RATE mm/hour 36*             Results from last 7 days   Lab Units 10/28/23  1642   CLARITY UA  Slightly Cloudy   COLOR UA  Dark Beata   SPEC GRAV UA  1.025   PH UA  6.5   GLUCOSE UA mg/dl Negative   KETONES UA mg/dl 15 (1+)*   BLOOD UA  Large*   PROTEIN UA mg/dl >=300*   NITRITE UA  Positive* BILIRUBIN UA  Negative   UROBILINOGEN UA E.U./dl 1.0   LEUKOCYTES UA  Negative   WBC UA /hpf 1-2   RBC UA /hpf 1-2   BACTERIA UA /hpf Moderate*   EPITHELIAL CELLS WET PREP /hpf None Seen             Results from last 7 days   Lab Units 10/28/23  1642   AMPH/METH  Negative   BARBITURATE UR  Negative   BENZODIAZEPINE UR  Negative   COCAINE UR  Negative   METHADONE URINE  Negative   OPIATE UR  Negative   PCP UR  Negative   THC UR  Positive*     Results from last 7 days   Lab Units 10/28/23  1318   ETHANOL LVL mg/dL 35*     Results from last 7 days   Lab Units 10/28/23  1422 10/28/23  1416   BLOOD CULTURE  No Growth at 48 hrs. No Growth at 48 hrs.      Results from last 7 days   Lab Units 10/29/23  0549   VANCOMYCIN RM ug/mL 11.9       ED Treatment:   Medication Administration from 10/28/2023 1240 to 10/28/2023 1749         Date/Time Order Dose Route Action     10/28/2023 1319 EDT LORazepam (ATIVAN) injection 4 mg 4 mg Intravenous Given     10/28/2023 1315 EDT sodium chloride 0.9 % bolus 1,000 mL 1,000 mL Intravenous New Bag     10/28/2023 1325 EDT ondansetron (ZOFRAN) injection 4 mg 4 mg Intravenous Given     10/28/2023 1442 EDT lactated ringers bolus 1,000 mL 1,000 mL Intravenous New Bag     10/28/2023 1621 EDT LORazepam (ATIVAN) tablet 2 mg 2 mg Oral Given     10/28/2023 1624 EDT cefTRIAXone (ROCEPHIN) IVPB (premix in dextrose) 1,000 mg 50 mL 1,000 mg Intravenous New Bag     10/28/2023 1732 EDT labetalol (NORMODYNE) injection 20 mg 20 mg Intravenous Not Given          Past Medical History:   Diagnosis Date    Anxiety     Depression     ETOH abuse     GERD (gastroesophageal reflux disease)      Present on Admission:   Alcohol use disorder, severe, dependence ([address])   GERD without esophagitis   Generalized anxiety disorder   Alcohol withdrawal syndrome with complication (HCC)   Transaminitis   SIRS (systemic inflammatory response syndrome) ([address])   Alcoholic hepatitis without ascites   Hyponatremia   Rhabdomyolysis JORDI (acute kidney injury) ([facility])   Toxic metabolic encephalopathy      Admitting Diagnosis: Bacteremia [R78.81]  Alcohol dependence ([facility]) [F10.20]  UTI (urinary tract infection) [N39.0]  Elevated liver function tests [R79.89]  JORDI (acute kidney injury) ([facility]) [N17.9]  Withdrawal symptoms, alcohol ([facility]) [F10.939]  Sepsis ([facility]) [A41.9]  Age/Sex: 53 y.o. male    Admission Orders: Forced warm air blanket  SCD  CIWA  Continuous pulse oximetry              Scheduled Medications: PHENobarbital 260 mg in sodium chloride 0.9 % 100 mL IVPB X2  PHENobarbital 390 mg in sodium chloride 0.9 % 100 mL IVPB  X 1  busPIRone, 10 mg, Oral, TID  cefTRIAXone, 1,000 mg, Intravenous, Q24H  chlordiazePOXIDE, 25 mg, Oral, Q8H JULIAN  cloNIDine, 0.1 mg, Oral, HS  folic acid, 1 mg, Oral, Daily  gabapentin, 300 mg, Oral, TID  lidocaine, 1 patch, Topical, Daily  multivitamin-minerals, 1 tablet, Oral, Daily  pantoprazole, 40 mg, Oral, Early Morning  potassium chloride, 20 mEq, Intravenous, Once  saccharomyces boulardii, 250 mg, Oral, BID  thiamine, 100 mg, Oral, Daily  venlafaxine, 75 mg, Oral, Daily      Continuous IV Infusions:  multi-electrolyte, 150 mL/hr, Intravenous, Continuous      PRN Meds:  LORazepam, 2 mg, Intravenous, Q6H PRN  ondansetron, 4 mg, Intravenous, Q4H PRN        IP CONSULT TO GASTROENTEROLOGY    Network Utilization Review Department  ATTENTION: Please call with any questions or concerns to [phone] and carefully listen to the prompts so that you are directed to the right person. All voicemails are confidential.   For Discharge needs, contact Care Management DC Support Team at [phone] opt. [phone]  Send all requests for admission clinical reviews, approved or denied determinations and any other requests to dedicated fax number below belonging to the campus where the patient is receiving treatment.  List of dedicated fax numbers for the Facilities:  FACILITY NAME UR FAX NUMBER   ADMISSION DENIALS (Administrative/Medical Necessity) [phone]   DISCHARGE SUPPORT TEAM (NETWORK) [phone] [address] (Maternity/NICU/Pediatrics) [phone]   [address] [facility] [phone]   [address] [facility] [id number] [place] [address] [phone]   [address] [place] [address]  [phone]

## 2023-10-31 NOTE — ASSESSMENT & PLAN NOTE
Likely due to alcohol abuse, rhabdo as potential causes. AST 1,530, , T bili 3.89 on admission in setting of alcohol abuse  CT abdomen shows moderate fatty infiltration of the liver without biliary ductal dilatation. Gallbladder is distended without pericholecystic inflammation or calcified gallstones  RUQ US reveals hepatic steatosis  Current AST: 247, ALT: 134, Total Bilirubin: 0.55, all continuing to trend downwards. Maddrey discriminant function was 3.2 per GI note. Hepatitis panel is pending.   Follow-up outpatient if persistently elevated LFTs or if has persistent symptoms of GERD  Will follow [facility]  GI input appreciated

## 2023-10-31 NOTE — PHYSICAL THERAPY NOTE
PHYSICAL THERAPY EVALUATION/TREATMENT     10/31/23 1005   PT Last Visit   PT Visit Date 10/31/23   Note Type   Note type Evaluation   Pain Assessment   Pain Assessment Tool Lagunas-Baker FACES   Lagunas-Baker FACES Pain Rating 2   Pain Location/Orientation   (legs, ribs are sore)   Restrictions/Precautions   Other Precautions Pain; Fall Risk   Home Living   Type of [address] One level  (1 flight of steps to enter)   Prior Function   Level of [place] Independent with functional mobility; Independent with ADLs   Lives With Alone   Falls in the last 6 months 1 to 4   Vocational Full time employment   General   Additional Pertinent History Pt admitted with alcohol withdrawal syndrome. Pt reports he has been lying in bed for 3 days and is generally stiff and sore. Cognition   Overall Cognitive Status WFL   Orientation Level Oriented X4   Subjective   Subjective "I can walk"   LUE Assessment   LUE Assessment WFL   RLE Assessment   RLE Assessment WFL   Bed Mobility   Supine to Sit 7  Independent   Sit to Supine 7  Independent   Transfers   Sit to Stand 5  Supervision   Stand to Sit 5  Supervision   Stand pivot 5  Supervision   Ambulation/Elevation   Gait pattern Wide WENDI  (slightly unsteady with self correction of balance loss)   Gait Assistance 5  Supervision   Assistive Device   (none)   Distance 100 feet   Balance   Static Sitting Good   Dynamic Sitting Fair +   Static Standing Fair +   Dynamic Standing Fair   Ambulatory Fair   Activity Tolerance   Activity Tolerance Patient tolerated treatment well   Assessment   Prognosis Good   Problem List Decreased strength; Impaired balance;Decreased mobility; Decreased endurance;Pain   Assessment Patient is an 53y.o. year old male seen for Physical Therapy evaluation. Patient admitted with Alcohol withdrawal syndrome with complication ([address] Comorbidities affecting patient's physical performance include: TME, multiple falls, alcohol dependence.   Personal factors affecting patient at time of initial evaluation include: stairs to enter home, inability to perform dynamic tasks in community, and limited home support. Prior to admission, patient was independent with functional mobility without assistive device, independent with ADLS, and works full time. Please find objective findings from Physical Therapy assessment regarding body systems outlined above with impairments and limitations including impaired balance, decreased endurance, gait deviations, decreased activity tolerance, decreased functional mobility tolerance, and fall risk. The Barthel Index was used as a functional outcome tool presenting with a score of Barthel Index Score: 75 today indicating moderate limitations of functional mobility and ADLS. Patient's clinical presentation is currently evolving as seen in patient's presentation of changing level of pain, increased fall risk, new onset of impairment of functional mobility, decreased endurance, and new onset of weakness. Pt would benefit from continued Physical Therapy treatment to address deficits as defined above and maximize level of functional mobility. As demonstrated by objective findings, the assigned level of complexity for this evaluation is moderate. The patient's -[facility] Basic Mobility Inpatient Short Form Raw Score is 22. A Raw score of greater than 16 suggests the patient may benefit from discharge to home. Goals   Patient Goals "feel better, go home"   STG Expiration Date 11/07/23   Short Term Goal #1 Independent ambulation indoor level surfaces with a steady gait 300 feet, independent up-and-down 1 flight of steps so patient can get to his second floor apartment   LTG Expiration Date 11/14/23   Long Term Goal #1 Independent ambulation with a steady gait 500 feet so patient can negotiate community distances   Plan   Treatment/Interventions LE strengthening/ROM; Elevations; Therapeutic exercise;Gait training   PT Frequency 3-5x/wk Discharge Recommendation   Rehab Resource Intensity Level, PT No post-acute rehabilitation needs   AM-PAC Basic Mobility Inpatient   Turning in Flat Bed Without Bedrails 4   Lying on Back to Sitting on Edge of Flat Bed Without Bedrails 4   Moving Bed to Chair 4   Standing Up From Chair Using Arms 4   Walk in Room 3   Climb 3-5 Stairs With Railing 3   Basic Mobility Inpatient Raw Score 22   Basic Mobility Standardized Score 47.4   Highest Level Of Mobility   JH-HLM Goal 7: Walk 25 feet or more   JH-HLM Achieved 8: Walk 250 feet ot more   Barthel Index   Feeding 10   Bathing 0   Grooming Score 5   Dressing Score 5   Bladder Score 10   Bowels Score 10   Toilet Use Score 5   Transfers (Bed/Chair) Score 15   Mobility (Level Surface) Score 10   Stairs Score 5   Barthel Index Score 75   Additional Treatment Session   Start Time 0955   End Time 1005   Treatment Assessment S:  "I can walk some more" O:  Pt ambulated 200 feet with a wide WENDI with a mildly guarded gait. A:  Gait is generally steady. Anticipate with continued mobilization pt will return to his prior functional level. P: stairclimbing next session.    Licensure   [place] License Number  [employer] PT [id number]

## 2023-10-31 NOTE — ASSESSMENT & PLAN NOTE
Considering dehydration and beer potomania as potential causes.    Sodium 127 on admission  Currently 136  Continue fluids   Will monitor CMP

## 2023-10-31 NOTE — ASSESSMENT & PLAN NOTE
Admitted for detox 8/1-3 and 9/2-6. Not currently taking naltrexone.    Alcohol cessation discussed  CIWA protocol in place

## 2023-10-31 NOTE — ASSESSMENT & PLAN NOTE
Patient reports a history of several falls while intoxicated over the last few days. Consider fall, rhabdo and dehydration as potential causes.    CK 3,702, down from 9,420  Continue fluids and I&O monitoring  Trend CK

## 2023-10-31 NOTE — ASSESSMENT & PLAN NOTE
Possibly 2/2 ETOH abuse, withdrawal, and dehydration  On admission, HR: 125, RR; 24, Temp 96.6 and WBC: 21.91. Lactic acid 2.6-> 1.8. Continue IV fluids   Procalcitonin 1.10->0. 46. BC x2 negative  Denies urinary or respiratory symptoms. Does endorse left sided abdominal pain. UA showing largely + for blood, ketones and protein. + nitrites, moderate bacteria and renal tube epithelial cells. Pt asymptomatic for UTI. Discontinue Rocephin as patient has received 4 days of antibiotics with no obvious source of infection  CT chest/abdomen/pelvis showing distended gallbladder but no stones or other infectious source   RUQ US shows hepatic steatosis   Cefepime and Vancomycin given in ED, vancomycin was previously discontinued with negative MRSA results.   Trend CBC, temperature curve

## 2023-10-31 NOTE — PROGRESS NOTES
Progress Note - Yessica Tenorio 53 y.o. male MRN: [id number]    Unit/Bed#: [address] [place] [address] Encounter: [id number]        Assessment/Plan:  67-year-old male with elevated LFTs which is likely multifactorial in setting of alcohol abuse along with rhabdomyolysis. -[facility]: [facility].2 which indicates low short-term mortality and treatment with glucocorticoids is not indicated  -LFTs are improving daily, continue to trend intermittently  -Monitor MELD labs, MELD 3.0 is 8  -acute hepatitis panel pending  -Right upper quadrant ultrasound just showing hepatic steatosis  -Monitor output of stool, started on PPI due to possible black stool as outpt  -Follow serial H&H, remains stable at 11.8 today  -We will follow as needed, can follow-up as an outpatient if persistent symptoms of GERD and/or persistently elevated LFTs    Subjective:   Patient is lying in bed. He reports that he has no significant abdominal pain and states that he believes he moved his bowels and denies any melena.     Objective:     Vitals: /85 (BP Location: Right arm)   Pulse 75   Temp 97.5 °F (36.4 °C) (Oral)   Resp 18   Ht 6' 1" (1.854 m)   Wt 93.6 kg (206 lb 5.6 oz)   SpO2 97%   BMI 27.22 kg/m²       Physical Exam:  Gen-alert, nad  Abd-+bs, NT, ND, no r.r.g       Lab, Imaging and other studies:   Recent Results (from the past 72 hour(s))   CBC and differential    Collection Time: 10/28/23  1:18 PM   Result Value Ref Range    WBC 21.91 (H) 4.31 - 10.16 Thousand/uL    RBC 5.57 3.88 - 5.62 Million/uL    Hemoglobin 17.4 (H) 12.0 - 17.0 g/dL    Hematocrit 47.2 36.5 - 49.3 %    MCV 85 82 - 98 fL    MCH 31.2 26.8 - 34.3 pg    MCHC 36.9 31.4 - 37.4 g/dL    RDW 12.0 11.6 - 15.1 %    MPV 10.3 8.9 - 12.7 fL    Platelets 782 (H) 637 - 390 Thousands/uL    nRBC 0 /100 WBCs    Neutrophils Relative 87 (H) 43 - 75 %    Immat GRANS % 1 0 - 2 %    Lymphocytes Relative 5 (L) 14 - 44 %    Monocytes Relative 7 4 - 12 %    Eosinophils Relative 0 0 - 6 %    Basophils Relative 0 0 - 1 %    Neutrophils Absolute 19.28 (H) 1.85 - 7.62 Thousands/µL    Immature Grans Absolute 0.10 0.00 - 0.20 Thousand/uL    Lymphocytes Absolute 0.98 0.60 - 4.47 Thousands/µL    Monocytes Absolute 1.51 (H) 0.17 - 1.22 Thousand/µL    Eosinophils Absolute 0.00 0.00 - 0.61 Thousand/µL    Basophils Absolute 0.04 0.00 - 0.10 Thousands/µL   Comprehensive metabolic panel    Collection Time: 10/28/23  1:18 PM   Result Value Ref Range    Sodium 127 (L) 135 - 147 mmol/L    Potassium 3.3 (L) 3.5 - 5.3 mmol/L    Chloride 84 (L) 96 - 108 mmol/L    CO2 16 (L) 21 - 32 mmol/L    ANION GAP 27 mmol/L    BUN 28 (H) 5 - 25 mg/dL    Creatinine 2.09 (H) 0.60 - 1.30 mg/dL    Glucose 118 65 - 140 mg/dL    Calcium 9.6 8.4 - 10.2 mg/dL    AST 1,530 (H) 13 - 39 U/L     (H) 7 - 52 U/L    Alkaline Phosphatase 87 34 - 104 U/L    Total Protein 8.4 6.4 - 8.4 g/dL    Albumin 5.0 3.5 - 5.0 g/dL    Total Bilirubin 3.89 (H) 0.20 - 1.00 mg/dL    eGFR 36 ml/min/1.73sq m   Protime-INR    Collection Time: 10/28/23  1:18 PM   Result Value Ref Range    Protime 13.5 11.6 - 14.5 seconds    INR 1.01 0.84 - 1.19   APTT    Collection Time: 10/28/23  1:18 PM   Result Value Ref Range    PTT 29 23 - 37 seconds   Ethanol    Collection Time: 10/28/23  1:18 PM   Result Value Ref Range    Ethanol Lvl 35 (H) <10 mg/dL   Magnesium    Collection Time: 10/28/23  1:18 PM   Result Value Ref Range    Magnesium 2.0 1.9 - 2.7 mg/dL   C-reactive protein    Collection Time: 10/28/23  1:18 PM   Result Value Ref Range    .9 (H) <3.0 mg/L   Sedimentation rate, automated    Collection Time: 10/28/23  1:18 PM   Result Value Ref Range    Sed Rate 36 (H) 0 - 14 mm/hour   Procalcitonin    Collection Time: 10/28/23  1:18 PM   Result Value Ref Range    Procalcitonin 1.76 (H) <=0.25 ng/ml   CK    Collection Time: 10/28/23  1:18 PM   Result Value Ref Range    Total CK 85,247 (H) 39 - 308 U/L   Lipase    Collection Time: 10/28/23 1:18 PM   Result Value Ref Range    Lipase 20 11 - 82 u/L   ECG 12 lead    Collection Time: 10/28/23  1:28 PM   Result Value Ref Range    Ventricular Rate 109 BPM    Atrial Rate 109 BPM    MD Interval 100 ms    QRSD Interval 98 ms    QT Interval 350 ms    QTC Interval 471 ms    P [place] 32 degrees    QRS Axis -4 degrees    T Wave Axis 19 degrees   Blood culture #1    Collection Time: 10/28/23  2:16 PM    Specimen: Arm, Left; Blood   Result Value Ref Range    Blood Culture No Growth at 48 hrs. Lactic acid, plasma (w/reflex if result > 2.0)    Collection Time: 10/28/23  2:16 PM   Result Value Ref Range    LACTIC ACID 2.6 (HH) 0.5 - 2.0 mmol/L   Blood culture #2    Collection Time: 10/28/23  2:22 PM    Specimen: Arm, Right; Blood   Result Value Ref Range    Blood Culture No Growth at 48 hrs.     Rapid drug screen, urine    Collection Time: 10/28/23  4:42 PM   Result Value Ref Range    Amph/Meth UR Negative Negative    Barbiturate Ur Negative Negative    Benzodiazepine Urine Negative Negative    Cocaine Urine Negative Negative    Methadone Urine Negative Negative    Opiate Urine Negative Negative    PCP Ur Negative Negative    THC Urine Positive (A) Negative    Oxycodone Urine Negative Negative   UA (URINE) with reflex to Scope    Collection Time: 10/28/23  4:42 PM   Result Value Ref Range    Color, UA Dark [place]ata     Clarity, UA Slightly Cloudy     Specific Gravity, UA 1.025 1.000 - 1.030    pH, UA 6.5 5.0, 5.5, 6.0, 6.5, 7.0, 7.5, 8.0, 8.5, 9.0    Leukocytes, UA Negative Negative    Nitrite, UA Positive (A) Negative    Protein, UA >=300 (A) Negative mg/dl    Glucose, UA Negative Negative mg/dl    Ketones, UA 15 (1+) (A) Negative mg/dl    Urobilinogen, UA 1.0 0.2, 1.0 E.U./dl E.U./dl    Bilirubin, UA Negative Negative    Occult Blood, UA Large (A) Negative   Lactic acid 2 Hours    Collection Time: 10/28/23  4:42 PM   Result Value Ref Range    LACTIC ACID 1.8 0.5 - 2.0 mmol/L   Urine Microscopic    Collection Time: 10/28/23  4:42 PM   Result Value Ref Range    RBC, UA 1-2 None Seen, 0-1, 1-2, 2-4, 0-5 /hpf    WBC, UA 1-2 None Seen, 0-1, 1-2, 0-5, 2-4 /hpf    Epithelial Cells None Seen None Seen, Occasional /hpf    Bacteria, UA Moderate (A) None Seen, Occasional /hpf    Fine granular casts 10-25 /lpf    AMORPH URATES Moderate /hpf    OTHER OBSERVATIONS Renal Tubule Epithelial Cells Present    MRSA culture    Collection Time: 10/28/23  6:07 PM    Specimen: Nose; Nares   Result Value Ref Range    MRSA Culture Only       No Methicillin Resistant Staphlyococcus aureus (MRSA) isolated   Comprehensive metabolic panel    Collection Time: 10/28/23 11:22 PM   Result Value Ref Range    Sodium 128 (L) 135 - 147 mmol/L    Potassium 3.7 3.5 - 5.3 mmol/L    Chloride 97 96 - 108 mmol/L    CO2 19 (L) 21 - 32 mmol/L    ANION GAP 12 mmol/L    BUN 25 5 - 25 mg/dL    Creatinine 1.31 (H) 0.60 - 1.30 mg/dL    Glucose 98 65 - 140 mg/dL    Calcium 7.4 (L) 8.4 - 10.2 mg/dL     (H) 13 - 39 U/L     (H) 7 - 52 U/L    Alkaline Phosphatase 52 34 - 104 U/L    Total Protein 5.8 (L) 6.4 - 8.4 g/dL    Albumin 3.5 3.5 - 5.0 g/dL    Total Bilirubin 3.27 (H) 0.20 - 1.00 mg/dL    eGFR 64 ml/min/1.73sq m   Magnesium    Collection Time: 10/28/23 11:22 PM   Result Value Ref Range    Magnesium 2.9 (H) 1.9 - 2.7 mg/dL   Procalcitonin    Collection Time: 10/29/23  5:49 AM   Result Value Ref Range    Procalcitonin 1.10 (H) <=0.25 ng/ml   Bilirubin, direct    Collection Time: 10/29/23  5:49 AM   Result Value Ref Range    Bilirubin, Direct 0.62 (H) 0.00 - 0.20 mg/dL   CK    Collection Time: 10/29/23  5:49 AM   Result Value Ref Range    Total CK 27,253 (H) 39 - 308 U/L   Vancomycin, random    Collection Time: 10/29/23  5:49 AM   Result Value Ref Range    Vancomycin Rm 11.9 10.0 - 20.0 ug/mL   CBC and differential    Collection Time: 10/29/23  5:49 AM   Result Value Ref Range    WBC 9.52 4.31 - 10.16 Thousand/uL    RBC 3.89 3.88 - 5.62 Million/uL    Hemoglobin 12.5 12.0 - 17.0 g/dL    Hematocrit 34.2 (L) 36.5 - 49.3 %    MCV 88 82 - 98 fL    MCH 32.1 26.8 - 34.3 pg    MCHC 36.5 31.4 - 37.4 g/dL    RDW 12.5 11.6 - 15.1 %    MPV 10.7 8.9 - 12.7 fL    Platelets 978 514 - 631 Thousands/uL    nRBC 0 /100 WBCs    Neutrophils Relative 86 (H) 43 - 75 %    Immat GRANS % 1 0 - 2 %    Lymphocytes Relative 7 (L) 14 - 44 %    Monocytes Relative 6 4 - 12 %    Eosinophils Relative 0 0 - 6 %    Basophils Relative 0 0 - 1 %    Neutrophils Absolute 8.21 (H) 1.85 - 7.62 Thousands/µL    Immature Grans Absolute 0.05 0.00 - 0.20 Thousand/uL    Lymphocytes Absolute 0.62 0.60 - 4.47 Thousands/µL    Monocytes Absolute 0.60 0.17 - 1.22 Thousand/µL    Eosinophils Absolute 0.01 0.00 - 0.61 Thousand/µL    Basophils Absolute 0.03 0.00 - 0.10 Thousands/µL   Protime-INR    Collection Time: 10/29/23  5:49 AM   Result Value Ref Range    Protime 13.5 11.6 - 14.5 seconds    INR 1.01 0.84 - 1.19   Comprehensive metabolic panel    Collection Time: 10/29/23  5:49 AM   Result Value Ref Range    Sodium 131 (L) 135 - 147 mmol/L    Potassium 3.6 3.5 - 5.3 mmol/L    Chloride 99 96 - 108 mmol/L    CO2 23 21 - 32 mmol/L    ANION GAP 9 mmol/L    BUN 21 5 - 25 mg/dL    Creatinine 1.19 0.60 - 1.30 mg/dL    Glucose 91 65 - 140 mg/dL    Calcium 7.8 (L) 8.4 - 10.2 mg/dL    Corrected Calcium 8.4 8.3 - 10.1 mg/dL     (H) 13 - 39 U/L     (H) 7 - 52 U/L    Alkaline Phosphatase 53 34 - 104 U/L    Total Protein 5.6 (L) 6.4 - 8.4 g/dL    Albumin 3.3 (L) 3.5 - 5.0 g/dL    Total Bilirubin 2.95 (H) 0.20 - 1.00 mg/dL    eGFR 72 ml/min/1.73sq m   Magnesium    Collection Time: 10/29/23  5:49 AM   Result Value Ref Range    Magnesium 3.0 (H) 1.9 - 2.7 mg/dL   Calcium, ionized    Collection Time: 10/29/23  5:49 AM   Result Value Ref Range    Calcium, Ionized 1.05 (L) 1.12 - 1.32 mmol/L   CBC and differential    Collection Time: 10/30/23  5:59 AM   Result Value Ref Range    WBC 5.48 4.31 - 10.16 Thousand/uL    RBC 3.48 (L) 3.88 - 5.62 Million/uL    Hemoglobin 11.3 (L) 12.0 - 17.0 g/dL    Hematocrit 31.0 (L) 36.5 - 49.3 %    MCV 89 82 - 98 fL    MCH 32.5 26.8 - 34.3 pg    MCHC 36.5 31.4 - 37.4 g/dL    RDW 12.4 11.6 - 15.1 %    MPV 10.5 8.9 - 12.7 fL    Platelets 744 (L) 894 - 390 Thousands/uL    nRBC 0 /100 WBCs    Neutrophils Relative 76 (H) 43 - 75 %    Immat GRANS % 1 0 - 2 %    Lymphocytes Relative 13 (L) 14 - 44 %    Monocytes Relative 8 4 - 12 %    Eosinophils Relative 1 0 - 6 %    Basophils Relative 1 0 - 1 %    Neutrophils Absolute 4.22 1.85 - 7.62 Thousands/µL    Immature Grans Absolute 0.03 0.00 - 0.20 Thousand/uL    Lymphocytes Absolute 0.71 0.60 - 4.47 Thousands/µL    Monocytes Absolute 0.44 0.17 - 1.22 Thousand/µL    Eosinophils Absolute 0.05 0.00 - 0.61 Thousand/µL    Basophils Absolute 0.03 0.00 - 0.10 Thousands/µL   Comprehensive metabolic panel    Collection Time: 10/30/23  5:59 AM   Result Value Ref Range    Sodium 132 (L) 135 - 147 mmol/L    Potassium 3.0 (L) 3.5 - 5.3 mmol/L    Chloride 100 96 - 108 mmol/L    CO2 23 21 - 32 mmol/L    ANION GAP 9 mmol/L    BUN 11 5 - 25 mg/dL    Creatinine 0.76 0.60 - 1.30 mg/dL    Glucose 81 65 - 140 mg/dL    Calcium 7.4 (L) 8.4 - 10.2 mg/dL    Corrected Calcium 8.2 (L) 8.3 - 10.1 mg/dL     (H) 13 - 39 U/L     (H) 7 - 52 U/L    Alkaline Phosphatase 47 34 - 104 U/L    Total Protein 5.1 (L) 6.4 - 8.4 g/dL    Albumin 3.0 (L) 3.5 - 5.0 g/dL    Total Bilirubin 0.85 0.20 - 1.00 mg/dL    eGFR 108 ml/min/1.73sq m   CK    Collection Time: 10/30/23  5:59 AM   Result Value Ref Range    Total CK 9,420 (H) 39 - 308 U/L   Protime-INR    Collection Time: 10/31/23  5:26 AM   Result Value Ref Range    Protime 13.9 11.6 - 14.5 seconds    INR 1.04 0.84 - 1.19   Comprehensive metabolic panel    Collection Time: 10/31/23  5:26 AM   Result Value Ref Range    Sodium 136 135 - 147 mmol/L    Potassium 3.0 (L) 3.5 - 5.3 mmol/L    Chloride 101 96 - 108 mmol/L    CO2 29 21 - 32 mmol/L    ANION GAP 6 mmol/L    BUN 6 5 - 25 mg/dL    Creatinine 0.76 0.60 - 1.30 mg/dL    Glucose 77 65 - 140 mg/dL    Calcium 7.8 (L) 8.4 - 10.2 mg/dL    Corrected Calcium 8.6 8.3 - 10.1 mg/dL     (H) 13 - 39 U/L     (H) 7 - 52 U/L    Alkaline Phosphatase 46 34 - 104 U/L    Total Protein 5.2 (L) 6.4 - 8.4 g/dL    Albumin 3.0 (L) 3.5 - 5.0 g/dL    Total Bilirubin 0.55 0.20 - 1.00 mg/dL    eGFR 108 ml/min/1.73sq m   Procalcitonin    Collection Time: 10/31/23  5:26 AM   Result Value Ref Range    Procalcitonin 0.46 (H) <=0.25 ng/ml   CK    Collection Time: 10/31/23  5:26 AM   Result Value Ref Range    Total CK 3,702 (H) 39 - 308 U/L   CBC and differential    Collection Time: 10/31/23  5:26 AM   Result Value Ref Range    WBC 4.98 4.31 - 10.16 Thousand/uL    RBC 3.54 (L) 3.88 - 5.62 Million/uL    Hemoglobin 11.8 (L) 12.0 - 17.0 g/dL    Hematocrit 32.0 (L) 36.5 - 49.3 %    MCV 90 82 - 98 fL    MCH 33.3 26.8 - 34.3 pg    MCHC 36.9 31.4 - 37.4 g/dL    RDW 12.4 11.6 - 15.1 %    MPV 10.4 8.9 - 12.7 fL    Platelets 424 581 - 767 Thousands/uL    nRBC 0 /100 WBCs    Neutrophils Relative 68 43 - 75 %    Immat GRANS % 1 0 - 2 %    Lymphocytes Relative 19 14 - 44 %    Monocytes Relative 9 4 - 12 %    Eosinophils Relative 2 0 - 6 %    Basophils Relative 1 0 - 1 %    Neutrophils Absolute 3.38 1.85 - 7.62 Thousands/µL    Immature Grans Absolute 0.04 0.00 - 0.20 Thousand/uL    Lymphocytes Absolute 0.93 0.60 - 4.47 Thousands/µL    Monocytes Absolute 0.47 0.17 - 1.22 Thousand/µL    Eosinophils Absolute 0.12 0.00 - 0.61 Thousand/µL    Basophils Absolute 0.04 0.00 - 0.10 Thousands/µL

## 2023-10-31 NOTE — ASSESSMENT & PLAN NOTE
Query evolving alcoholic hepatitis: initial MDF only 10.8, MELD 3.0":8  Trend LFTs, coag studies, MED and MELD, and H/H  MDF: 3.2, indicating low short-term mortality. MELD 3.0 us 8  Glucocorticoids are not indicated at this time. Additional plan as outlined  Per GI, continue PPI   Follow outpatient for GERD or elevated LFTs.

## 2023-10-31 NOTE — ASSESSMENT & PLAN NOTE
Considering elevated CK and dehydration as potential causes. UA shows renal tube epithelial cells   Cr remains 0.76, down from 2.09 on admission. Continue IV fluids and I&O monitoring.

## 2023-11-01 ENCOUNTER — TELEPHONE (OUTPATIENT)
Dept: PSYCHIATRY | Facility: CLINIC | Age: 48
End: 2023-11-01

## 2023-11-01 VITALS
TEMPERATURE: 98 F | HEART RATE: 81 BPM | DIASTOLIC BLOOD PRESSURE: 82 MMHG | RESPIRATION RATE: 18 BRPM | HEIGHT: 73 IN | WEIGHT: 206.35 LBS | SYSTOLIC BLOOD PRESSURE: 125 MMHG | OXYGEN SATURATION: 99 % | BODY MASS INDEX: 27.35 KG/M2

## 2023-11-01 DIAGNOSIS — F33.42 MAJOR DEPRESSIVE DISORDER, RECURRENT, IN FULL REMISSION (HCC): ICD-10-CM

## 2023-11-01 DIAGNOSIS — F41.1 GENERALIZED ANXIETY DISORDER: ICD-10-CM

## 2023-11-01 LAB
ALBUMIN SERPL BCP-MCNC: 3 G/DL (ref 3.5–5)
ALP SERPL-CCNC: 48 U/L (ref 34–104)
ALT SERPL W P-5'-P-CCNC: 120 U/L (ref 7–52)
ANION GAP SERPL CALCULATED.3IONS-SCNC: 7 MMOL/L
AST SERPL W P-5'-P-CCNC: 160 U/L (ref 13–39)
BACTERIA UR CULT: NORMAL
BASOPHILS # BLD AUTO: 0.04 THOUSANDS/ÂΜL (ref 0–0.1)
BASOPHILS NFR BLD AUTO: 1 % (ref 0–1)
BILIRUB SERPL-MCNC: 0.46 MG/DL (ref 0.2–1)
BUN SERPL-MCNC: 7 MG/DL (ref 5–25)
CALCIUM ALBUM COR SERPL-MCNC: 8.7 MG/DL (ref 8.3–10.1)
CALCIUM SERPL-MCNC: 7.9 MG/DL (ref 8.4–10.2)
CHLORIDE SERPL-SCNC: 102 MMOL/L (ref 96–108)
CK SERPL-CCNC: 1400 U/L (ref 39–308)
CO2 SERPL-SCNC: 28 MMOL/L (ref 21–32)
CREAT SERPL-MCNC: 0.74 MG/DL (ref 0.6–1.3)
EOSINOPHIL # BLD AUTO: 0.15 THOUSAND/ÂΜL (ref 0–0.61)
EOSINOPHIL NFR BLD AUTO: 3 % (ref 0–6)
ERYTHROCYTE [DISTWIDTH] IN BLOOD BY AUTOMATED COUNT: 12.6 % (ref 11.6–15.1)
GFR SERPL CREATININE-BSD FRML MDRD: 109 ML/MIN/1.73SQ M
GLUCOSE SERPL-MCNC: 78 MG/DL (ref 65–140)
HCT VFR BLD AUTO: 33 % (ref 36.5–49.3)
HGB BLD-MCNC: 11.9 G/DL (ref 12–17)
IMM GRANULOCYTES # BLD AUTO: 0.06 THOUSAND/UL (ref 0–0.2)
IMM GRANULOCYTES NFR BLD AUTO: 1 % (ref 0–2)
LYMPHOCYTES # BLD AUTO: 1.02 THOUSANDS/ÂΜL (ref 0.6–4.47)
LYMPHOCYTES NFR BLD AUTO: 18 % (ref 14–44)
MCH RBC QN AUTO: 32.7 PG (ref 26.8–34.3)
MCHC RBC AUTO-ENTMCNC: 36.1 G/DL (ref 31.4–37.4)
MCV RBC AUTO: 91 FL (ref 82–98)
MONOCYTES # BLD AUTO: 0.64 THOUSAND/ÂΜL (ref 0.17–1.22)
MONOCYTES NFR BLD AUTO: 12 % (ref 4–12)
NEUTROPHILS # BLD AUTO: 3.67 THOUSANDS/ÂΜL (ref 1.85–7.62)
NEUTS SEG NFR BLD AUTO: 65 % (ref 43–75)
NRBC BLD AUTO-RTO: 0 /100 WBCS
PLATELET # BLD AUTO: 194 THOUSANDS/UL (ref 149–390)
PMV BLD AUTO: 10.4 FL (ref 8.9–12.7)
POTASSIUM SERPL-SCNC: 3.3 MMOL/L (ref 3.5–5.3)
PROT SERPL-MCNC: 5.3 G/DL (ref 6.4–8.4)
RBC # BLD AUTO: 3.64 MILLION/UL (ref 3.88–5.62)
SODIUM SERPL-SCNC: 137 MMOL/L (ref 135–147)
WBC # BLD AUTO: 5.58 THOUSAND/UL (ref 4.31–10.16)

## 2023-11-01 PROCEDURE — 99239 HOSP IP/OBS DSCHRG MGMT >30: CPT | Performed by: FAMILY MEDICINE

## 2023-11-01 PROCEDURE — 85025 COMPLETE CBC W/AUTO DIFF WBC: CPT | Performed by: STUDENT IN AN ORGANIZED HEALTH CARE EDUCATION/TRAINING PROGRAM

## 2023-11-01 PROCEDURE — 80053 COMPREHEN METABOLIC PANEL: CPT | Performed by: FAMILY MEDICINE

## 2023-11-01 PROCEDURE — 82550 ASSAY OF CK (CPK): CPT | Performed by: FAMILY MEDICINE

## 2023-11-01 RX ORDER — POTASSIUM CHLORIDE 14.9 MG/ML
20 INJECTION INTRAVENOUS ONCE
Status: COMPLETED | OUTPATIENT
Start: 2023-11-01 | End: 2023-11-01

## 2023-11-01 RX ORDER — MULTIVITAMIN
1 TABLET ORAL DAILY
Qty: 30 TABLET | Refills: 0 | Status: SHIPPED | OUTPATIENT
Start: 2023-11-01 | End: 2023-11-10 | Stop reason: SDUPTHER

## 2023-11-01 RX ORDER — POTASSIUM CHLORIDE 20 MEQ/1
20 TABLET, EXTENDED RELEASE ORAL DAILY
Qty: 10 TABLET | Refills: 0 | Status: SHIPPED | OUTPATIENT
Start: 2023-11-01

## 2023-11-01 RX ORDER — FOLIC ACID 1 MG/1
1 TABLET ORAL DAILY
Qty: 30 TABLET | Refills: 0 | Status: SHIPPED | OUTPATIENT
Start: 2023-11-02 | End: 2023-11-10 | Stop reason: SDUPTHER

## 2023-11-01 RX ORDER — PANTOPRAZOLE SODIUM 40 MG/1
40 TABLET, DELAYED RELEASE ORAL
Qty: 30 TABLET | Refills: 0 | Status: SHIPPED | OUTPATIENT
Start: 2023-11-02 | End: 2023-11-10 | Stop reason: ALTCHOICE

## 2023-11-01 RX ORDER — LANOLIN ALCOHOL/MO/W.PET/CERES
100 CREAM (GRAM) TOPICAL DAILY
Qty: 30 TABLET | Refills: 0 | Status: SHIPPED | OUTPATIENT
Start: 2023-11-02 | End: 2023-11-10 | Stop reason: SDUPTHER

## 2023-11-01 RX ADMIN — Medication 1 TABLET: at 08:14

## 2023-11-01 RX ADMIN — BUSPIRONE HYDROCHLORIDE 10 MG: 10 TABLET ORAL at 08:15

## 2023-11-01 RX ADMIN — FOLIC ACID 1 MG: 1 TABLET ORAL at 08:15

## 2023-11-01 RX ADMIN — POTASSIUM CHLORIDE 20 MEQ: 14.9 INJECTION, SOLUTION INTRAVENOUS at 09:27

## 2023-11-01 RX ADMIN — VENLAFAXINE HYDROCHLORIDE 75 MG: 75 CAPSULE, EXTENDED RELEASE ORAL at 08:15

## 2023-11-01 RX ADMIN — GABAPENTIN 300 MG: 300 CAPSULE ORAL at 08:15

## 2023-11-01 RX ADMIN — LIDOCAINE 1 PATCH: 50 PATCH CUTANEOUS at 08:14

## 2023-11-01 RX ADMIN — PANTOPRAZOLE SODIUM 40 MG: 40 TABLET, DELAYED RELEASE ORAL at 05:47

## 2023-11-01 RX ADMIN — SODIUM CHLORIDE, SODIUM GLUCONATE, SODIUM ACETATE, POTASSIUM CHLORIDE, MAGNESIUM CHLORIDE, SODIUM PHOSPHATE, DIBASIC, AND POTASSIUM PHOSPHATE 150 ML/HR: .53; .5; .37; .037; .03; .012; .00082 INJECTION, SOLUTION INTRAVENOUS at 08:18

## 2023-11-01 RX ADMIN — Medication 250 MG: at 08:14

## 2023-11-01 RX ADMIN — THIAMINE HCL TAB 100 MG 100 MG: 100 TAB at 08:15

## 2023-11-01 RX ADMIN — POTASSIUM CHLORIDE 20 MEQ: 14.9 INJECTION, SOLUTION INTRAVENOUS at 11:21

## 2023-11-01 NOTE — ASSESSMENT & PLAN NOTE
Admitted for detox 8/1-3 and 9/2-6. Not currently taking naltrexone.    Alcohol cessation discussed  Was on [facility] protocol here

## 2023-11-01 NOTE — ASSESSMENT & PLAN NOTE
Considering dehydration and beer potomania as potential causes.    Sodium 127 on admission  Currently 137 with IVF

## 2023-11-01 NOTE — PLAN OF CARE
Problem: MOBILITY - ADULT  Goal: Maintain or return to baseline ADL function  Description: INTERVENTIONS:  -  Assess patient's ability to carry out ADLs; assess patient's baseline for ADL function and identify physical deficits which impact ability to perform ADLs (bathing, care of mouth/teeth, toileting, grooming, dressing, etc.)  - Assess/evaluate cause of self-care deficits   - Assess range of motion  - Assess patient's mobility; develop plan if impaired  - Assess patient's need for assistive devices and provide as appropriate  - Encourage maximum independence but intervene and supervise when necessary  - Involve family in performance of ADLs  - Assess for home care needs following discharge   - Consider OT consult to assist with ADL evaluation and planning for discharge  - Provide patient education as appropriate  Outcome: Progressing  Goal: Maintains/Returns to pre admission functional level  Description: INTERVENTIONS:  - Perform BMAT or MOVE assessment daily.   - Set and communicate daily mobility goal to care team and patient/family/caregiver. - Collaborate with rehabilitation services on mobility goals if consulted  - Perform Range of Motion 3 times a day. - Reposition patient every 2 hours.   - Dangle patient 3 times a day  - Stand patient 3 times a day  - Ambulate patient 3 times a day  - Out of bed to chair 3 times a day   - Out of bed for meals 3 times a day  - Out of bed for toileting  - Record patient progress and toleration of activity level   Outcome: Progressing     Problem: PAIN - ADULT  Goal: Verbalizes/displays adequate comfort level or baseline comfort level  Description: Interventions:  - Encourage patient to monitor pain and request assistance  - Assess pain using appropriate pain scale  - Administer analgesics based on type and severity of pain and evaluate response  - Implement non-pharmacological measures as appropriate and evaluate response  - Consider cultural and social influences on pain and pain management  - Notify physician/advanced practitioner if interventions unsuccessful or patient reports new pain  Outcome: Progressing     Problem: INFECTION - ADULT  Goal: Absence or prevention of progression during hospitalization  Description: INTERVENTIONS:  - Assess and monitor for signs and symptoms of infection  - Monitor lab/diagnostic results  - Monitor all insertion sites, i.e. indwelling lines, tubes, and drains  - Monitor endotracheal if appropriate and nasal secretions for changes in amount and color  - [place] appropriate cooling/warming therapies per order  - Administer medications as ordered  - Instruct and encourage patient and family to use good hand hygiene technique  - Identify and instruct in appropriate isolation precautions for identified infection/condition  Outcome: Progressing     Problem: SAFETY ADULT  Goal: Maintain or return to baseline ADL function  Description: INTERVENTIONS:  -  Assess patient's ability to carry out ADLs; assess patient's baseline for ADL function and identify physical deficits which impact ability to perform ADLs (bathing, care of mouth/teeth, toileting, grooming, dressing, etc.)  - Assess/evaluate cause of self-care deficits   - Assess range of motion  - Assess patient's mobility; develop plan if impaired  - Assess patient's need for assistive devices and provide as appropriate  - Encourage maximum independence but intervene and supervise when necessary  - Involve family in performance of ADLs  - Assess for home care needs following discharge   - Consider OT consult to assist with ADL evaluation and planning for discharge  - Provide patient education as appropriate  Outcome: Progressing  Goal: Maintains/Returns to pre admission functional level  Description: INTERVENTIONS:  - Perform BMAT or MOVE assessment daily.   - Set and communicate daily mobility goal to care team and patient/family/caregiver.    - Collaborate with rehabilitation services on mobility goals if consulted  - Perform Range of Motion 3 times a day. - Reposition patient every 2 hours.   - Dangle patient 3 times a day  - Stand patient 3 times a day  - Ambulate patient 3 times a day  - Out of bed to chair 3 times a day   - Out of bed for meals 3 times a day  - Out of bed for toileting  - Record patient progress and toleration of activity level   Outcome: Progressing  Goal: Patient will remain free of falls  Description: INTERVENTIONS:  - Educate patient/family on patient safety including physical limitations  - Instruct patient to call for assistance with activity   - Consult OT/PT to assist with strengthening/mobility   - Keep Call bell within reach  - Keep bed low and locked with side rails adjusted as appropriate  - Keep care items and personal belongings within reach  - Initiate and maintain comfort rounds  - Make Fall Risk Sign visible to staff  - Offer Toileting every 2 Hours, in advance of need  - Initiate/Maintain alarm  - Obtain necessary fall risk management equipment  - Apply yellow socks and bracelet for high fall risk patients  - Consider moving patient to room near nurses station  Outcome: Progressing     Problem: DISCHARGE PLANNING  Goal: Discharge to home or other facility with appropriate resources  Description: INTERVENTIONS:  - Identify barriers to discharge w/patient and caregiver  - Arrange for needed discharge resources and transportation as appropriate  - Identify discharge learning needs (meds, wound care, etc.)  - Arrange for interpretive services to assist at discharge as needed  - Refer to Case Management Department for coordinating discharge planning if the patient needs post-hospital services based on physician/advanced practitioner order or complex needs related to functional status, cognitive ability, or social support system  Outcome: Progressing     Problem: Knowledge Deficit  Goal: Patient/family/caregiver demonstrates understanding of disease process, treatment plan, medications, and discharge instructions  Description: Complete learning assessment and assess knowledge base. Interventions:  - Provide teaching at level of understanding  - Provide teaching via preferred learning methods  Outcome: Progressing     Problem: NEUROSENSORY - ADULT  Goal: Achieves stable or improved neurological status  Description: INTERVENTIONS  - Monitor and report changes in neurological status  - Monitor vital signs such as temperature, blood pressure, glucose, and any other labs ordered   - Initiate measures to prevent increased intracranial pressure  - Monitor for seizure activity and implement precautions if appropriate      Outcome: Progressing  Goal: Remains free of injury related to seizures activity  Description: INTERVENTIONS  - Maintain airway, patient safety  and administer oxygen as ordered  - Monitor patient for seizure activity, document and report duration and description of seizure to physician/advanced practitioner  - If seizure occurs,  ensure patient safety during seizure  - Reorient patient post seizure  - Seizure pads on all 4 side rails  - Instruct patient/family to notify RN of any seizure activity including if an aura is experienced  - Instruct patient/family to call for assistance with activity based on nursing assessment  - Administer anti-seizure medications if ordered    Outcome: Progressing  Goal: Achieves maximal functionality and self care  Description: INTERVENTIONS  - Monitor swallowing and airway patency with patient fatigue and changes in neurological status  - Encourage and assist patient to increase activity and self care.    - Encourage visually impaired, hearing impaired and aphasic patients to use assistive/communication devices  Outcome: Progressing     Problem: CARDIOVASCULAR - ADULT  Goal: Absence of cardiac dysrhythmias or at baseline rhythm  Description: INTERVENTIONS:  - Continuous cardiac monitoring, vital signs, obtain 12 lead EKG if ordered  - Administer antiarrhythmic and heart rate control medications as ordered  - Monitor electrolytes and administer replacement therapy as ordered  Outcome: Progressing     Problem: RESPIRATORY - ADULT  Goal: Achieves optimal ventilation and oxygenation  Description: INTERVENTIONS:  - Assess for changes in respiratory status  - Assess for changes in mentation and behavior  - Position to facilitate oxygenation and minimize respiratory effort  - Oxygen administered by appropriate delivery if ordered  - Initiate smoking cessation education as indicated  - Encourage broncho-pulmonary hygiene including cough, deep breathe, Incentive Spirometry  - Assess the need for suctioning and aspirate as needed  - Assess and instruct to report SOB or any respiratory difficulty  - Respiratory Therapy support as indicated  Outcome: Progressing     Problem: GASTROINTESTINAL - ADULT  Goal: Minimal or absence of nausea and/or vomiting  Description: INTERVENTIONS:  - Administer IV fluids if ordered to ensure adequate hydration  - Maintain NPO status until nausea and vomiting are resolved  - Nasogastric tube if ordered  - Administer ordered antiemetic medications as needed  - Provide nonpharmacologic comfort measures as appropriate  - Advance diet as tolerated, if ordered  - Consider nutrition services referral to assist patient with adequate nutrition and appropriate food choices  Outcome: Progressing  Goal: Maintains adequate nutritional intake  Description: INTERVENTIONS:  - Monitor percentage of each meal consumed  - Identify factors contributing to decreased intake, treat as appropriate  - Assist with meals as needed  - Monitor I&O, weight, and lab values if indicated  - Obtain nutrition services referral as needed  Outcome: Progressing  Goal: Oral mucous membranes remain intact  Description: INTERVENTIONS  - Assess oral mucosa and hygiene practices  - Implement preventative oral hygiene regimen  - Implement oral medicated treatments as ordered  - Initiate Nutrition services referral as needed  Outcome: Progressing      Problem: MUSCULOSKELETAL - ADULT  Goal: Maintain or return mobility to safest level of function  Description: INTERVENTIONS:  - Assess patient's ability to carry out ADLs; assess patient's baseline for ADL function and identify physical deficits which impact ability to perform ADLs (bathing, care of mouth/teeth, toileting, grooming, dressing, etc.)  - Assess/evaluate cause of self-care deficits   - Assess range of motion  - Assess patient's mobility  - Assess patient's need for assistive devices and provide as appropriate  - Encourage maximum independence but intervene and supervise when necessary  - Involve family in performance of ADLs  - Assess for home care needs following discharge   - Consider OT consult to assist with ADL evaluation and planning for discharge  - Provide patient education as appropriate  Outcome: Progressing     Problem: Nutrition/Hydration-ADULT  Goal: Nutrient/Hydration intake appropriate for improving, restoring or maintaining nutritional needs  Description: Monitor and assess patient's nutrition/hydration status for malnutrition. Collaborate with interdisciplinary team and initiate plan and interventions as ordered. Monitor patient's weight and dietary intake as ordered or per policy. Utilize nutrition screening tool and intervene as necessary. Determine patient's food preferences and provide high-protein, high-caloric foods as appropriate.      INTERVENTIONS:  - Monitor oral intake, urinary output, labs, and treatment plans  - Assess nutrition and hydration status and recommend course of action  - Evaluate amount of meals eaten  - Assist patient with eating if necessary   - Allow adequate time for meals  - Recommend/ encourage appropriate diets, oral nutritional supplements, and vitamin/mineral supplements  - Order, calculate, and assess calorie counts as needed  - Recommend, monitor, and adjust tube feedings and TPN/PPN based on assessed needs  - Assess need for intravenous fluids  - Provide specific nutrition/hydration education as appropriate  - Include patient/family/caregiver in decisions related to nutrition  Outcome: Progressing

## 2023-11-01 NOTE — ASSESSMENT & PLAN NOTE
Considering elevated CK and dehydration as potential causes. UA shows renal tube epithelial cells   Cr remains 0.74, down from 2.09 on admission.

## 2023-11-01 NOTE — PLAN OF CARE
Problem: INFECTION - ADULT  Goal: Absence or prevention of progression during hospitalization  Description: INTERVENTIONS:  - Assess and monitor for signs and symptoms of infection  - Monitor lab/diagnostic results  - Monitor all insertion sites, i.e. indwelling lines, tubes, and drains  - Monitor endotracheal if appropriate and nasal secretions for changes in amount and color  - [place] appropriate cooling/warming therapies per order  - Administer medications as ordered  - Instruct and encourage patient and family to use good hand hygiene technique  - Identify and instruct in appropriate isolation precautions for identified infection/condition  Outcome: Progressing

## 2023-11-01 NOTE — DISCHARGE INSTR - AVS FIRST PAGE
Stop drinking alcohol completely    Follow-up with your psychiatrist after discharge    Forward results of the blood work to your primary care doctor to see if you need to continue with potassium supplementation based on the results of blood work    Keep Yourself well-hydrated at home

## 2023-11-01 NOTE — ASSESSMENT & PLAN NOTE
Patient states he had been sober for 35 days, but recently relapsed, which resulted in the withdrawal symptoms that brought him to the ED.    In the ED, patient was noted to be "Grossly tremulous, diaphoretic, anxious, hyperactive on exam."  Patient received 6mg total lorazepam.  ETOH on admission 35, last drink was 2100 10/27  Brought to ICU for phenobarbital loading of 910 mg phenobarbital.   Completed course of Librium with  no further signs of withdrawal  Folic acid, thiamine, MVI   Not interested in inpatient rehab but agreeable with outpatient counseling

## 2023-11-01 NOTE — ASSESSMENT & PLAN NOTE
Patient reports a history of several falls while intoxicated over the last few days. Consider fall, rhabdo and dehydration as potential causes.    CK 1,400, down from 3,702  Recommended good po  hydration

## 2023-11-01 NOTE — ASSESSMENT & PLAN NOTE
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  -Doing well on it. It may be a little less effective by the end of the day, but she is still able to concentrate and is happy with how it is working for her.    -Classes going well.  -No problems with her appetite and wt is stable  -No racing heart or funny heartbeats.    Current Outpatient Medications   Medication     amphetamine-dextroamphetamine (ADDERALL XR) 15 MG 24 hr capsule     [START ON 3/3/2022] amphetamine-dextroamphetamine (ADDERALL XR) 15 MG 24 hr capsule     [START ON 4/3/2022] amphetamine-dextroamphetamine (ADDERALL XR) 15 MG 24 hr capsule     amphetamine-dextroamphetamine (ADDERALL XR) 15 MG 24 hr capsule     amphetamine-dextroamphetamine (ADDERALL XR) 5 MG 24 hr capsule     meloxicam (MOBIC) 15 MG tablet     No current facility-administered medications for this visit.     PMH:  No change since her last visit    O: NAD  /77   Pulse 85   Ht 1.524 m (5')   Wt 54.1 kg (119 lb 3.2 oz)   LMP  (LMP Unknown)   BMI 23.28 kg/m    Heart: rrr with m/g  Lungs; CTA      A:  ADHD:  Doing well on Adderall 15 mg XR q day    P:  Refill for one month supply with two additional refills.     Sandra Trent ATC, was present for the entire appt.     Jany Gallo MD, CAQ, FACSM, CCD  [facility]  Sports Medicine and [facility]  Team Physician;  Athletics           Jany Gallo MD     Possibly 2/2 ETOH abuse, withdrawal, and dehydration  On admission, HR: 125, RR; 24, Temp 96.6 and WBC: 21.91. Lactic acid 2.6-> 1.8. Continue IV fluids   Procalcitonin 1.10->0. 46. BC x2 negative  Denies urinary or respiratory symptoms. Does endorse left sided abdominal pain. UA showing largely + for blood, ketones and protein. + nitrites, moderate bacteria and renal tube epithelial cells. Pt asymptomatic for UTI. Discontinued Rocephin as patient had received 4 days of antibiotics with no obvious source of infection  Patient remains afebrile without leukocytosis even off antibiotic  CT chest/abdomen/pelvis showing distended gallbladder but no stones or other infectious source   RUQ US shows hepatic steatosis   Cefepime and Vancomycin given in ED, vancomycin was previously discontinued with negative MRSA results.

## 2023-11-01 NOTE — TELEPHONE ENCOUNTER
Received call from patient stated that his work [employer] #:  [phone]  Fax #:  [phone]  Address: [place], [address] [place] [address]  Has not received paperwork from [facility] [employer] for FMLA/Employment/Disability. Informed patient that an GLENN is needed for release of paperwork to be faxed to employer (at front office desk bin). Patient will come in this week to sign GLENN. Copies of forms printed from media placed in [phone] folder at desk.

## 2023-11-01 NOTE — DISCHARGE SUMMARY
[address] [facility], [place]  Discharge- Immanuel Wang 1975, 53 y.o. male MRN: [id number]  Unit/Bed#: [address] [place] [address] Encounter: [id number]  Primary Care Provider: No primary care provider on file. Date and time admitted to hospital: 10/28/2023 12:57 PM    * Alcohol withdrawal syndrome with complication [facility])  Assessment & Plan  Patient states he had been sober for 35 days, but recently relapsed, which resulted in the withdrawal symptoms that brought him to the ED. In the ED, patient was noted to be "Grossly tremulous, diaphoretic, anxious, hyperactive on exam."  Patient received 6mg total lorazepam.  ETOH on admission 35, last drink was 2100 10/27  Brought to ICU for phenobarbital loading of 910 mg phenobarbital.   Completed course of Librium with  no further signs of withdrawal  Folic acid, thiamine, MVI   Not interested in inpatient rehab but agreeable with outpatient counseling    Transaminitis  Assessment & Plan  Likely due to alcohol abuse, rhabdo as potential causes. AST 1,530, , T bili 3.89 on admission in setting of alcohol abuse  CT abdomen shows moderate fatty infiltration of the liver without biliary ductal dilatation. Gallbladder distended without pericholecystic inflammation or calcified gallstones  RUQ US reveals hepatic steatosis  Current AST: 160, ALT: 120, Total Bilirubin: 0.46, all continuing to trend downwards. Maddrey discriminant function was 3.2 per GI note. Hepatitis panel is normal  Outpatient CMP ordered to reassess LFTs. Follow-up outpatient if persistently elevated LFTs or if has persistent symptoms of GERD  GI input appreciated    SIRS (systemic inflammatory response syndrome) (HCC)  Assessment & Plan  Possibly 2/2 ETOH abuse, withdrawal, and dehydration  On admission, HR: 125, RR; 24, Temp 96.6 and WBC: 21.91. Lactic acid 2.6-> 1.8. Continue IV fluids   Procalcitonin 1.10->0. 46. BC x2 negative  Denies urinary or respiratory symptoms.  Does endorse left sided abdominal pain. UA showing largely + for blood, ketones and protein. + nitrites, moderate bacteria and renal tube epithelial cells. Pt asymptomatic for UTI. Discontinued Rocephin as patient had received 4 days of antibiotics with no obvious source of infection  Patient remains afebrile without leukocytosis even off antibiotic  CT chest/abdomen/pelvis showing distended gallbladder but no stones or other infectious source   RUQ US shows hepatic steatosis   Cefepime and Vancomycin given in ED, vancomycin was previously discontinued with negative MRSA results. Rhabdomyolysis  Assessment & Plan  Patient reports a history of several falls while intoxicated over the last few days. Consider fall, rhabdo and dehydration as potential causes. CK 1,400, down from 3,702  Recommended good po  hydration    Alcohol use disorder, severe, dependence ([address])  Assessment & Plan  Admitted for detox 8/1-3 and 9/2-6. Not currently taking naltrexone. Alcohol cessation discussed  Was on CIWA protocol here    Multiple falls  Assessment & Plan  Consider alcohol use as and intoxication as cause. Scattered flat bruising noted on all extremities, left flank, and back. Appear to be in multiple stages of healing  CT head, c-spine, C/A/P all negative for acute traumatic injury  Pain only present over bruising on left flank. Fall precautions  Continue education for alcohol abstinence. Generalized anxiety disorder  Assessment & Plan  Continue home medications:  Buspar 10 mg TID  Effexor 75 mg daily    GERD without esophagitis  Assessment & Plan  Continue Protonix. JORDI (acute kidney injury) (HCC)-resolved as of 10/31/2023  Assessment & Plan  Considering elevated CK and dehydration as potential causes. UA shows renal tube epithelial cells   Cr remains 0.74, down from 2.09 on admission. Hyponatremia-resolved as of 10/31/2023  Assessment & Plan  Considering dehydration and beer potomania as potential causes.    Sodium 127 on admission  Currently 137 with IVF      Medical Problems       Resolved Problems  Date Reviewed: 11/1/2023            Resolved    Hyponatremia 10/31/2023     Resolved by  Tomasz Monroy DO    JORDI (acute kidney injury) ([address]) 10/31/2023     Resolved by  Tomasz Monroy DO        Discharging Physician / Practitioner: Tomasz Monroy DO  PCP: No primary care provider on file. Admission Date:   Admission Orders (From admission, onward)       Ordered        10/28/23 [address]  Once            10/28/23 1633  INPATIENT ADMISSION  Once,   Status:  Canceled                          Discharge Date: 11/01/23    Consultations During Hospital Stay:  GI    Procedures Performed:   None    Significant Findings / Test Results: Moderate Hepatic Steatosis seen on RUQ US    Incidental Findings:   none    Test Results Pending at Discharge (will require follow up): None     Outpatient Tests Requested:  CMP    Complications:  None    Reason for Admission: Alcohol Withdrawal     Hospital Course:   Jignesh Ann is a 53 y.o. male patient who originally presented to the hospital on 10/28/2023 due to alcohol withdrawal. Patient reported he had previously been 35 days sober, but relapsed on a recent binge, which ended at 2200 the night prior to admission. Patient was noted to have multiple bruises of unknown origin which occurred while the patient was intoxicated. On exam, he had tremors and initial CIWA score was 27. Patient was sent to the ICU where he was loaded with phenobarbital in order to prevent serious deterioratio patient was later started on Librium taper and withdrawal symptoms have resolved. He was noticed to meet SIRS criteria and have a LA of 2.6. The patient was asymptomatic, but CK was over 85,000. Cr was 2.09, and patient was started on IV fluids with resolution of JORDI and improvement in total CK level.  Transaminitis was also present, with Rhabdo and Alcoholic hepatitis considered as potential sources. Patient was seen by GI due to transaminitis and plan will be for outpatient follow-up and outpatient lab work. Electrolytes were corrected as needed. Please see above list of diagnoses and related plan for additional information. Condition at Discharge: good    Discharge Day Visit / Exam:   Subjective:  Patient states he was feeling better today, and reports no over night issues. He has been eating, drinking and ambulating as able with IV in place. He reports the pain in his left flank has continued to improve, down to a 3/10. His diarrhea has also been improving. He denies any tremor. He denies any chest pain, abdominal pain, SOB, fevers, chills, or dysuria. Vitals: Blood Pressure: 130/74 (11/01/23 0700)  Pulse: 71 (11/01/23 0700)  Temperature: 97.6 °F (36.4 °C) (11/01/23 0700)  Temp Source: Oral (11/01/23 0700)  Respirations: 18 (11/01/23 0700)  Height: 6' 1" (185.4 cm) (10/28/23 1750)  Weight - Scale: 93.6 kg (206 lb 5.6 oz) (10/28/23 1750)  SpO2: 98 % (11/01/23 0700)  Exam:   Physical Exam  Constitutional:       General: He is not in acute distress. Appearance: Normal appearance. He is overweight. He is not toxic-appearing. HENT:      Head: Normocephalic and atraumatic. Eyes:      General: Gaze aligned appropriately. No scleral icterus. Cardiovascular:      Rate and Rhythm: Normal rate and regular rhythm. Pulmonary:      Effort: Pulmonary effort is normal. No respiratory distress. Breath sounds: Normal breath sounds. No wheezing or rales. Abdominal:      General: Bowel sounds are normal. There is no distension. Tenderness: There is no abdominal tenderness. Musculoskeletal:      Right lower leg: No edema. Left lower leg: No edema. Skin:     General: Skin is warm and dry. Neurological:      Mental Status: He is alert, oriented to person, place, and time and easily aroused. Motor: No tremor.    Psychiatric:         Attention and Perception: Attention normal.         Mood and Affect: Mood normal.         Speech: Speech normal.          Discussion with Family: Patient declined call to . Discharge instructions/Information to patient and family:   See after visit summary for information provided to patient and family. Provisions for Follow-Up Care:  See after visit summary for information related to follow-up care and any pertinent home health orders. Disposition:   Home    Planned Readmission: None     Discharge Statement:  I spent > 30 minutes discharging the patient. This time was spent on the day of discharge. I had direct contact with the patient on the day of discharge. Greater than 50% of the total time was spent examining patient, answering all patient questions, arranging and discussing plan of care with patient as well as directly providing post-discharge instructions. Additional time then spent on discharge activities. Discharge Medications:  See after visit summary for reconciled discharge medications provided to patient and/or family.       **Please Note: This note may have been constructed using a voice recognition system**

## 2023-11-01 NOTE — ASSESSMENT & PLAN NOTE
Likely due to alcohol abuse, rhabdo as potential causes. AST 1,530, , T bili 3.89 on admission in setting of alcohol abuse  CT abdomen shows moderate fatty infiltration of the liver without biliary ductal dilatation. Gallbladder distended without pericholecystic inflammation or calcified gallstones  RUQ US reveals hepatic steatosis  Current AST: 160, ALT: 120, Total Bilirubin: 0.46, all continuing to trend downwards. Maddrey discriminant function was 3.2 per GI note. Hepatitis panel is normal  Outpatient CMP ordered to reassess LFTs.   Follow-up outpatient if persistently elevated LFTs or if has persistent symptoms of GERD  GI input appreciated

## 2023-11-02 LAB
BACTERIA BLD CULT: NORMAL
BACTERIA BLD CULT: NORMAL

## 2023-11-02 RX ORDER — BUSPIRONE HYDROCHLORIDE 10 MG/1
10 TABLET ORAL 3 TIMES DAILY
Qty: 90 TABLET | Refills: 2 | Status: SHIPPED | OUTPATIENT
Start: 2023-11-02 | End: 2024-01-31

## 2023-11-02 RX ORDER — VENLAFAXINE HYDROCHLORIDE 75 MG/1
75 CAPSULE, EXTENDED RELEASE ORAL DAILY
Qty: 30 CAPSULE | Refills: 2 | Status: SHIPPED | OUTPATIENT
Start: 2023-11-02 | End: 2024-01-31

## 2023-11-02 NOTE — TELEPHONE ENCOUNTER
Received [employer] for patient's employer [employer] and [employer]. Scanned into media. Faxed [facility] paperwork to [employer] and confirmed. Scanned into media.

## 2023-11-03 NOTE — UTILIZATION REVIEW
NOTIFICATION OF ADMISSION DISCHARGE   This is a Notification of Discharge from [facility]. Please be advised that this patient has been discharge from our facility. Below you will find the admission and discharge date and time including the patient’s disposition. UTILIZATION REVIEW CONTACT:  Haley Herman  Utilization   Network Utilization Review Department  Phone: [phone] x carefully listen to the prompts. All voicemails are confidential.  Email: [email or web address]     ADMISSION INFORMATION  PRESENTATION DATE: 10/28/2023 12:57 PM  OBERVATION ADMISSION DATE:   INPATIENT ADMISSION DATE: 10/28/23  4:33 PM   DISCHARGE DATE: 11/1/2023  4:44 PM   DISPOSITION:Home/Self Care    Network Utilization Review Department  ATTENTION: Please call with any questions or concerns to [phone] and carefully listen to the prompts so that you are directed to the right person. All voicemails are confidential.   For Discharge needs, contact Care Management DC Support Team at [phone] opt. 2  Send all requests for admission clinical reviews, approved or denied determinations and any other requests to dedicated fax number below belonging to the campus where the patient is receiving treatment.  List of dedicated fax numbers for the Facilities:  [place] DENIALS (Administrative/Medical Necessity) [phone]   DISCHARGE SUPPORT TEAM (Network) [phone] [address] [facility] (Maternity/NICU/Pediatrics) [phone]   [address] [facility] [address] [phone] [address] [facility] [phone]   [address] [place] [address]  [address]  [phone]

## 2023-11-06 ENCOUNTER — HOSPITAL ENCOUNTER (OUTPATIENT)
Facility: HOSPITAL | Age: 48
Setting detail: OBSERVATION
Discharge: HOME | End: 2023-11-07
Attending: EMERGENCY MEDICINE | Admitting: INTERNAL MEDICINE
Payer: COMMERCIAL

## 2023-11-06 ENCOUNTER — APPOINTMENT (OUTPATIENT)
Dept: LAB | Facility: HOSPITAL | Age: 48
End: 2023-11-06
Attending: FAMILY MEDICINE
Payer: COMMERCIAL

## 2023-11-06 DIAGNOSIS — L50.9 URTICARIA: ICD-10-CM

## 2023-11-06 DIAGNOSIS — E87.8 ELECTROLYTE ABNORMALITY: ICD-10-CM

## 2023-11-06 DIAGNOSIS — F10.10 ALCOHOL ABUSE: ICD-10-CM

## 2023-11-06 DIAGNOSIS — T78.40XA ALLERGIC REACTION, INITIAL ENCOUNTER: Primary | ICD-10-CM

## 2023-11-06 DIAGNOSIS — R79.89 ELEVATED LIVER FUNCTION TESTS: ICD-10-CM

## 2023-11-06 PROBLEM — R21 RASH: Status: ACTIVE | Noted: 2023-11-06

## 2023-11-06 PROBLEM — Z72.0 NICOTINE ABUSE: Status: ACTIVE | Noted: 2023-11-06

## 2023-11-06 LAB
ALBUMIN SERPL BCP-MCNC: 4.1 G/DL (ref 3.5–5)
ALP SERPL-CCNC: 76 U/L (ref 34–104)
ALT SERPL W P-5'-P-CCNC: 172 U/L (ref 7–52)
ANION GAP SERPL CALCULATED.3IONS-SCNC: 7 MMOL/L
AST SERPL W P-5'-P-CCNC: 170 U/L (ref 13–39)
BASOPHILS # BLD AUTO: 0.04 THOUSANDS/ÂΜL (ref 0–0.1)
BASOPHILS NFR BLD AUTO: 0 % (ref 0–1)
BILIRUB SERPL-MCNC: 0.53 MG/DL (ref 0.2–1)
BUN SERPL-MCNC: 12 MG/DL (ref 5–25)
CALCIUM SERPL-MCNC: 9.3 MG/DL (ref 8.4–10.2)
CHLORIDE SERPL-SCNC: 104 MMOL/L (ref 96–108)
CO2 SERPL-SCNC: 27 MMOL/L (ref 21–32)
CREAT SERPL-MCNC: 0.92 MG/DL (ref 0.6–1.3)
EOSINOPHIL # BLD AUTO: 0.01 THOUSAND/ÂΜL (ref 0–0.61)
EOSINOPHIL NFR BLD AUTO: 0 % (ref 0–6)
ERYTHROCYTE [DISTWIDTH] IN BLOOD BY AUTOMATED COUNT: 13.4 % (ref 11.6–15.1)
GFR SERPL CREATININE-BSD FRML MDRD: 98 ML/MIN/1.73SQ M
GLUCOSE SERPL-MCNC: 85 MG/DL (ref 65–140)
HCT VFR BLD AUTO: 40.1 % (ref 36.5–49.3)
HGB BLD-MCNC: 13.6 G/DL (ref 12–17)
IMM GRANULOCYTES # BLD AUTO: 0.06 THOUSAND/UL (ref 0–0.2)
IMM GRANULOCYTES NFR BLD AUTO: 1 % (ref 0–2)
LYMPHOCYTES # BLD AUTO: 0.44 THOUSANDS/ÂΜL (ref 0.6–4.47)
LYMPHOCYTES NFR BLD AUTO: 4 % (ref 14–44)
MAGNESIUM SERPL-MCNC: 2 MG/DL (ref 1.9–2.7)
MCH RBC QN AUTO: 32.2 PG (ref 26.8–34.3)
MCHC RBC AUTO-ENTMCNC: 33.9 G/DL (ref 31.4–37.4)
MCV RBC AUTO: 95 FL (ref 82–98)
MONOCYTES # BLD AUTO: 0.27 THOUSAND/ÂΜL (ref 0.17–1.22)
MONOCYTES NFR BLD AUTO: 2 % (ref 4–12)
NEUTROPHILS # BLD AUTO: 10.34 THOUSANDS/ÂΜL (ref 1.85–7.62)
NEUTS SEG NFR BLD AUTO: 93 % (ref 43–75)
NRBC BLD AUTO-RTO: 0 /100 WBCS
PHOSPHATE SERPL-MCNC: 3.3 MG/DL (ref 2.7–4.5)
PLATELET # BLD AUTO: 417 THOUSANDS/UL (ref 149–390)
PMV BLD AUTO: 9.4 FL (ref 8.9–12.7)
POTASSIUM SERPL-SCNC: 4.8 MMOL/L (ref 3.5–5.3)
PROT SERPL-MCNC: 6.9 G/DL (ref 6.4–8.4)
RBC # BLD AUTO: 4.22 MILLION/UL (ref 3.88–5.62)
SARS-COV-2 RNA RESP QL NAA+PROBE: NEGATIVE
SODIUM SERPL-SCNC: 138 MMOL/L (ref 135–147)
WBC # BLD AUTO: 11.16 THOUSAND/UL (ref 4.31–10.16)

## 2023-11-06 PROCEDURE — 99221 1ST HOSP IP/OBS SF/LOW 40: CPT | Performed by: NURSE PRACTITIONER

## 2023-11-06 PROCEDURE — 80053 COMPREHEN METABOLIC PANEL: CPT

## 2023-11-06 PROCEDURE — 96361 HYDRATE IV INFUSION ADD-ON: CPT

## 2023-11-06 PROCEDURE — 96375 TX/PRO/DX INJ NEW DRUG ADDON: CPT

## 2023-11-06 PROCEDURE — 87635 SARS-COV-2 COVID-19 AMP PRB: CPT

## 2023-11-06 PROCEDURE — 99283 EMERGENCY DEPT VISIT LOW MDM: CPT

## 2023-11-06 PROCEDURE — 36415 COLL VENOUS BLD VENIPUNCTURE: CPT

## 2023-11-06 PROCEDURE — 99285 EMERGENCY DEPT VISIT HI MDM: CPT | Performed by: EMERGENCY MEDICINE

## 2023-11-06 PROCEDURE — 85025 COMPLETE CBC W/AUTO DIFF WBC: CPT | Performed by: NURSE PRACTITIONER

## 2023-11-06 PROCEDURE — 84100 ASSAY OF PHOSPHORUS: CPT | Performed by: NURSE PRACTITIONER

## 2023-11-06 PROCEDURE — 83735 ASSAY OF MAGNESIUM: CPT | Performed by: NURSE PRACTITIONER

## 2023-11-06 PROCEDURE — 96374 THER/PROPH/DIAG INJ IV PUSH: CPT

## 2023-11-06 RX ORDER — POTASSIUM CHLORIDE 20 MEQ/1
20 TABLET, EXTENDED RELEASE ORAL DAILY
Status: DISCONTINUED | OUTPATIENT
Start: 2023-11-07 | End: 2023-11-07 | Stop reason: HOSPADM

## 2023-11-06 RX ORDER — FOLIC ACID 1 MG/1
1 TABLET ORAL DAILY
Status: DISCONTINUED | OUTPATIENT
Start: 2023-11-07 | End: 2023-11-07 | Stop reason: HOSPADM

## 2023-11-06 RX ORDER — LANOLIN ALCOHOL/MO/W.PET/CERES
100 CREAM (GRAM) TOPICAL DAILY
Status: DISCONTINUED | OUTPATIENT
Start: 2023-11-07 | End: 2023-11-07 | Stop reason: HOSPADM

## 2023-11-06 RX ORDER — LORATADINE 10 MG/1
10 TABLET ORAL ONCE
Status: COMPLETED | OUTPATIENT
Start: 2023-11-06 | End: 2023-11-06

## 2023-11-06 RX ORDER — ONDANSETRON 2 MG/ML
4 INJECTION INTRAMUSCULAR; INTRAVENOUS EVERY 6 HOURS PRN
Status: DISCONTINUED | OUTPATIENT
Start: 2023-11-06 | End: 2023-11-07 | Stop reason: HOSPADM

## 2023-11-06 RX ORDER — DIPHENHYDRAMINE HYDROCHLORIDE 50 MG/ML
50 INJECTION INTRAMUSCULAR; INTRAVENOUS ONCE
Status: COMPLETED | OUTPATIENT
Start: 2023-11-06 | End: 2023-11-06

## 2023-11-06 RX ORDER — CLONIDINE HYDROCHLORIDE 0.1 MG/1
0.1 TABLET ORAL
Status: DISCONTINUED | OUTPATIENT
Start: 2023-11-06 | End: 2023-11-07 | Stop reason: HOSPADM

## 2023-11-06 RX ORDER — LORATADINE 10 MG/1
10 TABLET ORAL
Status: DISCONTINUED | OUTPATIENT
Start: 2023-11-07 | End: 2023-11-07 | Stop reason: HOSPADM

## 2023-11-06 RX ORDER — GABAPENTIN 300 MG/1
300 CAPSULE ORAL 3 TIMES DAILY
Status: DISCONTINUED | OUTPATIENT
Start: 2023-11-06 | End: 2023-11-07 | Stop reason: HOSPADM

## 2023-11-06 RX ORDER — SODIUM CHLORIDE, SODIUM LACTATE, POTASSIUM CHLORIDE, CALCIUM CHLORIDE 600; 310; 30; 20 MG/100ML; MG/100ML; MG/100ML; MG/100ML
100 INJECTION, SOLUTION INTRAVENOUS CONTINUOUS
Status: DISCONTINUED | OUTPATIENT
Start: 2023-11-06 | End: 2023-11-07 | Stop reason: HOSPADM

## 2023-11-06 RX ORDER — PANTOPRAZOLE SODIUM 40 MG/1
40 TABLET, DELAYED RELEASE ORAL
Status: DISCONTINUED | OUTPATIENT
Start: 2023-11-07 | End: 2023-11-07 | Stop reason: HOSPADM

## 2023-11-06 RX ORDER — METHYLPREDNISOLONE SODIUM SUCCINATE 40 MG/ML
40 INJECTION, POWDER, LYOPHILIZED, FOR SOLUTION INTRAMUSCULAR; INTRAVENOUS EVERY 8 HOURS SCHEDULED
Status: DISCONTINUED | OUTPATIENT
Start: 2023-11-06 | End: 2023-11-07 | Stop reason: HOSPADM

## 2023-11-06 RX ORDER — FAMOTIDINE 10 MG/ML
40 INJECTION, SOLUTION INTRAVENOUS ONCE
Status: COMPLETED | OUTPATIENT
Start: 2023-11-06 | End: 2023-11-06

## 2023-11-06 RX ORDER — DEXAMETHASONE SODIUM PHOSPHATE 4 MG/ML
10 INJECTION, SOLUTION INTRA-ARTICULAR; INTRALESIONAL; INTRAMUSCULAR; INTRAVENOUS; SOFT TISSUE ONCE
Status: COMPLETED | OUTPATIENT
Start: 2023-11-06 | End: 2023-11-06

## 2023-11-06 RX ORDER — DIPHENHYDRAMINE HYDROCHLORIDE 50 MG/ML
25 INJECTION INTRAMUSCULAR; INTRAVENOUS EVERY 6 HOURS
Status: DISCONTINUED | OUTPATIENT
Start: 2023-11-07 | End: 2023-11-07 | Stop reason: HOSPADM

## 2023-11-06 RX ORDER — BUSPIRONE HYDROCHLORIDE 10 MG/1
10 TABLET ORAL 3 TIMES DAILY
Status: DISCONTINUED | OUTPATIENT
Start: 2023-11-06 | End: 2023-11-07 | Stop reason: HOSPADM

## 2023-11-06 RX ORDER — FAMOTIDINE 10 MG/ML
20 INJECTION, SOLUTION INTRAVENOUS EVERY 12 HOURS SCHEDULED
Status: DISCONTINUED | OUTPATIENT
Start: 2023-11-07 | End: 2023-11-07 | Stop reason: HOSPADM

## 2023-11-06 RX ORDER — VENLAFAXINE HYDROCHLORIDE 75 MG/1
75 CAPSULE, EXTENDED RELEASE ORAL DAILY
Status: DISCONTINUED | OUTPATIENT
Start: 2023-11-07 | End: 2023-11-07 | Stop reason: HOSPADM

## 2023-11-06 RX ORDER — METHYLPREDNISOLONE SODIUM SUCCINATE 125 MG/2ML
60 INJECTION, POWDER, LYOPHILIZED, FOR SOLUTION INTRAMUSCULAR; INTRAVENOUS ONCE
Status: COMPLETED | OUTPATIENT
Start: 2023-11-06 | End: 2023-11-06

## 2023-11-06 RX ORDER — ENOXAPARIN SODIUM 100 MG/ML
40 INJECTION SUBCUTANEOUS DAILY
Status: DISCONTINUED | OUTPATIENT
Start: 2023-11-07 | End: 2023-11-07 | Stop reason: HOSPADM

## 2023-11-06 RX ADMIN — SODIUM CHLORIDE 1000 ML: 0.9 INJECTION, SOLUTION INTRAVENOUS at 18:52

## 2023-11-06 RX ADMIN — DIPHENHYDRAMINE HYDROCHLORIDE 50 MG: 50 INJECTION, SOLUTION INTRAMUSCULAR; INTRAVENOUS at 18:54

## 2023-11-06 RX ADMIN — SODIUM CHLORIDE, SODIUM LACTATE, POTASSIUM CHLORIDE, AND CALCIUM CHLORIDE 100 ML/HR: .6; .31; .03; .02 INJECTION, SOLUTION INTRAVENOUS at 23:19

## 2023-11-06 RX ADMIN — FAMOTIDINE 40 MG: 10 INJECTION, SOLUTION INTRAVENOUS at 18:55

## 2023-11-06 RX ADMIN — METHYLPREDNISOLONE SODIUM SUCCINATE 60 MG: 125 INJECTION, POWDER, FOR SOLUTION INTRAMUSCULAR; INTRAVENOUS at 18:52

## 2023-11-06 RX ADMIN — BUSPIRONE HYDROCHLORIDE 10 MG: 10 TABLET ORAL at 23:13

## 2023-11-06 RX ADMIN — LORATADINE 10 MG: 10 TABLET ORAL at 19:01

## 2023-11-06 RX ADMIN — DEXAMETHASONE SODIUM PHOSPHATE 10 MG: 4 INJECTION, SOLUTION INTRAMUSCULAR; INTRAVENOUS at 22:47

## 2023-11-06 RX ADMIN — METHYLPREDNISOLONE SODIUM SUCCINATE 40 MG: 40 INJECTION, POWDER, FOR SOLUTION INTRAMUSCULAR; INTRAVENOUS at 23:15

## 2023-11-06 RX ADMIN — DIPHENHYDRAMINE HYDROCHLORIDE 50 MG: 50 INJECTION, SOLUTION INTRAMUSCULAR; INTRAVENOUS at 22:47

## 2023-11-06 RX ADMIN — CLONIDINE HYDROCHLORIDE 0.1 MG: 0.1 TABLET ORAL at 23:13

## 2023-11-06 RX ADMIN — GABAPENTIN 300 MG: 300 CAPSULE ORAL at 23:13

## 2023-11-07 VITALS
OXYGEN SATURATION: 99 % | TEMPERATURE: 98.1 F | WEIGHT: 213.6 LBS | HEIGHT: 73 IN | HEART RATE: 85 BPM | DIASTOLIC BLOOD PRESSURE: 71 MMHG | SYSTOLIC BLOOD PRESSURE: 112 MMHG | BODY MASS INDEX: 28.31 KG/M2 | RESPIRATION RATE: 21 BRPM

## 2023-11-07 LAB
ANION GAP SERPL CALCULATED.3IONS-SCNC: 6 MMOL/L
BUN SERPL-MCNC: 13 MG/DL (ref 5–25)
CALCIUM SERPL-MCNC: 9.2 MG/DL (ref 8.4–10.2)
CHLORIDE SERPL-SCNC: 106 MMOL/L (ref 96–108)
CO2 SERPL-SCNC: 23 MMOL/L (ref 21–32)
CREAT SERPL-MCNC: 0.87 MG/DL (ref 0.6–1.3)
GFR SERPL CREATININE-BSD FRML MDRD: 102 ML/MIN/1.73SQ M
GLUCOSE P FAST SERPL-MCNC: 115 MG/DL (ref 65–99)
GLUCOSE SERPL-MCNC: 115 MG/DL (ref 65–140)
MAGNESIUM SERPL-MCNC: 1.7 MG/DL (ref 1.9–2.7)
POTASSIUM SERPL-SCNC: 4.3 MMOL/L (ref 3.5–5.3)
SODIUM SERPL-SCNC: 135 MMOL/L (ref 135–147)

## 2023-11-07 PROCEDURE — 83735 ASSAY OF MAGNESIUM: CPT | Performed by: NURSE PRACTITIONER

## 2023-11-07 PROCEDURE — 80048 BASIC METABOLIC PNL TOTAL CA: CPT | Performed by: NURSE PRACTITIONER

## 2023-11-07 PROCEDURE — 99239 HOSP IP/OBS DSCHRG MGMT >30: CPT | Performed by: INTERNAL MEDICINE

## 2023-11-07 PROCEDURE — 94760 N-INVAS EAR/PLS OXIMETRY 1: CPT

## 2023-11-07 RX ORDER — PREDNISONE 20 MG/1
40 TABLET ORAL DAILY
Qty: 10 TABLET | Refills: 0 | Status: SHIPPED | OUTPATIENT
Start: 2023-11-08 | End: 2023-11-13

## 2023-11-07 RX ORDER — FEXOFENADINE HCL 180 MG/1
180 TABLET ORAL DAILY
Refills: 0
Start: 2023-11-07

## 2023-11-07 RX ORDER — HYDROXYZINE HYDROCHLORIDE 25 MG/1
50 TABLET, FILM COATED ORAL EVERY 6 HOURS PRN
Status: DISCONTINUED | OUTPATIENT
Start: 2023-11-07 | End: 2023-11-07 | Stop reason: HOSPADM

## 2023-11-07 RX ORDER — MAGNESIUM SULFATE HEPTAHYDRATE 40 MG/ML
2 INJECTION, SOLUTION INTRAVENOUS ONCE
Status: COMPLETED | OUTPATIENT
Start: 2023-11-07 | End: 2023-11-07

## 2023-11-07 RX ORDER — FAMOTIDINE 20 MG/1
20 TABLET, FILM COATED ORAL 2 TIMES DAILY
Refills: 0
Start: 2023-11-07

## 2023-11-07 RX ORDER — DIPHENHYDRAMINE HCL 25 MG
25 TABLET ORAL EVERY 6 HOURS PRN
Qty: 30 TABLET | Refills: 0 | Status: SHIPPED | OUTPATIENT
Start: 2023-11-07

## 2023-11-07 RX ADMIN — METHYLPREDNISOLONE SODIUM SUCCINATE 40 MG: 40 INJECTION, POWDER, FOR SOLUTION INTRAMUSCULAR; INTRAVENOUS at 14:28

## 2023-11-07 RX ADMIN — DIPHENHYDRAMINE HYDROCHLORIDE 25 MG: 50 INJECTION, SOLUTION INTRAMUSCULAR; INTRAVENOUS at 06:26

## 2023-11-07 RX ADMIN — POTASSIUM CHLORIDE 20 MEQ: 1500 TABLET, EXTENDED RELEASE ORAL at 09:17

## 2023-11-07 RX ADMIN — PANTOPRAZOLE SODIUM 40 MG: 40 TABLET, DELAYED RELEASE ORAL at 05:14

## 2023-11-07 RX ADMIN — METHYLPREDNISOLONE SODIUM SUCCINATE 40 MG: 40 INJECTION, POWDER, FOR SOLUTION INTRAMUSCULAR; INTRAVENOUS at 06:19

## 2023-11-07 RX ADMIN — FOLIC ACID 1 MG: 1 TABLET ORAL at 09:18

## 2023-11-07 RX ADMIN — HYDROXYZINE HYDROCHLORIDE 50 MG: 25 TABLET ORAL at 03:26

## 2023-11-07 RX ADMIN — ENOXAPARIN SODIUM 40 MG: 40 INJECTION SUBCUTANEOUS at 09:18

## 2023-11-07 RX ADMIN — THIAMINE HCL TAB 100 MG 100 MG: 100 TAB at 09:18

## 2023-11-07 RX ADMIN — FAMOTIDINE 20 MG: 10 INJECTION, SOLUTION INTRAVENOUS at 06:30

## 2023-11-07 RX ADMIN — BUSPIRONE HYDROCHLORIDE 10 MG: 10 TABLET ORAL at 09:18

## 2023-11-07 RX ADMIN — GABAPENTIN 300 MG: 300 CAPSULE ORAL at 09:18

## 2023-11-07 RX ADMIN — DIPHENHYDRAMINE HYDROCHLORIDE 25 MG: 50 INJECTION, SOLUTION INTRAMUSCULAR; INTRAVENOUS at 11:43

## 2023-11-07 RX ADMIN — VENLAFAXINE HYDROCHLORIDE 75 MG: 75 CAPSULE, EXTENDED RELEASE ORAL at 09:19

## 2023-11-07 RX ADMIN — B-COMPLEX W/ C & FOLIC ACID TAB 1 TABLET: TAB at 09:17

## 2023-11-07 RX ADMIN — SODIUM CHLORIDE, SODIUM LACTATE, POTASSIUM CHLORIDE, AND CALCIUM CHLORIDE 100 ML/HR: .6; .31; .03; .02 INJECTION, SOLUTION INTRAVENOUS at 09:38

## 2023-11-07 RX ADMIN — MAGNESIUM SULFATE HEPTAHYDRATE 2 G: 40 INJECTION, SOLUTION INTRAVENOUS at 09:39

## 2023-11-07 NOTE — UTILIZATION REVIEW
Initial Clinical Review    Admission: Date/Time/Statement:   Admission Orders (From admission, onward)       Ordered        11/06/23 2240  Place in Observation  Once                          Orders Placed This Encounter   Procedures    Place in Observation     Standing Status:   Standing     Number of Occurrences:   1     Order Specific Question:   Level of Care     Answer:   Med Surg [16]     ED Arrival Information       Expected   -    Arrival   11/6/2023 17:50    Acuity   Urgent              Means of arrival   Walk-In    Escorted by   Self    Service   Hospitalist    Admission type   Emergency              Arrival complaint   Rash             Chief Complaint   Patient presents with    Rash     Started with rash on neck yesterday, spreading. Slightly itchy, no trouble breathing or angioedema. States put on some new meds recently. Also started dual action tylenol advil and it may have started after this       Initial Presentation:   81-year-old male with past history of heavy alcohol use, anxiety, depression, GERD, presents to the ED for evaluation of pruritic rash to his body, face, extremity since yesterday. Patient states that he was recently in detox for alcohol. He was discharged home on thiamine, folic acid, Protonix, clonidine, as well as multivitamins. Patient was noted to have elevated LFTs. Patient is followed by PCP for the elevated LFTs. Patient had repeat CMP done earlier today by his PCP. Patient states that he took some over-the-counter ibuprofen yesterday for his chronic back pain. Patient then noted some rash to his anterior chest.  Rashes since then spread to his face as well as his trunk and bilateral upper and lower extremities. Presents febrile, tachycardic, tachypneic, erythematous, maculopapular, raised lesions noted throughout anterior and posterior trunk, bilateral upper and lower extremities, as well as face. Admission work-up showing elevated LFT's, CK.  Placed in observation status for rash    ED Triage Vitals [11/06/23 1808]   Temperature Pulse Respirations Blood Pressure SpO2   100.3 °F (37.9 °C) (!) 112 22 138/92 100 %      Temp Source Heart Rate Source Patient Position - Orthostatic VS BP Location FiO2 (%)   Tympanic Monitor Sitting Right arm --      Pain Score       No Pain          Wt Readings from Last 1 Encounters:   11/07/23 96.9 kg (213 lb 9.6 oz)     Additional Vital Signs:   Date/Time Temp Pulse Resp BP MAP (mmHg) SpO2 O2 Device Patient Position - Orthostatic VS   11/07/23 08:21:52 98 °F (36.7 °C) -- -- 110/69 83 -- -- --   11/07/23 04:15:49 98.4 °F (36.9 °C) 85 20 113/72 86 94 % -- --   11/07/23 03:32:21 97.4 °F (36.3 °C) Abnormal  94 20 114/72 86 97 % -- --   11/07/23 00:17:48 97.2 °F (36.2 °C) Abnormal  -- 18 129/87 101 -- -- --   11/06/23 2330 -- 106 Abnormal  22 129/77 98 96 % None (Room air) Sitting   11/06/23 2322 -- 100 20 132/83 103 97 % None (Room air) Sitting   11/06/23 2300 -- 96 19 160/98 121 97 % None (Room air) Sitting   11/06/23 2115 -- 96 15 134/81 101 97 % None (Room air) Sitting   11/06/23 2031 98.6 °F (37 °C) -- -- -- -- -- -- --   11/06/23 1931 -- 92 20 161/94 119 100 % None (Room air) Sitting   11/06/23 1808 100.3 °F (37.9 °C) 112 Abnormal  22 138/92 -- 100 % None (Room air) Sitting     Pertinent Labs/Diagnostic Test Results:   No orders to display     Results from last 7 days   Lab Units 11/06/23  2251   SARS-COV-2  Negative     Results from last 7 days   Lab Units 11/06/23 2239 11/01/23  0507   WBC Thousand/uL 11.16* 5.58   HEMOGLOBIN g/dL 13.6 11.9*   HEMATOCRIT % 40.1 33.0*   PLATELETS Thousands/uL 417* 194   NEUTROS ABS Thousands/µL 10.34* 3.67         Results from last 7 days   Lab Units 11/07/23  0641 11/06/23  1249 11/01/23  0507   SODIUM mmol/L 135 138 137   POTASSIUM mmol/L 4.3 4.8 3.3*   CHLORIDE mmol/L 106 104 102   CO2 mmol/L 23 27 28   ANION GAP mmol/L 6 7 7   BUN mg/dL 13 12 7   CREATININE mg/dL 0.87 0.92 0.74   EGFR ml/min/1.73sq m 102 98 109 CALCIUM mg/dL 9.2 9.3 7.9*   MAGNESIUM mg/dL 1.7* 2.0  --    PHOSPHORUS mg/dL  --  3.3  --      Results from last 7 days   Lab Units 11/06/23  1249 11/01/23  0507   AST U/L 170* 160*   ALT U/L 172* 120*   ALK PHOS U/L 76 48   TOTAL PROTEIN g/dL 6.9 5.3*   ALBUMIN g/dL 4.1 3.0*   TOTAL BILIRUBIN mg/dL 0.53 0.46         Results from last 7 days   Lab Units 11/07/23  0641 11/06/23  1249 11/01/23  0507   GLUCOSE RANDOM mg/dL 115 85 78             No results found for: "BETA-HYDROXYBUTYRATE"               Results from last 7 days   Lab Units 11/01/23  0507   CK TOTAL U/L 1,400*                                                                                                         Results from last 7 days   Lab Units 10/31/23  1140   URINE CULTURE  No Growth <1000 cfu/mL                   ED Treatment:   Medication Administration from 11/06/2023 1750 to 11/07/2023 0016         Date/Time Order Dose Route Action     11/06/2023 1852 EST methylPREDNISolone sodium succinate (Solu-MEDROL) injection 60 mg 60 mg Intravenous Given     11/06/2023 1854 EST diphenhydrAMINE (BENADRYL) injection 50 mg 50 mg Intravenous Given     11/06/2023 1855 EST Famotidine (PF) (PEPCID) injection 40 mg 40 mg Intravenous Given     11/06/2023 1852 EST sodium chloride 0.9 % bolus 1,000 mL 1,000 mL Intravenous New Bag     11/06/2023 1901 EST loratadine (CLARITIN) tablet 10 mg 10 mg Oral Given     11/06/2023 2247 EST dexamethasone (DECADRON) injection 10 mg 10 mg Intravenous Given     11/06/2023 2247 EST diphenhydrAMINE (BENADRYL) injection 50 mg 50 mg Intravenous Given     11/06/2023 2313 EST busPIRone (BUSPAR) tablet 10 mg 10 mg Oral Given     11/06/2023 2313 EST cloNIDine (CATAPRES) tablet 0.1 mg 0.1 mg Oral Given     11/06/2023 2313 EST gabapentin (NEURONTIN) capsule 300 mg 300 mg Oral Given     11/06/2023 2319 EST lactated ringers infusion 100 mL/hr Intravenous New Bag     11/06/2023 231 EST methylPREDNISolone sodium succinate (Solu-MEDROL) injection 40 mg 40 mg Intravenous Given          Past Medical History:   Diagnosis Date    Anxiety     Depression     ETOH abuse     GERD (gastroesophageal reflux disease)      Present on Admission:   Transaminitis   Major depressive disorder, recurrent, in full remission ([facility])   GERD without esophagitis      Admitting Diagnosis: Rash [R21]  Urticaria [L50.9]  Allergic reaction, initial encounter [T78.40XA]  Age/Sex: 53 y.o. male  Admission Orders:  Cont pulse ox  CIWA protocol    Scheduled Medications:  busPIRone, 10 mg, Oral, TID  cloNIDine, 0.1 mg, Oral, HS  diphenhydrAMINE, 25 mg, Intravenous, Q6H  enoxaparin, 40 mg, Subcutaneous, Daily  famotidine, 20 mg, Intravenous, T27P JULIAN  folic acid, 1 mg, Oral, Daily  gabapentin, 300 mg, Oral, TID  loratadine, 10 mg, Oral, After Dinner  methylPREDNISolone sodium succinate, 40 mg, Intravenous, Q8H JULIAN  multivitamin stress formula, 1 tablet, Oral, Daily  pantoprazole, 40 mg, Oral, Early Morning  potassium chloride, 20 mEq, Oral, Daily  thiamine, 100 mg, Oral, Daily  venlafaxine, 75 mg, Oral, Daily    Continuous IV Infusions:  lactated ringers, 100 mL/hr, Intravenous, Continuous    PRN Meds:  hydrOXYzine HCL, 50 mg, Oral, Q6H PRN  ondansetron, 4 mg, Intravenous, Q6H PRN      Network Utilization Review Department  ATTENTION: Please call with any questions or concerns to [phone] and carefully listen to the prompts so that you are directed to the right person. All voicemails are confidential.   For Discharge needs, contact Care Management DC Support Team at [phone] opt. 2  Send all requests for admission clinical reviews, approved or denied determinations and any other requests to dedicated fax number below belonging to the campus where the patient is receiving treatment.  List of dedicated fax numbers for the Facilities:  [place] DENIALS (Administrative/Medical Necessity) [phone]   DISCHARGE SUPPORT TEAM ([address] [place] [address]) [phone] [facility] (Maternity/NICU/Pediatrics) 367 [facility] 1000 [facility] [phone] 1505 [facility] [address] [address] [facility] [address] [phone]

## 2023-11-07 NOTE — H&P
[id number] [facility]  H&P  Name: Sherrill Humphries 53 y.o. male I MRN: [id number]  Unit/Bed#: 2 [facility] 219 A  Date of Admission: 11/6/2023   Date of Service: 11/7/2023 I Hospital Day: 0      Assessment/Plan   * Rash  Assessment & Plan  Patient presents with pruritic rash on chest started yesterday a few hours after taking Motrin dual action over-the-counter for left sided chest wall pain. Reports taking Motrin in the past but not dual action. Reports rash got worse when waking up this morning, now with diffuse rash in the trunk and mild scattered rash in upper extremities. Denies history of drug related rash. Denies SOB. Chart reviewed. Patient was hospitalized in end of October 2023 for alcohol withdrawal.  Patient was discharged on folic acid, MVI, thiamine. Unclear etiology. Given Solu-Medrol, dexamethasone, IV Pepcid, IV Benadryl, Claritin in ED without much relief of symptoms. We will continue IV Solu-Medrol 40 every 8 hours, IV Pepcid, IV Benadryl. Continue Claritin  Monitor symptoms. Alcohol abuse  Assessment & Plan  As above  Patient determined to quit this time. Continue folic acid MVI thiamine. Reports left anterior ribs pain from fall recently while intoxicated. No fractures on CT. Incentive parameter. Monitor for pain.   No indication for CIWA protocol at this time    Nicotine abuse  Assessment & Plan  Uses smokeless tobacco.  Declined nicotine patch    Transaminitis  Assessment & Plan  Mild AST ALT elevation, TB and alk phos normal.  Most likely due to alcohol abuse  Reinforced alcohol cessation  Denies abdominal pain nausea vomiting    Major depressive disorder, recurrent, in full remission (HCC)  Assessment & Plan  Continue BuSpar, Effexor, clonidine, gabapentin    GERD without esophagitis  Assessment & Plan  Continue PPI daily         VTE Prophylaxis: Enoxaparin (Lovenox)  / reason for no mechanical VTE prophylaxis on Lovenox    Code Status: Full code  POLST: POLST form is not discussed and not completed at this time. Anticipated Length of Stay:  Patient will be admitted on an Observation basis with an anticipated length of stay of  < 2 midnights. Justification for Hospital Stay: Rash    Total Time for Visit, including Counseling / Coordination of Care: 30 minutes. Greater than 50% of this total time spent on direct patient counseling and coordination of care. Chief Complaint:   Body rash started yesterday    History of Present Illness:    Joycelyn Kaur is a 53 y.o. male PMH of alcohol abuse, depression anxiety GERD, nicotine abuse who presents with pruritic rash on chest started yesterday a few hours after taking Motrin dual action over-the-counter for left sided chest wall pain. Reports taking Motrin in the past but not dual action. Reports rash got worse when waking up this morning, now with diffuse rash in the trunk and mild scattered rash in upper extremities. Denies history of drug related rash. Denies SOB. Denies chest pain, headache, dizziness, nausea vomiting diarrhea constipation. Reports low-grade fever in ED. Denies cough, UTI symptoms. Review of Systems:    Review of Systems   Constitutional:         Low-grade fever in ED   Respiratory:          Left-sided ribs pain /chest wall pain from recent fall   Skin:  Positive for rash. All other systems reviewed and are negative. Past Medical and Surgical History:     Past Medical History:   Diagnosis Date    Anxiety     Depression     ETOH abuse     GERD (gastroesophageal reflux disease)        Past Surgical History:   Procedure Laterality Date    CLOSED REDUCTION FOREARM FRACTURE Right     EGD         Meds/Allergies:    Prior to Admission medications    Medication Sig Start Date End Date Taking?  Authorizing Provider   busPIRone (BUSPAR) 10 mg tablet Take 1 tablet (10 mg total) by mouth 3 (three) times a day 11/2/23 1/31/24 Yes Mary Shahid PA-C   cloNIDine (Catapres) 0.1 mg tablet Take 1 tablet (0.1 mg total) by mouth daily at bedtime 9/25/23 12/24/23 Yes Samina Li PA-C   folic acid (FOLVITE) 1 mg tablet Take 1 tablet (1 mg total) by mouth daily Do not start before November 2, 2023. 11/2/23  Yes Bonnie Belcher DO   gabapentin (Neurontin) 300 mg capsule Take 1 capsule (300 mg total) by mouth 3 (three) times a day 9/25/23 11/24/23 Yes Mary Anthony PA-C   Multiple Vitamin (multivitamin) tablet Take 1 tablet by mouth daily 11/1/23  Yes Bonnie Belcher DO   pantoprazole (PROTONIX) 40 mg tablet Take 1 tablet (40 mg total) by mouth daily in the early morning Do not start before November 2, 2023. 11/2/23  Yes Bonnie Belcher DO   potassium chloride (K-DUR,KLOR-CON) 20 mEq tablet Take 1 tablet (20 mEq total) by mouth daily 11/1/23  Yes Bonnie Belcher DO   thiamine 100 MG tablet Take 1 tablet (100 mg total) by mouth daily Do not start before November 2, 2023. 11/2/23  Yes Bonnie Belcher DO   venlafaxine (EFFEXOR-XR) 75 mg 24 hr capsule Take 1 capsule (75 mg total) by mouth daily 11/2/23 1/31/24 Yes Samina Li PA-C     I have reviewed home medications with patient personally.     Allergies: No Known Allergies    Social History:     Marital Status: Single   Occupation:   Patient Pre-hospital Living Situation: Family  Patient Pre-hospital Level of Mobility: Independent  Patient Pre-hospital Diet Restrictions: Regular  Substance Use History:   Social History     Substance and Sexual Activity   Alcohol Use Not Currently    Comment: not since 10/27     Social History     Tobacco Use   Smoking Status Never   Smokeless Tobacco Current    Types: Chew     Social History     Substance and Sexual Activity   Drug Use Never       Family History:    non-contributory    Physical Exam:     Vitals:   Blood Pressure: 114/72 (11/07/23 0332)  Pulse: 94 (11/07/23 0332)  Temperature: (!) 97.4 °F (36.3 °C) (11/07/23 0332)  Temp Source: Oral (11/06/23 2031)  Respirations: 20 (11/07/23 4621)  Height: 6' 1" (185.4 cm) (11/07/23 0053)  Weight - Scale: 96.9 kg (213 lb 9.6 oz) (11/07/23 0053)  SpO2: 97 % (11/07/23 0332)    Physical Exam  Vitals and nursing note reviewed. Constitutional:       Appearance: He is well-developed. Comments: Patient appears overweight   HENT:      Head: Normocephalic and atraumatic. Neck:      Thyroid: No thyromegaly. Vascular: No JVD. Trachea: No tracheal deviation. Cardiovascular:      Rate and Rhythm: Regular rhythm. Tachycardia present. Heart sounds: Normal heart sounds. Pulmonary:      Effort: Pulmonary effort is normal. No respiratory distress. Breath sounds: Normal breath sounds. No wheezing or rales. Abdominal:      General: Bowel sounds are normal. There is no distension. Palpations: Abdomen is soft. Tenderness: There is no abdominal tenderness. There is no guarding. Musculoskeletal:         General: Normal range of motion. Cervical back: Neck supple. Right lower leg: No edema. Left lower leg: No edema. Skin:     General: Skin is warm and dry. Findings: Rash present. Comments: Diffuse rash to trunk of body and neck, scattered rash to upper extremities. Neurological:      General: No focal deficit present. Mental Status: He is alert and oriented to person, place, and time. Psychiatric:         Judgment: Judgment normal.      Comments: Appears anxious and uncomfortable         Additional Data:     Lab Results: I have personally reviewed pertinent reports.       Results from last 7 days   Lab Units 11/06/23  2239   WBC Thousand/uL 11.16*   HEMOGLOBIN g/dL 13.6   HEMATOCRIT % 40.1   PLATELETS Thousands/uL 417*   NEUTROS PCT % 93*   LYMPHS PCT % 4*   MONOS PCT % 2*   EOS PCT % 0     Results from last 7 days   Lab Units 11/06/23  1249   POTASSIUM mmol/L 4.8   CHLORIDE mmol/L 104   CO2 mmol/L 27   BUN mg/dL 12   CREATININE mg/dL 0.92   CALCIUM mg/dL 9.3   ALK PHOS U/L 76   ALT U/L 172* AST U/L 170*     Results from last 7 days   Lab Units 10/31/23  0526   INR  1.04       Imaging: I have personally reviewed pertinent reports. US right upper quadrant    Result Date: 10/30/2023  Narrative: RIGHT UPPER QUADRANT ULTRASOUND INDICATION:  transaminitis. COMPARISON: Abdominal ultrasound dated 10/23/2019. TECHNIQUE:   Real-time ultrasound of the right upper quadrant was performed with a curvilinear transducer with both volumetric sweeps and still imaging techniques. FINDINGS: PANCREAS: Portions of the pancreas are obscured by bowel gas. Visualized portions of the pancreas are unremarkable. AORTA AND IVC:  Visualized portions are normal for patient age. LIVER: Size:  The liver measures 15.3 cm in the midclavicular line. Contour:  Surface contour is smooth. Parenchyma: There is moderate diffuse increased echogenicity with smooth echotexture and acoustic beam attenuation. Most consistent with moderate hepatic steatosis. No liver mass identified. Limited imaging of the main portal vein shows it to be patent and hepatopetal. BILIARY: No gallbladder findings. No intrahepatic biliary dilatation. CBD measures 3.0 mm. No choledocholithiasis. KIDNEY: Right kidney measures 11.0 x 6.2 x 5.6 cm. Volume 200.9 mL Kidney within normal limits. ASCITES:   None. Impression: Hepatic steatosis. Workstation performed: [id number]     CT chest abdomen pelvis wo contrast    Result Date: 10/28/2023  Narrative: CT CHEST, ABDOMEN AND PELVIS WITHOUT IV CONTRAST INDICATION:   trauma. COMPARISON: Correlation is made with the abdominal ultrasound dated 10/23/2019. TECHNIQUE: CT examination of the chest, abdomen and pelvis was performed without intravenous contrast. Multiplanar 2D reformatted images were created from the source data. 3D reconstructions of the bony thorax were performed in order to improve sensitivity of evaluation for rib fractures.  This examination, like all CT scans performed in the [facility], was performed utilizing techniques to minimize radiation dose exposure, including the use of iterative reconstruction and automated exposure control. Radiation dose length product (DLP) for this visit:  723 mGy-cm Enteric contrast was not administered. FINDINGS: CHEST LUNGS:  Lungs are clear. There is no tracheal or endobronchial lesion. PLEURA:  Unremarkable. HEART/GREAT VESSELS: Heart is unremarkable for patient's age. No thoracic aortic aneurysm. No pericardial effusion. MEDIASTINUM AND RUFINA: No mediastinal hematoma. No mediastinal, hilar, or axillary lymphadenopathy. CHEST WALL AND LOWER NECK:  Unremarkable. ABDOMEN LIVER/BILIARY TREE: Moderate fatty infiltration of the liver. No biliary ductal dilatation. GALLBLADDER: Distended gallbladder without pericholecystic inflammation or calcified gallstones. SPLEEN:  Unremarkable. PANCREAS:  Unremarkable. ADRENAL GLANDS:  Unremarkable. KIDNEYS/URETERS:  Unremarkable. No hydronephrosis. STOMACH AND BOWEL: No bowel obstruction or focal inflammatory process. APPENDIX:  No findings to suggest appendicitis. ABDOMINOPELVIC CAVITY:  No ascites. No pneumoperitoneum. No lymphadenopathy. VESSELS:  Unremarkable for patient's age. Retroaortic left renal vein. PELVIS REPRODUCTIVE ORGANS:  Unremarkable for patient's age. URINARY BLADDER:  Unremarkable. ABDOMINAL WALL/INGUINAL REGIONS:  Unremarkable. OSSEOUS STRUCTURES:  No acute fracture or destructive osseous lesion. Impression: No acute traumatic CT findings. Additional findings as above. Workstation performed: [id number]     CT cervical spine without contrast    Result Date: 10/28/2023  Narrative: CT CERVICAL SPINE - WITHOUT CONTRAST INDICATION:   trauma. COMPARISON:  None. TECHNIQUE:  CT examination of the cervical spine was performed without intravenous contrast.  Contiguous axial images were obtained. Multiplanar 2D reformatted images were created from the source data.  Radiation dose length product (DLP) for this visit:  459 mGy-cm . This examination, like all CT scans performed in the [facility], was performed utilizing techniques to minimize radiation dose exposure, including the use of iterative reconstruction and automated exposure control. IMAGE QUALITY:  Diagnostic. FINDINGS: ALIGNMENT:  Normal alignment of the cervical spine. No subluxation. VERTEBRAE:  No fracture. DEGENERATIVE CHANGES: Mild multilevel cervical degenerative changes are noted without critical central canal stenosis. PREVERTEBRAL AND PARASPINAL SOFT TISSUES: No prevertebral soft tissue edema. Unremarkable THORACIC INLET:  Normal.     Impression: No cervical spine fracture or traumatic malalignment. Workstation performed: [id number]     CT head without contrast    Result Date: 10/28/2023  Narrative: CT BRAIN - WITHOUT CONTRAST INDICATION:   Head trauma, moderate-severe trauma. COMPARISON:  None. TECHNIQUE:  CT examination of the brain was performed. Multiplanar 2D reformatted images were created from the source data. Radiation dose length product (DLP) for this visit:  926 mGy-cm . This examination, like all CT scans performed in the [facility], was performed utilizing techniques to minimize radiation dose exposure, including the use of iterative reconstruction and automated exposure control. IMAGE QUALITY:  Diagnostic. FINDINGS: PARENCHYMA:  No intracranial mass, mass effect or midline shift. No CT signs of acute infarction. No acute parenchymal hemorrhage. VENTRICLES AND EXTRA-AXIAL SPACES:  Normal for the patient's age. VISUALIZED ORBITS: Normal visualized orbits. PARANASAL SINUSES: Normal visualized paranasal sinuses. CALVARIUM AND EXTRACRANIAL SOFT TISSUES:  Normal.     Impression: No acute intracranial abnormality.  Workstation performed: [id number]       EKG, Pathology, and Other Studies Reviewed on Admission:   EKG: na    Allscripts Records Reviewed: Yes     ** Please Note: Dragon 360 Dictation voice to text software may have been used in the creation of this document.  **

## 2023-11-07 NOTE — ASSESSMENT & PLAN NOTE
As above  Patient determined to quit this time. Continue folic acid MVI thiamine. Reports left anterior ribs pain from fall recently while intoxicated. No fractures on CT. Incentive parameter. Monitor for pain.   No indication for CIWA protocol at this time

## 2023-11-07 NOTE — ASSESSMENT & PLAN NOTE
Patient presents with a maculopapular, slightly pruritic rash on chest started Sunday a few hours after taking Motrin dual action over-the-counter for left sided chest wall pain. Reports taking both acetaminophen and ibuprofen in the past but not both in the combined pill. Reports rash got worse when waking up yesterday morning, now with diffuse rash in the trunk and mild scattered rash in upper extremities. Denies history of drug related rash. Considering exanthematous drug eruption as potential cause, either from dual action Motrin or phenobarbital given on 10/29. Chart reviewed. Patient was hospitalized in end of October 2023 for alcohol withdrawal.  Patient was discharged on folic acid, MVI, thiamine. Given Solu-Medrol, dexamethasone, IV Pepcid, IV Benadryl, Claritin in ED without much relief of symptoms. Patient received Solu-Medrol 40 mg IV every 8 hours with IV Pepcid, IV Benadryl and Claritin during hospitalization  Denies SOB, lung exam clear on exam.  D/C with 5 days of 40 mg prednisone. Continue to take allegra, benadryl and Pepcid until symptoms resolve.

## 2023-11-07 NOTE — ED PROVIDER NOTES
History  Chief Complaint   Patient presents with    Rash     Started with rash on neck yesterday, spreading. Slightly itchy, no trouble breathing or angioedema. States put on some new meds recently. Also started dual action tylenol advil and it may have started after this     81-year-old male with past history of heavy alcohol use, anxiety, depression, GERD, presents to the ED for evaluation of pruritic rash to his body, face, extremity since yesterday. Patient states that he was recently in detox for alcohol. He was discharged home on thiamine, folic acid, Protonix, clonidine, as well as multivitamins. Patient was noted to have elevated LFTs. Patient is followed by PCP for the elevated LFTs. Patient had repeat CMP done earlier today by his PCP. Patient states that he took some over-the-counter ibuprofen yesterday for his chronic back pain. Patient then noted some rash to his anterior chest.  Rashes since then spread to his face as well as his trunk and bilateral upper and lower extremities. Patient came to the ED for further evaluation. Patient denies any rash inside the mouth or any throat closing sensation. Patient denies any shortness of breath. History provided by:  Patient  Rash  Associated symptoms: no abdominal pain, no fever, no joint pain, no shortness of breath, no sore throat and not vomiting        Prior to Admission Medications   Prescriptions Last Dose Informant Patient Reported? Taking? Multiple Vitamin (multivitamin) tablet   No No   Sig: Take 1 tablet by mouth daily   busPIRone (BUSPAR) 10 mg tablet   No No   Sig: Take 1 tablet (10 mg total) by mouth 3 (three) times a day   cloNIDine (Catapres) 0.1 mg tablet   No No   Sig: Take 1 tablet (0.1 mg total) by mouth daily at bedtime   folic acid (FOLVITE) 1 mg tablet   No No   Sig: Take 1 tablet (1 mg total) by mouth daily Do not start before November 2, 2023.    gabapentin (Neurontin) 300 mg capsule   No No   Sig: Take 1 capsule (300 mg total) by mouth 3 (three) times a day   pantoprazole (PROTONIX) 40 mg tablet   No No   Sig: Take 1 tablet (40 mg total) by mouth daily in the early morning Do not start before November 2, 2023. potassium chloride (K-DUR,KLOR-CON) 20 mEq tablet   No No   Sig: Take 1 tablet (20 mEq total) by mouth daily   thiamine 100 MG tablet   No No   Sig: Take 1 tablet (100 mg total) by mouth daily Do not start before November 2, 2023. venlafaxine (EFFEXOR-XR) 75 mg 24 hr capsule   No No   Sig: Take 1 capsule (75 mg total) by mouth daily      Facility-Administered Medications: None       Past Medical History:   Diagnosis Date    Anxiety     Depression     ETOH abuse     GERD (gastroesophageal reflux disease)        Past Surgical History:   Procedure Laterality Date    CLOSED REDUCTION FOREARM FRACTURE Right     EGD         History reviewed. No pertinent family history. I have reviewed and agree with the history as documented. E-Cigarette/Vaping    E-Cigarette Use Never User      E-Cigarette/Vaping Substances    Nicotine No     THC No     CBD No     Flavoring No     Other No     Unknown No      Social History     Tobacco Use    Smoking status: Never    Smokeless tobacco: Current     Types: Chew   Vaping Use    Vaping Use: Never used   Substance Use Topics    Alcohol use: Not Currently     Comment: not since 10/27    Drug use: Never       Review of Systems   Constitutional:  Negative for chills and fever. HENT:  Negative for ear pain and sore throat. Eyes:  Negative for pain and visual disturbance. Respiratory:  Negative for cough and shortness of breath. Cardiovascular:  Negative for chest pain and palpitations. Gastrointestinal:  Negative for abdominal pain and vomiting. Genitourinary:  Negative for dysuria and hematuria. Musculoskeletal:  Negative for arthralgias and back pain. Skin:  Positive for rash. Negative for color change. Neurological:  Negative for seizures and syncope.    All other systems reviewed and are negative. Physical Exam  Physical Exam  Vitals and nursing note reviewed. Constitutional:       General: He is not in acute distress. Appearance: He is well-developed. HENT:      Head: Normocephalic and atraumatic. Mouth/Throat:      Comments: No swelling noted to examination of oropharynx. No signs of airway compromise noted. Eyes:      Conjunctiva/sclera: Conjunctivae normal.   Cardiovascular:      Rate and Rhythm: Normal rate and regular rhythm. Heart sounds: No murmur heard. Pulmonary:      Effort: Pulmonary effort is normal. No respiratory distress. Breath sounds: Normal breath sounds. Comments: Lungs are clear to auscultation bilateral.  Abdominal:      Palpations: Abdomen is soft. Tenderness: There is no abdominal tenderness. Musculoskeletal:         General: No swelling. Cervical back: Neck supple. Skin:     General: Skin is warm and dry. Capillary Refill: Capillary refill takes less than 2 seconds. Comments: Erythematous, maculopapular, raised lesions noted throughout anterior and posterior trunk, bilateral upper and lower extremities, as well as face. Neurological:      Mental Status: He is alert.    Psychiatric:         Mood and Affect: Mood normal.         Vital Signs  ED Triage Vitals [11/06/23 1808]   Temperature Pulse Respirations Blood Pressure SpO2   100.3 °F (37.9 °C) (!) 112 22 138/92 100 %      Temp Source Heart Rate Source Patient Position - Orthostatic VS BP Location FiO2 (%)   Tympanic Monitor Sitting Right arm --      Pain Score       No Pain           Vitals:    11/06/23 1808 11/06/23 1931 11/06/23 2115   BP: 138/92 161/94 134/81   Pulse: (!) 112 92 96   Patient Position - Orthostatic VS: Sitting Sitting Sitting         Visual Acuity      ED Medications  Medications   dexamethasone (DECADRON) injection 10 mg (has no administration in time range)   diphenhydrAMINE (BENADRYL) injection 50 mg (has no administration in time range)   methylPREDNISolone sodium succinate (Solu-MEDROL) injection 60 mg (60 mg Intravenous Given 11/6/23 1852)   diphenhydrAMINE (BENADRYL) injection 50 mg (50 mg Intravenous Given 11/6/23 1854)   Famotidine (PF) (PEPCID) injection 40 mg (40 mg Intravenous Given 11/6/23 1855)   sodium chloride 0.9 % bolus 1,000 mL (0 mL Intravenous Stopped 11/6/23 1952)   loratadine (CLARITIN) tablet 10 mg (10 mg Oral Given 11/6/23 1901)       Diagnostic Studies  Results Reviewed       Procedure Component Value Units Date/Time    CBC and differential [[id number]] Collected: 11/06/23 2239    Lab Status: No result Specimen: Blood from Arm, Right     Phosphorus [[id number]] Collected: 11/06/23 1249    Lab Status: In process Specimen: Blood Updated: 11/06/23 2238    Magnesium [[id number]] Collected: 11/06/23 1249    Lab Status: In process Specimen: Blood Updated: 11/06/23 2238                   No orders to display              Procedures  Procedures         ED Course  ED Course as of 11/06/23 2240   Mon Nov 06, 2023 2227 Patient reexamined at bedside. Patient continues to have itchiness and rash persists. At this time patient will be admitted                               SBIRT 22yo+      Flowsheet Row Most Recent Value   Initial Alcohol Screen: US AUDIT-C     1. How often do you have a drink containing alcohol? 1 Filed at: 11/06/2023 1907   2. How many drinks containing alcohol do you have on a typical day you are drinking? 0 Filed at: 11/06/2023 1907   3a. Male UNDER 65: How often do you have five or more drinks on one occasion? 0 Filed at: 11/06/2023 1907   Audit-C Score 1 Filed at: 11/06/2023 1907   YORDY: How many times in the past year have you. .. Used an illegal drug or used a prescription medication for non-medical reasons? Never Filed at: 11/06/2023 1907                      Medical Decision Making  Give steroids, antihistamines IV fluids, and continue to monitor patient for any worsening symptoms.     Patient continued to have rash despite initially given steroids and antihistamines. Medications doses were repeated. At this time patient will be admitted for further evaluation and management. Patient agrees with admission plans. Risk  OTC drugs. Prescription drug management. Decision regarding hospitalization. Disposition  Final diagnoses: Allergic reaction, initial encounter   Urticaria     Time reflects when diagnosis was documented in both MDM as applicable and the Disposition within this note       Time User Action Codes Description Comment    11/6/2023 10:33 PM Stefano Laird Add [T78.40XA] Allergic reaction, initial encounter     11/6/2023 10:33 PM Charly Ramos Add [L50.9] Urticaria           ED Disposition       ED Disposition   Admit    Condition   Stable    Date/Time   Mon Nov 6, 2023 2232    Comment   Case was discussed with NP for hospitalist and the patient's admission status was agreed to be Admission Status: observation status to the service of Dr. Selena Rock. Follow-up Information    None         Patient's Medications   Discharge Prescriptions    No medications on file       No discharge procedures on file.     PDMP Review       None            ED Provider  Electronically Signed by             Charly Ramos DO  11/06/23 DO Arnaldo  11/06/23 [id number]

## 2023-11-07 NOTE — ASSESSMENT & PLAN NOTE
Mild AST ALT elevation, TB and alk phos normal.  Most likely due to alcohol abuse  Reinforced alcohol cessation  Denies abdominal pain nausea vomiting

## 2023-11-07 NOTE — ASSESSMENT & PLAN NOTE
As above  Patient determined to quit this time, states he did not have drinks between prior discharge and readmission. Continue folic acid, MVI, and thiamine. Reports left anterior ribs pain from fall recently while intoxicated. No fractures on CT. Incentive parameter. Monitor for pain.   No indication for CIWA protocol at this time

## 2023-11-07 NOTE — CASE MANAGEMENT
Case Management Assessment & Discharge Planning Note    Patient name Caden Regalado  Location 2 [facility] 219/2 [address] [facility] MRN [id number]  : 1975 Date 2023       Current Admission Date: 2023  Current Admission Diagnosis:Rash   Patient Active Problem List    Diagnosis Date Noted    Alcohol abuse 2023    Nicotine abuse 2023    Rash 2023    Alcohol withdrawal delirium, acute, hyperactive ([address]) 10/28/2023    SIRS (systemic inflammatory response syndrome) ([address]) 10/28/2023    Rhabdomyolysis 10/28/2023    Electrolyte abnormality 10/28/2023    Multiple falls     Toxic metabolic encephalopathy     Alcohol withdrawal syndrome with complication ([address]) 1287    Alcohol use disorder, severe, dependence ([address]) 2023    Dehydration 2023    Transaminitis 2023    Insomnia 2023    Major depressive disorder, recurrent, in full remission ([address]) 2021    Generalized anxiety disorder 2020    Epigastric pain 10/18/2019    GERD without esophagitis 2019      LOS (days): 0  Geometric Mean LOS (GMLOS) (days):   Days to GMLOS:     OBJECTIVE:        Current admission status: Observation  Referral Reason: Drug/Alcohol Abuse    Preferred Pharmacy:   [address] [place] [id number]  Phone: [phone] Fax: [phone]    Primary Care Provider: No primary care provider on file. Primary Insurance: [employer]  Secondary Insurance:     ASSESSMENT:  Willem Proxies    There are no active Health Care Proxies on file.         Obs Notice Signed: 23    Readmission Root Cause  30 Day Readmission: No    Patient Information  Admitted from[de-identified] Home  Mental Status: Alert  During Assessment patient was accompanied by: Not accompanied during assessment  Assessment information provided by[de-identified] Patient  Primary Caregiver: Self  Support Systems: Family members  [facility] [address] [place] [address] do you live in?: [place]  Type of Current Residence: Apartment  Upon entering residence, is there a bedroom on the main floor (no further steps)?: Yes  Upon entering residence, is there a bathroom on the main floor (no further steps)?: Yes  In the last 12 months, was there a time when you were not able to pay the mortgage or rent on time?: No  In the last 12 months, how many places have you lived?: 1  In the last 12 months, was there a time when you did not have a steady place to sleep or slept in a shelter (including now)?: No  Homeless/housing insecurity resource given?: N/A  Living Arrangements: Lives Alone    Activities of Daily Living Prior to Admission  Functional Status: Independent  Completes ADLs independently?: Yes  Ambulates independently?: Yes  Does patient use assisted devices?: No  Does patient currently own DME?: No  Does patient have a history of Outpatient Therapy (PT/OT)?: No  Does the patient have a history of Short-Term Rehab?: No  Does patient have a history of HHC?: No  Does patient currently have [address]?: No    Patient Information Continued  Income Source: Employed  Does patient have prescription coverage?: Yes  Within the past 12 months, you worried that your food would run out before you got the money to buy more.: Never true  Within the past 12 months, the food you bought just didn't last and you didn't have money to get more.: Never true  Food insecurity resource given?: N/A  Does patient receive dialysis treatments?: No    Does patient have a history of substance abuse?: Yes  Historical substance use preference: Alcohol/ETOH  Is patient currently in treatment for substance abuse?: No. Patient declined treatment information. (Patient plans to speak with his PCP at his next appointment on Friday. Patient also has an appointment with his psychiatrist on 12/1 and will request therapy to address his SA issues.   Contact info for [facility] Path[facility] to Recovery program placed on AVS.)  Does patient have a history of Mental Health Diagnosis?: Yes (Major depressive disorder)  Is patient receiving treatment for mental health?: Yes     Means of Transportation  Means of Transport to Appts[de-identified] Drives Self  In the past 12 months, has lack of transportation kept you from medical appointments or from getting medications?: No  In the past 12 months, has lack of transportation kept you from meetings, work, or from getting things needed for daily living?: No  Was application for public transport provided?: N/A      DISCHARGE DETAILS:    Discharge planning discussed with[de-identified] Patient  Freedom of Choice: Yes    Comments - Freedom of Choice: SW spoke with patient at bedside to review role of CM, conduct assessment and discuss discharge planning. Patient is independent, employed and drives. He has been working for the same company for 21 years and stated that the company has come close to firing him due to his substance abuse hospitalizations. This is motivating for him and he plans to speak with his PCP on Friday about treatment options. He also has an appointment with his psychiatrist on 12/1. BRENNA offered a referral to the [facility] program and patient declined but was agreeable to having contact information for [facility] placed on his AVS.  SW will continue to follow.       CM contacted family/caregiver?: No- see comments (Patient declined call to emergency contact)  Were Treatment Team discharge recommendations reviewed with patient/caregiver?: Yes  Did patient/caregiver verbalize understanding of patient care needs?: Yes  Were patient/caregiver advised of the risks associated with not following Treatment Team discharge recommendations?: Yes    Requested [address]         Is the patient interested in [address] at discharge?: No    DME Referral Provided  Referral made for DME?: No    Other Referral/Resources/Interventions Provided:  Interventions: Substance Abuse Treatment    Would you like to participate in our [address] service program?  : No - Declined    Treatment Team Recommendation: Home  Discharge Destination Plan[de-identified] Home  Transport at Discharge : Self (Patient's car is in hospital lot)

## 2023-11-07 NOTE — DISCHARGE SUMMARY
[address]  Discharge- Tess Weller 1975, 53 y.o. male MRN: [id number]  Unit/Bed#: 2 [place] [address] A Encounter: [id number]  Primary Care Provider: No primary care provider on file. Date and time admitted to hospital: 11/6/2023  6:27 PM    * Rash  Assessment & Plan  Patient presents with a maculopapular, slightly pruritic rash on chest started Sunday a few hours after taking Motrin dual action over-the-counter for left sided chest wall pain. Reports taking both acetaminophen and ibuprofen in the past but not both in the combined pill. Reports rash got worse when waking up yesterday morning, now with diffuse rash in the trunk and mild scattered rash in upper extremities. Denies history of drug related rash. Considering exanthematous drug eruption as potential cause, either from dual action Motrin or phenobarbital given on 10/29. Chart reviewed. Patient was hospitalized in end of October 2023 for alcohol withdrawal.  Patient was discharged on folic acid, MVI, thiamine. Given Solu-Medrol, dexamethasone, IV Pepcid, IV Benadryl, Claritin in ED without much relief of symptoms. Patient received Solu-Medrol 40 mg IV every 8 hours with IV Pepcid, IV Benadryl and Claritin during hospitalization  Denies SOB, lung exam clear on exam.  D/C with 5 days of 40 mg prednisone. Continue to take allegra, benadryl and Pepcid until symptoms resolve. Nicotine abuse  Assessment & Plan  Uses smokeless tobacco.  Declined nicotine patch    Alcohol abuse  Assessment & Plan  As above  Patient determined to quit this time, states he did not have drinks between prior discharge and readmission. Continue folic acid, MVI, and thiamine. Reports left anterior ribs pain from fall recently while intoxicated. No fractures on CT. Incentive parameter. Monitor for pain.   No indication for CIWA protocol at this time    Transaminitis  Assessment & Plan  Mild AST ALT elevation, total bilirubin and alk phos normal.  Most likely due to alcohol abuse  Reinforced alcohol cessation  Denies abdominal pain nausea vomiting    Major depressive disorder, recurrent, in full remission ([address])  Assessment & Plan  Continue BuSpar, Effexor, clonidine, gabapentin    GERD without esophagitis  Assessment & Plan  Continue PPI daily    Hypomagnesemia-magnesium level was 1.7. Patient repleted with 2 g IV magnesium sulfate    Medical Problems       Resolved Problems  Date Reviewed: 11/7/2023   None       Discharging Physician / Practitioner: Fani Valdez MD  PCP: No primary care provider on file. Admission Date:   Admission Orders (From admission, onward)       Ordered        11/06/23 2240  Place in Observation  Once                          Discharge Date: 11/07/23    Consultations During Hospital Stay:  None    Procedures Performed:   None    Significant Findings / Test Results:   None    Incidental Findings:   None    Test Results Pending at Discharge (will require follow up): None     Outpatient Tests Requested:  None    Complications:  None    Reason for Admission: None    Hospital Course:   Cale Lee is a 53 y.o. male patient who originally presented to the hospital on 11/6/2023 due to maculopapular rash for two days. Patient was admitted to the hospital and received IV steroids, IV Benadryl, IV Pepcid and Claritin. Patient will be discharged on p.o. steroids with an outpatient follow-up with please see above list of diagnoses and related plan for additional information. Condition at Discharge: good    Discharge Day Visit / Exam:   Subjective:  Patient states there has been no change in the rash since his admission yesterday. He states the rash is only slightly pruritic. He states the rash began after taking a dual action Motrin. Currently, the pain in his left side has subsided.  He denies any other pain, and has been eating, drinking, and using the bathroom as normal. He denies chest pain, abdominal pain or SOB.    Vitals: Blood Pressure: 112/71 (11/07/23 1531)  Pulse: 85 (11/07/23 1531)  Temperature: 98.1 °F (36.7 °C) (11/07/23 1531)  Temp Source: Oral (11/07/23 0821)  Respirations: 21 (11/07/23 1531)  Height: 6' 1" (185.4 cm) (11/07/23 0053)  Weight - Scale: 96.9 kg (213 lb 9.6 oz) (11/07/23 0053)  SpO2: 99 % (11/07/23 1531)  Exam:   Physical Exam  Constitutional:       General: He is not in acute distress. Appearance: Normal appearance. He is not ill-appearing, toxic-appearing or diaphoretic. HENT:      Head: Normocephalic and atraumatic. Comments: Face is flush, but without any rash. Cardiovascular:      Rate and Rhythm: Normal rate and regular rhythm. Pulmonary:      Effort: Pulmonary effort is normal.      Breath sounds: Normal breath sounds. Abdominal:      General: Abdomen is flat. There is no distension. Tenderness: There is no abdominal tenderness. Musculoskeletal:      Right lower leg: No edema. Left lower leg: No edema. Skin:     Findings: Rash present. Rash is macular, papular and purpuric. Rash is not pustular or urticarial.      Comments: Rash seems to be most concentrated at the neck, but also spread through the abdomen and onto the back. There is some papules on the arms near the flexor surface of the elbow and on the thighs. Neurological:      Mental Status: He is alert. Psychiatric:         Attention and Perception: Attention and perception normal.         Mood and Affect: Mood normal.         Speech: Speech normal.         Behavior: Behavior normal.          Discharge instructions/Information to patient and family:   See after visit summary for information provided to patient and family. Provisions for Follow-Up Care:  See after visit summary for information related to follow-up care and any pertinent home health orders. Disposition:   Home    Planned Readmission: None     Discharge Statement:  I spent 35 minutes discharging the patient.  This time was spent on the day of discharge. I had direct contact with the patient on the day of discharge. Greater than 50% of the total time was spent examining patient, answering all patient questions, arranging and discussing plan of care with patient as well as directly providing post-discharge instructions. Additional time then spent on discharge activities. Discharge Medications:  See after visit summary for reconciled discharge medications provided to patient and/or family.       **Please Note: This note may have been constructed using a voice recognition system**

## 2023-11-07 NOTE — ASSESSMENT & PLAN NOTE
Mild AST ALT elevation, total bilirubin and alk phos normal.  Most likely due to alcohol abuse  Reinforced alcohol cessation  Denies abdominal pain nausea vomiting

## 2023-11-07 NOTE — NURSING NOTE
Pt left with belongings declined wheelchair as will be driving.  No additional questions at discharge

## 2023-11-07 NOTE — NURSING NOTE
Patient discharged to home. Discharge instructions reviewed with patient. Patient verbalized understanding. VSS/afebrile. IV discontinued from arm, pressure dressing in place. Pt stable at time of discharge.

## 2023-11-07 NOTE — PLAN OF CARE

## 2023-11-07 NOTE — PLAN OF CARE

## 2023-11-07 NOTE — ASSESSMENT & PLAN NOTE
Patient presents with pruritic rash on chest started yesterday a few hours after taking Motrin dual action over-the-counter for left sided chest wall pain. Reports taking Motrin in the past but not dual action. Reports rash got worse when waking up this morning, now with diffuse rash in the trunk and mild scattered rash in upper extremities. Denies history of drug related rash. Denies SOB. Chart reviewed. Patient was hospitalized in end of October 2023 for alcohol withdrawal.  Patient was discharged on folic acid, MVI, thiamine. Unclear etiology. Given Solu-Medrol, dexamethasone, IV Pepcid, IV Benadryl, Claritin in ED without much relief of symptoms. We will continue IV Solu-Medrol 40 every 8 hours, IV Pepcid, IV Benadryl. Continue Claritin  Monitor symptoms.

## 2023-11-10 ENCOUNTER — OFFICE VISIT (OUTPATIENT)
Dept: FAMILY MEDICINE CLINIC | Facility: CLINIC | Age: 48
End: 2023-11-10
Payer: COMMERCIAL

## 2023-11-10 VITALS
BODY MASS INDEX: 28.99 KG/M2 | RESPIRATION RATE: 18 BRPM | SYSTOLIC BLOOD PRESSURE: 124 MMHG | DIASTOLIC BLOOD PRESSURE: 90 MMHG | WEIGHT: 214 LBS | HEART RATE: 86 BPM | HEIGHT: 72 IN | TEMPERATURE: 98.6 F

## 2023-11-10 DIAGNOSIS — F41.1 GENERALIZED ANXIETY DISORDER: ICD-10-CM

## 2023-11-10 DIAGNOSIS — F10.939 ALCOHOL WITHDRAWAL SYNDROME WITH COMPLICATION (HCC): ICD-10-CM

## 2023-11-10 DIAGNOSIS — Z12.11 SCREENING FOR COLON CANCER: ICD-10-CM

## 2023-11-10 DIAGNOSIS — K21.9 GERD WITHOUT ESOPHAGITIS: Chronic | ICD-10-CM

## 2023-11-10 DIAGNOSIS — Z11.4 SCREENING FOR HIV (HUMAN IMMUNODEFICIENCY VIRUS): ICD-10-CM

## 2023-11-10 DIAGNOSIS — Z72.0 NICOTINE ABUSE: ICD-10-CM

## 2023-11-10 DIAGNOSIS — F33.42 MAJOR DEPRESSIVE DISORDER, RECURRENT, IN FULL REMISSION (HCC): ICD-10-CM

## 2023-11-10 DIAGNOSIS — Z00.00 WELL ADULT EXAM: Primary | ICD-10-CM

## 2023-11-10 DIAGNOSIS — R74.01 TRANSAMINITIS: ICD-10-CM

## 2023-11-10 DIAGNOSIS — R21 RASH: ICD-10-CM

## 2023-11-10 DIAGNOSIS — Z13.29 SCREENING FOR THYROID DISORDER: ICD-10-CM

## 2023-11-10 DIAGNOSIS — F10.20 ALCOHOL DEPENDENCE (HCC): ICD-10-CM

## 2023-11-10 PROBLEM — M62.82 RHABDOMYOLYSIS: Status: RESOLVED | Noted: 2023-10-28 | Resolved: 2023-11-10

## 2023-11-10 PROBLEM — F10.931 ALCOHOL WITHDRAWAL DELIRIUM, ACUTE, HYPERACTIVE (HCC): Status: RESOLVED | Noted: 2023-10-28 | Resolved: 2023-11-10

## 2023-11-10 PROBLEM — R65.10 SIRS (SYSTEMIC INFLAMMATORY RESPONSE SYNDROME) (HCC): Status: RESOLVED | Noted: 2023-10-28 | Resolved: 2023-11-10

## 2023-11-10 PROBLEM — E87.8 ELECTROLYTE ABNORMALITY: Status: RESOLVED | Noted: 2023-10-28 | Resolved: 2023-11-10

## 2023-11-10 PROBLEM — F10.10 ALCOHOL ABUSE: Status: RESOLVED | Noted: 2023-11-06 | Resolved: 2023-11-10

## 2023-11-10 PROBLEM — R10.13 EPIGASTRIC PAIN: Status: RESOLVED | Noted: 2019-10-18 | Resolved: 2023-11-10

## 2023-11-10 PROBLEM — G92.8 TOXIC METABOLIC ENCEPHALOPATHY: Status: RESOLVED | Noted: 2023-10-28 | Resolved: 2023-11-10

## 2023-11-10 PROBLEM — E86.0 DEHYDRATION: Status: RESOLVED | Noted: 2023-08-01 | Resolved: 2023-11-10

## 2023-11-10 PROCEDURE — 99386 PREV VISIT NEW AGE 40-64: CPT | Performed by: FAMILY MEDICINE

## 2023-11-10 RX ORDER — FOLIC ACID 1 MG/1
1 TABLET ORAL DAILY
Qty: 30 TABLET | Refills: 0 | Status: SHIPPED | OUTPATIENT
Start: 2023-11-10

## 2023-11-10 RX ORDER — LANOLIN ALCOHOL/MO/W.PET/CERES
100 CREAM (GRAM) TOPICAL DAILY
Qty: 30 TABLET | Refills: 0 | Status: SHIPPED | OUTPATIENT
Start: 2023-11-10

## 2023-11-10 RX ORDER — MULTIVITAMIN
1 TABLET ORAL DAILY
Qty: 30 TABLET | Refills: 0 | Status: SHIPPED | OUTPATIENT
Start: 2023-11-10

## 2023-11-10 NOTE — ASSESSMENT & PLAN NOTE
He has struggled with alcohol abuse for many years and has been in and out of detox. Most recently was hospitalized last month. Last drink was prior to recent hospitalization on 10/27/23. He reports that he will follow up with his psychiatrist to discuss private alcohol abuse therapy sessions because he does not like the group setting that  offers. He feels that group therapy does not help him.

## 2023-11-10 NOTE — ASSESSMENT & PLAN NOTE
He has follow up scheduled with psychiatry first week of December. He is currently on effexor. Feels like his depression is controlled on effexor.

## 2023-11-10 NOTE — ASSESSMENT & PLAN NOTE
Drug reaction rash for which he was recently hospitalized for. It is improving, but still present. He will continue prednisone, benadryl, allegra, and pepcid for an additional 3 days.

## 2023-11-10 NOTE — ASSESSMENT & PLAN NOTE
He would like to talk to his psychiatrist about options for getting help. Last drink was 10/27/23 prior to hospitalization for detox. He is hesitant about AA.

## 2023-11-10 NOTE — PROGRESS NOTES
[address]    NAME: Shelley Do  AGE: 53 y.o. SEX: male  : 1975     DATE: 11/10/2023     Assessment and Plan:     Problem List Items Addressed This Visit          Digestive    GERD without esophagitis (Chronic)     Stable on Pepcid. Musculoskeletal and Integument    Rash     Drug reaction rash for which he was recently hospitalized for. It is improving, but still present. He will continue prednisone, benadryl, allegra, and pepcid for an additional 3 days. Relevant Orders    CBC and differential       Other    Generalized anxiety disorder     Managed by psychiatry- currently on Effexor, Buspar, Gabapentin, and Clonidine. Major depressive disorder, recurrent, in full remission [facility])     He has follow up scheduled with psychiatry first week of December. He is currently on effexor. Feels like his depression is controlled on effexor. Alcohol withdrawal syndrome with complication [facility])     He would like to talk to his psychiatrist about options for getting help. Last drink was 10/27/23 prior to hospitalization for detox. He is hesitant about AA. Alcohol dependence ([address])     He has struggled with alcohol abuse for many years and has been in and out of detox. Most recently was hospitalized last month. Last drink was prior to recent hospitalization on 10/27/23. He reports that he will follow up with his psychiatrist to discuss private alcohol abuse therapy sessions because he does not like the group setting that AA offers. He feels that group therapy does not help him. Relevant Medications    folic acid (FOLVITE) 1 mg tablet    thiamine 100 MG tablet    Multiple Vitamin (multivitamin) tablet    Other Relevant Orders    Magnesium    Transaminitis     Will monitor. He does have fatty liver and alcohol abuse.           Relevant Orders    Comprehensive metabolic panel    Lipid Panel with Direct LDL reflex    Nicotine abuse     Uses smokeless tobacco. He does a couple of cans a week. He is ready to quit. He will look into getting nicotine gum or patches. Other Visit Diagnoses       Well adult exam    -  Primary    Screening for HIV (human immunodeficiency virus)        Relevant Orders    HIV 1/2 AG/AB w Reflex [facility] for 2 yr old and above    Screening for thyroid disorder        Relevant Orders    TSH, 3rd generation with Free T4 reflex    Screening for colon cancer        Relevant Orders    Cologuard            Immunizations and preventive care screenings were discussed with patient today. Appropriate education was printed on patient's after visit summary. BMI Counseling: Body mass index is 29.02 kg/m². The BMI is above normal. Nutrition recommendations include decreasing portion sizes, encouraging healthy choices of fruits and vegetables, decreasing fast food intake, consuming healthier snacks, limiting drinks that contain sugar and moderation in carbohydrate intake. Exercise recommendations include moderate physical activity 150 minutes/week. Rationale for BMI follow-up plan is due to patient being overweight or obese. Return in about 3 months (around 2/10/2024) for Recheck. Chief Complaint:     Chief Complaint   Patient presents with    Establish Care    Physical Exam     Sas/cma      History of Present Illness:     Adult Annual Physical   Patient here for a comprehensive physical exam and to establish care. He has a history of alcohol abuse, nicotine abuse, depression, anxiety. Diet and Physical Activity  Diet/Nutrition: well balanced diet. Exercise: no formal exercise. Depression Screening  PHQ-2/9 Depression Screening           General Health  Sleep: sleeps poorly. Hearing: normal - bilateral.  Vision: vision problems: poor vision . Dental: regular dental visits, brushes teeth twice daily, and flosses teeth occasionally.         Health  Symptoms include: none   Review of Systems:     Review of Systems   Constitutional: Negative. HENT: Negative. Eyes: Negative. Respiratory: Negative. Cardiovascular: Negative. Gastrointestinal: Negative. Endocrine: Negative. Genitourinary: Negative. Musculoskeletal: Negative. Skin:  Positive for rash. Neurological: Negative. Hematological: Negative. Psychiatric/Behavioral: Negative. Past Medical History:     Past Medical History:   Diagnosis Date    Alcohol withdrawal delirium, acute, hyperactive ([address]) 10/28/2023    Anxiety     Depression     Electrolyte abnormality 10/28/2023    ETOH abuse     GERD (gastroesophageal reflux disease)     Rhabdomyolysis 94/88/6360    Toxic metabolic encephalopathy 14/23/5861      Past Surgical History:     Past Surgical History:   Procedure Laterality Date    CLOSED REDUCTION FOREARM FRACTURE Right     EGD        Family History:     Family History   Problem Relation Age of Onset    Kidney disease Father       Social History:     Social History     Socioeconomic History    Marital status: Single     Spouse name: None    Number of children: None    Years of education: None    Highest education level: None   Occupational History    None   Tobacco Use    Smoking status: Never    Smokeless tobacco: Current     Types: Chew   Vaping Use    Vaping Use: Never used   Substance and Sexual Activity    Alcohol use: Not Currently     Comment: not since 10/27    Drug use: Never    Sexual activity: Not Currently   Other Topics Concern    None   Social History Narrative    None     Social Determinants of Health     Financial Resource Strain: Not on file   Food Insecurity: No Food Insecurity (11/7/2023)    Hunger Vital Sign     Worried About Running Out of Food in the Last Year: Never true     Ran Out of Food in the Last Year: Never true   Transportation Needs: No Transportation Needs (11/7/2023)    PRAPARE - Transportation     Lack of Transportation (Medical):  No Lack of Transportation (Non-Medical): No   Physical Activity: Not on file   Stress: Not on file   Social Connections: Not on file   Intimate Partner Violence: Not on file   Housing Stability: Low Risk  (11/7/2023)    Housing Stability Vital Sign     Unable to Pay for Housing in the Last Year: No     Number of Places Lived in the Last Year: 1     Unstable Housing in the Last Year: No      Current Medications:     Current Outpatient Medications   Medication Sig Dispense Refill    busPIRone (BUSPAR) 10 mg tablet Take 1 tablet (10 mg total) by mouth 3 (three) times a day 90 tablet 2    cloNIDine (Catapres) 0.1 mg tablet Take 1 tablet (0.1 mg total) by mouth daily at bedtime 30 tablet 2    diphenhydrAMINE (BENADRYL) 25 mg tablet Take 1 tablet (25 mg total) by mouth every 6 (six) hours as needed for itching 30 tablet 0    famotidine (PEPCID) 20 mg tablet Take 1 tablet (20 mg total) by mouth 2 (two) times a day  0    fexofenadine (ALLEGRA) 180 MG tablet Take 1 tablet (180 mg total) by mouth daily  0    folic acid (FOLVITE) 1 mg tablet Take 1 tablet (1 mg total) by mouth daily 30 tablet 0    gabapentin (Neurontin) 300 mg capsule Take 1 capsule (300 mg total) by mouth 3 (three) times a day 90 capsule 1    Multiple Vitamin (multivitamin) tablet Take 1 tablet by mouth daily 30 tablet 0    potassium chloride (K-DUR,KLOR-CON) 20 mEq tablet Take 1 tablet (20 mEq total) by mouth daily 10 tablet 0    predniSONE 20 mg tablet Take 2 tablets (40 mg total) by mouth daily for 5 days Do not start before November 8, 2023. 10 tablet 0    thiamine 100 MG tablet Take 1 tablet (100 mg total) by mouth daily 30 tablet 0    venlafaxine (EFFEXOR-XR) 75 mg 24 hr capsule Take 1 capsule (75 mg total) by mouth daily 30 capsule 2     No current facility-administered medications for this visit.       Allergies:     No Known Allergies   Physical Exam:     /90   Pulse 86   Temp 98.6 °F (37 °C)   Resp 18   Ht 6' (1.829 m)   Wt 97.1 kg (214 lb) BMI 29.02 kg/m²     Physical Exam  Constitutional:       General: He is not in acute distress. Appearance: Normal appearance. He is normal weight. He is not ill-appearing, toxic-appearing or diaphoretic. HENT:      Head: Normocephalic and atraumatic. Right Ear: Tympanic membrane, ear canal and external ear normal. There is no impacted cerumen. Left Ear: Tympanic membrane, ear canal and external ear normal. There is no impacted cerumen. Nose: Nose normal.      Mouth/Throat:      Mouth: Mucous membranes are moist.      Pharynx: Oropharynx is clear. No oropharyngeal exudate or posterior oropharyngeal erythema. Eyes:      General: No scleral icterus. Right eye: No discharge. Left eye: No discharge. Extraocular Movements: Extraocular movements intact. Conjunctiva/sclera: Conjunctivae normal.      Pupils: Pupils are equal, round, and reactive to light. Cardiovascular:      Rate and Rhythm: Normal rate and regular rhythm. Pulses: Normal pulses. Heart sounds: Normal heart sounds. No murmur heard. No friction rub. No gallop. Pulmonary:      Effort: Pulmonary effort is normal. No respiratory distress. Breath sounds: Normal breath sounds. No stridor. No wheezing, rhonchi or rales. Chest:      Chest wall: No tenderness. Abdominal:      General: Abdomen is flat. Bowel sounds are normal. There is no distension. Palpations: Abdomen is soft. There is no mass. Tenderness: There is no abdominal tenderness. There is no guarding or rebound. Hernia: No hernia is present. Musculoskeletal:         General: Normal range of motion. Skin:     General: Skin is warm and dry. Findings: Rash (erythematous maculopapular rash throughout neck, chest and abdomen as noted in images below) present. Neurological:      General: No focal deficit present. Mental Status: He is alert and oriented to person, place, and time.    Psychiatric:         Mood and Affect: Mood normal.         Behavior: Behavior normal.         Thought Content:  Thought content normal.         Judgment: Judgment normal.             Radha Zavala MD  [facility]

## 2023-11-10 NOTE — ASSESSMENT & PLAN NOTE
Uses smokeless tobacco. He does a couple of cans a week. He is ready to quit. He will look into getting nicotine gum or patches.

## 2023-12-01 ENCOUNTER — APPOINTMENT (OUTPATIENT)
Dept: LAB | Facility: HOSPITAL | Age: 48
End: 2023-12-01
Attending: FAMILY MEDICINE
Payer: COMMERCIAL

## 2023-12-01 ENCOUNTER — OFFICE VISIT (OUTPATIENT)
Dept: PSYCHIATRY | Facility: CLINIC | Age: 48
End: 2023-12-01
Payer: COMMERCIAL

## 2023-12-01 DIAGNOSIS — F41.1 GENERALIZED ANXIETY DISORDER: ICD-10-CM

## 2023-12-01 DIAGNOSIS — F10.90 ALCOHOL USE DISORDER: Primary | ICD-10-CM

## 2023-12-01 DIAGNOSIS — F10.20 ALCOHOL DEPENDENCE (HCC): ICD-10-CM

## 2023-12-01 DIAGNOSIS — R74.01 TRANSAMINITIS: ICD-10-CM

## 2023-12-01 DIAGNOSIS — R21 RASH: ICD-10-CM

## 2023-12-01 DIAGNOSIS — Z11.4 SCREENING FOR HIV (HUMAN IMMUNODEFICIENCY VIRUS): ICD-10-CM

## 2023-12-01 DIAGNOSIS — F33.1 MDD (MAJOR DEPRESSIVE DISORDER), RECURRENT EPISODE, MODERATE (HCC): ICD-10-CM

## 2023-12-01 DIAGNOSIS — Z13.29 SCREENING FOR THYROID DISORDER: ICD-10-CM

## 2023-12-01 LAB
ALBUMIN SERPL BCP-MCNC: 4.4 G/DL (ref 3.5–5)
ALP SERPL-CCNC: 74 U/L (ref 34–104)
ALT SERPL W P-5'-P-CCNC: 41 U/L (ref 7–52)
ANION GAP SERPL CALCULATED.3IONS-SCNC: 5 MMOL/L
AST SERPL W P-5'-P-CCNC: 19 U/L (ref 13–39)
BASOPHILS # BLD AUTO: 0.05 THOUSANDS/ÂΜL (ref 0–0.1)
BASOPHILS NFR BLD AUTO: 1 % (ref 0–1)
BILIRUB SERPL-MCNC: 0.95 MG/DL (ref 0.2–1)
BUN SERPL-MCNC: 12 MG/DL (ref 5–25)
CALCIUM SERPL-MCNC: 9.7 MG/DL (ref 8.4–10.2)
CHLORIDE SERPL-SCNC: 106 MMOL/L (ref 96–108)
CHOLEST SERPL-MCNC: 210 MG/DL
CO2 SERPL-SCNC: 26 MMOL/L (ref 21–32)
CREAT SERPL-MCNC: 0.95 MG/DL (ref 0.6–1.3)
EOSINOPHIL # BLD AUTO: 0.16 THOUSAND/ÂΜL (ref 0–0.61)
EOSINOPHIL NFR BLD AUTO: 2 % (ref 0–6)
ERYTHROCYTE [DISTWIDTH] IN BLOOD BY AUTOMATED COUNT: 11.9 % (ref 11.6–15.1)
GFR SERPL CREATININE-BSD FRML MDRD: 94 ML/MIN/1.73SQ M
GLUCOSE P FAST SERPL-MCNC: 92 MG/DL (ref 65–99)
HCT VFR BLD AUTO: 48.4 % (ref 36.5–49.3)
HDLC SERPL-MCNC: 69 MG/DL
HGB BLD-MCNC: 16 G/DL (ref 12–17)
HIV 1+2 AB+HIV1 P24 AG SERPL QL IA: NORMAL
HIV 2 AB SERPL QL IA: NORMAL
HIV1 AB SERPL QL IA: NORMAL
HIV1 P24 AG SERPL QL IA: NORMAL
IMM GRANULOCYTES # BLD AUTO: 0.04 THOUSAND/UL (ref 0–0.2)
IMM GRANULOCYTES NFR BLD AUTO: 0 % (ref 0–2)
LDLC SERPL CALC-MCNC: 123 MG/DL (ref 0–100)
LYMPHOCYTES # BLD AUTO: 1.42 THOUSANDS/ÂΜL (ref 0.6–4.47)
LYMPHOCYTES NFR BLD AUTO: 16 % (ref 14–44)
MAGNESIUM SERPL-MCNC: 2.1 MG/DL (ref 1.9–2.7)
MCH RBC QN AUTO: 30.2 PG (ref 26.8–34.3)
MCHC RBC AUTO-ENTMCNC: 33.1 G/DL (ref 31.4–37.4)
MCV RBC AUTO: 92 FL (ref 82–98)
MONOCYTES # BLD AUTO: 0.83 THOUSAND/ÂΜL (ref 0.17–1.22)
MONOCYTES NFR BLD AUTO: 9 % (ref 4–12)
NEUTROPHILS # BLD AUTO: 6.43 THOUSANDS/ÂΜL (ref 1.85–7.62)
NEUTS SEG NFR BLD AUTO: 72 % (ref 43–75)
NRBC BLD AUTO-RTO: 0 /100 WBCS
PLATELET # BLD AUTO: 367 THOUSANDS/UL (ref 149–390)
PMV BLD AUTO: 9.3 FL (ref 8.9–12.7)
POTASSIUM SERPL-SCNC: 4.6 MMOL/L (ref 3.5–5.3)
PROT SERPL-MCNC: 7.1 G/DL (ref 6.4–8.4)
RBC # BLD AUTO: 5.29 MILLION/UL (ref 3.88–5.62)
SODIUM SERPL-SCNC: 137 MMOL/L (ref 135–147)
TRIGL SERPL-MCNC: 88 MG/DL
TSH SERPL DL<=0.05 MIU/L-ACNC: 2.56 UIU/ML (ref 0.45–4.5)
WBC # BLD AUTO: 8.93 THOUSAND/UL (ref 4.31–10.16)

## 2023-12-01 PROCEDURE — 80061 LIPID PANEL: CPT

## 2023-12-01 PROCEDURE — 36415 COLL VENOUS BLD VENIPUNCTURE: CPT

## 2023-12-01 PROCEDURE — 99214 OFFICE O/P EST MOD 30 MIN: CPT | Performed by: PHYSICIAN ASSISTANT

## 2023-12-01 PROCEDURE — 83735 ASSAY OF MAGNESIUM: CPT

## 2023-12-01 PROCEDURE — 80053 COMPREHEN METABOLIC PANEL: CPT

## 2023-12-01 PROCEDURE — 87389 HIV-1 AG W/HIV-1&-2 AB AG IA: CPT

## 2023-12-01 PROCEDURE — 84443 ASSAY THYROID STIM HORMONE: CPT

## 2023-12-01 PROCEDURE — 85025 COMPLETE CBC W/AUTO DIFF WBC: CPT

## 2023-12-01 RX ORDER — GABAPENTIN 300 MG/1
300 CAPSULE ORAL 3 TIMES DAILY
Qty: 90 CAPSULE | Refills: 1 | Status: SHIPPED | OUTPATIENT
Start: 2023-12-01 | End: 2024-01-30

## 2023-12-01 NOTE — PSYCH
This note was not shared with the patient due to reasonable likelihood of causing patient harm     PROGRESS NOTE        [facility]      Name and Date of Birth:  Nany Adams 53 y.o. 1975    Date of Visit: 12/01/23    SUBJECTIVE:      Frankey  presents today for medication management follow-up. He reports "I had a really tough few weeks."  He shares that he maintain his sobriety from alcohol for 35 days and then "I took a drink because I was still having withdrawal symptoms."  He adds "you think you will be perfect after detox and that is just not true."  He reports from there that he increased the amount he was drinking and ended up falling and hurting his rib cage. He did end up back in detox and spent some time in the ICU. He reports taking a dual action painkiller when he returned home due to his rib pain. He ended up having some sort of a drug reaction with a rash. This brought him back to the emergency department. He reports that he has been feeling better physically. He recently saw a new PCP who has ordered blood work and instructed him to make some changes with his daily medications. Documentation from his hospital stay and the  note that he had lost his job. Today he reports that he did not lose his job and he has been working. He shares today that he did try to go to some AA meetings but he did not feel like they were helpful. He also has not followed up with any outpatient services that were provided by this office. He has intermittent difficulty with sleep. He reports fair appetite. He would like to remain on his current medication regimen. He has been consistent with taking his medicine. He denies suicidal ideation, intent or plan at present, has no suicidal ideation, intent or plan at present.     He denies any auditory hallucinations and visual hallucinations, denies any other delusional thinking, denies any delusional thinking. He denies any side effects from medications  . HPI ROS Appetite Changes and Sleep: normal appetite, intermittent difficulty with sleep    Review Of Systems:      Constitutional Negative   ENT Negative   Cardiovascular Negative   Respiratory Negative   Gastrointestinal Negative   Genitourinary Negative   Musculoskeletal Negative   Integumentary Negative   Neurological Negative   Endocrine Negative   Other Symptoms Negative and None       Laboratory Results: No results found for this or any previous visit.     Substance Abuse History:    Social History     Substance and Sexual Activity   Drug Use Never       Family Psychiatric History:     Family History   Problem Relation Age of Onset    Kidney disease Father        The following portions of the patient's history were reviewed and updated as appropriate: past family history, past medical history, past social history, past surgical history and problem list.    Social History     Socioeconomic History    Marital status: Single     Spouse name: Not on file    Number of children: Not on file    Years of education: Not on file    Highest education level: Not on file   Occupational History    Not on file   Tobacco Use    Smoking status: Never    Smokeless tobacco: Current     Types: Chew   Vaping Use    Vaping Use: Never used   Substance and Sexual Activity    Alcohol use: Not Currently     Comment: not since 10/27    Drug use: Never    Sexual activity: Not Currently   Other Topics Concern    Not on file   Social History Narrative    Not on file     Social Determinants of Health     Financial Resource Strain: Not on file   Food Insecurity: No Food Insecurity (11/7/2023)    Hunger Vital Sign     Worried About Running Out of Food in the Last Year: Never true     Ran Out of Food in the Last Year: Never true   Transportation Needs: No Transportation Needs (11/7/2023)    PRAPARE - Transportation     Lack of Transportation (Medical): No     Lack of Transportation (Non-Medical): No   Physical Activity: Not on file   Stress: Not on file   Social Connections: Not on file   Intimate Partner Violence: Not on file   Housing Stability: Low Risk  (11/7/2023)    Housing Stability Vital Sign     Unable to Pay for Housing in the Last Year: No     Number of Places Lived in the Last Year: 1     Unstable Housing in the Last Year: No     Social History     Social History Narrative    Not on file        Social History       Tobacco History       Smoking Status  Never      Smokeless Tobacco Use  Current Smokeless Tobacco Type  Chew              Alcohol History       Alcohol Use Status  Not Currently Comment  not since 10/27              Drug Use       Drug Use Status  Never              Sexual Activity       Sexually Active  Not Currently              Activities of Daily Living    Not Asked                 Additional Substance Use Detail       Questions Responses    Problems Due to Past Use of Alcohol? Yes    Problems Due to Past Use of Substances? No    Alcohol Use Frequency Daily    Alcohol Drink of Choice Liquor    1st Use of Alcohol 21    Last Use of Alcohol & Amount 15 drinks, 100 proof liquor    Longest Abstinence from Alcohol 2 years    Not reviewed.               OBJECTIVE:     Mental Status Evaluation:    Appearance age appropriate, casually dressed   Behavior pleasant, cooperative   Speech normal volume, normal pitch   Mood Dysphoric   Affect Mood congruent   Thought Processes logical   Associations intact associations   Thought Content normal   Perceptual Disturbances: none   Abnormal Thoughts  Risk Potential Suicidal ideation - None  Homicidal ideation - None  Potential for aggression - No   Orientation oriented to person, place, time/date and situation   Memory recent and remote memory grossly intact   Cosciousness alert and awake   Attention Span attention span and concentration are age appropriate   Intellect Appears to be of Average Intelligence   Insight age appropriate    Judgement good    Muscle Strength and  Gait muscle strength and tone were normal   Language no difficulty naming common objects   Fund of Knowledge displays adequate knowledge of current events   Pain none   Pain Scale 0       Assessment/Plan:       Diagnoses and all orders for this visit:    Alcohol use disorder    MDD (major depressive disorder), recurrent episode, moderate (HCC)    Generalized anxiety disorder  -     gabapentin (Neurontin) 300 mg capsule; Take 1 capsule (300 mg total) by mouth 3 (three) times a day          Treatment Recommendations/Precautions:    Patient currently reports that he has been sober from alcohol since 10/27. He is resistant to obtaining any type of therapeutic support or getting any more intensive services. He was provided with a list of outpatient, IOP, and partial programs with a focus on addiction. He is also resistant to going to AA. We did discuss that it is important for him to develop a support system and alternative coping skills. He has not been taking clonidine at bedtime as he does not feel it has been helpful. Will discontinue clonidine    Continue current medications:           Gabapentin 300 mg 3 times daily for anxiety  Buspar 10 mg TID for anxiety  Effexor XR 75 mg daily for anxiety and depression     We will follow up in  1 month or sooner if questions or concerns arise. He is aware of emergent vs non-emergent mental health resources. He is able to contract for safety at this time. Risks/Benefits      Risks, Benefits And Possible Side Effects Of Medications:    Risks, benefits, and possible side effects of medications explained to patient and patient verbalizes understanding and agreement for treatment. Controlled Medication Discussion:     Not applicable    Psychotherapy Provided:     Individual psychotherapy provided: No    This note was completed in part utilizing Dragon dictation Software.  Grammatical, translation, syntax errors, random word insertions, spelling mistakes, and incomplete sentences may be an occasional consequence of this system secondary to software limitations with voice recognition, ambient noise, and hardware issues. If you have any questions or concerns about the content, text, or information contained within the body of this dictation, please contact the provider for clarification.

## 2023-12-07 DIAGNOSIS — F10.20 ALCOHOL DEPENDENCE (HCC): ICD-10-CM

## 2023-12-07 RX ORDER — FOLIC ACID 1 MG/1
1000 TABLET ORAL DAILY
Qty: 30 TABLET | Refills: 5 | Status: SHIPPED | OUTPATIENT
Start: 2023-12-07

## 2023-12-08 LAB — COLOGUARD RESULT REPORTABLE: NEGATIVE

## 2024-01-10 DIAGNOSIS — F41.1 GENERALIZED ANXIETY DISORDER: ICD-10-CM

## 2024-01-10 DIAGNOSIS — F33.42 MAJOR DEPRESSIVE DISORDER, RECURRENT, IN FULL REMISSION (HCC): ICD-10-CM

## 2024-01-10 RX ORDER — VENLAFAXINE HYDROCHLORIDE 75 MG/1
75 CAPSULE, EXTENDED RELEASE ORAL DAILY
Qty: 30 CAPSULE | Refills: 2 | Status: SHIPPED | OUTPATIENT
Start: 2024-01-10

## 2024-01-19 ENCOUNTER — OFFICE VISIT (OUTPATIENT)
Dept: PSYCHIATRY | Facility: CLINIC | Age: 49
End: 2024-01-19
Payer: COMMERCIAL

## 2024-01-19 DIAGNOSIS — F10.90 ALCOHOL USE DISORDER: ICD-10-CM

## 2024-01-19 DIAGNOSIS — F33.1 MDD (MAJOR DEPRESSIVE DISORDER), RECURRENT EPISODE, MODERATE (HCC): Primary | ICD-10-CM

## 2024-01-19 DIAGNOSIS — F41.1 GENERALIZED ANXIETY DISORDER: ICD-10-CM

## 2024-01-19 PROCEDURE — 99214 OFFICE O/P EST MOD 30 MIN: CPT | Performed by: PHYSICIAN ASSISTANT

## 2024-01-19 NOTE — PSYCH
"  This note was not shared with the patient due to reasonable likelihood of causing patient harm   PROGRESS NOTE        [facility]      Name and Date of Birth:  Joel Sherwood 48 y.o. 1975    Date of Visit: 01/19/24    SUBJECTIVE:    Joel presents today for medication management and follow-up.  He reports that he has been able to maintain his sobriety from alcohol since 10/27.  He reports that he has been following up regularly with his PCP.  He had recent blood work and excitedly shares that his liver enzymes were within the normal range.  He shares that his sleep has improved.  He notes that his appetite has also improved.  He reports that he is doing well at work.  He shares that he has not been craving alcohol and he has been thinking a lot about how much better things have been since he has been sober.  He reports \"in the past when I was drinking I would avoid my family and make excuses and now I am actually present to help them out when they need me.\"  He shares that he is going to drive his father to dialysis today because of the bad weather.  He shares that his anxiety level has also improved.    He denies any significant depressive symptoms.  He feels physically well today.  He would like to remain on his current medication regimen.    He denies suicidal ideation, intent or plan at present, has no suicidal ideation, intent or plan at present.    He denies any auditory hallucinations and visual hallucinations, denies any other delusional thinking, denies any delusional thinking.    He denies any side effects from medications  .  HPI ROS Appetite Changes and Sleep: normal appetite, normal sleep    Review Of Systems:      Constitutional Negative   ENT Negative   Cardiovascular Negative   Respiratory Negative   Gastrointestinal Negative   Genitourinary Negative   Musculoskeletal Negative   Integumentary Negative   Neurological Negative   Endocrine " Department of Anesthesiology  Postprocedure Note    Patient: Maryam King  MRN: [id number]  YOB: 2017  Date of evaluation: 12/20/2021  Time:  11:34 AM     Procedure Summary     Date: 12/20/21 Room / Location: [address] [place] [address] 02 / [address] [facility]    Anesthesia Start: 0730 Anesthesia Stop: 9934    Procedure: DENTAL RESTORATIONS (N/A Mouth) Diagnosis: (DENTAL CARIES)    Surgeons: Helene Kayser, DDS Responsible Provider: Giovanna Childers MD    Anesthesia Type: general ASA Status: 1          Anesthesia Type: general    Lizet Phase I: Lizet Score: 10    Lizet Phase II: Lizet Score: 10    Last vitals: Reviewed and per EMR flowsheets.        Anesthesia Post Evaluation    Patient location during evaluation: PACU  Patient participation: complete - patient cannot participate  Level of consciousness: awake  Airway patency: patent  Nausea & Vomiting: no vomiting and no nausea  Complications: no  Cardiovascular status: hemodynamically stable  Respiratory status: acceptable  Hydration status: stable Negative   Other Symptoms Negative and None       Laboratory Results: No results found for this or any previous visit.    Substance Abuse History:    Social History     Substance and Sexual Activity   Drug Use Never       Family Psychiatric History:     Family History   Problem Relation Age of Onset    Kidney disease Father        The following portions of the patient's history were reviewed and updated as appropriate: past family history, past medical history, past social history, past surgical history and problem list.    Social History     Socioeconomic History    Marital status: Single     Spouse name: Not on file    Number of children: Not on file    Years of education: Not on file    Highest education level: Not on file   Occupational History    Not on file   Tobacco Use    Smoking status: Never    Smokeless tobacco: Current     Types: Chew   Vaping Use    Vaping status: Never Used   Substance and Sexual Activity    Alcohol use: Not Currently     Comment: not since 10/27    Drug use: Never    Sexual activity: Not Currently   Other Topics Concern    Not on file   Social History Narrative    Not on file     Social Determinants of Health     Financial Resource Strain: Not on file   Food Insecurity: No Food Insecurity (11/7/2023)    Hunger Vital Sign     Worried About Running Out of Food in the Last Year: Never true     Ran Out of Food in the Last Year: Never true   Transportation Needs: No Transportation Needs (11/7/2023)    PRAPARE - Transportation     Lack of Transportation (Medical): No     Lack of Transportation (Non-Medical): No   Physical Activity: Not on file   Stress: Not on file   Social Connections: Not on file   Intimate Partner Violence: Not on file   Housing Stability: Low Risk  (11/7/2023)    Housing Stability Vital Sign     Unable to Pay for Housing in the Last Year: No     Number of Places Lived in the Last Year: 1     Unstable Housing in the Last Year: No     Social History     Social History  Narrative    Not on file        Social History       Tobacco History       Smoking Status  Never      Smokeless Tobacco Use  Current Smokeless Tobacco Type  Chew              Alcohol History       Alcohol Use Status  Not Currently Comment  not since 10/27              Drug Use       Drug Use Status  Never              Sexual Activity       Sexually Active  Not Currently              Activities of Daily Living    Not Asked                 Additional Substance Use Detail       Questions Responses    Problems Due to Past Use of Alcohol? Yes    Problems Due to Past Use of Substances? No    Alcohol Use Frequency Daily    Alcohol Drink of Choice Liquor    1st Use of Alcohol 20    Last Use of Alcohol & Amount 15 drinks, 100 proof liquor    Longest Abstinence from Alcohol 2 years    Not reviewed.              OBJECTIVE:     Mental Status Evaluation:    Appearance age appropriate, casually dressed   Behavior pleasant, cooperative   Speech normal volume, normal pitch   Mood neutral   Affect Mood congruent    Thought Processes logical   Associations intact associations   Thought Content normal   Perceptual Disturbances: none   Abnormal Thoughts  Risk Potential Suicidal ideation - None  Homicidal ideation - None  Potential for aggression - No   Orientation oriented to person, place, time/date and situation   Memory recent and remote memory grossly intact   Cosciousness alert and awake   Attention Span attention span and concentration are age appropriate   Intellect Appears to be of Average Intelligence   Insight age appropriate    Judgement good    Muscle Strength and  Gait muscle strength and tone were normal   Language no difficulty naming common objects   Fund of Knowledge displays adequate knowledge of current events   Pain none   Pain Scale 0       Assessment/Plan:       Diagnoses and all orders for this visit:    MDD (major depressive disorder), recurrent episode, moderate (HCC)    Alcohol use disorder    Generalized  anxiety disorder          Treatment Recommendations/Precautions:      Continue current medications:           Gabapentin 300 mg 3 times daily for anxiety  Buspar 10 mg TID for anxiety  Effexor XR 75 mg daily for anxiety and depression     We will follow up in  2 months or sooner if questions or concerns arise.   He is aware of emergent vs non-emergent mental health resources.   He is able to contract for safety at this time.        Risks/Benefits      Risks, Benefits And Possible Side Effects Of Medications:    Risks, benefits, and possible side effects of medications explained to patient and patient verbalizes understanding and agreement for treatment.    Controlled Medication Discussion:     Not applicable    Psychotherapy Provided:     Individual psychotherapy provided: No    Visit Start Time:  10:30 AM  Visit End Time:  10:50 AM  Total Visit Duration: 20 minutes    This note was completed in part utilizing Dragon dictation Software. Grammatical, translation, syntax errors, random word insertions, spelling mistakes, and incomplete sentences may be an occasional consequence of this system secondary to software limitations with voice recognition, ambient noise, and hardware issues. If you have any questions or concerns about the content, text, or information contained within the body of this dictation, please contact the provider for clarification.

## 2024-01-27 DIAGNOSIS — F41.1 GENERALIZED ANXIETY DISORDER: ICD-10-CM

## 2024-01-28 RX ORDER — GABAPENTIN 300 MG/1
300 CAPSULE ORAL 3 TIMES DAILY
Qty: 90 CAPSULE | Refills: 1 | Status: SHIPPED | OUTPATIENT
Start: 2024-01-28

## 2024-02-16 ENCOUNTER — OFFICE VISIT (OUTPATIENT)
Dept: FAMILY MEDICINE CLINIC | Facility: CLINIC | Age: 49
End: 2024-02-16
Payer: COMMERCIAL

## 2024-02-16 VITALS
WEIGHT: 216 LBS | TEMPERATURE: 97.5 F | BODY MASS INDEX: 29.26 KG/M2 | SYSTOLIC BLOOD PRESSURE: 110 MMHG | DIASTOLIC BLOOD PRESSURE: 80 MMHG | HEART RATE: 80 BPM | RESPIRATION RATE: 18 BRPM | HEIGHT: 72 IN

## 2024-02-16 DIAGNOSIS — F41.1 GENERALIZED ANXIETY DISORDER: Primary | ICD-10-CM

## 2024-02-16 DIAGNOSIS — K21.9 GERD WITHOUT ESOPHAGITIS: Chronic | ICD-10-CM

## 2024-02-16 DIAGNOSIS — F33.42 MAJOR DEPRESSIVE DISORDER, RECURRENT, IN FULL REMISSION (HCC): ICD-10-CM

## 2024-02-16 DIAGNOSIS — Z72.0 NICOTINE ABUSE: ICD-10-CM

## 2024-02-16 DIAGNOSIS — E78.5 HYPERLIPIDEMIA, UNSPECIFIED HYPERLIPIDEMIA TYPE: ICD-10-CM

## 2024-02-16 DIAGNOSIS — F10.21 ALCOHOL DEPENDENCE IN REMISSION (HCC): ICD-10-CM

## 2024-02-16 PROBLEM — R29.6 MULTIPLE FALLS: Status: RESOLVED | Noted: 2023-10-28 | Resolved: 2024-02-16

## 2024-02-16 PROBLEM — R21 RASH: Status: RESOLVED | Noted: 2023-11-06 | Resolved: 2024-02-16

## 2024-02-16 PROBLEM — R74.01 TRANSAMINITIS: Status: RESOLVED | Noted: 2023-08-01 | Resolved: 2024-02-16

## 2024-02-16 PROBLEM — F10.939 ALCOHOL WITHDRAWAL SYNDROME WITH COMPLICATION (HCC): Status: RESOLVED | Noted: 2023-08-01 | Resolved: 2024-02-16

## 2024-02-16 PROCEDURE — 99214 OFFICE O/P EST MOD 30 MIN: CPT | Performed by: FAMILY MEDICINE

## 2024-02-16 RX ORDER — FAMOTIDINE 20 MG/1
20 TABLET, FILM COATED ORAL EVERY OTHER DAY
Status: SHIPPED
Start: 2024-02-16

## 2024-02-16 NOTE — PROGRESS NOTES
Name: Joel Sherwood      : 1975      MRN: [id number]  Encounter Provider: Anayeli Do MD  Encounter Date: 2024   Encounter department: [facility]    Assessment & Plan     1. Generalized anxiety disorder  Assessment & Plan:  Stable. Managed by psychiatry. On Effexor, Gabapentin.      2. Major depressive disorder, recurrent, in full remission (HCC)  Assessment & Plan:  Stable. Managed by psychiatry. On Effexor, Gabapentin.      3. Alcohol dependence in remission (HCC)  Assessment & Plan:  Last drink was 2023. He is not in AA. Follows psychiatry. He has been doing well. Continue thiamine, folic acid, multivitamin.     Orders:  -     CBC and differential; Future  -     Comprehensive metabolic panel; Future  -     Magnesium; Future    4. Nicotine abuse  Assessment & Plan:  Chews tobacco. He would like to quit. Recommend nicotine gum.       5. GERD without esophagitis  Assessment & Plan:  Stable. Takes pepcid once every other day. Can consider stopping at this point    Orders:  -     famotidine (PEPCID) 20 mg tablet; Take 1 tablet (20 mg total) by mouth every other day    6. Hyperlipidemia, unspecified hyperlipidemia type  -     Comprehensive metabolic panel; Future  -     Lipid Panel with Direct LDL reflex; Future           Subjective      HPI  He is here today for routine follow up visit. No acute issues/concerns.     Review of Systems   Constitutional: Negative.    HENT: Negative.     Eyes: Negative.    Respiratory: Negative.     Cardiovascular: Negative.    Gastrointestinal: Negative.    Endocrine: Negative.    Genitourinary: Negative.    Musculoskeletal: Negative.    Neurological: Negative.    Hematological: Negative.    Psychiatric/Behavioral: Negative.         Current Outpatient Medications on File Prior to Visit   Medication Sig    busPIRone (BUSPAR) 10 mg tablet Take 1 tablet (10 mg total) by mouth 3 (three) times a day    folic acid (FOLVITE) 1 mg tablet take 1 tablet by  mouth once daily    gabapentin (NEURONTIN) 300 mg capsule take 1 capsule by mouth three times a day    Multiple Vitamin (multivitamin) tablet Take 1 tablet by mouth daily    thiamine 100 MG tablet Take 1 tablet (100 mg total) by mouth daily    venlafaxine (EFFEXOR-XR) 75 mg 24 hr capsule take 1 capsule by mouth once daily    [DISCONTINUED] famotidine (PEPCID) 20 mg tablet Take 1 tablet (20 mg total) by mouth 2 (two) times a day    [DISCONTINUED] diphenhydrAMINE (BENADRYL) 25 mg tablet Take 1 tablet (25 mg total) by mouth every 6 (six) hours as needed for itching (Patient not taking: Reported on 2/16/2024)    [DISCONTINUED] fexofenadine (ALLEGRA) 180 MG tablet Take 1 tablet (180 mg total) by mouth daily (Patient not taking: Reported on 2/16/2024)    [DISCONTINUED] potassium chloride (K-DUR,KLOR-CON) 20 mEq tablet Take 1 tablet (20 mEq total) by mouth daily (Patient not taking: Reported on 2/16/2024)       Objective     /80   Pulse 80   Temp 97.5 °F (36.4 °C) (Tympanic)   Resp 18   Ht 6' (1.829 m)   Wt 98 kg (216 lb)   BMI 29.29 kg/m²     Physical Exam  Constitutional:       General: He is not in acute distress.     Appearance: He is well-developed. He is not diaphoretic.   HENT:      Head: Normocephalic and atraumatic.   Eyes:      General: No scleral icterus.        Right eye: No discharge.         Left eye: No discharge.      Conjunctiva/sclera: Conjunctivae normal.   Cardiovascular:      Rate and Rhythm: Normal rate and regular rhythm.      Pulses: Normal pulses.   Pulmonary:      Effort: Pulmonary effort is normal. No respiratory distress.      Breath sounds: Normal breath sounds. No stridor. No wheezing, rhonchi or rales.   Chest:      Chest wall: No tenderness.   Musculoskeletal:         General: Normal range of motion.      Cervical back: Normal range of motion.   Skin:     General: Skin is warm.   Neurological:      Mental Status: He is alert and oriented to person, place, and time.   Psychiatric:          Mood and Affect: Mood normal.         Behavior: Behavior normal.         Thought Content: Thought content normal.         Judgment: Judgment normal.       Anayeli Do MD

## 2024-02-16 NOTE — ASSESSMENT & PLAN NOTE
Last drink was October 2023. He is not in AA. Follows psychiatry. He has been doing well. Continue thiamine, folic acid, multivitamin.

## 2024-03-11 DIAGNOSIS — F41.1 GENERALIZED ANXIETY DISORDER: ICD-10-CM

## 2024-03-11 RX ORDER — BUSPIRONE HYDROCHLORIDE 10 MG/1
10 TABLET ORAL 3 TIMES DAILY
Qty: 90 TABLET | Refills: 2 | Status: SHIPPED | OUTPATIENT
Start: 2024-03-11 | End: 2024-06-09

## 2024-03-11 NOTE — TELEPHONE ENCOUNTER
Medication Refill Request     Name of Medication Buspar 10 mg  Dose/Frequency 1tid  Quantity 90  Verified pharmacy   [x]  Verified ordering Provider   [x]  Does patient have enough for the next 3 days? Yes [] No [x]  Does patient have a follow-up appointment scheduled? Yes [x] No []   If so when is appointment: 3/15/2024

## 2024-03-15 ENCOUNTER — OFFICE VISIT (OUTPATIENT)
Dept: PSYCHIATRY | Facility: CLINIC | Age: 49
End: 2024-03-15
Payer: COMMERCIAL

## 2024-03-15 DIAGNOSIS — F33.1 MDD (MAJOR DEPRESSIVE DISORDER), RECURRENT EPISODE, MODERATE (HCC): Primary | ICD-10-CM

## 2024-03-15 DIAGNOSIS — F10.91 ALCOHOL USE DISORDER IN REMISSION: ICD-10-CM

## 2024-03-15 DIAGNOSIS — F41.1 GENERALIZED ANXIETY DISORDER: ICD-10-CM

## 2024-03-15 PROCEDURE — 99214 OFFICE O/P EST MOD 30 MIN: CPT | Performed by: PHYSICIAN ASSISTANT

## 2024-03-15 RX ORDER — GABAPENTIN 300 MG/1
300 CAPSULE ORAL 3 TIMES DAILY
Qty: 90 CAPSULE | Refills: 1 | Status: SHIPPED | OUTPATIENT
Start: 2024-03-15

## 2024-03-15 NOTE — PSYCH
"  This note was not shared with the patient due to reasonable likelihood of causing patient harm   PROGRESS NOTE        [facility]      Name and Date of Birth:  Joel Sherwood 48 y.o. 1975    Date of Visit: 03/15/24    SUBJECTIVE:    Joel presents today for medication management and follow-up.  He reports that he has been able to maintain his sobriety from alcohol since 10/27.  According to the chart he has followed up regularly with his PCP.  He states today \"I am a little sluggish and tired but stable.\"  He reports that his mood has been \"neutral.\"  He shares that he does get anxious at times but he feels that his anxiety is manageable.  He reports that he has been doing well at work.  He shares today that he has a family member that is going through a tough time due to death of a loved 1.  He does note that he would like to be a support for this individual as they supported him when he was struggling.  His sleep has improved significantly.  He notes that when he was drinking to access his sleep was incredibly disrupted.  He states \"I am in a good place and everything is improving a little at a time now that I am not drinking poison anymore.\"    He denies any significant depressive symptoms.  He feels physically well today.  He would like to remain on his current medication regimen.    He denies suicidal ideation, intent or plan at present, has no suicidal ideation, intent or plan at present.    He denies any auditory hallucinations and visual hallucinations, denies any other delusional thinking, denies any delusional thinking.    He denies any side effects from medications  .  HPI ROS Appetite Changes and Sleep: normal appetite, normal sleep    Review Of Systems:      Constitutional Negative   ENT Negative   Cardiovascular Negative   Respiratory Negative   Gastrointestinal Negative   Genitourinary Negative   Musculoskeletal Negative   Integumentary " Negative   Neurological Negative   Endocrine Negative   Other Symptoms Negative and None       Laboratory Results: No results found for this or any previous visit.    Substance Abuse History:    Social History     Substance and Sexual Activity   Drug Use Never       Family Psychiatric History:     Family History   Problem Relation Age of Onset    Kidney disease Father        The following portions of the patient's history were reviewed and updated as appropriate: past family history, past medical history, past social history, past surgical history and problem list.    Social History     Socioeconomic History    Marital status: Single     Spouse name: Not on file    Number of children: Not on file    Years of education: Not on file    Highest education level: Not on file   Occupational History    Not on file   Tobacco Use    Smoking status: Never     Passive exposure: Never    Smokeless tobacco: Current     Types: Chew   Vaping Use    Vaping status: Never Used   Substance and Sexual Activity    Alcohol use: Not Currently     Comment: not since 10/27    Drug use: Never    Sexual activity: Not Currently   Other Topics Concern    Not on file   Social History Narrative    Not on file     Social Determinants of Health     Financial Resource Strain: Not on file   Food Insecurity: No Food Insecurity (11/7/2023)    Hunger Vital Sign     Worried About Running Out of Food in the Last Year: Never true     Ran Out of Food in the Last Year: Never true   Transportation Needs: No Transportation Needs (11/7/2023)    PRAPARE - Transportation     Lack of Transportation (Medical): No     Lack of Transportation (Non-Medical): No   Physical Activity: Not on file   Stress: Not on file   Social Connections: Not on file   Intimate Partner Violence: Not on file   Housing Stability: Low Risk  (11/7/2023)    Housing Stability Vital Sign     Unable to Pay for Housing in the Last Year: No     Number of Places Lived in the Last Year: 1      Unstable Housing in the Last Year: No     Social History     Social History Narrative    Not on file        Social History       Tobacco History       Smoking Status  Never      Smokeless Tobacco Use  Current Smokeless Tobacco Type  Chew              Alcohol History       Alcohol Use Status  Not Currently Comment  not since 10/27              Drug Use       Drug Use Status  Never              Sexual Activity       Sexually Active  Not Currently              Activities of Daily Living    Not Asked                 Additional Substance Use Detail       Questions Responses    Problems Due to Past Use of Alcohol? Yes    Problems Due to Past Use of Substances? No    Alcohol Use Frequency Daily    Alcohol Drink of Choice Liquor    1st Use of Alcohol 20    Last Use of Alcohol & Amount 15 drinks, 100 proof liquor    Longest Abstinence from Alcohol 2 years    Not reviewed.              OBJECTIVE:     Mental Status Evaluation:    Appearance age appropriate, casually dressed   Behavior pleasant, cooperative   Speech normal volume, normal pitch   Mood neutral   Affect Mood congruent    Thought Processes logical   Associations intact associations   Thought Content normal   Perceptual Disturbances: none   Abnormal Thoughts  Risk Potential Suicidal ideation - None  Homicidal ideation - None  Potential for aggression - No   Orientation oriented to person, place, time/date and situation   Memory recent and remote memory grossly intact   Cosciousness alert and awake   Attention Span attention span and concentration are age appropriate   Intellect Appears to be of Average Intelligence   Insight age appropriate    Judgement good    Muscle Strength and  Gait muscle strength and tone were normal   Language no difficulty naming common objects   Fund of Knowledge displays adequate knowledge of current events   Pain none   Pain Scale 0       Assessment/Plan:       Diagnoses and all orders for this visit:    MDD (major depressive disorder),  recurrent episode, moderate (HCC)    Generalized anxiety disorder  -     gabapentin (NEURONTIN) 300 mg capsule; Take 1 capsule (300 mg total) by mouth 3 (three) times a day    Alcohol use disorder in remission          Treatment Recommendations/Precautions:      Continue current medications:           Gabapentin 300 mg 3 times daily for anxiety  Buspar 10 mg TID for anxiety  Effexor XR 75 mg daily for anxiety and depression     We will follow up in  2-3 months or sooner if questions or concerns arise.   He is aware of emergent vs non-emergent mental health resources.   He is able to contract for safety at this time.        Risks/Benefits      Risks, Benefits And Possible Side Effects Of Medications:    Risks, benefits, and possible side effects of medications explained to patient and patient verbalizes understanding and agreement for treatment.    Controlled Medication Discussion:     Not applicable    Psychotherapy Provided:     Individual psychotherapy provided: No    Visit Start Time:  10:30 AM  Visit End Time:  10:50 AM  Total Visit Duration: 20 minutes    This note was completed in part utilizing Dragon dictation Software. Grammatical, translation, syntax errors, random word insertions, spelling mistakes, and incomplete sentences may be an occasional consequence of this system secondary to software limitations with voice recognition, ambient noise, and hardware issues. If you have any questions or concerns about the content, text, or information contained within the body of this dictation, please contact the provider for clarification.

## 2024-03-15 NOTE — BH TREATMENT PLAN
TREATMENT PLAN (Medication Management Only)        [facility]    Name and Date of Birth:  Joel Sherwood 48 y.o. 1975  Date of Treatment Plan: March 15, 2024  Diagnosis/Diagnoses:    1. MDD (major depressive disorder), recurrent episode, moderate (HCC)    2. Generalized anxiety disorder    3. Alcohol use disorder in remission      Strengths/Personal Resources for Self-Care: supportive family, supportive friends, taking medications as prescribed, ability to adapt to life changes, ability to communicate needs.  Area/Areas of need (in own words): anxiety symptoms, depressive symptoms  1. Long Term Goal: maintain improvement in depression.  Target Date:6 months - 9/15/2024  Person/Persons responsible for completion of goal: Joel WALTERS  2.  Short Term Objective (s) - How will we reach this goal?:   A. Provider new recommended medication/dosage changes and/or continue medication(s): continue current medications as prescribed.  B. N/A.  C. N/A.  Target Date:6 months - 9/15/2024  Person/Persons Responsible for Completion of Goal: Joel WALTERS  Progress Towards Goals: continuing treatment  Treatment Modality: medication management every 3 months  Review due 180 days from date of this plan: 6 months - 9/15/2024  Expected length of service: ongoing treatment  My Physician/PA/NP and I have developed this plan together and I agree to work on the goals and objectives. I understand the treatment goals that were developed for my treatment.

## 2024-04-08 DIAGNOSIS — F41.1 GENERALIZED ANXIETY DISORDER: ICD-10-CM

## 2024-04-08 DIAGNOSIS — F33.42 MAJOR DEPRESSIVE DISORDER, RECURRENT, IN FULL REMISSION (HCC): ICD-10-CM

## 2024-04-08 RX ORDER — VENLAFAXINE HYDROCHLORIDE 75 MG/1
75 CAPSULE, EXTENDED RELEASE ORAL DAILY
Qty: 30 CAPSULE | Refills: 2 | Status: SHIPPED | OUTPATIENT
Start: 2024-04-08

## 2024-04-09 ENCOUNTER — TELEPHONE (OUTPATIENT)
Dept: PSYCHIATRY | Facility: CLINIC | Age: 49
End: 2024-04-09

## 2024-05-20 DIAGNOSIS — F41.1 GENERALIZED ANXIETY DISORDER: ICD-10-CM

## 2024-05-20 RX ORDER — GABAPENTIN 300 MG/1
300 CAPSULE ORAL 3 TIMES DAILY
Qty: 90 CAPSULE | Refills: 1 | Status: SHIPPED | OUTPATIENT
Start: 2024-05-20

## 2024-05-31 ENCOUNTER — HOSPITAL ENCOUNTER (EMERGENCY)
Facility: HOSPITAL | Age: 49
Discharge: HOME | End: 2024-05-31
Attending: EMERGENCY MEDICINE
Payer: COMMERCIAL

## 2024-05-31 VITALS
HEART RATE: 97 BPM | RESPIRATION RATE: 20 BRPM | WEIGHT: 202.8 LBS | BODY MASS INDEX: 27.5 KG/M2 | DIASTOLIC BLOOD PRESSURE: 95 MMHG | SYSTOLIC BLOOD PRESSURE: 121 MMHG | TEMPERATURE: 97.8 F | OXYGEN SATURATION: 94 %

## 2024-05-31 DIAGNOSIS — F32.A DEPRESSION: ICD-10-CM

## 2024-05-31 DIAGNOSIS — F10.939 ALCOHOL WITHDRAWAL (HCC): Primary | ICD-10-CM

## 2024-05-31 DIAGNOSIS — F10.10 ALCOHOL ABUSE: ICD-10-CM

## 2024-05-31 DIAGNOSIS — F10.10 ALCOHOL CONSUMPTION BINGE DRINKING: ICD-10-CM

## 2024-05-31 DIAGNOSIS — F41.9 ANXIETY: ICD-10-CM

## 2024-05-31 LAB
ALBUMIN SERPL BCP-MCNC: 4.7 G/DL (ref 3.5–5)
ALP SERPL-CCNC: 68 U/L (ref 34–104)
ALT SERPL W P-5'-P-CCNC: 20 U/L (ref 7–52)
ANION GAP SERPL CALCULATED.3IONS-SCNC: 6 MMOL/L (ref 4–13)
AST SERPL W P-5'-P-CCNC: 22 U/L (ref 13–39)
ATRIAL RATE: 68 BPM
BASOPHILS # BLD AUTO: 0.05 THOUSANDS/ÂΜL (ref 0–0.1)
BASOPHILS NFR BLD AUTO: 1 % (ref 0–1)
BILIRUB SERPL-MCNC: 1.82 MG/DL (ref 0.2–1)
BUN SERPL-MCNC: 11 MG/DL (ref 5–25)
CALCIUM SERPL-MCNC: 9.7 MG/DL (ref 8.4–10.2)
CHLORIDE SERPL-SCNC: 99 MMOL/L (ref 96–108)
CO2 SERPL-SCNC: 29 MMOL/L (ref 21–32)
CREAT SERPL-MCNC: 0.95 MG/DL (ref 0.6–1.3)
EOSINOPHIL # BLD AUTO: 0.01 THOUSAND/ÂΜL (ref 0–0.61)
EOSINOPHIL NFR BLD AUTO: 0 % (ref 0–6)
ERYTHROCYTE [DISTWIDTH] IN BLOOD BY AUTOMATED COUNT: 12.2 % (ref 11.6–15.1)
ETHANOL SERPL-MCNC: <10 MG/DL
GFR SERPL CREATININE-BSD FRML MDRD: 94 ML/MIN/1.73SQ M
GLUCOSE SERPL-MCNC: 95 MG/DL (ref 65–140)
HCT VFR BLD AUTO: 49.2 % (ref 36.5–49.3)
HGB BLD-MCNC: 17.1 G/DL (ref 12–17)
IMM GRANULOCYTES # BLD AUTO: 0.03 THOUSAND/UL (ref 0–0.2)
IMM GRANULOCYTES NFR BLD AUTO: 0 % (ref 0–2)
LYMPHOCYTES # BLD AUTO: 1.61 THOUSANDS/ÂΜL (ref 0.6–4.47)
LYMPHOCYTES NFR BLD AUTO: 17 % (ref 14–44)
MAGNESIUM SERPL-MCNC: 2.1 MG/DL (ref 1.9–2.7)
MCH RBC QN AUTO: 29 PG (ref 26.8–34.3)
MCHC RBC AUTO-ENTMCNC: 34.8 G/DL (ref 31.4–37.4)
MCV RBC AUTO: 84 FL (ref 82–98)
MONOCYTES # BLD AUTO: 0.94 THOUSAND/ÂΜL (ref 0.17–1.22)
MONOCYTES NFR BLD AUTO: 10 % (ref 4–12)
NEUTROPHILS # BLD AUTO: 6.87 THOUSANDS/ÂΜL (ref 1.85–7.62)
NEUTS SEG NFR BLD AUTO: 72 % (ref 43–75)
NRBC BLD AUTO-RTO: 0 /100 WBCS
P AXIS: 49 DEGREES
PLATELET # BLD AUTO: 413 THOUSANDS/UL (ref 149–390)
PMV BLD AUTO: 9.2 FL (ref 8.9–12.7)
POTASSIUM SERPL-SCNC: 3.7 MMOL/L (ref 3.5–5.3)
PR INTERVAL: 126 MS
PROT SERPL-MCNC: 7.5 G/DL (ref 6.4–8.4)
QRS AXIS: -36 DEGREES
QRSD INTERVAL: 96 MS
QT INTERVAL: 394 MS
QTC INTERVAL: 418 MS
RBC # BLD AUTO: 5.89 MILLION/UL (ref 3.88–5.62)
SODIUM SERPL-SCNC: 134 MMOL/L (ref 135–147)
T WAVE AXIS: 12 DEGREES
VENTRICULAR RATE: 68 BPM
WBC # BLD AUTO: 9.51 THOUSAND/UL (ref 4.31–10.16)

## 2024-05-31 PROCEDURE — 96365 THER/PROPH/DIAG IV INF INIT: CPT

## 2024-05-31 PROCEDURE — 99284 EMERGENCY DEPT VISIT MOD MDM: CPT | Performed by: EMERGENCY MEDICINE

## 2024-05-31 PROCEDURE — 93010 ELECTROCARDIOGRAM REPORT: CPT | Performed by: INTERNAL MEDICINE

## 2024-05-31 PROCEDURE — 93005 ELECTROCARDIOGRAM TRACING: CPT

## 2024-05-31 PROCEDURE — 80053 COMPREHEN METABOLIC PANEL: CPT | Performed by: EMERGENCY MEDICINE

## 2024-05-31 PROCEDURE — 83735 ASSAY OF MAGNESIUM: CPT | Performed by: EMERGENCY MEDICINE

## 2024-05-31 PROCEDURE — 85025 COMPLETE CBC W/AUTO DIFF WBC: CPT | Performed by: EMERGENCY MEDICINE

## 2024-05-31 PROCEDURE — 36415 COLL VENOUS BLD VENIPUNCTURE: CPT | Performed by: EMERGENCY MEDICINE

## 2024-05-31 PROCEDURE — 99284 EMERGENCY DEPT VISIT MOD MDM: CPT

## 2024-05-31 PROCEDURE — 82077 ASSAY SPEC XCP UR&BREATH IA: CPT | Performed by: EMERGENCY MEDICINE

## 2024-05-31 PROCEDURE — 96367 TX/PROPH/DG ADDL SEQ IV INF: CPT

## 2024-05-31 PROCEDURE — 96375 TX/PRO/DX INJ NEW DRUG ADDON: CPT

## 2024-05-31 RX ORDER — MAGNESIUM SULFATE HEPTAHYDRATE 40 MG/ML
2 INJECTION, SOLUTION INTRAVENOUS ONCE
Status: COMPLETED | OUTPATIENT
Start: 2024-05-31 | End: 2024-05-31

## 2024-05-31 RX ORDER — DIAZEPAM 5 MG/ML
10 INJECTION, SOLUTION INTRAMUSCULAR; INTRAVENOUS ONCE
Status: COMPLETED | OUTPATIENT
Start: 2024-05-31 | End: 2024-05-31

## 2024-05-31 RX ORDER — ONDANSETRON 2 MG/ML
4 INJECTION INTRAMUSCULAR; INTRAVENOUS ONCE
Status: COMPLETED | OUTPATIENT
Start: 2024-05-31 | End: 2024-05-31

## 2024-05-31 RX ADMIN — MAGNESIUM SULFATE HEPTAHYDRATE 2 G: 40 INJECTION, SOLUTION INTRAVENOUS at 11:33

## 2024-05-31 RX ADMIN — ONDANSETRON 4 MG: 2 INJECTION INTRAMUSCULAR; INTRAVENOUS at 11:30

## 2024-05-31 RX ADMIN — SODIUM CHLORIDE 1000 ML: 0.9 INJECTION, SOLUTION INTRAVENOUS at 11:27

## 2024-05-31 RX ADMIN — DIAZEPAM 10 MG: 5 INJECTION INTRAMUSCULAR; INTRAVENOUS at 11:32

## 2024-05-31 RX ADMIN — THIAMINE HYDROCHLORIDE 100 MG: 100 INJECTION, SOLUTION INTRAMUSCULAR; INTRAVENOUS at 12:41

## 2024-05-31 RX ADMIN — FOLIC ACID 1 MG: 5 INJECTION, SOLUTION INTRAMUSCULAR; INTRAVENOUS; SUBCUTANEOUS at 12:20

## 2024-05-31 NOTE — ED PROVIDER NOTES
History  Chief Complaint   Patient presents with    Anxiety     Pt c/o feeling more anxious lately, is on anxiety and depression meds.  Pt also states he was sober for 7 months but has been drinking for the past three days, last drink at 0400 today.  Admits to smoking marijuana to help cope with anxiety, denies SI/HI      Patient with history of alcohol abuse, GERD, depression, anxiety, alcohol withdrawal delirium, rhabdomyolysis, toxic metabolic encephalopathy, transaminitis, presents to the ED for evaluation of increasing anxiety and possible withdrawal symptoms.  Patient states that he had gone through alcohol detox and was sober for 7 months.  For the past 3 days, patient has been binge drinking 10-12 shots of 80 proof of gin on a daily basis.  Last drink was 4 AM this morning.  Patient states that he stopped eating about 5 days ago due to lack of appetite.  Patient feels extremely anxious and thinks he is withdrawing.  Patient has been also taking a lot of marijuana.  Patient does not think his depression and anxiety meds are working.  Patient denies any suicidal or homicidal ideations.      History provided by:  Patient  Anxiety  Associated symptoms: appetite change    Associated symptoms: no abdominal pain and no chest pain        Prior to Admission Medications   Prescriptions Last Dose Informant Patient Reported? Taking?   Multiple Vitamin (multivitamin) tablet   No No   Sig: Take 1 tablet by mouth daily   busPIRone (BUSPAR) 10 mg tablet   No No   Sig: Take 1 tablet (10 mg total) by mouth 3 (three) times a day   famotidine (PEPCID) 20 mg tablet   No No   Sig: Take 1 tablet (20 mg total) by mouth every other day   folic acid (FOLVITE) 1 mg tablet   No No   Sig: take 1 tablet by mouth once daily   gabapentin (NEURONTIN) 300 mg capsule   No No   Sig: take 1 capsule by mouth three times a day   thiamine 100 MG tablet   No No   Sig: Take 1 tablet (100 mg total) by mouth daily   venlafaxine (EFFEXOR-XR) 75 mg 24  hr capsule   No No   Sig: take 1 capsule by mouth once daily      Facility-Administered Medications: None       Past Medical History:   Diagnosis Date    Alcohol withdrawal delirium, acute, hyperactive (HCC) 10/28/2023    Alcohol withdrawal syndrome with complication (HCC)     Anxiety     Depression     Electrolyte abnormality 10/28/2023    ETOH abuse     GERD (gastroesophageal reflux disease)     Multiple falls     Rash     Rhabdomyolysis 10/28/2023    Toxic metabolic encephalopathy 10/28/2023    Transaminitis        Past Surgical History:   Procedure Laterality Date    CLOSED REDUCTION FOREARM FRACTURE Right     EGD         Family History   Problem Relation Age of Onset    Kidney disease Father      I have reviewed and agree with the history as documented.    E-Cigarette/Vaping    E-Cigarette Use Never User      E-Cigarette/Vaping Substances    Nicotine No     THC No     CBD No     Flavoring No     Other No     Unknown No      Social History     Tobacco Use    Smoking status: Never     Passive exposure: Never    Smokeless tobacco: Current     Types: Chew   Vaping Use    Vaping status: Never Used   Substance Use Topics    Alcohol use: Not Currently     Comment: not since 10/27    Drug use: Never       Review of Systems   Constitutional:  Positive for appetite change. Negative for chills and fever.   HENT:  Negative for ear pain and sore throat.    Eyes:  Negative for pain and visual disturbance.   Respiratory:  Negative for cough and shortness of breath.    Cardiovascular:  Negative for chest pain and palpitations.   Gastrointestinal:  Negative for abdominal pain and vomiting.   Genitourinary:  Negative for dysuria and hematuria.   Musculoskeletal:  Negative for arthralgias and back pain.   Skin:  Negative for color change and rash.   Neurological:  Negative for seizures and syncope.   Psychiatric/Behavioral:          Anxious   All other systems reviewed and are negative.      Physical Exam  Physical Exam  Vitals  and nursing note reviewed.   Constitutional:       General: He is not in acute distress.     Appearance: He is well-developed.   HENT:      Head: Normocephalic and atraumatic.   Eyes:      Conjunctiva/sclera: Conjunctivae normal.   Cardiovascular:      Rate and Rhythm: Normal rate and regular rhythm.      Heart sounds: No murmur heard.  Pulmonary:      Effort: Pulmonary effort is normal. No respiratory distress.      Breath sounds: Normal breath sounds.   Abdominal:      Palpations: Abdomen is soft.      Tenderness: There is no abdominal tenderness.   Musculoskeletal:         General: No swelling.      Cervical back: Neck supple.   Skin:     General: Skin is warm and dry.      Capillary Refill: Capillary refill takes less than 2 seconds.   Neurological:      Mental Status: He is alert.   Psychiatric:      Comments: Anxious affect         Vital Signs  ED Triage Vitals [05/31/24 0945]   Temperature Pulse Respirations Blood Pressure SpO2   97.8 °F (36.6 °C) 97 20 121/95 94 %      Temp Source Heart Rate Source Patient Position - Orthostatic VS BP Location FiO2 (%)   Temporal Monitor Sitting Left arm --      Pain Score       --           Vitals:    05/31/24 0945   BP: 121/95   Pulse: 97   Patient Position - Orthostatic VS: Sitting         Visual Acuity      ED Medications  Medications   thiamine (VITAMIN B1) 100 mg in sodium chloride 0.9 % 50 mL IVPB (has no administration in time range)   folic acid 1 mg in sodium chloride 0.9 % 50 mL IVPB (1 mg Intravenous New Bag 5/31/24 1220)   sodium chloride 0.9 % bolus 1,000 mL (1,000 mL Intravenous New Bag 5/31/24 1127)   ondansetron (ZOFRAN) injection 4 mg (4 mg Intravenous Given 5/31/24 1130)   magnesium sulfate 2 g/50 mL IVPB (premix) 2 g (0 g Intravenous Stopped 5/31/24 1237)   diazepam (VALIUM) injection 10 mg (10 mg Intravenous Given 5/31/24 1132)       Diagnostic Studies  Results Reviewed       Procedure Component Value Units Date/Time    Comprehensive metabolic panel  [[id number]]  (Abnormal) Collected: 05/31/24 1128    Lab Status: Final result Specimen: Blood from Arm, Left Updated: 05/31/24 1156     Sodium 134 mmol/L      Potassium 3.7 mmol/L      Chloride 99 mmol/L      CO2 29 mmol/L      ANION GAP 6 mmol/L      BUN 11 mg/dL      Creatinine 0.95 mg/dL      Glucose 95 mg/dL      Calcium 9.7 mg/dL      AST 22 U/L      ALT 20 U/L      Alkaline Phosphatase 68 U/L      Total Protein 7.5 g/dL      Albumin 4.7 g/dL      Total Bilirubin 1.82 mg/dL      eGFR 94 ml/min/1.73sq m     Narrative:      National Kidney Disease Foundation guidelines for Chronic Kidney Disease (CKD):     Stage 1 with normal or high GFR (GFR > 90 mL/min/1.73 square meters)    Stage 2 Mild CKD (GFR = 60-89 mL/min/1.73 square meters)    Stage 3A Moderate CKD (GFR = 45-59 mL/min/1.73 square meters)    Stage 3B Moderate CKD (GFR = 30-44 mL/min/1.73 square meters)    Stage 4 Severe CKD (GFR = 15-29 mL/min/1.73 square meters)    Stage 5 End Stage CKD (GFR <15 mL/min/1.73 square meters)  Note: GFR calculation is accurate only with a steady state creatinine    Magnesium [[id number]]  (Normal) Collected: 05/31/24 1128    Lab Status: Final result Specimen: Blood from Arm, Left Updated: 05/31/24 1156     Magnesium 2.1 mg/dL     Ethanol [[id number]]  (Normal) Collected: 05/31/24 1128    Lab Status: Final result Specimen: Blood from Arm, Left Updated: 05/31/24 1156     Ethanol Lvl <10 mg/dL     CBC and differential [[id number]]  (Abnormal) Collected: 05/31/24 1128    Lab Status: Final result Specimen: Blood from Arm, Left Updated: 05/31/24 1135     WBC 9.51 Thousand/uL      RBC 5.89 Million/uL      Hemoglobin 17.1 g/dL      Hematocrit 49.2 %      MCV 84 fL      MCH 29.0 pg      MCHC 34.8 g/dL      RDW 12.2 %      MPV 9.2 fL      Platelets 413 Thousands/uL      nRBC 0 /100 WBCs      Segmented % 72 %      Immature Grans % 0 %      Lymphocytes % 17 %      Monocytes % 10 %      Eosinophils Relative 0 %      Basophils Relative 1 %       Absolute Neutrophils 6.87 Thousands/µL      Absolute Immature Grans 0.03 Thousand/uL      Absolute Lymphocytes 1.61 Thousands/µL      Absolute Monocytes 0.94 Thousand/µL      Eosinophils Absolute 0.01 Thousand/µL      Basophils Absolute 0.05 Thousands/µL     UA w Reflex to Microscopic w Reflex to Culture [[id number]]     Lab Status: No result Specimen: Urine     Rapid drug screen, urine [[id number]]     Lab Status: No result Specimen: Urine                    No orders to display              Procedures  ECG 12 Lead Documentation Only    Date/Time: 5/31/2024 10:59 AM    Performed by: Linn Laird DO  Authorized by: Linn Laird DO    Indications / Diagnosis:  Medical clearance  ECG reviewed by me, the ED Provider: yes    Patient location:  ED  Previous ECG:     Previous ECG:  Compared to current    Similarity:  Changes noted    Comparison to cardiac monitor: Yes    Interpretation:     Interpretation: abnormal    Comments:      Sinus rhythm, rate 68, left axis deviation, no acute ST/T wave abnormalities noted, moderate criteria noted for LVH, previous EKG showed sinus tachycardia, left axis deviation is new compared to previous EKG.           ED Course  ED Course as of 05/31/24 1239   Fri May 31, 2024   1045 Patient requesting inpatient alcohol detox.  Reached out to detox team as [facility], awaiting callback.   1056 Detox team at [facility] reviewed patient's chart and recommends warm handoff with [facility] for detox referral.   1210 Patient currently being evaluated by [facility] staff.    1232 Patient accepted to rehab at [facility].  Staff from [facility] will come to  patient.  At this time patient can be discharged from our emergency department pending pickup by [facility] staff for inpatient detox placement.                               SBIRT 20yo+      Flowsheet Row Most Recent Value   Initial Alcohol Screen: US AUDIT-C     1. How often do you have a drink containing alcohol? 0 Filed at: 05/31/2024 0953   2.  How many drinks containing alcohol do you have on a typical day you are drinking?  0 Filed at: 05/31/2024 0953   3a. Male UNDER 65: How often do you have five or more drinks on one occasion? 0 Filed at: 05/31/2024 0953   3b. FEMALE Any Age, or MALE 65+: How often do you have 4 or more drinks on one occassion? 0 Filed at: 05/31/2024 0953   Audit-C Score 0 Filed at: 05/31/2024 0953   YORDY: How many times in the past year have you...    Used an illegal drug or used a prescription medication for non-medical reasons? Never Filed at: 05/31/2024 0953                      Medical Decision Making  Obtain medical clearance workup for inpatient detox placement  Give banana bag, Valium for anxiety and continue to monitor patient for any worsening symptoms    Speak to [facility] detox unit for possible transfer    Patient was denied by [facility] detox program.  Subsequently warm handoff was consulted via [facility].  Patient was evaluated by [facility] staff was able to arrange for inpatient detox placement with [facility].  [facility] staff will  patient from our emergency department to take him to inpatient rehab.  Patient stable at time of discharge.    Amount and/or Complexity of Data Reviewed  Labs: ordered. Decision-making details documented in ED Course.  ECG/medicine tests: ordered and independent interpretation performed. Decision-making details documented in ED Course.    Risk  Prescription drug management.             Disposition  Final diagnoses:   Alcohol withdrawal (HCC)   Alcohol consumption binge drinking   Alcohol abuse   Anxiety   Depression     Time reflects when diagnosis was documented in both MDM as applicable and the Disposition within this note       Time User Action Codes Description Comment    5/31/2024 12:35 PM Linn Laird Add [F10.939] Alcohol withdrawal (HCC)     5/31/2024 12:35 PM Linn Laird Add [F10.10] Alcohol consumption binge drinking     5/31/2024 12:35 PM Linn Laird Add [F10.10] Alcohol  abuse     5/31/2024 12:35 PM Linn Laird Add [F41.9] Anxiety     5/31/2024 12:35 PM Linn Laird Add [F32.A] Depression           ED Disposition       ED Disposition   Discharge    Condition   Stable    Date/Time   Fri May 31, 2024 1235    Comment   Joel WALTERS Sherwood discharge to home/self care.                   Follow-up Information       Follow up With Specialties Details Why Contact Info    [facility]   [facility] staff will come to pick you up to take you to inpatient alcohol detox program.             Patient's Medications   Discharge Prescriptions    No medications on file       No discharge procedures on file.    PDMP Review       None            ED Provider  Electronically Signed by             Linn Laird DO  05/31/24 1238

## 2024-06-07 DIAGNOSIS — F10.20 ALCOHOL DEPENDENCE (HCC): ICD-10-CM

## 2024-06-07 RX ORDER — FOLIC ACID 1 MG/1
1000 TABLET ORAL DAILY
Qty: 30 TABLET | Refills: 5 | Status: SHIPPED | OUTPATIENT
Start: 2024-06-07

## 2024-06-12 ENCOUNTER — TELEPHONE (OUTPATIENT)
Dept: PSYCHIATRY | Facility: CLINIC | Age: 49
End: 2024-06-12

## 2024-06-12 NOTE — TELEPHONE ENCOUNTER
Pt sister called to cxl the appt with you for 6/18/2024. Joel is in rehab right now and will r/s once he is allowed to use the phone

## 2024-07-03 ENCOUNTER — OFFICE VISIT (OUTPATIENT)
Dept: PSYCHIATRY | Facility: CLINIC | Age: 49
End: 2024-07-03
Payer: COMMERCIAL

## 2024-07-03 DIAGNOSIS — F41.1 GENERALIZED ANXIETY DISORDER: ICD-10-CM

## 2024-07-03 DIAGNOSIS — F10.90 ALCOHOL USE DISORDER: ICD-10-CM

## 2024-07-03 DIAGNOSIS — F33.1 MDD (MAJOR DEPRESSIVE DISORDER), RECURRENT EPISODE, MODERATE (HCC): Primary | ICD-10-CM

## 2024-07-03 PROCEDURE — 99214 OFFICE O/P EST MOD 30 MIN: CPT | Performed by: PHYSICIAN ASSISTANT

## 2024-07-03 RX ORDER — GABAPENTIN 300 MG/1
300 CAPSULE ORAL 3 TIMES DAILY
Qty: 90 CAPSULE | Refills: 1 | Status: SHIPPED | OUTPATIENT
Start: 2024-07-03

## 2024-07-03 RX ORDER — HYDROXYZINE 50 MG/1
50 TABLET, FILM COATED ORAL DAILY PRN
Qty: 30 TABLET | Refills: 1 | Status: SHIPPED | OUTPATIENT
Start: 2024-07-03 | End: 2024-09-01

## 2024-07-03 RX ORDER — VENLAFAXINE HYDROCHLORIDE 150 MG/1
150 CAPSULE, EXTENDED RELEASE ORAL DAILY
Qty: 30 CAPSULE | Refills: 1 | Status: SHIPPED | OUTPATIENT
Start: 2024-07-03 | End: 2024-09-01

## 2024-07-03 RX ORDER — BUSPIRONE HYDROCHLORIDE 15 MG/1
15 TABLET ORAL 3 TIMES DAILY
Qty: 90 TABLET | Refills: 2 | Status: SHIPPED | OUTPATIENT
Start: 2024-07-03 | End: 2024-10-01

## 2024-07-03 NOTE — PSYCH
"  This note was not shared with the patient due to reasonable likelihood of causing patient harm   PROGRESS NOTE        [facility]      Name and Date of Birth:  Joel Sherwood 48 y.o. 1975    Date of Visit: 07/03/24    SUBJECTIVE:    Joel presents today for medication management and follow-up. He reports that at the end of May he relapsed and ended up inpatient. He did just finish an inpatient rehab program. He shares \"I was experimenting with cannabis and I got very anxious. I knew I was going to the hospital anyway, so I started drinking again.\" Since leaving the rehab he reports a high level of anxiety. At this time he shares \"I'm not ready for it\" when asked about AA or an IOP. He starts work again tomorrow.     He is having difficulty settling down for sleep. He reports a low appetite.     He denies suicidal ideation, intent or plan at present, has no suicidal ideation, intent or plan at present.    He denies any auditory hallucinations and visual hallucinations, denies any other delusional thinking, denies any delusional thinking.    He denies any side effects from medications  .  HPI ROS Appetite Changes and Sleep: poor appetite, poor sleep     Review Of Systems:      Constitutional Negative   ENT Negative   Cardiovascular Negative   Respiratory Negative   Gastrointestinal Negative   Genitourinary Negative   Musculoskeletal Negative   Integumentary Negative   Neurological Negative   Endocrine Negative   Other Symptoms Negative and None       Laboratory Results: No results found for this or any previous visit.    Substance Abuse History:    Social History     Substance and Sexual Activity   Drug Use Never       Family Psychiatric History:     Family History   Problem Relation Age of Onset    Kidney disease Father        The following portions of the patient's history were reviewed and updated as appropriate: past family history, past medical " history, past social history, past surgical history and problem list.    Social History     Socioeconomic History    Marital status: Single     Spouse name: Not on file    Number of children: Not on file    Years of education: Not on file    Highest education level: Not on file   Occupational History    Not on file   Tobacco Use    Smoking status: Never     Passive exposure: Never    Smokeless tobacco: Current     Types: Chew   Vaping Use    Vaping status: Never Used   Substance and Sexual Activity    Alcohol use: Not Currently     Comment: not since 10/27    Drug use: Never    Sexual activity: Not Currently   Other Topics Concern    Not on file   Social History Narrative    Not on file     Social Determinants of Health     Financial Resource Strain: Not on file   Food Insecurity: No Food Insecurity (11/7/2023)    Hunger Vital Sign     Worried About Running Out of Food in the Last Year: Never true     Ran Out of Food in the Last Year: Never true   Transportation Needs: No Transportation Needs (11/7/2023)    PRAPARE - Transportation     Lack of Transportation (Medical): No     Lack of Transportation (Non-Medical): No   Physical Activity: Not on file   Stress: Not on file   Social Connections: Not on file   Intimate Partner Violence: Not on file   Housing Stability: Low Risk  (11/7/2023)    Housing Stability Vital Sign     Unable to Pay for Housing in the Last Year: No     Number of Times Moved in the Last Year: 1     Homeless in the Last Year: No     Social History     Social History Narrative    Not on file        Social History       Tobacco History       Smoking Status  Never      Smokeless Tobacco Use  Current Smokeless Tobacco Type  Chew              Alcohol History       Alcohol Use Status  Not Currently Comment  not since 10/27              Drug Use       Drug Use Status  Never              Sexual Activity       Sexually Active  Not Currently              Activities of Daily Living    Not Asked                  Additional Substance Use Detail       Questions Responses    Problems Due to Past Use of Alcohol? Yes    Problems Due to Past Use of Substances? No    Alcohol Use Frequency Daily    Alcohol Drink of Choice Liquor    1st Use of Alcohol 21    Last Use of Alcohol & Amount 15 drinks, 100 proof liquor    Longest Abstinence from Alcohol 2 years    Not reviewed.              OBJECTIVE:     Mental Status Evaluation:    Appearance age appropriate, casually dressed   Behavior cooperative   Speech normal volume, normal pitch   Mood Anxious   Affect Mood congruent    Thought Processes logical   Associations intact associations   Thought Content normal   Perceptual Disturbances: none   Abnormal Thoughts  Risk Potential Suicidal ideation - None  Homicidal ideation - None  Potential for aggression - No   Orientation oriented to person, place, time/date and situation   Memory recent and remote memory grossly intact   Cosciousness alert and awake   Attention Span attention span and concentration are age appropriate   Intellect Appears to be of Average Intelligence   Insight age appropriate    Judgement good    Muscle Strength and  Gait muscle strength and tone were normal   Language no difficulty naming common objects   Fund of Knowledge displays adequate knowledge of current events   Pain none   Pain Scale 0       Assessment/Plan:       Diagnoses and all orders for this visit:    MDD (major depressive disorder), recurrent episode, moderate (HCC)  -     busPIRone (BUSPAR) 15 mg tablet; Take 1 tablet (15 mg total) by mouth 3 (three) times a day  -     venlafaxine (EFFEXOR-XR) 150 mg 24 hr capsule; Take 1 capsule (150 mg total) by mouth daily    Alcohol use disorder    Generalized anxiety disorder  -     busPIRone (BUSPAR) 15 mg tablet; Take 1 tablet (15 mg total) by mouth 3 (three) times a day  -     venlafaxine (EFFEXOR-XR) 150 mg 24 hr capsule; Take 1 capsule (150 mg total) by mouth daily  -     gabapentin (NEURONTIN) 300 mg  capsule; Take 1 capsule (300 mg total) by mouth 3 (three) times a day  -     hydrOXYzine HCL (ATARAX) 50 mg tablet; Take 1 tablet (50 mg total) by mouth daily as needed for anxiety          Treatment Recommendations/Precautions:      Patient reports that his BuSpar was increased to 15 mg 3 times daily while in rehab.  At this time he is experiencing a high level of anxiety given the transition from inpatient rehab to home.    Recommended increase in Effexor XR to 150 mg daily.    Provided a supply of Vistaril 50 mg as needed daily for anxiety-we reviewed risk and benefit of this medication, patient has been on this in the past.    Continue to discuss the importance of a support system including psychotherapeutic support as well as AA.  At this time patient declines and reports that he does not wish to have any therapeutic services at this time.    Continue current medications:           Gabapentin 300 mg 3 times daily for anxiety  Buspar 15 mg TID for anxiety      We discussed signs and symptoms of serotonin syndrome given current regimen.     We will follow up in  1-2 months or sooner if questions or concerns arise.   He is aware of emergent vs non-emergent mental health resources.   He is able to contract for safety at this time.        Risks/Benefits      Risks, Benefits And Possible Side Effects Of Medications:    Risks, benefits, and possible side effects of medications explained to patient and patient verbalizes understanding and agreement for treatment.    Controlled Medication Discussion:     Not applicable    Psychotherapy Provided:     Individual psychotherapy provided: No    Visit Start Time:  11:00 AM  Visit End Time:  11:20 AM  Total Visit Duration: 20 minutes    This note was completed in part utilizing Dragon dictation Software. Grammatical, translation, syntax errors, random word insertions, spelling mistakes, and incomplete sentences may be an occasional consequence of this system secondary to  software limitations with voice recognition, ambient noise, and hardware issues. If you have any questions or concerns about the content, text, or information contained within the body of this dictation, please contact the provider for clarification.

## 2024-08-16 ENCOUNTER — OFFICE VISIT (OUTPATIENT)
Dept: PSYCHIATRY | Facility: CLINIC | Age: 49
End: 2024-08-16
Payer: COMMERCIAL

## 2024-08-16 DIAGNOSIS — F33.1 MDD (MAJOR DEPRESSIVE DISORDER), RECURRENT EPISODE, MODERATE (HCC): Primary | ICD-10-CM

## 2024-08-16 DIAGNOSIS — F10.90 ALCOHOL USE DISORDER: ICD-10-CM

## 2024-08-16 DIAGNOSIS — F41.1 GENERALIZED ANXIETY DISORDER: ICD-10-CM

## 2024-08-16 PROCEDURE — 99214 OFFICE O/P EST MOD 30 MIN: CPT | Performed by: PHYSICIAN ASSISTANT

## 2024-08-16 RX ORDER — BUSPIRONE HYDROCHLORIDE 15 MG/1
15 TABLET ORAL 3 TIMES DAILY
Qty: 90 TABLET | Refills: 2 | Status: SHIPPED | OUTPATIENT
Start: 2024-08-16 | End: 2024-11-14

## 2024-08-16 RX ORDER — NALTREXONE HYDROCHLORIDE 50 MG/1
50 TABLET, FILM COATED ORAL DAILY
Qty: 30 TABLET | Refills: 1 | Status: SHIPPED | OUTPATIENT
Start: 2024-08-16

## 2024-08-16 RX ORDER — VENLAFAXINE HYDROCHLORIDE 150 MG/1
150 CAPSULE, EXTENDED RELEASE ORAL DAILY
Qty: 30 CAPSULE | Refills: 1 | Status: SHIPPED | OUTPATIENT
Start: 2024-08-16 | End: 2024-10-15

## 2024-08-16 RX ORDER — NALTREXONE HYDROCHLORIDE 50 MG/1
50 TABLET, FILM COATED ORAL DAILY
COMMUNITY
Start: 2024-08-12 | End: 2024-08-16 | Stop reason: SDUPTHER

## 2024-08-16 RX ORDER — GABAPENTIN 300 MG/1
300 CAPSULE ORAL 3 TIMES DAILY
Qty: 90 CAPSULE | Refills: 1 | Status: SHIPPED | OUTPATIENT
Start: 2024-08-16

## 2024-08-16 NOTE — PSYCH
"  This note was not shared with the patient due to reasonable likelihood of causing patient harm   PROGRESS NOTE        [facility]      Name and Date of Birth:  Joel Sherwood 48 y.o. 1975    Date of Visit: 08/16/24    SUBJECTIVE:    Joel presents today for medication management and follow-up.  He shares that since our last appointment he went back to rehab again.  He shares that he relapsed and felt it was due to high level of anxiety.  Currently he feels that his anxiety is better controlled.  He shares that initially when he went back to the program they tried to detox him with Ativan but he developed severe diarrhea.  He was placed on naltrexone for cravings.    Overall he feels \"things are different this time.\"  He shares that the groups that was with him at the program was more conducive to him developing trust in relationships with these peers.  He is still in contact with some of them.  He has gone back to work and his job has been very supportive.    He reports that he has been sleeping well and getting adequate nutrition.    He still does not have any therapeutic support.    He denies suicidal ideation, intent or plan at present, has no suicidal ideation, intent or plan at present.    He denies any auditory hallucinations and visual hallucinations, denies any other delusional thinking, denies any delusional thinking.    He denies any side effects from medications  .  HPI ROS Appetite Changes and Sleep: Adequate appetite, adequate sleep    Review Of Systems:      Constitutional Negative   ENT Negative   Cardiovascular Negative   Respiratory Negative   Gastrointestinal Negative   Genitourinary Negative   Musculoskeletal Negative   Integumentary Negative   Neurological Negative   Endocrine Negative   Other Symptoms Negative and None       Laboratory Results: No results found for this or any previous visit.    Substance Abuse History:    Social " History     Substance and Sexual Activity   Drug Use Never       Family Psychiatric History:     Family History   Problem Relation Age of Onset    Kidney disease Father        The following portions of the patient's history were reviewed and updated as appropriate: past family history, past medical history, past social history, past surgical history and problem list.    Social History     Socioeconomic History    Marital status: Single     Spouse name: Not on file    Number of children: Not on file    Years of education: Not on file    Highest education level: Not on file   Occupational History    Not on file   Tobacco Use    Smoking status: Never     Passive exposure: Never    Smokeless tobacco: Current     Types: Chew   Vaping Use    Vaping status: Never Used   Substance and Sexual Activity    Alcohol use: Not Currently     Comment: not since 10/27    Drug use: Never    Sexual activity: Not Currently   Other Topics Concern    Not on file   Social History Narrative    Not on file     Social Determinants of Health     Financial Resource Strain: Not on file   Food Insecurity: No Food Insecurity (11/7/2023)    Hunger Vital Sign     Worried About Running Out of Food in the Last Year: Never true     Ran Out of Food in the Last Year: Never true   Transportation Needs: No Transportation Needs (11/7/2023)    PRAPARE - Transportation     Lack of Transportation (Medical): No     Lack of Transportation (Non-Medical): No   Physical Activity: Not on file   Stress: Not on file   Social Connections: Not on file   Intimate Partner Violence: Not on file   Housing Stability: Low Risk  (11/7/2023)    Housing Stability Vital Sign     Unable to Pay for Housing in the Last Year: No     Number of Times Moved in the Last Year: 1     Homeless in the Last Year: No     Social History     Social History Narrative    Not on file        Social History       Tobacco History       Smoking Status  Never      Smokeless Tobacco Use  Current  Smokeless Tobacco Type  Chew              Alcohol History       Alcohol Use Status  Not Currently Comment  not since 10/27              Drug Use       Drug Use Status  Never              Sexual Activity       Sexually Active  Not Currently              Activities of Daily Living    Not Asked                 Additional Substance Use Detail       Questions Responses    Problems Due to Past Use of Alcohol? Yes    Problems Due to Past Use of Substances? No    Alcohol Use Frequency Daily    Alcohol Drink of Choice Liquor    1st Use of Alcohol 20    Last Use of Alcohol & Amount 15 drinks, 100 proof liquor    Longest Abstinence from Alcohol 2 years    Not reviewed.              OBJECTIVE:     Mental Status Evaluation:    Appearance age appropriate, casually dressed   Behavior cooperative, pleasant   Speech normal volume, normal pitch   Mood Anxious   Affect Mood congruent    Thought Processes logical   Associations intact associations   Thought Content normal   Perceptual Disturbances: none   Abnormal Thoughts  Risk Potential Suicidal ideation - None  Homicidal ideation - None  Potential for aggression - No   Orientation oriented to person, place, time/date and situation   Memory recent and remote memory grossly intact   Cosciousness alert and awake   Attention Span attention span and concentration are age appropriate   Intellect Appears to be of Average Intelligence   Insight age appropriate    Judgement good    Muscle Strength and  Gait muscle strength and tone were normal   Language no difficulty naming common objects   Fund of Knowledge displays adequate knowledge of current events   Pain none   Pain Scale 0       Assessment/Plan:       Diagnoses and all orders for this visit:    MDD (major depressive disorder), recurrent episode, moderate (HCC)  -     venlafaxine (EFFEXOR-XR) 150 mg 24 hr capsule; Take 1 capsule (150 mg total) by mouth daily  -     busPIRone (BUSPAR) 15 mg tablet; Take 1 tablet (15 mg total) by mouth  3 (three) times a day    Generalized anxiety disorder  -     venlafaxine (EFFEXOR-XR) 150 mg 24 hr capsule; Take 1 capsule (150 mg total) by mouth daily  -     gabapentin (NEURONTIN) 300 mg capsule; Take 1 capsule (300 mg total) by mouth 3 (three) times a day  -     busPIRone (BUSPAR) 15 mg tablet; Take 1 tablet (15 mg total) by mouth 3 (three) times a day    Alcohol use disorder  -     naltrexone (REVIA) 50 mg tablet; Take 1 tablet (50 mg total) by mouth daily    Other orders  -     Discontinue: naltrexone (REVIA) 50 mg tablet; Take 50 mg by mouth daily          Treatment Recommendations/Precautions:      Continue to discuss the importance of a support system including psychotherapeutic support as well as AA.  At this time patient declines and reports that he does not wish to have any therapeutic services at this time.    Continue current medications:     Vistaril 50 mg as needed daily for anxiety   Effexor XR to 150 mg daily.  Gabapentin 300 mg 3 times daily for anxiety  Buspar 15 mg TID for anxiety  Revia 50 mg daily for alcohol cravings     We discussed signs and symptoms of serotonin syndrome given current regimen.     We will follow up in  1-2 months or sooner if questions or concerns arise.   He is aware of emergent vs non-emergent mental health resources.   He is able to contract for safety at this time.        Risks/Benefits      Risks, Benefits And Possible Side Effects Of Medications:    Risks, benefits, and possible side effects of medications explained to patient and patient verbalizes understanding and agreement for treatment.    Controlled Medication Discussion:     Not applicable    Psychotherapy Provided:     Individual psychotherapy provided: No    Visit Start Time: 1:30 PM  Visit End Time: 1:50 PM  Total Visit Duration: 20 minutes    This note was completed in part utilizing Dragon dictation Software. Grammatical, translation, syntax errors, random word insertions, spelling mistakes, and  incomplete sentences may be an occasional consequence of this system secondary to software limitations with voice recognition, ambient noise, and hardware issues. If you have any questions or concerns about the content, text, or information contained within the body of this dictation, please contact the provider for clarification.

## 2024-09-13 ENCOUNTER — OFFICE VISIT (OUTPATIENT)
Dept: FAMILY MEDICINE CLINIC | Facility: CLINIC | Age: 49
End: 2024-09-13
Payer: COMMERCIAL

## 2024-09-13 VITALS
SYSTOLIC BLOOD PRESSURE: 138 MMHG | HEIGHT: 72 IN | HEART RATE: 90 BPM | TEMPERATURE: 97.9 F | DIASTOLIC BLOOD PRESSURE: 82 MMHG | RESPIRATION RATE: 16 BRPM | BODY MASS INDEX: 28.39 KG/M2 | WEIGHT: 209.6 LBS

## 2024-09-13 DIAGNOSIS — F10.90 ALCOHOL USE DISORDER: ICD-10-CM

## 2024-09-13 DIAGNOSIS — F41.1 GENERALIZED ANXIETY DISORDER: ICD-10-CM

## 2024-09-13 DIAGNOSIS — F10.20 ALCOHOL DEPENDENCE (HCC): ICD-10-CM

## 2024-09-13 DIAGNOSIS — F33.42 MAJOR DEPRESSIVE DISORDER, RECURRENT, IN FULL REMISSION (HCC): Primary | ICD-10-CM

## 2024-09-13 PROCEDURE — 99214 OFFICE O/P EST MOD 30 MIN: CPT | Performed by: FAMILY MEDICINE

## 2024-09-13 RX ORDER — MULTIVITAMIN WITH IRON
TABLET ORAL DAILY
COMMUNITY

## 2024-09-13 RX ORDER — NALTREXONE HYDROCHLORIDE 50 MG/1
50 TABLET, FILM COATED ORAL DAILY
Qty: 30 TABLET | Refills: 0 | Status: SHIPPED | OUTPATIENT
Start: 2024-09-13

## 2024-09-13 RX ORDER — FOLIC ACID 1 MG/1
1000 TABLET ORAL DAILY
Qty: 90 TABLET | Refills: 1 | Status: SHIPPED | OUTPATIENT
Start: 2024-09-13

## 2024-09-13 NOTE — ASSESSMENT & PLAN NOTE
Recently got out of rehab. Last drink was 2 months ago. Currently on naltrexone.     Orders:    folic acid (FOLVITE) 1 mg tablet; Take 1 tablet (1,000 mcg total) by mouth daily

## 2024-09-13 NOTE — PROGRESS NOTES
Ambulatory Visit  Name: Joel Sherwood      : 1975      MRN: [id number]  Encounter Provider: Anayeli Do MD  Encounter Date: 2024   Encounter department: [facility]    Assessment & Plan  Alcohol dependence (HCC)  Recently got out of rehab. Last drink was 2 months ago. Currently on naltrexone.     Orders:    folic acid (FOLVITE) 1 mg tablet; Take 1 tablet (1,000 mcg total) by mouth daily    Major depressive disorder, recurrent, in full remission (HCC)  Stable. Managed by psychiatrist. On effexor and gabapentin.          Generalized anxiety disorder  Stable. Managed by psychiatrist. On effexor, buspar and gabapentin.            Depression Screening and Follow-up Plan: Patient was screened for depression during today's encounter. They screened negative with a PHQ-9 score of 2.    Tobacco Cessation Counseling: Tobacco cessation counseling was provided. The patient is sincerely urged to quit consumption of tobacco. He is ready to quit tobacco. Medication options discussed. Nicotine patch and nicotine gum was prescribed.         History of Present Illness     HPI  Joel is a 47 yo male with a history of alcohol abuse, anxiety, depression who is here today for follow up after he was in rehab for alcohol abuse. He was in a rehab for 2 months. He fell off the wagon with his drinking due to uncontrolled anxiety. His medications were adjusted by his psychiatrist. Currently on effexor, buspar, gabapentin and naltrexone.   Last drink was 24.   Currently doing well. Feels less anxious.     Review of Systems   Constitutional: Negative.    HENT: Negative.     Eyes: Negative.    Respiratory: Negative.     Cardiovascular: Negative.    Gastrointestinal: Negative.    Endocrine: Negative.    Genitourinary: Negative.    Musculoskeletal: Negative.    Neurological: Negative.    Hematological: Negative.    Psychiatric/Behavioral: Negative.       Past Medical History:   Diagnosis Date    Alcohol  withdrawal delirium, acute, hyperactive (HCC) 10/28/2023    Alcohol withdrawal syndrome with complication (HCC)     Anxiety     Depression     Electrolyte abnormality 10/28/2023    ETOH abuse     GERD (gastroesophageal reflux disease)     Multiple falls     Rash     Rhabdomyolysis 10/28/2023    Toxic metabolic encephalopathy 10/28/2023    Transaminitis      Past Surgical History:   Procedure Laterality Date    CLOSED REDUCTION FOREARM FRACTURE Right     EGD       Family History   Problem Relation Age of Onset    Kidney disease Father      Social History     Tobacco Use    Smoking status: Never     Passive exposure: Never    Smokeless tobacco: Current     Types: Chew   Vaping Use    Vaping status: Never Used   Substance and Sexual Activity    Alcohol use: Not Currently     Comment: not since 10/27    Drug use: Never    Sexual activity: Not Currently     Current Outpatient Medications on File Prior to Visit   Medication Sig    busPIRone (BUSPAR) 15 mg tablet Take 1 tablet (15 mg total) by mouth 3 (three) times a day    famotidine (PEPCID) 20 mg tablet Take 1 tablet (20 mg total) by mouth every other day (Patient taking differently: Take 20 mg by mouth if needed)    gabapentin (NEURONTIN) 300 mg capsule Take 1 capsule (300 mg total) by mouth 3 (three) times a day    Magnesium 250 MG TABS Take by mouth in the morning    Multiple Vitamin (multivitamin) tablet Take 1 tablet by mouth daily    naltrexone (REVIA) 50 mg tablet Take 1 tablet (50 mg total) by mouth daily    nicotine polacrilex (NICORETTE) 4 mg gum as needed    thiamine 100 MG tablet Take 1 tablet (100 mg total) by mouth daily    venlafaxine (EFFEXOR-XR) 150 mg 24 hr capsule Take 1 capsule (150 mg total) by mouth daily    [DISCONTINUED] folic acid (FOLVITE) 1 mg tablet take 1 tablet by mouth once daily    [DISCONTINUED] hydrOXYzine HCL (ATARAX) 50 mg tablet Take 1 tablet (50 mg total) by mouth daily as needed for anxiety (Patient not taking: Reported on  9/13/2024)     No Known Allergies    There is no immunization history on file for this patient.  Objective     /82   Pulse 90   Temp 97.9 °F (36.6 °C)   Resp 16   Ht 6' (1.829 m)   Wt 95.1 kg (209 lb 9.6 oz)   BMI 28.43 kg/m²     Physical Exam  Vitals and nursing note reviewed.   Constitutional:       General: He is not in acute distress.     Appearance: Normal appearance. He is well-developed.   HENT:      Head: Normocephalic and atraumatic.   Eyes:      Conjunctiva/sclera: Conjunctivae normal.   Cardiovascular:      Rate and Rhythm: Normal rate and regular rhythm.      Pulses: Normal pulses.      Heart sounds: Normal heart sounds. No murmur heard.  Pulmonary:      Effort: Pulmonary effort is normal. No respiratory distress.      Breath sounds: Normal breath sounds.   Musculoskeletal:         General: No swelling.      Cervical back: Neck supple.   Skin:     General: Skin is warm and dry.      Capillary Refill: Capillary refill takes less than 2 seconds.   Neurological:      Mental Status: He is alert.   Psychiatric:         Mood and Affect: Mood normal.

## 2024-09-13 NOTE — TELEPHONE ENCOUNTER
Reason for call:   [x] Refill   [] Prior Auth  [] Other:     Office:   [] PCP/Provider -   [x] Specialty/Provider - Mary Segundo PA-C / [facility]     Medication: naltrexone (REVIA) 50 mg tablet / Take 1 tablet (50 mg total) by mouth daily     Pharmacy: [id number] - [address]     Does the patient have enough for 3 days?   [x] Yes   [] No - Send as HP to POD

## 2024-09-27 ENCOUNTER — OFFICE VISIT (OUTPATIENT)
Dept: PSYCHIATRY | Facility: CLINIC | Age: 49
End: 2024-09-27
Payer: COMMERCIAL

## 2024-09-27 DIAGNOSIS — F41.1 GENERALIZED ANXIETY DISORDER: ICD-10-CM

## 2024-09-27 DIAGNOSIS — F33.1 MDD (MAJOR DEPRESSIVE DISORDER), RECURRENT EPISODE, MODERATE (HCC): Primary | ICD-10-CM

## 2024-09-27 DIAGNOSIS — F10.90 ALCOHOL USE DISORDER: ICD-10-CM

## 2024-09-27 PROCEDURE — 99214 OFFICE O/P EST MOD 30 MIN: CPT | Performed by: PHYSICIAN ASSISTANT

## 2024-09-27 RX ORDER — NALTREXONE HYDROCHLORIDE 50 MG/1
50 TABLET, FILM COATED ORAL DAILY
Qty: 30 TABLET | Refills: 2 | Status: SHIPPED | OUTPATIENT
Start: 2024-09-27 | End: 2024-12-26

## 2024-09-27 RX ORDER — GABAPENTIN 300 MG/1
300 CAPSULE ORAL 3 TIMES DAILY
Qty: 90 CAPSULE | Refills: 1 | Status: SHIPPED | OUTPATIENT
Start: 2024-09-27

## 2024-09-27 RX ORDER — VENLAFAXINE HYDROCHLORIDE 150 MG/1
150 CAPSULE, EXTENDED RELEASE ORAL DAILY
Qty: 30 CAPSULE | Refills: 2 | Status: SHIPPED | OUTPATIENT
Start: 2024-09-27 | End: 2024-12-26

## 2024-09-27 RX ORDER — BUSPIRONE HYDROCHLORIDE 15 MG/1
15 TABLET ORAL 3 TIMES DAILY
Qty: 90 TABLET | Refills: 2 | Status: SHIPPED | OUTPATIENT
Start: 2024-09-27 | End: 2024-12-26

## 2024-09-27 NOTE — BH TREATMENT PLAN
TREATMENT PLAN (Medication Management Only)        [facility]    Name and Date of Birth:  Joel Sherwood 48 y.o. 1975  Date of Treatment Plan: September 27, 2024  Diagnosis/Diagnoses:    1. MDD (major depressive disorder), recurrent episode, moderate (HCC)    2. Alcohol use disorder    3. Generalized anxiety disorder      Strengths/Personal Resources for Self-Care: supportive family, taking medications as prescribed, ability to adapt to life changes, ability to communicate needs, ability to communicate well.  Area/Areas of need (in own words): anxiety symptoms, depressive symptoms  1. Long Term Goal: maintain control of mood.  Target Date:6 months - 3/27/2025  Person/Persons responsible for completion of goal: Joel WALTERS  2.  Short Term Objective (s) - How will we reach this goal?:   A. Provider new recommended medication/dosage changes and/or continue medication(s): continue current medications as prescribed.  B. N/A.  C. N/A.  Target Date:6 months - 3/27/2025  Person/Persons Responsible for Completion of Goal: Joel WALTERS  Progress Towards Goals: continuing treatment  Treatment Modality: medication management every 2 months  Review due 180 days from date of this plan: 6 months - 3/27/2025  Expected length of service: ongoing treatment  My Physician/PA/NP and I have developed this plan together and I agree to work on the goals and objectives. I understand the treatment goals that were developed for my treatment.

## 2024-09-27 NOTE — PSYCH
"  This note was not shared with the patient due to reasonable likelihood of causing patient harm   PROGRESS NOTE        [facility] - [facility]      Name and Date of Birth:  Joel Sherwood 48 y.o. 1975    Date of Visit: 09/27/24    SUBJECTIVE:    Joel presents today for medication management and follow-up.  He reports feeling \"a lot calmer.\"  He reports feeling that he can process his emotions more easily.  He shares the understanding that he has to \"pryor anxiety and alcohol use every day.\"  Joel reports feeling that his medications and coping skills he learned in rehab have been helpful.  He has been able to implement some of the tools that he has learned.  Though he does not have current therapeutic support.    He reports that he has been sleeping well and getting adequate nutrition.  He denies any current cravings for alcohol.      He denies suicidal ideation, intent or plan at present, has no suicidal ideation, intent or plan at present.    He denies any auditory hallucinations and visual hallucinations, denies any other delusional thinking, denies any delusional thinking.    He denies any side effects from medications  .  HPI ROS Appetite Changes and Sleep: Adequate appetite, adequate sleep    Review Of Systems:      Constitutional Negative   ENT Negative   Cardiovascular Negative   Respiratory Negative   Gastrointestinal Negative   Genitourinary Negative   Musculoskeletal Negative   Integumentary Negative   Neurological Negative   Endocrine Negative   Other Symptoms Negative and None       Laboratory Results: No results found for this or any previous visit.    Substance Abuse History:    Social History     Substance and Sexual Activity   Drug Use Never       Family Psychiatric History:     Family History   Problem Relation Age of Onset    Kidney disease Father        The following portions of the patient's history were reviewed and updated as appropriate: " past family history, past medical history, past social history, past surgical history and problem list.    Social History     Socioeconomic History    Marital status: Single     Spouse name: Not on file    Number of children: Not on file    Years of education: Not on file    Highest education level: Not on file   Occupational History    Not on file   Tobacco Use    Smoking status: Never     Passive exposure: Never    Smokeless tobacco: Current     Types: Chew   Vaping Use    Vaping status: Never Used   Substance and Sexual Activity    Alcohol use: Not Currently     Comment: not since 10/27    Drug use: Never    Sexual activity: Not Currently   Other Topics Concern    Not on file   Social History Narrative    Not on file     Social Determinants of Health     Financial Resource Strain: Not on file   Food Insecurity: No Food Insecurity (11/7/2023)    Hunger Vital Sign     Worried About Running Out of Food in the Last Year: Never true     Ran Out of Food in the Last Year: Never true   Transportation Needs: No Transportation Needs (11/7/2023)    PRAPARE - Transportation     Lack of Transportation (Medical): No     Lack of Transportation (Non-Medical): No   Physical Activity: Not on file   Stress: Not on file   Social Connections: Not on file   Intimate Partner Violence: Not on file   Housing Stability: Low Risk  (11/7/2023)    Housing Stability Vital Sign     Unable to Pay for Housing in the Last Year: No     Number of Times Moved in the Last Year: 1     Homeless in the Last Year: No     Social History     Social History Narrative    Not on file        Social History       Tobacco History       Smoking Status  Never      Smokeless Tobacco Use  Current Smokeless Tobacco Type  Chew              Alcohol History       Alcohol Use Status  Not Currently Comment  not since 10/27              Drug Use       Drug Use Status  Never              Sexual Activity       Sexually Active  Not Currently              Activities of Daily  Living    Not Asked                 Additional Substance Use Detail       Questions Responses    Problems Due to Past Use of Alcohol? Yes    Problems Due to Past Use of Substances? No    Alcohol Use Frequency Daily    Alcohol Drink of Choice Liquor    1st Use of Alcohol 20    Last Use of Alcohol & Amount 15 drinks, 100 proof liquor    Longest Abstinence from Alcohol 2 years    Not reviewed.              OBJECTIVE:     Mental Status Evaluation:    Appearance age appropriate, casually dressed   Behavior cooperative, pleasant   Speech normal volume, normal pitch   Mood Anxious   Affect Mood congruent    Thought Processes logical   Associations intact associations   Thought Content normal   Perceptual Disturbances: none   Abnormal Thoughts  Risk Potential Suicidal ideation - None  Homicidal ideation - None  Potential for aggression - No   Orientation oriented to person, place, time/date and situation   Memory recent and remote memory grossly intact   Cosciousness alert and awake   Attention Span attention span and concentration are age appropriate   Intellect Appears to be of Average Intelligence   Insight age appropriate    Judgement good    Muscle Strength and  Gait muscle strength and tone were normal   Language no difficulty naming common objects   Fund of Knowledge displays adequate knowledge of current events   Pain none   Pain Scale 0       Assessment/Plan:      Assessment & Plan  MDD (major depressive disorder), recurrent episode, moderate (HCC)  Stable on Effexor  mg daily, BuSpar 15 mg 3 times daily, gabapentin 300 mg 3 times daily    Orders:    venlafaxine (EFFEXOR-XR) 150 mg 24 hr capsule; Take 1 capsule (150 mg total) by mouth daily    busPIRone (BUSPAR) 15 mg tablet; Take 1 tablet (15 mg total) by mouth 3 (three) times a day    Alcohol use disorder  Continue gabapentin 300 mg 3 times daily and naltrexone 50 mg daily for cravings    Orders:    naltrexone (REVIA) 50 mg tablet; Take 1 tablet (50 mg  total) by mouth daily    Generalized anxiety disorder  See MDD    Orders:    venlafaxine (EFFEXOR-XR) 150 mg 24 hr capsule; Take 1 capsule (150 mg total) by mouth daily    gabapentin (NEURONTIN) 300 mg capsule; Take 1 capsule (300 mg total) by mouth 3 (three) times a day    busPIRone (BUSPAR) 15 mg tablet; Take 1 tablet (15 mg total) by mouth 3 (three) times a day             Treatment Recommendations/Precautions:      Continue to discuss the importance of a support system including psychotherapeutic support as well as AA.  At this time patient declines.  Continue current medications:     Vistaril 50 mg as needed daily for anxiety   Effexor XR to 150 mg daily.  Gabapentin 300 mg 3 times daily for anxiety  Buspar 15 mg TID for anxiety  Revia 50 mg daily for alcohol cravings     We discussed signs and symptoms of serotonin syndrome given current regimen.     We will follow up in  1-2 months or sooner if questions or concerns arise.   He is aware of emergent vs non-emergent mental health resources.   He is able to contract for safety at this time.        Risks/Benefits      Risks, Benefits And Possible Side Effects Of Medications:    Risks, benefits, and possible side effects of medications explained to patient and patient verbalizes understanding and agreement for treatment.    Controlled Medication Discussion:     Not applicable    Psychotherapy Provided:     Individual psychotherapy provided: No    Visit Start Time: 11:30 AM  Visit End Time: 11:50 AM  Total Visit Duration: 20 minutes    This note was completed in part utilizing Dragon dictation Software. Grammatical, translation, syntax errors, random word insertions, spelling mistakes, and incomplete sentences may be an occasional consequence of this system secondary to software limitations with voice recognition, ambient noise, and hardware issues. If you have any questions or concerns about the content, text, or information contained within the body of this  dictation, please contact the provider for clarification.

## 2024-09-27 NOTE — ASSESSMENT & PLAN NOTE
Medicare Annual Wellness Visit  Name: Herbert Peoples Date: 2018   MRN: [id number] Sex: Female   Age: 72 y.o. Ethnicity: Non-/Non    : 1948 Race: Jemma Minaya is here for Medicare AWV    Screenings for behavioral, psychosocial and functional/safety risks, and cognitive dysfunction are all negative except as indicated below. These results, as well as other patient data from the [address] [facility], [place], operated by [facility] form, are documented in Flowsheets linked to this Encounter. Allergies   Allergen Reactions    Claritin [Loratadine]     Keflex [Cephalexin]        Prior to Visit Medications    Medication Sig Taking? Authorizing Provider   metoprolol tartrate (LOPRESSOR) 50 MG tablet Take 1 tablet by mouth 2 times daily  Winsome Cason MD   desloratadine (CLARINEX) 5 MG tablet Take 1 tablet by mouth daily  Winsome Cason MD   clopidogrel (PLAVIX) 75 MG tablet Take 1 tablet by mouth daily  Winsome Cason MD   simvastatin (ZOCOR) 20 MG tablet Take 1 tablet by mouth nightly  Winsome Cason MD   furosemide (LASIX) 40 MG tablet Take 1 tablet by mouth 2 times daily  Winsome Cason MD   metFORMIN (GLUCOPHAGE) 500 MG tablet Take 1 tablet by mouth 2 times daily (with meals)  Winsome Cason MD   glipiZIDE (GLUCOTROL) 5 MG tablet Take 1 tablet by mouth 2 times daily (before meals)  Winsome Cason MD   fenofibrate micronized (LOFIBRA) 200 MG capsule Take 1 capsule by mouth every morning (before breakfast)  Winsome Cason MD   glucose blood VI test strips (FREESTYLE LITE) strip 1 each by In Vitro route daily As needed. Winsome Cason MD   clobetasol (TEMOVATE) 0.05 % cream Apply topically 2 times daily.   Winsome Cason MD   allopurinol (ZYLOPRIM) 100 MG tablet Take 1 tablet by mouth daily  Winsome Cason MD   FREESTYLE LANCETS MISC 1 each by Does not apply route daily  Winsome Cason MD   glucose monitoring kit (FREESTYLE) monitoring Stable on Effexor  mg daily, BuSpar 15 mg 3 times daily, gabapentin 300 mg 3 times daily   acute distress  Skin: warm and dry, no rash or erythema  Head: normocephalic and atraumatic  Eyes: pupils equal, round, and reactive to light, extraocular eye movements intact, conjunctivae normal  ENT: tympanic membrane, external ear and ear canal normal bilaterally, nose without deformity, nasal mucosa and turbinates normal without polyps  Neck: supple and non-tender without mass, no thyromegaly or thyroid nodules, no cervical lymphadenopathy  Pulmonary/Chest: clear to auscultation bilaterally- no wheezes, rales or rhonchi, normal air movement, no respiratory distress  Cardiovascular: normal rate, regular rhythm, normal S1 and S2, no murmurs, rubs, clicks, or gallops, distal pulses intact, no carotid bruits  Abdomen: soft, non-tender, non-distended, normal bowel sounds, no masses or organomegaly  Extremities: no cyanosis, clubbing or edema  Musculoskeletal: normal range of motion, no joint swelling, deformity or tenderness  Neurologic: reflexes normal and symmetric, no cranial nerve deficit, gait, coordination and speech normal    Patient's complete Health Risk Assessment and screening values have been reviewed and are found in Flowsheets. The following problems were reviewed today and where indicated follow up appointments were made and/or referrals ordered. Positive Risk Factor Screenings with Interventions:     Cognitive:   Words recalled: 0 Words Recalled  Clock Drawing Test (CDT) Score: Normal  Total Score Interpretation: Positive Mini-Cog  Did the patient refuse to take the cognition test?: No  Cognitive Impairment Interventions:  · Patient declines any further evaluation/treatment for cognitive impairment   · Pt negative for dementia    General Health:  General  In general, how would you say your health is?: Good  In the past 7 days, have you experienced any of the following?: None of These  Do you get the social and emotional support that you need?: Yes  Do you have a Living Will?: (!) No  General Health Risk Interventions:none   ·     Health Habits/Nutrition:  Health Habits/Nutrition  Do you exercise for at least 20 minutes 2-3 times per week?: (!) No  Have you lost any weight without trying in the past 3 months?: No  Do you eat fewer than 2 meals per day?: No  Have you seen a dentist within the past year?: (!) No  Body mass index is 34.13 kg/m². Health Habits/Nutrition Interventions:  · Inadequate physical activity:  patient agrees to increase physical activity as follows: daily walking     Hearing/Vision:  Hearing/Vision  Do you or your family notice any trouble with your hearing?: (!) Yes  Do you have difficulty driving, watching TV, or doing any of your daily activities because of your eyesight?: No  Have you had an eye exam within the past year?: (!) No  Hearing/Vision Interventions:  · Hearing concerns:  patient declines any further evaluation/treatment for hearing issues    Personalized Preventive Plan   Current Health Maintenance Status    There is no immunization history on file for this patient. Health Maintenance   Topic Date Due    Hepatitis C screen  1948    DTaP/Tdap/Td vaccine (1 - Tdap) 08/16/1968    Shingles Vaccine (1 of 2 - 2 Dose Series) 08/16/1999    Pneumococcal low/med risk (1 of 2 - PCV13) 08/16/2014    Diabetic retinal exam  09/09/2015    Breast cancer screen  06/01/2017    A1C test (Diabetic or Prediabetic)  03/29/2018    Diabetic microalbuminuria test  03/29/2018    Lipid screen  03/29/2018    Colon Cancer Screen FIT/FOBT  03/29/2018    Potassium monitoring  03/29/2018    Creatinine monitoring  03/29/2018    Flu vaccine (1) 09/01/2018    Diabetic foot exam  10/01/2019    DEXA (modify frequency per FRAX score)  Completed     Recommendations for Preventive Services Due: see orders and patient instructions/AVS.  .   Recommended screening schedule for the next 5-10 years is provided to the patient in written form: see Patient Instructions/AVS.

## 2024-11-14 DIAGNOSIS — F33.1 MDD (MAJOR DEPRESSIVE DISORDER), RECURRENT EPISODE, MODERATE (HCC): ICD-10-CM

## 2024-11-14 DIAGNOSIS — F41.1 GENERALIZED ANXIETY DISORDER: ICD-10-CM

## 2024-11-14 RX ORDER — BUSPIRONE HYDROCHLORIDE 15 MG/1
15 TABLET ORAL 3 TIMES DAILY
Qty: 90 TABLET | Refills: 0 | Status: SHIPPED | OUTPATIENT
Start: 2024-11-14 | End: 2025-02-12

## 2024-11-14 NOTE — TELEPHONE ENCOUNTER
Reason for call:   [x] Refill   [] Prior Auth  [] Other:     Office:   [] PCP/Provider -   [x] Specialty/Provider -  Mary Segundo PA-C     Medication: busPIRone (BUSPAR) 15 mg tablet     Dose/Frequency: Take 1 tablet (15 mg total) by mouth 3 (three) times a day,     Quantity: 90 tab    Pharmacy: [id number] [address]     Does the patient have enough for 3 days?   [x] Yes   [] No - Send as HP to POD

## 2024-11-27 ENCOUNTER — OFFICE VISIT (OUTPATIENT)
Dept: PSYCHIATRY | Facility: CLINIC | Age: 49
End: 2024-11-27
Payer: COMMERCIAL

## 2024-11-27 DIAGNOSIS — F33.1 MDD (MAJOR DEPRESSIVE DISORDER), RECURRENT EPISODE, MODERATE (HCC): Primary | ICD-10-CM

## 2024-11-27 DIAGNOSIS — F10.91 ALCOHOL USE DISORDER IN REMISSION: ICD-10-CM

## 2024-11-27 DIAGNOSIS — F41.1 GENERALIZED ANXIETY DISORDER: ICD-10-CM

## 2024-11-27 PROCEDURE — 99214 OFFICE O/P EST MOD 30 MIN: CPT | Performed by: PHYSICIAN ASSISTANT

## 2024-11-27 RX ORDER — BUSPIRONE HYDROCHLORIDE 15 MG/1
15 TABLET ORAL 3 TIMES DAILY
Qty: 270 TABLET | Refills: 0 | Status: SHIPPED | OUTPATIENT
Start: 2024-11-27 | End: 2025-02-25

## 2024-11-27 RX ORDER — NALTREXONE HYDROCHLORIDE 50 MG/1
50 TABLET, FILM COATED ORAL DAILY
Qty: 90 TABLET | Refills: 0 | Status: SHIPPED | OUTPATIENT
Start: 2024-11-27 | End: 2025-02-25

## 2024-11-27 RX ORDER — VENLAFAXINE HYDROCHLORIDE 150 MG/1
150 CAPSULE, EXTENDED RELEASE ORAL DAILY
Qty: 90 CAPSULE | Refills: 0 | Status: SHIPPED | OUTPATIENT
Start: 2024-11-27 | End: 2025-02-25

## 2024-11-27 NOTE — PSYCH
This note was not shared with the patient due to reasonable likelihood of causing patient harm   PROGRESS NOTE        [facility]      Name and Date of Birth:  Joel Sherwood 49 y.o. 1975    Date of Visit: 11/27/24    SUBJECTIVE:    Joel presents today for medication management and follow-up.  He reports that he has been doing well over the last several weeks.  He shares that things have been going well at work.  He notes that occasionally he does get bored.  He shares that in the past he would drink alcohol and he was bored but now he has come to the conclusion that if he starts drinking he will end up in detox and then rehab.  He talks a lot about having to treat alcoholism as a disease that he constantly has to work on.  He does feel that his cravings have decreased since starting ReVia.  He has been sober from alcohol since July 12.    He reports good sleep and fair appetite.    He has been taking his medication consistently and he would like to remain on the same.    He denies suicidal ideation, intent or plan at present, has no suicidal ideation, intent or plan at present.    He denies any auditory hallucinations and visual hallucinations, denies any other delusional thinking, denies any delusional thinking.    He denies any side effects from medications  .  HPI ROS Appetite Changes and Sleep: Adequate appetite, adequate sleep    Review Of Systems:      Constitutional Negative   ENT Negative   Cardiovascular Negative   Respiratory Negative   Gastrointestinal Negative   Genitourinary Negative   Musculoskeletal Negative   Integumentary Negative   Neurological Negative   Endocrine Negative   Other Symptoms Negative and None       Laboratory Results: No results found for this or any previous visit.    Substance Abuse History:    Social History     Substance and Sexual Activity   Drug Use Never       Family Psychiatric History:     Family History    Problem Relation Age of Onset    Kidney disease Father        The following portions of the patient's history were reviewed and updated as appropriate: past family history, past medical history, past social history, past surgical history and problem list.    Social History     Socioeconomic History    Marital status: Single     Spouse name: Not on file    Number of children: Not on file    Years of education: Not on file    Highest education level: Not on file   Occupational History    Not on file   Tobacco Use    Smoking status: Never     Passive exposure: Never    Smokeless tobacco: Current     Types: Chew   Vaping Use    Vaping status: Never Used   Substance and Sexual Activity    Alcohol use: Not Currently     Comment: not since 10/27    Drug use: Never    Sexual activity: Not Currently   Other Topics Concern    Not on file   Social History Narrative    Not on file     Social Drivers of Health     Financial Resource Strain: Not on file   Food Insecurity: No Food Insecurity (11/7/2023)    Nursing - Inadequate Food Risk Classification     Worried About Running Out of Food in the Last Year: Never true     Ran Out of Food in the Last Year: Never true     Ran Out of Food in the Last Year: Not on file   Transportation Needs: No Transportation Needs (11/7/2023)    PRAPARE - Transportation     Lack of Transportation (Medical): No     Lack of Transportation (Non-Medical): No   Physical Activity: Not on file   Stress: Not on file   Social Connections: Not on file   Intimate Partner Violence: Not on file   Housing Stability: Low Risk  (11/7/2023)    Housing Stability Vital Sign     Unable to Pay for Housing in the Last Year: No     Number of Times Moved in the Last Year: 1     Homeless in the Last Year: No     Social History     Social History Narrative    Not on file        Social History       Tobacco History       Smoking Status  Never      Smokeless Tobacco Use  Current Smokeless Tobacco Type  Chew               Alcohol History       Alcohol Use Status  Not Currently Comment  not since 10/27              Drug Use       Drug Use Status  Never              Sexual Activity       Sexually Active  Not Currently              Activities of Daily Living    Not Asked                 Additional Substance Use Detail       Questions Responses    Problems Due to Past Use of Alcohol? Yes    Problems Due to Past Use of Substances? No    Alcohol Use Frequency Daily    Alcohol Drink of Choice Liquor    1st Use of Alcohol 20    Last Use of Alcohol & Amount 15 drinks, 100 proof liquor    Longest Abstinence from Alcohol 2 years    Not reviewed.              OBJECTIVE:     Mental Status Evaluation:    Appearance age appropriate, casually dressed   Behavior cooperative, pleasant   Speech normal volume, normal pitch   Mood Euthymic   Affect Mood congruent    Thought Processes logical   Associations intact associations   Thought Content normal   Perceptual Disturbances: none   Abnormal Thoughts  Risk Potential Suicidal ideation - None  Homicidal ideation - None  Potential for aggression - No   Orientation oriented to person, place, time/date and situation   Memory recent and remote memory grossly intact   Cosciousness alert and awake   Attention Span attention span and concentration are age appropriate   Intellect Appears to be of Average Intelligence   Insight age appropriate    Judgement good    Muscle Strength and  Gait muscle strength and tone were normal   Language no difficulty naming common objects   Fund of Knowledge displays adequate knowledge of current events   Pain none   Pain Scale 0       Assessment/Plan:      Assessment & Plan  MDD (major depressive disorder), recurrent episode, moderate (HCC)  Continue Effexor  mg daily for depression and anxiety  Continue BuSpar 15 mg 3 times daily for anxiety  Continue gabapentin 300 mg 3 times daily for anxiety  Continue ReVia 50 mg daily for alcohol cravings    Orders:    busPIRone  (BUSPAR) 15 mg tablet; Take 1 tablet (15 mg total) by mouth 3 (three) times a day    venlafaxine (EFFEXOR-XR) 150 mg 24 hr capsule; Take 1 capsule (150 mg total) by mouth daily    Generalized anxiety disorder  See MDD    Orders:    busPIRone (BUSPAR) 15 mg tablet; Take 1 tablet (15 mg total) by mouth 3 (three) times a day    venlafaxine (EFFEXOR-XR) 150 mg 24 hr capsule; Take 1 capsule (150 mg total) by mouth daily    Alcohol use disorder in remission  See MDD    Orders:    naltrexone (REVIA) 50 mg tablet; Take 1 tablet (50 mg total) by mouth daily             Treatment Recommendations/Precautions:            Continue current medications:     Vistaril 50 mg as needed daily for anxiety   Effexor XR to 150 mg daily.  Gabapentin 300 mg 3 times daily for anxiety  Buspar 15 mg TID for anxiety  Revia 50 mg daily for alcohol cravings     We discussed signs and symptoms of serotonin syndrome given current regimen.     We will follow up in  1-2 months or sooner if questions or concerns arise.   He is aware of emergent vs non-emergent mental health resources.   He is able to contract for safety at this time.        Risks/Benefits      Risks, Benefits And Possible Side Effects Of Medications:    Risks, benefits, and possible side effects of medications explained to patient and patient verbalizes understanding and agreement for treatment.    Controlled Medication Discussion:     Not applicable    Psychotherapy Provided:     Individual psychotherapy provided: No    Visit Start Time: 3:00 PM  Visit End Time: 3:20 PM  Total Visit Duration: 20 minutes    This note was completed in part utilizing Dragon dictation Software. Grammatical, translation, syntax errors, random word insertions, spelling mistakes, and incomplete sentences may be an occasional consequence of this system secondary to software limitations with voice recognition, ambient noise, and hardware issues. If you have any questions or concerns about the content, text,  or information contained within the body of this dictation, please contact the provider for clarification.

## 2024-12-02 PROBLEM — F10.91 ALCOHOL USE DISORDER IN REMISSION: Status: ACTIVE | Noted: 2024-12-02

## 2024-12-03 NOTE — ASSESSMENT & PLAN NOTE
Continue Effexor  mg daily for depression and anxiety  Continue BuSpar 15 mg 3 times daily for anxiety  Continue gabapentin 300 mg 3 times daily for anxiety  Continue ReVia 50 mg daily for alcohol cravings  
See MDD  
See MDD  
normal...

## 2025-01-22 ENCOUNTER — OFFICE VISIT (OUTPATIENT)
Dept: PSYCHIATRY | Facility: CLINIC | Age: 50
End: 2025-01-22
Payer: COMMERCIAL

## 2025-01-22 DIAGNOSIS — F10.91 ALCOHOL USE DISORDER IN REMISSION: ICD-10-CM

## 2025-01-22 DIAGNOSIS — F33.1 MDD (MAJOR DEPRESSIVE DISORDER), RECURRENT EPISODE, MODERATE (HCC): Primary | ICD-10-CM

## 2025-01-22 DIAGNOSIS — F41.1 GENERALIZED ANXIETY DISORDER: ICD-10-CM

## 2025-01-22 PROCEDURE — 99214 OFFICE O/P EST MOD 30 MIN: CPT | Performed by: PHYSICIAN ASSISTANT

## 2025-01-22 RX ORDER — NALTREXONE HYDROCHLORIDE 50 MG/1
50 TABLET, FILM COATED ORAL DAILY
Qty: 90 TABLET | Refills: 0 | Status: SHIPPED | OUTPATIENT
Start: 2025-01-22 | End: 2025-04-22

## 2025-01-22 RX ORDER — VENLAFAXINE HYDROCHLORIDE 150 MG/1
150 CAPSULE, EXTENDED RELEASE ORAL DAILY
Qty: 90 CAPSULE | Refills: 0 | Status: SHIPPED | OUTPATIENT
Start: 2025-01-22 | End: 2025-04-22

## 2025-01-22 RX ORDER — GABAPENTIN 300 MG/1
300 CAPSULE ORAL 3 TIMES DAILY
Qty: 270 CAPSULE | Refills: 0 | Status: SHIPPED | OUTPATIENT
Start: 2025-01-22

## 2025-01-22 RX ORDER — BUSPIRONE HYDROCHLORIDE 15 MG/1
15 TABLET ORAL 3 TIMES DAILY
Qty: 270 TABLET | Refills: 0 | Status: SHIPPED | OUTPATIENT
Start: 2025-01-22 | End: 2025-04-22

## 2025-01-22 NOTE — PSYCH
"  This note was not shared with the patient due to reasonable likelihood of causing patient harm   PROGRESS NOTE        [facility]      Name and Date of Birth:  Joel Sherwood 49 y.o. 1975    Date of Visit: 01/22/25     SUBJECTIVE:    Joel presents today for medication management and follow-up.  He reports \"I'm good, I was counting on the way over here and I'm 194 days sober from alcohol.\" He reports that he has not been getting cravings and \"it is getting easier every day.\" He has good insight and is able to walk himself through \"if I have a drink I know I will end up back in rehab.\" He adds \"Hopefully this is it.\" He shares that his anxiety is greatly improved. He has been using coping skills such as puzzle books.     He does mention that his Father's health is very poor. He is current at a rehab and he does not feel its appropriate for him to return home. He is worried about his Father falling and getting hurt. He shares that since his Fathers mental state changed two years ago \"I grieved him at that point.\"      He reports good sleep and fair appetite.    He has been taking his medication consistently and he would like to remain on the same.    He denies suicidal ideation, intent or plan at present, has no suicidal ideation, intent or plan at present.    He denies any auditory hallucinations and visual hallucinations, denies any other delusional thinking, denies any delusional thinking.    He denies any side effects from medications  .  HPI ROS Appetite Changes and Sleep: Adequate appetite, adequate sleep    Review Of Systems:      Constitutional Negative   ENT Negative   Cardiovascular Negative   Respiratory Negative   Gastrointestinal Negative   Genitourinary Negative   Musculoskeletal Negative   Integumentary Negative   Neurological Negative   Endocrine Negative   Other Symptoms Negative and None       Laboratory Results: No results found for this " or any previous visit.    Substance Abuse History:    Social History     Substance and Sexual Activity   Drug Use Never       Family Psychiatric History:     Family History   Problem Relation Age of Onset    Kidney disease Father        The following portions of the patient's history were reviewed and updated as appropriate: past family history, past medical history, past social history, past surgical history and problem list.    Social History     Socioeconomic History    Marital status: Single     Spouse name: Not on file    Number of children: Not on file    Years of education: Not on file    Highest education level: Not on file   Occupational History    Not on file   Tobacco Use    Smoking status: Never     Passive exposure: Never    Smokeless tobacco: Current     Types: Chew   Vaping Use    Vaping status: Never Used   Substance and Sexual Activity    Alcohol use: Not Currently     Comment: not since 10/27    Drug use: Never    Sexual activity: Not Currently   Other Topics Concern    Not on file   Social History Narrative    Not on file     Social Drivers of Health     Financial Resource Strain: Not on file   Food Insecurity: No Food Insecurity (11/7/2023)    Nursing - Inadequate Food Risk Classification     Worried About Running Out of Food in the Last Year: Never true     Ran Out of Food in the Last Year: Never true     Ran Out of Food in the Last Year: Not on file   Transportation Needs: No Transportation Needs (11/7/2023)    PRAPARE - Transportation     Lack of Transportation (Medical): No     Lack of Transportation (Non-Medical): No   Physical Activity: Not on file   Stress: Not on file   Social Connections: Not on file   Intimate Partner Violence: Not on file   Housing Stability: Low Risk  (11/7/2023)    Housing Stability Vital Sign     Unable to Pay for Housing in the Last Year: No     Number of Times Moved in the Last Year: 1     Homeless in the Last Year: No     Social History     Social History  Narrative    Not on file        Social History       Tobacco History       Smoking Status  Never      Smokeless Tobacco Use  Current Smokeless Tobacco Type  Chew              Alcohol History       Alcohol Use Status  Not Currently Comment  not since 10/27              Drug Use       Drug Use Status  Never              Sexual Activity       Sexually Active  Not Currently              Activities of Daily Living    Not Asked                 Additional Substance Use Detail       Questions Responses    Problems Due to Past Use of Alcohol? Yes    Problems Due to Past Use of Substances? No    Alcohol Use Frequency Daily    Alcohol Drink of Choice Liquor    1st Use of Alcohol 21    Last Use of Alcohol & Amount 15 drinks, 100 proof liquor    Longest Abstinence from Alcohol 2 years    Not reviewed.              OBJECTIVE:     Mental Status Evaluation:    Appearance age appropriate, casually dressed   Behavior cooperative, pleasant, talkative    Speech normal volume, normal pitch   Mood Euthymic   Affect Mood congruent    Thought Processes logical   Associations intact associations   Thought Content normal   Perceptual Disturbances: none   Abnormal Thoughts  Risk Potential Suicidal ideation - None  Homicidal ideation - None  Potential for aggression - No   Orientation oriented to person, place, time/date and situation   Memory recent and remote memory grossly intact   Cosciousness alert and awake   Attention Span attention span and concentration are age appropriate   Intellect Appears to be of Average Intelligence   Insight age appropriate    Judgement good    Muscle Strength and  Gait muscle strength and tone were normal   Language no difficulty naming common objects   Fund of Knowledge displays adequate knowledge of current events   Pain none   Pain Scale 0       Assessment/Plan:      Assessment & Plan  MDD (major depressive disorder), recurrent episode, moderate (HCC)  Continue Effexor  mg daily for depression and  anxiety   Continue Gabapentin 300 mg TID for anxiety  Continue Buspar 15 mg TID for anxiety    Orders:    venlafaxine (EFFEXOR-XR) 150 mg 24 hr capsule; Take 1 capsule (150 mg total) by mouth daily    busPIRone (BUSPAR) 15 mg tablet; Take 1 tablet (15 mg total) by mouth 3 (three) times a day    Alcohol use disorder in remission  Continue Revia 50 mg daily for alcohol cravings     Orders:    naltrexone (REVIA) 50 mg tablet; Take 1 tablet (50 mg total) by mouth daily    Generalized anxiety disorder  See MDD    Orders:    venlafaxine (EFFEXOR-XR) 150 mg 24 hr capsule; Take 1 capsule (150 mg total) by mouth daily    gabapentin (NEURONTIN) 300 mg capsule; Take 1 capsule (300 mg total) by mouth 3 (three) times a day    busPIRone (BUSPAR) 15 mg tablet; Take 1 tablet (15 mg total) by mouth 3 (three) times a day             Treatment Recommendations/Precautions:            Continue current medications:       Effexor XR to 150 mg daily.  Gabapentin 300 mg 3 times daily for anxiety  Buspar 15 mg TID for anxiety  Revia 50 mg daily for alcohol cravings     We discussed signs and symptoms of serotonin syndrome given current regimen.     We will follow up in  3 months or sooner if questions or concerns arise.   He is aware of emergent vs non-emergent mental health resources.   He is able to contract for safety at this time.        Risks/Benefits      Risks, Benefits And Possible Side Effects Of Medications:    Risks, benefits, and possible side effects of medications explained to patient and patient verbalizes understanding and agreement for treatment.    Controlled Medication Discussion:     Not applicable    Psychotherapy Provided:     Individual psychotherapy provided: No    Visit Start Time: 10:00 AM  Visit End Time: 10:20 AM  Total Visit Duration: 20 minutes    This note was completed in part utilizing Dragon dictation Software. Grammatical, translation, syntax errors, random word insertions, spelling mistakes, and incomplete  sentences may be an occasional consequence of this system secondary to software limitations with voice recognition, ambient noise, and hardware issues. If you have any questions or concerns about the content, text, or information contained within the body of this dictation, please contact the provider for clarification.

## 2025-01-22 NOTE — ASSESSMENT & PLAN NOTE
Continue Effexor  mg daily for depression and anxiety   Continue Gabapentin 300 mg TID for anxiety  Continue Buspar 15 mg TID for anxiety

## 2025-03-02 DIAGNOSIS — F10.20 ALCOHOL DEPENDENCE (HCC): ICD-10-CM

## 2025-03-03 RX ORDER — FOLIC ACID 1 MG/1
1000 TABLET ORAL DAILY
Qty: 90 TABLET | Refills: 1 | Status: SHIPPED | OUTPATIENT
Start: 2025-03-03

## 2025-03-11 DIAGNOSIS — F33.1 MDD (MAJOR DEPRESSIVE DISORDER), RECURRENT EPISODE, MODERATE (HCC): ICD-10-CM

## 2025-03-11 DIAGNOSIS — F41.1 GENERALIZED ANXIETY DISORDER: ICD-10-CM

## 2025-03-11 RX ORDER — BUSPIRONE HYDROCHLORIDE 15 MG/1
15 TABLET ORAL 3 TIMES DAILY
Qty: 270 TABLET | Refills: 0 | Status: SHIPPED | OUTPATIENT
Start: 2025-03-11 | End: 2025-06-09

## 2025-03-11 NOTE — TELEPHONE ENCOUNTER
Reason for call:   [x] Refill   [] Prior Auth  [] Other:     Office:   [] PCP/Provider -   [x] Specialty/Provider - Psychiatry/ VERNA Segundo    Medication: busPIRone (BUSPAR) 15 mg tablet     Dose/Frequency: Take 1 tablet (15 mg total) by mouth 3 (three) times a day    Quantity: 270    Ph[facility]: [id number] [address] [phone]    Local Pharmacy   Does the patient have enough for 3 days?   [] Yes   [x] No - Send as HP to POD    Mail Away Pharmacy   Does the patient have enough for 10 days?   [] Yes   [] No - Send as HP to POD

## 2025-03-14 ENCOUNTER — OFFICE VISIT (OUTPATIENT)
Dept: FAMILY MEDICINE CLINIC | Facility: CLINIC | Age: 50
End: 2025-03-14
Payer: COMMERCIAL

## 2025-03-14 VITALS
WEIGHT: 216 LBS | HEIGHT: 72 IN | BODY MASS INDEX: 29.26 KG/M2 | HEART RATE: 86 BPM | SYSTOLIC BLOOD PRESSURE: 120 MMHG | RESPIRATION RATE: 18 BRPM | DIASTOLIC BLOOD PRESSURE: 70 MMHG | TEMPERATURE: 97.7 F | OXYGEN SATURATION: 98 %

## 2025-03-14 DIAGNOSIS — Z00.00 WELL ADULT EXAM: Primary | ICD-10-CM

## 2025-03-14 DIAGNOSIS — F33.1 MDD (MAJOR DEPRESSIVE DISORDER), RECURRENT EPISODE, MODERATE (HCC): ICD-10-CM

## 2025-03-14 DIAGNOSIS — F41.1 GENERALIZED ANXIETY DISORDER: ICD-10-CM

## 2025-03-14 DIAGNOSIS — F10.91 ALCOHOL USE DISORDER IN REMISSION: ICD-10-CM

## 2025-03-14 DIAGNOSIS — E78.5 HYPERLIPIDEMIA, UNSPECIFIED HYPERLIPIDEMIA TYPE: ICD-10-CM

## 2025-03-14 PROCEDURE — 99396 PREV VISIT EST AGE 40-64: CPT | Performed by: FAMILY MEDICINE

## 2025-03-14 NOTE — ASSESSMENT & PLAN NOTE
Has been sober for 8 months.   Orders:    CBC and differential; Future    TSH, 3rd generation with Free T4 reflex; Future    Vitamin D 25 hydroxy; Future    Vitamin B12; Future

## 2025-03-14 NOTE — PROGRESS NOTES
Adult Annual Physical  Name: Joel Sherwood      : 1975      MRN: [id number]  Encounter Provider: Anayeli Do MD  Encounter Date: 3/14/2025   Encounter department: [facility]    Assessment & Plan  Well adult exam         Hyperlipidemia, unspecified hyperlipidemia type    Orders:    Comprehensive metabolic panel; Future    Lipid Panel with Direct LDL reflex; Future    Alcohol use disorder in remission  Has been sober for 8 months.   Orders:    CBC and differential; Future    TSH, 3rd generation with Free T4 reflex; Future    Vitamin D 25 hydroxy; Future    Vitamin B12; Future    MDD (major depressive disorder), recurrent episode, moderate (HCC)  Managed by psychiatry. Stable.   Orders:    CBC and differential; Future    TSH, 3rd generation with Free T4 reflex; Future    Vitamin D 25 hydroxy; Future    Vitamin B12; Future    Generalized anxiety disorder  Managed by psychiatry. Stable.   Orders:    CBC and differential; Future    TSH, 3rd generation with Free T4 reflex; Future    Vitamin D 25 hydroxy; Future    Vitamin B12; Future    Preventive Screenings:  - Diabetes Screening: risks/benefits discussed and orders placed  - Cholesterol Screening: screening not indicated and has hyperlipidemia   - Hepatitis C screening: screening up-to-date   - HIV screening: screening up-to-date   - Colon cancer screening: screening up-to-date   - Lung cancer screening: screening not indicated   - Prostate cancer screening: screening not indicated     Immunizations:  - Immunizations due: Influenza, Prevnar 20, Tdap and Hepatitis A    Counseling/Anticipatory Guidance:  - Alcohol: discussed moderation in alcohol intake and recommendations for healthy alcohol use.   - Tobacco use: discussed harms of tobacco use and management options for quitting.   - Diet: discussed recommendations for a healthy/well-balanced diet.   - Exercise: the importance of regular exercise/physical activity was discussed. Recommend  exercise 3-5 times per week for at least 30 minutes.       Depression Screening and Follow-up Plan: Patient was screened for depression during today's encounter. They screened negative with a PHQ-9 score of 2.          History of Present Illness     Adult Annual Physical:  Patient presents for annual physical.     Diet and Physical Activity:  - Diet/Nutrition: well balanced diet.  - Exercise: no formal exercise.    Depression Screening:    - PHQ-9 Score: 2    General Health:  - Sleep: sleeps well.  - Hearing: normal hearing bilateral ears.  - Vision: no vision problems.  - Dental: regular dental visits.     Health:    - Urinary symptoms: none.     Review of Systems   Constitutional: Negative.  Negative for activity change, appetite change, chills, diaphoresis, fatigue and fever.   HENT: Negative.  Negative for congestion, postnasal drip, rhinorrhea, sinus pressure, sneezing and sore throat.    Eyes: Negative.    Respiratory: Negative.  Negative for cough, choking, chest tightness, shortness of breath and wheezing.    Cardiovascular: Negative.  Negative for chest pain and leg swelling.   Gastrointestinal: Negative.  Negative for abdominal distention, anal bleeding, blood in stool, constipation, diarrhea, nausea and vomiting.   Endocrine: Negative.    Genitourinary: Negative.    Musculoskeletal: Negative.  Negative for arthralgias, gait problem and myalgias.   Skin:  Negative for color change, pallor, rash and wound.   Neurological: Negative.  Negative for dizziness, tremors, syncope, weakness, light-headedness, numbness and headaches.   Hematological: Negative.    Psychiatric/Behavioral: Negative.           Objective   /70   Pulse 86   Temp 97.7 °F (36.5 °C)   Resp 18   Ht 6' (1.829 m)   Wt 98 kg (216 lb)   SpO2 98%   BMI 29.29 kg/m²     Physical Exam  Constitutional:       General: He is not in acute distress.     Appearance: Normal appearance. He is normal weight. He is not ill-appearing,  toxic-appearing or diaphoretic.   HENT:      Head: Normocephalic and atraumatic.      Right Ear: Tympanic membrane, ear canal and external ear normal. There is no impacted cerumen.      Left Ear: Tympanic membrane, ear canal and external ear normal. There is no impacted cerumen.      Nose: Nose normal.      Mouth/Throat:      Mouth: Mucous membranes are moist.      Pharynx: Oropharynx is clear. No oropharyngeal exudate or posterior oropharyngeal erythema.   Eyes:      General: No scleral icterus.        Right eye: No discharge.         Left eye: No discharge.      Extraocular Movements: Extraocular movements intact.      Conjunctiva/sclera: Conjunctivae normal.      Pupils: Pupils are equal, round, and reactive to light.   Cardiovascular:      Rate and Rhythm: Normal rate and regular rhythm.      Pulses: Normal pulses.      Heart sounds: Normal heart sounds. No murmur heard.     No friction rub. No gallop.   Pulmonary:      Effort: Pulmonary effort is normal. No respiratory distress.      Breath sounds: Normal breath sounds. No stridor. No wheezing, rhonchi or rales.   Chest:      Chest wall: No tenderness.   Abdominal:      General: Abdomen is flat. Bowel sounds are normal. There is no distension.      Palpations: Abdomen is soft. There is no mass.      Tenderness: There is no abdominal tenderness. There is no guarding or rebound.      Hernia: No hernia is present.   Musculoskeletal:         General: Normal range of motion.   Skin:     General: Skin is warm and dry.   Neurological:      General: No focal deficit present.      Mental Status: He is alert and oriented to person, place, and time.   Psychiatric:         Mood and Affect: Mood normal.         Behavior: Behavior normal.         Thought Content: Thought content normal.         Judgment: Judgment normal.

## 2025-03-14 NOTE — ASSESSMENT & PLAN NOTE
Managed by psychiatry. Stable.   Orders:    CBC and differential; Future    TSH, 3rd generation with Free T4 reflex; Future    Vitamin D 25 hydroxy; Future    Vitamin B12; Future

## 2025-03-22 ENCOUNTER — VBI (OUTPATIENT)
Dept: ADMINISTRATIVE | Facility: OTHER | Age: 50
End: 2025-03-22

## 2025-03-22 NOTE — TELEPHONE ENCOUNTER
03/22/25 12:20 PM     Chart reviewed for Depression Screening was/were submitted to the patient's insurance.     Leny Hilliard MA   [facility]

## 2025-03-28 DIAGNOSIS — F10.91 ALCOHOL USE DISORDER IN REMISSION: ICD-10-CM

## 2025-03-28 DIAGNOSIS — F33.1 MDD (MAJOR DEPRESSIVE DISORDER), RECURRENT EPISODE, MODERATE (HCC): ICD-10-CM

## 2025-03-28 DIAGNOSIS — F41.1 GENERALIZED ANXIETY DISORDER: ICD-10-CM

## 2025-03-28 RX ORDER — VENLAFAXINE HYDROCHLORIDE 150 MG/1
150 CAPSULE, EXTENDED RELEASE ORAL DAILY
Qty: 90 CAPSULE | Refills: 0 | Status: SHIPPED | OUTPATIENT
Start: 2025-03-28 | End: 2025-06-26

## 2025-03-28 RX ORDER — NALTREXONE HYDROCHLORIDE 50 MG/1
50 TABLET, FILM COATED ORAL DAILY
Qty: 90 TABLET | Refills: 0 | Status: SHIPPED | OUTPATIENT
Start: 2025-03-28 | End: 2025-06-26

## 2025-03-28 NOTE — TELEPHONE ENCOUNTER
Reason for call:   [x] Refill   [] Prior Auth  [] Other:     Office:   [] PCP/Provider -   [x] Specialty/Provider - [facility]  Authorized By: Mary Segundo PA-C    Medication:       naltrexone (REVIA) 50 mg tablet          venlafaxine (EFFEXOR-XR) 150 mg 24 hr capsule 150 mg, Daily 0 ordered                  Pharmacy: [id number] [address] [facility]   Does the patient have enough for 3 days?   [x] Yes   [] No - Send as HP to POD    Mail Away Pharmacy   Does the patient have enough for 10 days?   [] Yes   [] No - Send as HP to POD

## 2025-04-16 ENCOUNTER — OFFICE VISIT (OUTPATIENT)
Dept: PSYCHIATRY | Facility: CLINIC | Age: 50
End: 2025-04-16
Payer: COMMERCIAL

## 2025-04-16 DIAGNOSIS — F41.1 GENERALIZED ANXIETY DISORDER: ICD-10-CM

## 2025-04-16 DIAGNOSIS — F33.1 MDD (MAJOR DEPRESSIVE DISORDER), RECURRENT EPISODE, MODERATE (HCC): Primary | ICD-10-CM

## 2025-04-16 DIAGNOSIS — F10.91 ALCOHOL USE DISORDER IN REMISSION: ICD-10-CM

## 2025-04-16 PROCEDURE — 99214 OFFICE O/P EST MOD 30 MIN: CPT | Performed by: PHYSICIAN ASSISTANT

## 2025-04-16 RX ORDER — GABAPENTIN 300 MG/1
300 CAPSULE ORAL 3 TIMES DAILY
Qty: 270 CAPSULE | Refills: 0 | Status: SHIPPED | OUTPATIENT
Start: 2025-04-16

## 2025-04-17 NOTE — ASSESSMENT & PLAN NOTE
Continue Effexor XR daily for depression and anxiety  Continue gabapentin 300 mg 3 times a day for anxiety  Continue BuSpar 15 mg 3 times a day for anxiety

## 2025-04-17 NOTE — PSYCH
MEDICATION MANAGEMENT NOTE    Name: Joel Sherwood      : 1975      MRN: [id number]  Encounter Provider: Mary Segundo PA-C  Encounter Date: 2025   Encounter department: [facility]    Insurance: Payor: [employer] / Plan: [employer] / Product Type: [facility] /      Reason for Visit:   Chief Complaint   Patient presents with    Medication Management    Follow-up   :  Assessment & Plan  MDD (major depressive disorder), recurrent episode, moderate (HCC)  Continue Effexor XR daily for depression and anxiety  Continue gabapentin 300 mg 3 times a day for anxiety  Continue BuSpar 15 mg 3 times a day for anxiety         Alcohol use disorder in remission  Patient has been able to maintain sobriety for approximately 9 months         Generalized anxiety disorder  See MDD    Orders:    gabapentin (NEURONTIN) 300 mg capsule; Take 1 capsule (300 mg total) by mouth 3 (three) times a day        Treatment Recommendations:    Educated about diagnosis and treatment modalities. Verbalizes understanding and agreement with the treatment plan.  Discussed self monitoring of symptoms, and symptom monitoring tools.  Discussed medications and if treatment adjustment was needed or desired.  Medication management every 3 months  Aware of 24 hour and weekend coverage for urgent situations accessed by calling [facility] main practice number  I am scheduling this patient out for greater than 3 months: No    Medications Risks/Benefits:      Risks, Benefits And Possible Side Effects Of Medications:    Risks, benefits, and possible side effects of medications explained to Joel WALTERS and he (or legal representative) verbalizes understanding and agreement for treatment.    Controlled Medication Discussion:     Not applicable      History of Present Illness     Joel WALTERS is seen today for a follow up for depression, anxiety, and alcohol use  "disorder in remission.  He reports that he is 9 months sober.  He has been feeling well over the last few weeks.  He reports \"I have actually been looking forward to going to work in the morning.\"  He shares that his father is still not doing well and his health is declining.  He denies any significant anxiety, depression, or mood changes.  Mood he has been sleeping and getting adequate nutrition.    He denies suicidal ideation, intent or plan at present; denies homicidal ideation, intent or plan at present.    He denies auditory hallucinations, denies visual hallucinations, denies delusions.    He denies any side effects from current psychiatric medications.    HPI ROS Appetite Changes and Sleep:     He reports normal sleep, normal appetite, normal energy level    Review Of Systems: A review of systems is obtained and is negative except for the pertinent positives listed in HPI/Subjective above.      Current Rating Scores:     None completed today.    Areas of Improvement: reviewed in HPI/Subjective Section    Past Medical History:   Diagnosis Date    Alcohol withdrawal delirium, acute, hyperactive (HCC) 10/28/2023    Alcohol withdrawal syndrome with complication (HCC) 8/1/2023    Anxiety     Depression     Electrolyte abnormality 10/28/2023    ETOH abuse     GERD (gastroesophageal reflux disease)     Multiple falls 10/28/2023    Rash 11/6/2023    Rhabdomyolysis 10/28/2023    Toxic metabolic encephalopathy 10/28/2023    Transaminitis 8/1/2023     Past Surgical History:   Procedure Laterality Date    CLOSED REDUCTION FOREARM FRACTURE Right     EGD       Allergies: No Known Allergies    Current Outpatient Medications   Medication Instructions    busPIRone (BUSPAR) 15 mg, Oral, 3 times daily    folic acid (FOLVITE) 1,000 mcg, Oral, Daily    gabapentin (NEURONTIN) 300 mg, Oral, 3 times daily    Magnesium 250 MG TABS Daily    Multiple Vitamin (multivitamin) tablet 1 tablet, Oral, Daily    naltrexone (REVIA) 50 mg, Oral, " Daily    nicotine polacrilex (NICORETTE) 4 mg gum As needed    thiamine 100 mg, Oral, Daily    venlafaxine (EFFEXOR-XR) 150 mg, Oral, Daily        Substance Abuse History:    Tobacco, Alcohol and Drug Use History     Tobacco Use    Smoking status: Never     Passive exposure: Never    Smokeless tobacco: Current     Types: Chew   Vaping Use    Vaping status: Never Used   Substance Use Topics    Alcohol use: Not Currently     Comment: not since 10/27    Drug use: Never     Alcohol Use: Unknown (5/8/2019)    AUDIT-C     Frequency of Alcohol Consumption: Monthly or less       Social History:    Social History     Socioeconomic History    Marital status: Single     Spouse name: Not on file    Number of children: Not on file    Years of education: Not on file    Highest education level: Not on file   Occupational History    Not on file   Other Topics Concern    Not on file   Social History Narrative    Not on file        Family Psychiatric History:     Family History   Problem Relation Age of Onset    Kidney disease Father        Medical History Reviewed by provider this encounter:  Tobacco  Allergies  Meds  Problems  Med Hx  Surg Hx  Fam Hx          Objective   There were no vitals taken for this visit.     Mental Status Evaluation:    Appearance age appropriate, casually dressed   Behavior pleasant, cooperative, calm   Speech normal rate, normal volume, normal pitch, spontaneous   Mood anxious   Affect normal range and intensity, appropriate   Thought Processes organized, goal directed   Thought Content no overt delusions   Perceptual Disturbances: no auditory hallucinations, no visual hallucinations   Abnormal Thoughts  Risk Potential Suicidal ideation - None at present  Homicidal ideation - None at present  Potential for aggression - No   Orientation oriented to person, place, time/date, and situation   Memory recent and remote memory grossly intact   Consciousness alert and awake   Attention Span Concentration  Span attention span and concentration are age appropriate   Intellect appears to be of average intelligence   Insight intact   Judgement intact   Muscle Strength and  Gait normal muscle strength and normal muscle tone, normal gait and normal balance   Motor activity no abnormal movements   Language no difficulty naming common objects, no difficulty repeating a phrase   Fund of Knowledge adequate knowledge of current events  adequate fund of knowledge regarding past history  adequate fund of knowledge regarding vocabulary    Pain none   Pain Scale 0       Laboratory Results: not applicable     Suicide/Homicide Risk Assessment:    Risk of Harm to Self:  Based on today's assessment, Joel WALTERS presents the following risk of harm to self: none    Risk of Harm to Others:  Based on today's assessment, Joel WALTERS presents the following risk of harm to others: none    The following interventions are recommended: Continue medication management. No other intervention changes indicated at this time.    Psychotherapy Provided:     Individual psychotherapy provided: No    Treatment Plan:    Completed and signed during the session:  not completed    Goals: Progress towards Treatment Plan goals - Yes, progressing, as evidenced by subjective findings in HPI/Subjective Section        Note Share:    This note was not shared with the patient due to reasonable likelihood of causing patient harm        Visit Time  Visit Start Time: 10am  Visit Stop Time: 1020am  Total Visit Duration:  20 minutes    This note was completed in part utilizing Dragon dictation Software. Grammatical, translation, syntax errors, random word insertions, spelling mistakes, and incomplete sentences may be an occasional consequence of this system secondary to software limitations with voice recognition, ambient noise, and hardware issues. If you have any questions or concerns about the content, text, or information contained within the body of this dictation,  please contact the provider for clarification.     Mary Segundo PA-C 04/17/25

## 2025-04-28 ENCOUNTER — OFFICE VISIT (OUTPATIENT)
Dept: URGENT CARE | Facility: CLINIC | Age: 50
End: 2025-04-28
Payer: COMMERCIAL

## 2025-04-28 ENCOUNTER — APPOINTMENT (OUTPATIENT)
Dept: LAB | Facility: CLINIC | Age: 50
End: 2025-04-28
Payer: COMMERCIAL

## 2025-04-28 VITALS
BODY MASS INDEX: 29.76 KG/M2 | SYSTOLIC BLOOD PRESSURE: 132 MMHG | HEART RATE: 87 BPM | WEIGHT: 219.4 LBS | RESPIRATION RATE: 18 BRPM | TEMPERATURE: 97.8 F | DIASTOLIC BLOOD PRESSURE: 88 MMHG | OXYGEN SATURATION: 97 %

## 2025-04-28 DIAGNOSIS — F10.91 ALCOHOL USE DISORDER IN REMISSION: ICD-10-CM

## 2025-04-28 DIAGNOSIS — L01.00 IMPETIGO: Primary | ICD-10-CM

## 2025-04-28 DIAGNOSIS — F41.1 GENERALIZED ANXIETY DISORDER: ICD-10-CM

## 2025-04-28 DIAGNOSIS — F33.1 MDD (MAJOR DEPRESSIVE DISORDER), RECURRENT EPISODE, MODERATE (HCC): ICD-10-CM

## 2025-04-28 DIAGNOSIS — E78.5 HYPERLIPIDEMIA, UNSPECIFIED HYPERLIPIDEMIA TYPE: ICD-10-CM

## 2025-04-28 LAB
BASOPHILS # BLD AUTO: 0.05 THOUSANDS/ÂΜL (ref 0–0.1)
BASOPHILS NFR BLD AUTO: 1 % (ref 0–1)
EOSINOPHIL # BLD AUTO: 0.17 THOUSAND/ÂΜL (ref 0–0.61)
EOSINOPHIL NFR BLD AUTO: 2 % (ref 0–6)
ERYTHROCYTE [DISTWIDTH] IN BLOOD BY AUTOMATED COUNT: 12.5 % (ref 11.6–15.1)
HCT VFR BLD AUTO: 51.7 % (ref 36.5–49.3)
HGB BLD-MCNC: 17.4 G/DL (ref 12–17)
IMM GRANULOCYTES # BLD AUTO: 0.04 THOUSAND/UL (ref 0–0.2)
IMM GRANULOCYTES NFR BLD AUTO: 1 % (ref 0–2)
LYMPHOCYTES # BLD AUTO: 1.61 THOUSANDS/ÂΜL (ref 0.6–4.47)
LYMPHOCYTES NFR BLD AUTO: 20 % (ref 14–44)
MCH RBC QN AUTO: 29.6 PG (ref 26.8–34.3)
MCHC RBC AUTO-ENTMCNC: 33.7 G/DL (ref 31.4–37.4)
MCV RBC AUTO: 88 FL (ref 82–98)
MONOCYTES # BLD AUTO: 0.66 THOUSAND/ÂΜL (ref 0.17–1.22)
MONOCYTES NFR BLD AUTO: 8 % (ref 4–12)
NEUTROPHILS # BLD AUTO: 5.61 THOUSANDS/ÂΜL (ref 1.85–7.62)
NEUTS SEG NFR BLD AUTO: 68 % (ref 43–75)
NRBC BLD AUTO-RTO: 0 /100 WBCS
PLATELET # BLD AUTO: 457 THOUSANDS/UL (ref 149–390)
PMV BLD AUTO: 9.8 FL (ref 8.9–12.7)
RBC # BLD AUTO: 5.88 MILLION/UL (ref 3.88–5.62)
WBC # BLD AUTO: 8.14 THOUSAND/UL (ref 4.31–10.16)

## 2025-04-28 PROCEDURE — 80061 LIPID PANEL: CPT

## 2025-04-28 PROCEDURE — 99203 OFFICE O/P NEW LOW 30 MIN: CPT | Performed by: PHYSICIAN ASSISTANT

## 2025-04-28 PROCEDURE — 84443 ASSAY THYROID STIM HORMONE: CPT

## 2025-04-28 PROCEDURE — 85025 COMPLETE CBC W/AUTO DIFF WBC: CPT

## 2025-04-28 PROCEDURE — 36415 COLL VENOUS BLD VENIPUNCTURE: CPT

## 2025-04-28 PROCEDURE — 82607 VITAMIN B-12: CPT

## 2025-04-28 PROCEDURE — 80053 COMPREHEN METABOLIC PANEL: CPT

## 2025-04-28 PROCEDURE — 82306 VITAMIN D 25 HYDROXY: CPT

## 2025-04-28 RX ORDER — CEPHALEXIN 500 MG/1
500 CAPSULE ORAL EVERY 6 HOURS SCHEDULED
Qty: 28 CAPSULE | Refills: 0 | Status: SHIPPED | OUTPATIENT
Start: 2025-04-28 | End: 2025-05-05

## 2025-04-28 NOTE — PROGRESS NOTES
"  [facility]        NAME: Joel Sherwood is a 49 y.o. male  : 1975    MRN: [id number]  DATE: 2025  TIME: 1:03 PM    Assessment and Plan   Impetigo [L01.00]  1. Impetigo  cephalexin (KEFLEX) 500 mg capsule      - Patient prescribed Keflex and advised to complete the entire course. Reports no history of MRSA infection but advised patient to return for further evaluation if he notices no improvement with the antibiotic.  - Discussed supportive care measures to help with any pain and with swelling.  - Discussed indications to go to the ED.      Patient Instructions       Follow up with PCP in 3-5 days.  Proceed to  ER if symptoms worsen.    Chief Complaint     Chief Complaint   Patient presents with    Edema     Pt has swelling on lt side of face by ear. Started as a small bump x2 days ago, was oozing Saturday night. Denies any pain          History of Present Illness       BOY is a 49 y.o. male with no pertinent PMH here today for L facial swelling anterior to his ear that began three days ago. Notes it started as a \"small bump\" that was oozing clear liquid. Notes it is not painful and not itchy. Notes he tried hydrogen peroxide without improvement. Notes no worsening or improvement in the past three days. Denies any priors and reports no history of infection with MRSA. Pt is unsure if he was bitten by anything, but notes it might have started as an ingrown hair. Denies fever, chills, nausea, vomiting, CP, SOB, ear pain, difficulty hearing.         Review of Systems   Review of Systems   Constitutional:  Negative for chills and fever.   HENT:  Negative for ear discharge, ear pain and hearing loss.    Respiratory:  Negative for shortness of breath.    Cardiovascular:  Negative for chest pain.   Gastrointestinal:  Negative for nausea and vomiting.   Skin:  Positive for rash (rash and edema anterior to L ear).   Neurological:  Negative for dizziness and headaches.         Current Medications "       Current Outpatient Medications:     busPIRone (BUSPAR) 15 mg tablet, Take 1 tablet (15 mg total) by mouth 3 (three) times a day, Disp: 270 tablet, Rfl: 0    cephalexin (KEFLEX) 500 mg capsule, Take 1 capsule (500 mg total) by mouth every 6 (six) hours for 7 days, Disp: 28 capsule, Rfl: 0    folic acid (FOLVITE) 1 mg tablet, take 1 tablet by mouth daily, Disp: 90 tablet, Rfl: 1    gabapentin (NEURONTIN) 300 mg capsule, Take 1 capsule (300 mg total) by mouth 3 (three) times a day, Disp: 270 capsule, Rfl: 0    Magnesium 250 MG TABS, Take by mouth in the morning, Disp: , Rfl:     Multiple Vitamin (multivitamin) tablet, Take 1 tablet by mouth daily, Disp: 30 tablet, Rfl: 0    naltrexone (REVIA) 50 mg tablet, Take 1 tablet (50 mg total) by mouth daily, Disp: 90 tablet, Rfl: 0    thiamine 100 MG tablet, Take 1 tablet (100 mg total) by mouth daily, Disp: 30 tablet, Rfl: 0    venlafaxine (EFFEXOR-XR) 150 mg 24 hr capsule, Take 1 capsule (150 mg total) by mouth daily, Disp: 90 capsule, Rfl: 0    nicotine polacrilex (NICORETTE) 4 mg gum, as needed (Patient not taking: Reported on 4/28/2025), Disp: , Rfl:     Current Allergies     Allergies as of 04/28/2025    (No Known Allergies)            The following portions of the patient's history were reviewed and updated as appropriate: allergies, current medications, past family history, past medical history, past social history, past surgical history and problem list.     Past Medical History:   Diagnosis Date    Alcohol withdrawal delirium, acute, hyperactive (HCC) 10/28/2023    Alcohol withdrawal syndrome with complication (HCC) 8/1/2023    Anxiety     Depression     Electrolyte abnormality 10/28/2023    ETOH abuse     GERD (gastroesophageal reflux disease)     Multiple falls 10/28/2023    Rash 11/6/2023    Rhabdomyolysis 10/28/2023    Toxic metabolic encephalopathy 10/28/2023    Transaminitis 8/1/2023       Past Surgical History:   Procedure Laterality Date    CLOSED  REDUCTION FOREARM FRACTURE Right     EGD         Family History   Problem Relation Age of Onset    Kidney disease Father          Medications have been verified.        Objective   /88   Pulse 87   Temp 97.8 °F (36.6 °C)   Resp 18   Wt 99.5 kg (219 lb 6.4 oz)   SpO2 97%   BMI 29.76 kg/m²        Physical Exam     Physical Exam  Vitals and nursing note reviewed.   Constitutional:       General: He is not in acute distress.     Appearance: Normal appearance. He is not ill-appearing or toxic-appearing.   HENT:      Head: Normocephalic and atraumatic.        Comments: Anterior to L ear edema and rash present.     Left Ear: Tympanic membrane, ear canal and external ear normal.   Eyes:      Extraocular Movements: Extraocular movements intact.      Conjunctiva/sclera: Conjunctivae normal.      Pupils: Pupils are equal, round, and reactive to light.   Cardiovascular:      Rate and Rhythm: Normal rate and regular rhythm.      Heart sounds: Normal heart sounds. No murmur heard.     No friction rub. No gallop.   Pulmonary:      Effort: Pulmonary effort is normal. No respiratory distress.      Breath sounds: Normal breath sounds. No wheezing, rhonchi or rales.   Lymphadenopathy:      Cervical: No cervical adenopathy.   Skin:     General: Skin is warm and dry.      Capillary Refill: Capillary refill takes less than 2 seconds.      Findings: Erythema and rash present. Rash is crusting and pustular.      Comments: Anterior to L ear, rash is erythematous with a white papule and area of yellow crusting. Associated edema in area of rash.   Neurological:      Mental Status: He is alert and oriented to person, place, and time. Mental status is at baseline.   Psychiatric:         Behavior: Behavior normal.

## 2025-04-29 LAB
25(OH)D3 SERPL-MCNC: 28 NG/ML (ref 30–100)
ALBUMIN SERPL BCG-MCNC: 4.6 G/DL (ref 3.5–5)
ALP SERPL-CCNC: 62 U/L (ref 34–104)
ALT SERPL W P-5'-P-CCNC: 38 U/L (ref 7–52)
ANION GAP SERPL CALCULATED.3IONS-SCNC: 10 MMOL/L (ref 4–13)
AST SERPL W P-5'-P-CCNC: 28 U/L (ref 13–39)
BILIRUB SERPL-MCNC: 0.75 MG/DL (ref 0.2–1)
BUN SERPL-MCNC: 13 MG/DL (ref 5–25)
CALCIUM SERPL-MCNC: 9.6 MG/DL (ref 8.4–10.2)
CHLORIDE SERPL-SCNC: 102 MMOL/L (ref 96–108)
CHOLEST SERPL-MCNC: 213 MG/DL (ref ?–200)
CO2 SERPL-SCNC: 27 MMOL/L (ref 21–32)
CREAT SERPL-MCNC: 1 MG/DL (ref 0.6–1.3)
GFR SERPL CREATININE-BSD FRML MDRD: 88 ML/MIN/1.73SQ M
GLUCOSE P FAST SERPL-MCNC: 84 MG/DL (ref 65–99)
HDLC SERPL-MCNC: 59 MG/DL
LDLC SERPL CALC-MCNC: 123 MG/DL (ref 0–100)
POTASSIUM SERPL-SCNC: 4.9 MMOL/L (ref 3.5–5.3)
PROT SERPL-MCNC: 7.2 G/DL (ref 6.4–8.4)
SODIUM SERPL-SCNC: 139 MMOL/L (ref 135–147)
TRIGL SERPL-MCNC: 155 MG/DL (ref ?–150)
TSH SERPL DL<=0.05 MIU/L-ACNC: 4.4 UIU/ML (ref 0.45–4.5)
VIT B12 SERPL-MCNC: 417 PG/ML (ref 180–914)

## 2025-05-01 ENCOUNTER — RESULTS FOLLOW-UP (OUTPATIENT)
Dept: FAMILY MEDICINE CLINIC | Facility: CLINIC | Age: 50
End: 2025-05-01

## 2025-05-01 DIAGNOSIS — R79.89 ABNORMAL CBC: Primary | ICD-10-CM

## 2025-05-12 ENCOUNTER — APPOINTMENT (OUTPATIENT)
Dept: RADIOLOGY | Facility: CLINIC | Age: 50
End: 2025-05-12
Attending: FAMILY MEDICINE
Payer: COMMERCIAL

## 2025-05-12 ENCOUNTER — OFFICE VISIT (OUTPATIENT)
Dept: URGENT CARE | Facility: CLINIC | Age: 50
End: 2025-05-12
Payer: COMMERCIAL

## 2025-05-12 VITALS
OXYGEN SATURATION: 96 % | HEART RATE: 118 BPM | BODY MASS INDEX: 28.75 KG/M2 | SYSTOLIC BLOOD PRESSURE: 102 MMHG | DIASTOLIC BLOOD PRESSURE: 74 MMHG | TEMPERATURE: 100.5 F | WEIGHT: 212 LBS | RESPIRATION RATE: 16 BRPM

## 2025-05-12 DIAGNOSIS — J06.9 VIRAL URI: ICD-10-CM

## 2025-05-12 DIAGNOSIS — J18.9 PNEUMONIA OF RIGHT MIDDLE LOBE DUE TO INFECTIOUS ORGANISM: Primary | ICD-10-CM

## 2025-05-12 PROCEDURE — 71046 X-RAY EXAM CHEST 2 VIEWS: CPT

## 2025-05-12 PROCEDURE — 99214 OFFICE O/P EST MOD 30 MIN: CPT | Performed by: FAMILY MEDICINE

## 2025-05-12 RX ORDER — AZITHROMYCIN 250 MG/1
TABLET, FILM COATED ORAL
Qty: 6 TABLET | Refills: 0 | Status: SHIPPED | OUTPATIENT
Start: 2025-05-12 | End: 2025-05-16

## 2025-05-12 NOTE — PROGRESS NOTES
[facility]        NAME: Joel Sherwood is a 49 y.o. male  : 1975    MRN: [id number]  DATE: May 12, 2025  TIME: 11:06 AM    Assessment and Plan   Pneumonia of right middle lobe due to infectious organism [J18.9]  1. Pneumonia of right middle lobe due to infectious organism  Covid19 and INFLUENZA A/B PCR    XR chest pa and lateral    azithromycin (ZITHROMAX) 250 mg tablet          ABX given for pneumonia. Follow up with PCP in 3-5 days if no improvement. Proceed to ER if symptoms worsen.    Medical Decision Making     PROBLEM: 1 acute, uncomplicated illness or injury    DATA: Imaging independently reviewed today: yes, pneumonia    RISK: Prescription drug management    Chief Complaint     Chief Complaint   Patient presents with   • Cold Like Symptoms     Pt presents with chest congestion, started three days , headache, sweats/chills, diarrhea, decreased appetite;          History of Present Illness     Joel Sherwood is a 49 y.o. male presenting to the office today for upper respiratory complaints. Symptoms have been present for 3 days, and include cough, congestion, headaches, fevers and decreased appetite. He has tried OTC medication for his symptoms, with no relief.    Review of Systems     Review of Systems   Constitutional:  Positive for fatigue and fever. Negative for chills.   HENT:  Positive for congestion. Negative for ear discharge, ear pain, postnasal drip, sinus pressure, sinus pain and sore throat.    Eyes:  Negative for pain and discharge.   Respiratory:  Positive for cough. Negative for shortness of breath.    Cardiovascular:  Negative for chest pain and palpitations.   Gastrointestinal:  Negative for abdominal pain, diarrhea, nausea and vomiting.   Genitourinary:  Negative for difficulty urinating and dysuria.   Musculoskeletal:  Negative for arthralgias and myalgias.   Skin:  Negative for rash.   Neurological:  Positive for headaches. Negative for dizziness, syncope,  light-headedness and numbness.   Psychiatric/Behavioral:  Negative for agitation.    All other systems reviewed and are negative.      Current Medications       Current Outpatient Medications:   •  azithromycin (ZITHROMAX) 250 mg tablet, Take 2 tablets today then 1 tablet daily x 4 days, Disp: 6 tablet, Rfl: 0  •  busPIRone (BUSPAR) 15 mg tablet, Take 1 tablet (15 mg total) by mouth 3 (three) times a day, Disp: 270 tablet, Rfl: 0  •  folic acid (FOLVITE) 1 mg tablet, take 1 tablet by mouth daily, Disp: 90 tablet, Rfl: 1  •  gabapentin (NEURONTIN) 300 mg capsule, Take 1 capsule (300 mg total) by mouth 3 (three) times a day, Disp: 270 capsule, Rfl: 0  •  Magnesium 250 MG TABS, Take by mouth in the morning, Disp: , Rfl:   •  Multiple Vitamin (multivitamin) tablet, Take 1 tablet by mouth daily, Disp: 30 tablet, Rfl: 0  •  naltrexone (REVIA) 50 mg tablet, Take 1 tablet (50 mg total) by mouth daily, Disp: 90 tablet, Rfl: 0  •  thiamine 100 MG tablet, Take 1 tablet (100 mg total) by mouth daily, Disp: 30 tablet, Rfl: 0  •  venlafaxine (EFFEXOR-XR) 150 mg 24 hr capsule, Take 1 capsule (150 mg total) by mouth daily, Disp: 90 capsule, Rfl: 0  •  nicotine polacrilex (NICORETTE) 4 mg gum, as needed (Patient not taking: Reported on 3/14/2025), Disp: , Rfl:     Current Allergies     Allergies as of 05/12/2025   • (No Known Allergies)            The following portions of the patient's history were reviewed and updated as appropriate: allergies, current medications, past family history, past medical history, past social history, past surgical history and problem list.     Past Medical History:   Diagnosis Date   • Alcohol withdrawal delirium, acute, hyperactive (HCC) 10/28/2023   • Alcohol withdrawal syndrome with complication (HCC) 8/1/2023   • Anxiety    • Depression    • Electrolyte abnormality 10/28/2023   • ETOH abuse    • GERD (gastroesophageal reflux disease)    • Multiple falls 10/28/2023   • Rash 11/6/2023   •  Rhabdomyolysis 10/28/2023   • Toxic metabolic encephalopathy 10/28/2023   • Transaminitis 8/1/2023       Past Surgical History:   Procedure Laterality Date   • CLOSED REDUCTION FOREARM FRACTURE Right    • EGD         Family History   Problem Relation Age of Onset   • Kidney disease Father        Medications have been verified.    Objective     /74   Pulse (!) 118   Temp 100.5 °F (38.1 °C) (Tympanic)   Resp 16   Wt 96.2 kg (212 lb)   SpO2 96%   BMI 28.75 kg/m²   No LMP for male patient.     Physical Exam     Physical Exam  Vitals reviewed.   Constitutional:       General: He is not in acute distress.     Appearance: Normal appearance. He is not ill-appearing.   HENT:      Head: Normocephalic and atraumatic.      Mouth/Throat:      Mouth: Mucous membranes are moist.   Eyes:      Extraocular Movements: Extraocular movements intact.      Conjunctiva/sclera: Conjunctivae normal.   Cardiovascular:      Rate and Rhythm: Normal rate and regular rhythm.      Pulses: Normal pulses.      Heart sounds: Normal heart sounds. No murmur heard.  Pulmonary:      Effort: Pulmonary effort is normal. No respiratory distress.      Breath sounds: Examination of the right-middle field reveals rhonchi. Examination of the right-lower field reveals rhonchi. Rhonchi present. No wheezing.   Musculoskeletal:      Cervical back: Normal range of motion and neck supple. No tenderness.   Lymphadenopathy:      Cervical: Cervical adenopathy present.   Skin:     General: Skin is warm.   Neurological:      General: No focal deficit present.      Mental Status: He is alert.   Psychiatric:         Mood and Affect: Mood normal.         Behavior: Behavior normal.         Judgment: Judgment normal.

## 2025-05-19 ENCOUNTER — APPOINTMENT (OUTPATIENT)
Dept: RADIOLOGY | Facility: CLINIC | Age: 50
End: 2025-05-19
Attending: PHYSICIAN ASSISTANT
Payer: COMMERCIAL

## 2025-05-19 ENCOUNTER — OFFICE VISIT (OUTPATIENT)
Dept: URGENT CARE | Facility: CLINIC | Age: 50
End: 2025-05-19
Payer: COMMERCIAL

## 2025-05-19 VITALS
OXYGEN SATURATION: 100 % | DIASTOLIC BLOOD PRESSURE: 82 MMHG | BODY MASS INDEX: 28.21 KG/M2 | RESPIRATION RATE: 18 BRPM | WEIGHT: 208 LBS | SYSTOLIC BLOOD PRESSURE: 118 MMHG | HEART RATE: 100 BPM | TEMPERATURE: 96.9 F

## 2025-05-19 DIAGNOSIS — J18.9 PNEUMONIA OF RIGHT MIDDLE LOBE DUE TO INFECTIOUS ORGANISM: ICD-10-CM

## 2025-05-19 DIAGNOSIS — J18.9 PNEUMONIA OF RIGHT MIDDLE LOBE DUE TO INFECTIOUS ORGANISM: Primary | ICD-10-CM

## 2025-05-19 PROCEDURE — 99214 OFFICE O/P EST MOD 30 MIN: CPT | Performed by: PHYSICIAN ASSISTANT

## 2025-05-19 PROCEDURE — 71046 X-RAY EXAM CHEST 2 VIEWS: CPT

## 2025-05-19 NOTE — PROGRESS NOTES
[facility]        NAME: Joel Sherwood is a 49 y.o. male  : 1975    MRN: [id number]  DATE: May 19, 2025  TIME: 3:57 PM    Assessment and Plan   Pneumonia of right middle lobe due to infectious organism [J18.9]  1. Pneumonia of right middle lobe due to infectious organism  XR chest pa and lateral    amoxicillin-clavulanate (AUGMENTIN) 875-125 mg per tablet        CXR improved but not at baseline. Due to continued productive cough will add course of Augmentin. Discussed strict return to care precautions as well as red flag symptoms which should prompt immediate ED referral. Pt verbalized understanding and is in agreement with plan.  Please follow up with your primary care provider within the next week. Please remember that your visit today was with an urgent care provider and should not replace follow up with your primary care provider for chronic medical issues or annual physicals.       Patient Instructions       Follow up with PCP in 3-5 days.  Proceed to  ER if symptoms worsen.    If tests are performed, our office will contact you with results only if changes need to made to the care plan discussed with you at the visit. [facility]    Chief Complaint     Chief Complaint   Patient presents with    Cold Like Symptoms     Follow up for pneumonia, chest congestion and slight cough.          History of Present Illness       Pt is a 50 yo male presenting with fu for pna. Was seen 1 week ago and treated with a zpack for pneumonia. States he feels mostly better but still has some residual wheezing and chest congestion. No more fevers.        Review of Systems   Review of Systems   Constitutional:  Negative for fever.   Respiratory:  Positive for cough (productive) and wheezing. Negative for chest tightness and shortness of breath.    Cardiovascular:  Negative for chest pain.   Musculoskeletal:  Negative for myalgias.   Neurological:  Negative for weakness.          Current Medications     Current Medications[1]    Current Allergies     Allergies as of 05/19/2025    (No Known Allergies)            The following portions of the patient's history were reviewed and updated as appropriate: allergies, current medications, past family history, past medical history, past social history, past surgical history and problem list.     Past Medical History:   Diagnosis Date    Alcohol withdrawal delirium, acute, hyperactive (HCC) 10/28/2023    Alcohol withdrawal syndrome with complication (HCC) 8/1/2023    Anxiety     Depression     Electrolyte abnormality 10/28/2023    ETOH abuse     GERD (gastroesophageal reflux disease)     Multiple falls 10/28/2023    Rash 11/6/2023    Rhabdomyolysis 10/28/2023    Toxic metabolic encephalopathy 10/28/2023    Transaminitis 8/1/2023       Past Surgical History:   Procedure Laterality Date    CLOSED REDUCTION FOREARM FRACTURE Right     EGD         Family History   Problem Relation Age of Onset    Kidney disease Father          Medications have been verified.        Objective   /82   Pulse 100   Temp (!) 96.9 °F (36.1 °C)   Resp 18   Wt 94.3 kg (208 lb)   SpO2 100%   BMI 28.21 kg/m²        Physical Exam     Physical Exam  Vitals and nursing note reviewed.   Constitutional:       General: He is not in acute distress.     Appearance: Normal appearance. He is not toxic-appearing.   HENT:      Head: Normocephalic and atraumatic.      Nose: No congestion.      Mouth/Throat:      Mouth: Mucous membranes are moist.      Pharynx: Oropharynx is clear. No oropharyngeal exudate or posterior oropharyngeal erythema.     Eyes:      Conjunctiva/sclera: Conjunctivae normal.      Pupils: Pupils are equal, round, and reactive to light.       Cardiovascular:      Rate and Rhythm: Normal rate and regular rhythm.      Heart sounds: Normal heart sounds.   Pulmonary:      Effort: Pulmonary effort is normal. No respiratory distress.      Breath sounds: Normal  breath sounds. No wheezing, rhonchi or rales.     Musculoskeletal:      Cervical back: Normal range of motion and neck supple.   Lymphadenopathy:      Cervical: No cervical adenopathy.     Skin:     General: Skin is warm and dry.      Capillary Refill: Capillary refill takes less than 2 seconds.     Neurological:      Mental Status: He is alert and oriented to person, place, and time.     Psychiatric:         Behavior: Behavior normal.                        [1]   Current Outpatient Medications:     amoxicillin-clavulanate (AUGMENTIN) 875-125 mg per tablet, Take 1 tablet by mouth 2 (two) times a day with meals for 7 days, Disp: 14 tablet, Rfl: 0    busPIRone (BUSPAR) 15 mg tablet, Take 1 tablet (15 mg total) by mouth 3 (three) times a day, Disp: 270 tablet, Rfl: 0    folic acid (FOLVITE) 1 mg tablet, take 1 tablet by mouth daily, Disp: 90 tablet, Rfl: 1    gabapentin (NEURONTIN) 300 mg capsule, Take 1 capsule (300 mg total) by mouth 3 (three) times a day, Disp: 270 capsule, Rfl: 0    Magnesium 250 MG TABS, Take by mouth in the morning, Disp: , Rfl:     Multiple Vitamin (multivitamin) tablet, Take 1 tablet by mouth daily, Disp: 30 tablet, Rfl: 0    naltrexone (REVIA) 50 mg tablet, Take 1 tablet (50 mg total) by mouth daily, Disp: 90 tablet, Rfl: 0    thiamine 100 MG tablet, Take 1 tablet (100 mg total) by mouth daily, Disp: 30 tablet, Rfl: 0    venlafaxine (EFFEXOR-XR) 150 mg 24 hr capsule, Take 1 capsule (150 mg total) by mouth daily, Disp: 90 capsule, Rfl: 0    nicotine polacrilex (NICORETTE) 4 mg gum, as needed (Patient not taking: Reported on 5/19/2025), Disp: , Rfl:

## 2025-05-23 DIAGNOSIS — F41.1 GENERALIZED ANXIETY DISORDER: ICD-10-CM

## 2025-05-23 RX ORDER — GABAPENTIN 300 MG/1
300 CAPSULE ORAL 3 TIMES DAILY
Qty: 90 CAPSULE | Refills: 0 | Status: SHIPPED | OUTPATIENT
Start: 2025-05-23

## 2025-05-23 NOTE — TELEPHONE ENCOUNTER
Reason for call:   [x] Refill   [] Prior Auth  [] Other:     Office:   [] PCP/Provider -   [x] Specialty/Provider - Mary Hsu     Medication:     gabapentin (NEURONTIN) 300 mg       Dose/Frequency: Take 1 capsule (300 mg total) by mouth 3 (three) times a day     Quantity: 90 - Insurance change - must be 30 day supply only    Pharmacy: [facility]    [facility]   Does the patient have enough for 3 days?   [x] Yes   [] No - Send as HP to POD    [facility]   Does the patient have enough for 10 days?   [] Yes   [] No - Send as HP to POD

## 2025-05-29 DIAGNOSIS — F41.1 GENERALIZED ANXIETY DISORDER: ICD-10-CM

## 2025-05-29 DIAGNOSIS — F33.1 MDD (MAJOR DEPRESSIVE DISORDER), RECURRENT EPISODE, MODERATE (HCC): ICD-10-CM

## 2025-05-29 NOTE — TELEPHONE ENCOUNTER
Reason for call:   [x] Refill   [] Prior Auth  [] Other:     Office:   [] PCP/Provider -   [x] Specialty/Provider - Sanya morocho    Medication:   Buspirone 15 mg, 1 tid, 270    Pharmacy:   [facility]   Does the patient have enough for 3 days?   [x] Yes   [] No - Send as HP to POD    [facility] Pharmacy   Does the patient have enough for 10 days?   [] Yes   [] No - Send as HP to POD

## 2025-05-30 RX ORDER — BUSPIRONE HYDROCHLORIDE 15 MG/1
15 TABLET ORAL 3 TIMES DAILY
Qty: 270 TABLET | Refills: 0 | Status: SHIPPED | OUTPATIENT
Start: 2025-05-30 | End: 2025-08-28

## 2025-06-18 DIAGNOSIS — F41.1 GENERALIZED ANXIETY DISORDER: ICD-10-CM

## 2025-06-18 DIAGNOSIS — F10.91 ALCOHOL USE DISORDER IN REMISSION: ICD-10-CM

## 2025-06-18 DIAGNOSIS — F33.1 MDD (MAJOR DEPRESSIVE DISORDER), RECURRENT EPISODE, MODERATE (HCC): ICD-10-CM

## 2025-06-18 RX ORDER — GABAPENTIN 300 MG/1
300 CAPSULE ORAL 3 TIMES DAILY
Qty: 90 CAPSULE | Refills: 0 | Status: SHIPPED | OUTPATIENT
Start: 2025-06-18

## 2025-06-18 RX ORDER — NALTREXONE HYDROCHLORIDE 50 MG/1
50 TABLET, FILM COATED ORAL DAILY
Qty: 90 TABLET | Refills: 0 | Status: SHIPPED | OUTPATIENT
Start: 2025-06-18

## 2025-06-18 RX ORDER — VENLAFAXINE HYDROCHLORIDE 150 MG/1
150 CAPSULE, EXTENDED RELEASE ORAL DAILY
Qty: 90 CAPSULE | Refills: 0 | Status: SHIPPED | OUTPATIENT
Start: 2025-06-18

## 2025-07-09 ENCOUNTER — OFFICE VISIT (OUTPATIENT)
Dept: PSYCHIATRY | Facility: CLINIC | Age: 50
End: 2025-07-09
Payer: COMMERCIAL

## 2025-07-09 DIAGNOSIS — F33.1 MDD (MAJOR DEPRESSIVE DISORDER), RECURRENT EPISODE, MODERATE (HCC): Primary | ICD-10-CM

## 2025-07-09 DIAGNOSIS — F41.1 GENERALIZED ANXIETY DISORDER: ICD-10-CM

## 2025-07-09 DIAGNOSIS — F10.91 ALCOHOL USE DISORDER IN REMISSION: ICD-10-CM

## 2025-07-09 PROCEDURE — 99214 OFFICE O/P EST MOD 30 MIN: CPT | Performed by: PHYSICIAN ASSISTANT

## 2025-07-09 RX ORDER — BUSPIRONE HYDROCHLORIDE 15 MG/1
15 TABLET ORAL 3 TIMES DAILY
Qty: 270 TABLET | Refills: 0 | Status: SHIPPED | OUTPATIENT
Start: 2025-07-09 | End: 2025-10-07

## 2025-07-09 NOTE — PSYCH
MEDICATION MANAGEMENT NOTE    Name: Joel Sherwood      : 1975      MRN: [id number]  Encounter Provider: Mary Segundo PA-C  Encounter Date: 2025   Encounter department: [facility]    Insurance: Payor: [employer] / Plan: [employer] / Product Type: [facility] /      Reason for Visit:   Chief Complaint   Patient presents with    Medication Management    Follow-up   :  Assessment & Plan  MDD (major depressive disorder), recurrent episode, moderate (HCC)  Continue Effexor XR daily for depression and anxiety  Continue gabapentin 300 mg 3 times a day for anxiety  Continue BuSpar 15 mg 3 times a day for anxiety    Orders:    busPIRone (BUSPAR) 15 mg tablet; Take 1 tablet (15 mg total) by mouth 3 (three) times a day    Alcohol use disorder in remission  Patient has been able to maintain sobriety for approximately 1 year         Generalized anxiety disorder  See MDD    Orders:    busPIRone (BUSPAR) 15 mg tablet; Take 1 tablet (15 mg total) by mouth 3 (three) times a day        Treatment Recommendations:    Educated about diagnosis and treatment modalities. Verbalizes understanding and agreement with the treatment plan.  Discussed self monitoring of symptoms, and symptom monitoring tools.  Discussed medications and if treatment adjustment was needed or desired.  Medication management every 3 months  Aware of 24 hour and weekend coverage for urgent situations accessed by calling [facility] main practice number  I am scheduling this patient out for greater than 3 months: No    Medications Risks/Benefits:      Risks, Benefits And Possible Side Effects Of Medications:    Risks, benefits, and possible side effects of medications explained to Joel WALTERS and he (or legal representative) verbalizes understanding and agreement for treatment.    Controlled Medication Discussion:     Not applicable      History of Present  "Byron WALTERS is seen today for a follow up for MDD, SEKOU, and alcohol use DO in remission. He proudly shares \"in 4 days I will have one year of sobriety.\" He is able to recognize that although it took several months he feels his sleep has improved. He believes it was dysregulated from his alcohol use. He has been doing well at work. He now has a great relationship with his sister. He denies any significant anxiety or depression. He would like to remain on his current medication regimen.     He denies suicidal ideation, intent or plan at present; denies homicidal ideation, intent or plan at present.    He denies auditory hallucinations, denies visual hallucinations, denies delusions.    He denies any side effects from current psychiatric medications.    HPI ROS Appetite Changes and Sleep:     He reports normal sleep, normal appetite, normal energy level    Review Of Systems: A review of systems is obtained and is negative except for the pertinent positives listed in HPI/Subjective above.      Current Rating Scores:     None completed today.    Areas of Improvement: reviewed in HPI/Subjective Section      Past Medical History[1]  Past Surgical History[2]  Allergies: Allergies[3]    Current Outpatient Medications   Medication Instructions    busPIRone (BUSPAR) 15 mg, Oral, 3 times daily    folic acid (FOLVITE) 1,000 mcg, Oral, Daily    gabapentin (NEURONTIN) 300 mg, Oral, 3 times daily    Magnesium 250 MG TABS Daily    Multiple Vitamin (multivitamin) tablet 1 tablet, Oral, Daily    naltrexone (REVIA) 50 mg, Oral, Daily    nicotine polacrilex (NICORETTE) 4 mg gum As needed    thiamine 100 mg, Oral, Daily    venlafaxine (EFFEXOR-XR) 150 mg, Oral, Daily        Substance Abuse History:    Tobacco, Alcohol and Drug Use History     Tobacco Use    Smoking status: Never     Passive exposure: Never    Smokeless tobacco: Current     Types: Chew   Vaping Use    Vaping status: Never Used   Substance Use Topics    Alcohol " use: Not Currently     Comment: not since 10/27    Drug use: Never     Alcohol Use: Unknown (5/8/2019)    AUDIT-C     Frequency of Alcohol Consumption: Monthly or less       Social History:    Social History     Socioeconomic History    Marital status: Single     Spouse name: Not on file    Number of children: Not on file    Years of education: Not on file    Highest education level: Not on file   Occupational History    Not on file   Other Topics Concern    Not on file   Social History Narrative    Not on file        Family Psychiatric History:     Family History[4]    Medical History Reviewed by provider this encounter:  Tobacco  Allergies  Meds  Problems  Med Hx  Surg Hx  Fam Hx          Objective   There were no vitals taken for this visit.     Mental Status Evaluation:    Appearance age appropriate, casually dressed   Behavior pleasant, cooperative, calm   Speech normal rate, normal volume, normal pitch, spontaneous   Mood Neutral    Affect normal range and intensity, appropriate   Thought Processes organized, logical, coherent, goal directed   Thought Content no overt delusions   Perceptual Disturbances: no auditory hallucinations, no visual hallucinations   Abnormal Thoughts  Risk Potential Suicidal ideation - None at present  Homicidal ideation - None at present  Potential for aggression - No   Orientation oriented to person, place, time/date, and situation   Memory recent and remote memory grossly intact   Consciousness alert and awake   Attention Span Concentration Span attention span and concentration are age appropriate   Intellect appears to be of average intelligence   Insight intact   Judgement intact   Muscle Strength and  Gait normal muscle strength and normal muscle tone, normal gait and normal balance   Motor activity no abnormal movements   Language no difficulty naming common objects, no difficulty repeating a phrase   Fund of Knowledge adequate knowledge of current events  adequate fund  "of knowledge regarding past history       Laboratory Results: not applicable     Suicide/Homicide Risk Assessment:    Risk of Harm to Self:  Based on today's assessment, Joel WALTERS presents the following risk of harm to self: none    Risk of Harm to Others:  Based on today's assessment, Joel WALTERS presents the following risk of harm to others: none    The following interventions are recommended: Continue medication management. No other intervention changes indicated at this time.    Psychotherapy Provided:     Individual psychotherapy provided: No    Treatment Plan:    Completed and signed during the session: Yes - with Joel WALTERS.    Goals: Progress towards Treatment Plan goals - in Assessment and Plan Section        Note Share:    This note was not shared with the patient due to this is a psychotherapy note        Visit Time  Visit Start Time: 930am  Visit Stop Time: 950am  Total Visit Duration: 20 minutes    Portions of the record may have been created with voice recognition software. Occasional wrong word or \"sound a like\" substitutions may have occurred due to the inherent limitations of voice recognition software. Read the chart carefully and recognize, using context, where substitutions have occurred.    Mary Segundo PA-C 07/09/25         [1]   Past Medical History:  Diagnosis Date    Alcohol withdrawal delirium, acute, hyperactive (HCC) 10/28/2023    Alcohol withdrawal syndrome with complication (HCC) 8/1/2023    Anxiety     Depression     Electrolyte abnormality 10/28/2023    ETOH abuse     GERD (gastroesophageal reflux disease)     Multiple falls 10/28/2023    Rash 11/6/2023    Rhabdomyolysis 10/28/2023    Toxic metabolic encephalopathy 10/28/2023    Transaminitis 8/1/2023   [2]   Past Surgical History:  Procedure Laterality Date    CLOSED REDUCTION FOREARM FRACTURE Right     EGD     [3] No Known Allergies  [4]   Family History  Problem Relation Name Age of Onset    Kidney disease Father       "

## 2025-07-09 NOTE — ASSESSMENT & PLAN NOTE
See MDD    Orders:    busPIRone (BUSPAR) 15 mg tablet; Take 1 tablet (15 mg total) by mouth 3 (three) times a day

## 2025-07-09 NOTE — ASSESSMENT & PLAN NOTE
Continue Effexor XR daily for depression and anxiety  Continue gabapentin 300 mg 3 times a day for anxiety  Continue BuSpar 15 mg 3 times a day for anxiety    Orders:    busPIRone (BUSPAR) 15 mg tablet; Take 1 tablet (15 mg total) by mouth 3 (three) times a day

## 2025-07-28 DIAGNOSIS — F41.1 GENERALIZED ANXIETY DISORDER: ICD-10-CM

## 2025-07-28 RX ORDER — GABAPENTIN 300 MG/1
300 CAPSULE ORAL 3 TIMES DAILY
Qty: 90 CAPSULE | Refills: 2 | Status: SHIPPED | OUTPATIENT
Start: 2025-07-28

## 2025-08-01 ENCOUNTER — APPOINTMENT (OUTPATIENT)
Dept: LAB | Facility: CLINIC | Age: 50
End: 2025-08-01
Attending: FAMILY MEDICINE
Payer: COMMERCIAL

## 2025-08-01 DIAGNOSIS — R79.89 ABNORMAL CBC: ICD-10-CM

## 2025-08-01 LAB
BASOPHILS # BLD AUTO: 0.06 THOUSANDS/ÂΜL (ref 0–0.1)
BASOPHILS NFR BLD AUTO: 1 % (ref 0–1)
EOSINOPHIL # BLD AUTO: 0.08 THOUSAND/ÂΜL (ref 0–0.61)
EOSINOPHIL NFR BLD AUTO: 1 % (ref 0–6)
ERYTHROCYTE [DISTWIDTH] IN BLOOD BY AUTOMATED COUNT: 12.6 % (ref 11.6–15.1)
HCT VFR BLD AUTO: 46.8 % (ref 36.5–49.3)
HGB BLD-MCNC: 15.9 G/DL (ref 12–17)
IMM GRANULOCYTES # BLD AUTO: 0.03 THOUSAND/UL (ref 0–0.2)
IMM GRANULOCYTES NFR BLD AUTO: 0 % (ref 0–2)
LYMPHOCYTES # BLD AUTO: 2.1 THOUSANDS/ÂΜL (ref 0.6–4.47)
LYMPHOCYTES NFR BLD AUTO: 31 % (ref 14–44)
MCH RBC QN AUTO: 29.6 PG (ref 26.8–34.3)
MCHC RBC AUTO-ENTMCNC: 34 G/DL (ref 31.4–37.4)
MCV RBC AUTO: 87 FL (ref 82–98)
MONOCYTES # BLD AUTO: 0.71 THOUSAND/ÂΜL (ref 0.17–1.22)
MONOCYTES NFR BLD AUTO: 10 % (ref 4–12)
NEUTROPHILS # BLD AUTO: 3.83 THOUSANDS/ÂΜL (ref 1.85–7.62)
NEUTS SEG NFR BLD AUTO: 57 % (ref 43–75)
NRBC BLD AUTO-RTO: 0 /100 WBCS
PLATELET # BLD AUTO: 381 THOUSANDS/UL (ref 149–390)
PMV BLD AUTO: 10.3 FL (ref 8.9–12.7)
RBC # BLD AUTO: 5.38 MILLION/UL (ref 3.88–5.62)
WBC # BLD AUTO: 6.81 THOUSAND/UL (ref 4.31–10.16)

## 2025-08-01 PROCEDURE — 85025 COMPLETE CBC W/AUTO DIFF WBC: CPT

## 2025-08-01 PROCEDURE — 36415 COLL VENOUS BLD VENIPUNCTURE: CPT

## 2025-08-20 DIAGNOSIS — F10.91 ALCOHOL USE DISORDER IN REMISSION: ICD-10-CM

## 2025-08-21 RX ORDER — NALTREXONE HYDROCHLORIDE 50 MG/1
50 TABLET, FILM COATED ORAL DAILY
Qty: 90 TABLET | Refills: 0 | Status: SHIPPED | OUTPATIENT
Start: 2025-08-21